# Patient Record
Sex: FEMALE | Race: WHITE | NOT HISPANIC OR LATINO | Employment: OTHER | URBAN - NONMETROPOLITAN AREA
[De-identification: names, ages, dates, MRNs, and addresses within clinical notes are randomized per-mention and may not be internally consistent; named-entity substitution may affect disease eponyms.]

---

## 2017-01-05 ENCOUNTER — TELEPHONE (OUTPATIENT)
Dept: MEDICAL GROUP | Facility: CLINIC | Age: 62
End: 2017-01-05

## 2017-01-05 NOTE — TELEPHONE ENCOUNTER
----- Message from REGLA Doyle sent at 1/4/2017 12:49 PM PST -----  Please call Ms. Mcleod and schedule an appointment to discuss test results.  REGLA Doyle

## 2017-01-10 RX ORDER — INSULIN GLARGINE 100 [IU]/ML
INJECTION, SOLUTION SUBCUTANEOUS
Qty: 1 VIAL | Refills: 5 | Status: SHIPPED | OUTPATIENT
Start: 2017-01-10 | End: 2017-03-01 | Stop reason: SDUPTHER

## 2017-01-13 ENCOUNTER — ANTICOAGULATION VISIT (OUTPATIENT)
Dept: MEDICAL GROUP | Facility: PHYSICIAN GROUP | Age: 62
End: 2017-01-13
Payer: MEDICAID

## 2017-01-13 DIAGNOSIS — I48.19 PERSISTENT ATRIAL FIBRILLATION (HCC): ICD-10-CM

## 2017-01-13 LAB — INR PPP: 2.1 (ref 2–3.5)

## 2017-01-13 PROCEDURE — 85610 PROTHROMBIN TIME: CPT | Performed by: PHYSICIAN ASSISTANT

## 2017-01-13 NOTE — PROGRESS NOTES
Anticoagulation Summary as of 1/13/2017     INR goal 2.0-3.0   Selected INR 2.1 (1/13/2017)   Maintenance plan 2.5 mg (5 mg x 0.5) on Mon, Wed, Fri; 5 mg (5 mg x 1) all other days   Weekly total 27.5 mg   Plan last modified Santana Good, PHARMD (11/18/2016)   Next INR check 2/24/2017   Target end date Indefinite    Indications   Atrial flutter (HCC) (Resolved) [I48.92]  Acute ischemic right PCA stroke-Hemorrhage transformation is noted within the infarct (Resolved) [I63.531]  Persistent atrial fibrillation (HCC) [I48.1]         Anticoagulation Episode Summary     INR check location [facility]    Preferred lab     Send INR reminders to     Comments       Anticoagulation Care Providers     Provider Role Specialty Phone number    Bijan Sampson M.D. Referring Cardiology [phone]    [facility] Responsible  [phone]        Anticoagulation Patient Findings      Shani Neelam seen in clinic today  INR  therapeutic.    Denies signs/symptoms of bleeding and/or thrombosis.    Denies changes to diet or medications.   Follow up appointment in 6 week(s).    Continue weekly warfarin dose as noted    Loc Chan, PHARMD

## 2017-01-13 NOTE — MR AVS SNAPSHOT
Shani Richter   2017 9:30 AM   Anticoagulation Visit   MRN: [id number]    Department:  [facility]   Dept Phone:  [phone]    Description:  Female : 1955   Provider:  [place] ANTI-COAG           Allergies as of 2017     No Known Allergies      You were diagnosed with     Persistent atrial fibrillation (HCC)   [921457]         Vital Signs     Smoking Status                   Passive Smoke Exposure - Never Smoker           Basic Information     Date Of Birth Sex Race Ethnicity Preferred Language    1955 Female White Non- English      Your appointments     2017 10:00 AM   Established Patient with REGLA Doyle   [facility] (--)    [address] [address] [phone]           You will be receiving a confirmation call a few days before your appointment from our automated call confirmation system.            2017  8:05 PM   Sleep Study Diagnostic with SLEEP TECH   [facility] (--)    [address] [facility] [place] [address] [phone]            Feb 15, 2017 11:20 AM   Follow UP with REGLA Bailey   [facility] (--)    [address] [facility] [address]  [place] [address] [phone]            2017  1:45 PM   FOLLOW UP with Bijan Sampson M.D.   [facility] (--)    [address] [facility] [address]  [place] [address] [phone]            2017  9:45 AM   Anti-Coag Routine with L[place]NE ANTI-COAG   [facility] [place]    [address]  [place] [address] [phone]            Mar 15, 2017  9:20 AM   Established Patient with REGLA Doyle   [facility] (--)    [address] [address] [phone]           You will be receiving a confirmation call a few days before your  appointment from our automated call confirmation system.              Problem List              ICD-10-CM Priority Class Noted - Resolved    Obesity E66.9 Medium  11/17/2015 - Present    Diabetes type 2, uncontrolled (HCC) E11.65 Medium  11/17/2015 - Present    LAE (left atrial enlargement) I51.7 Medium  11/18/2015 - Present    Hyperlipidemia E78.5 Medium  11/19/2015 - Present    Essential hypertension I10 Medium  12/1/2015 - Present    Persistent atrial fibrillation (HCC) I48.1 Medium  12/1/2015 - Present    Sleep apnea G47.30 Medium  12/30/2015 - Present    Mitral stenosis with regurgitation I05.2 Medium  4/8/2016 - Present    Pulmonary hypertension (HCC) I27.2 Medium  4/8/2016 - Present    History of stroke Z86.73 Medium  11/30/2015 - Present    Status post ablation of atrial flutter Z98.890, Z86.79 Medium  5/18/2016 - Present    Cellulitis L03.90 Low  10/26/2016 - Present    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 Low  10/26/2016 - Present    Fecal occult blood test positive R19.5   12/16/2016 - Present    Vitamin D deficiency E55.9   12/16/2016 - Present      Health Maintenance        Date Due Completion Dates    IMM DTaP/Tdap/Td Vaccine (1 - Tdap) 12/8/1972 ---    PAP SMEAR 12/8/1974 ---    MAMMOGRAM 12/8/1993 ---    COLONOSCOPY 12/8/2003 ---    IMM ZOSTER VACCINE 12/8/2013 ---    IMM PNEUMOCOCCAL 19-64 (ADULT) MEDIUM RISK SERIES (1 of 1 - PPSV23) 1/14/2016 11/19/2015    IMM INFLUENZA (1) 9/1/2016 11/18/2015    URINE ACR / MICROALBUMIN 5/3/2017 5/3/2016    A1C SCREENING 5/30/2017 11/30/2016, 10/26/2016, 4/1/2016, 3/9/2016, 11/17/2015, 1/22/2010, 9/25/2009    RETINAL SCREENING 8/25/2017 8/25/2016    DIABETES MONOFILAMTENT / LE EXAM 10/26/2017 10/26/2016    FASTING LIPID PROFILE 11/30/2017 11/30/2016, 4/7/2016, 4/1/2016, 12/8/2015, 11/17/2015, 1/22/2010, 9/25/2009    SERUM CREATININE 11/30/2017 11/30/2016, 5/9/2016, 4/20/2016, 4/10/2016, 4/7/2016, 4/6/2016, 4/1/2016, 12/8/2015, 11/21/2015, 11/20/2015, 11/17/2015,  "11/17/2015, 1/22/2010, 1/21/2010, 9/24/2009            Results     POCT Protime      Component    INR    2.1    Comment:     ic valid [id number] 160 exp 10/2017                        Current Immunizations     13-VALENT PCV PREVNAR 11/19/2015  6:12 AM    Influenza Vaccine Quad Inj (Preserved) 11/18/2015  3:53 PM      Below and/or attached are the medications your provider expects you to take. Review all of your home medications and newly ordered medications with your provider and/or pharmacist. Follow medication instructions as directed by your provider and/or pharmacist. Please keep your medication list with you and share with your provider. Update the information when medications are discontinued, doses are changed, or new medications (including over-the-counter products) are added; and carry medication information at all times in the event of emergency situations     Allergies:  No Known Allergies          Medications  Valid as of: January 13, 2017 -  9:32 AM    Generic Name Brand Name Tablet Size Instructions for use    Cholecalciferol (Cap) VITAMIN D3 5000 UNIT Take 1 Cap by mouth every day.        DiltiaZEM HCl Coated Beads (CAPSULE SR 24 HR) CARDIZEM  MG Take 1 Cap by mouth 2 Times a Day.        Furosemide (Tab) LASIX 40 MG Take 1 Tab by mouth every day.        Glimepiride (Tab) AMARYL 2 MG Take 2 Tabs by mouth every morning.        Glucose Blood (Strip) glucose blood  1 Strip by Other route as needed (one touch ultra meter test bid dx- E11.65).        Insulin Glargine (Solution) LANTUS 100 UNIT/ML Take 20 units in the morning and 46 units in the evening as directed for a total dose of 66 units daily.        Insulin Syringe-Needle U-100 (Misc) INSULIN SYRINGE 1CC/29G 29G X 1/2\" 1 ML 1 Each by Does not apply route every day.        Lisinopril (Tab) PRINIVIL 5 MG Take 1 Tab by mouth every day.        Magnesium Oxide (Tab) MAG- (241.3 MG) MG Take 1 Tab by mouth every day.        Metoprolol Tartrate " (Tab) LOPRESSOR 50 MG Take 1 Tab by mouth 2 times a day.        Simvastatin (Tab) ZOCOR 20 MG Take 1 Tab by mouth every evening.        Warfarin Sodium (Tab) COUMADIN 5 MG Take one to 2  tablets daily as directed by coumadin clinic        Warfarin Sodium (Tab) COUMADIN 5 MG Take one to one and one-half (1-1.5) tablets daily as directed by coumadin clinic        .                 Medicines prescribed today were sent to:     [facility] [address] [place] [address]    [address] [place] [address]    Phone: [phone] Fax: [phone]    Open 24 Hours?: No    [facility] - [address] [address]    Phone: [phone] Fax: [phone]    Open 24 Hours?: No      Medication refill instructions:       If your prescription bottle indicates you have medication refills left, it is not necessary to call your provider’s office. Please contact your pharmacy and they will refill your medication.    If your prescription bottle indicates you do not have any refills left, you may request refills at any time through one of the following ways: The online [employer] system (except Urgent Care), by calling your provider’s office, or by asking your pharmacy to contact your provider’s office with a refill request. Medication refills are processed only during regular business hours and may not be available until the next business day. Your provider may request additional information or to have a follow-up visit with you prior to refilling your medication.   *Please Note: Medication refills are assigned a new Rx number when refilled electronically. Your pharmacy may indicate that no refills were authorized even though a new prescription for the same medication is available at the pharmacy. Please request the medicine by name with the pharmacy before contacting your provider for a refill.        Warfarin Dosing Calendar   January 2017 Details    Sun Mon Tue  Wed Thu Fri Sat     1               2               3               4               5               6               7                 8               9               10               11               12               13   2.1   2.5 mg   See details      14      5 mg           15      5 mg         16      2.5 mg         17      5 mg         18      2.5 mg         19      5 mg         20      2.5 mg         21      5 mg           22      5 mg         23      2.5 mg         24      5 mg         25      2.5 mg         26      5 mg         27      2.5 mg         28      5 mg           29      5 mg         30      2.5 mg         31      5 mg              Date Details   01/13 This INR check   INR: 2.1   ic valid [id number] 160 exp 10/2017               How to take your warfarin dose     To take:  2.5 mg Take 0.5 of a 5 mg tablet.    To take:  5 mg Take 1 of the 5 mg tablets.           Warfarin Dosing Calendar   February 2017 Details    Sun Mon Tue Wed Thu Fri Sat        1      2.5 mg         2      5 mg         3      2.5 mg         4      5 mg           5      5 mg         6      2.5 mg         7      5 mg         8      2.5 mg         9      5 mg         10      2.5 mg         11      5 mg           12      5 mg         13      2.5 mg         14      5 mg         15      2.5 mg         16      5 mg         17      2.5 mg         18      5 mg           19      5 mg         20      2.5 mg         21      5 mg         22      2.5 mg         23      5 mg         24      2.5 mg         25                 26               27               28                    Date Details   No additional details    Date of next INR:  2/24/2017         How to take your warfarin dose     To take:  2.5 mg Take 0.5 of a 5 mg tablet.    To take:  5 mg Take 1 of the 5 mg tablets.              GOODWIN Access Code: Activation code not generated  Current GOODWIN Status: Active

## 2017-01-18 ENCOUNTER — OFFICE VISIT (OUTPATIENT)
Dept: MEDICAL GROUP | Facility: CLINIC | Age: 62
End: 2017-01-18
Payer: MEDICAID

## 2017-01-18 VITALS
BODY MASS INDEX: 50.02 KG/M2 | DIASTOLIC BLOOD PRESSURE: 64 MMHG | RESPIRATION RATE: 18 BRPM | HEART RATE: 71 BPM | OXYGEN SATURATION: 97 % | HEIGHT: 64 IN | WEIGHT: 293 LBS | SYSTOLIC BLOOD PRESSURE: 108 MMHG | TEMPERATURE: 97.6 F

## 2017-01-18 DIAGNOSIS — J06.9 VIRAL UPPER RESPIRATORY TRACT INFECTION: ICD-10-CM

## 2017-01-18 DIAGNOSIS — I10 ESSENTIAL HYPERTENSION: ICD-10-CM

## 2017-01-18 PROCEDURE — 99214 OFFICE O/P EST MOD 30 MIN: CPT | Performed by: NURSE PRACTITIONER

## 2017-01-18 NOTE — ASSESSMENT & PLAN NOTE
This is a chronic problem that is well controlled with her current medications. Patient denies any headache, dizziness, chest pain, shortness of breath, or lower extremity edema. She is due for labs which have been ordered.

## 2017-01-18 NOTE — ASSESSMENT & PLAN NOTE
This is a chronic problem. Patient continues taking Lantus, 20 units in the morning and 35 units in the evening. Her fasting blood glucose this morning was 234. She will increase her Lantus to 40 units in the evening. She also continues taking glimepiride 4 mg every morning. I will increase her dose to 6 mg every morning. Dietary counseling was provided and lifestyle modifications were discussed as strategies to improve her overall well-being and to help control her blood sugar. Patient admits that she frequently makes poor diet choices and is rather sedentary. Encouraged her to increase her activity level.

## 2017-01-18 NOTE — MR AVS SNAPSHOT
"        Shani Richter   2017 10:00 AM   Office Visit   MRN: [id number]    Department:  [facility] Phone:  [phone]    Description:  Female : 1955   Provider:  STELLA DoyleRKENYA           Reason for Visit     Illness nasal congestion/ need new lab orders      Allergies as of 2017     No Known Allergies      You were diagnosed with     Uncontrolled type 2 diabetes mellitus with complication, with long-term current use of insulin (CMS-[facility])   [2430192]   Uncontrolled. Increase Amaryl, increase Lantus as instructed. Make dietary changes. Follow-up in 3 months.    Essential hypertension   [9943035]   Controlled. Continue current medications. Obtain current labs and follow-up for results.    Viral upper respiratory tract infection   [732733]   Uncontrolled. Use symptomatic care as instructed. Follow-up if symptoms worsen or fail to improve.      Vital Signs     Blood Pressure Pulse Temperature Respirations Height Weight    108/64 mmHg 71 36.4 °C (97.6 °F) 18 1.626 m (5' 4\") 135.172 kg (298 lb)    Body Mass Index Oxygen Saturation Smoking Status             51.13 kg/m2 97% Passive Smoke Exposure - Never Smoker         Basic Information     Date Of Birth Sex Race Ethnicity Preferred Language    1955 Female White Non- English      Your appointments     2017  8:05 PM   Sleep Study Diagnostic with SLEEP TECH   [facility] (--)    [address]  [place] [address]   [phone]            Feb 15, 2017 11:20 AM   Follow UP with SU BaileyP.RKENYA   [facility] (--)    [address]  [place] [address]   [phone]            2017  1:45 PM   FOLLOW UP with Bijan Sampson M.D.   [facility] (--)    [address]  [place] [address]   [phone]            2017  9:45 AM   Anti-Coag Routine with [place] " ANTI-COAG   [facility]    [address] [facility]  [place] [address] [phone]            Mar 15, 2017  9:20 AM   Established Patient with REGLA Doyle   [facility] (--)    [address] [address] [phone]           You will be receiving a confirmation call a few days before your appointment from our automated call confirmation system.              Problem List              ICD-10-CM Priority Class Noted - Resolved    Obesity E66.9 Medium  11/17/2015 - Present    Diabetes type 2, uncontrolled (CMS-HCC) E11.65 Medium  11/17/2015 - Present    LAE (left atrial enlargement) I51.7 Medium  11/18/2015 - Present    Hyperlipidemia E78.5 Medium  11/19/2015 - Present    Essential hypertension I10 Medium  12/1/2015 - Present    Persistent atrial fibrillation (CMS-HCC) I48.1 Medium  12/1/2015 - Present    Sleep apnea G47.30 Medium  12/30/2015 - Present    Mitral stenosis with regurgitation I05.2 Medium  4/8/2016 - Present    Pulmonary hypertension (CMS-HCC) I27.2 Medium  4/8/2016 - Present    History of stroke Z86.73 Medium  11/30/2015 - Present    Status post ablation of atrial flutter Z98.890, Z86.79 Medium  5/18/2016 - Present    Cellulitis L03.90 Low  10/26/2016 - Present    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 Low  10/26/2016 - Present    Fecal occult blood test positive R19.5   12/16/2016 - Present    Vitamin D deficiency E55.9   12/16/2016 - Present    Viral upper respiratory tract infection J06.9, B97.89   1/18/2017 - Present      Health Maintenance        Date Due Completion Dates    IMM DTaP/Tdap/Td Vaccine (1 - Tdap) 12/8/1972 ---    PAP SMEAR 12/8/1974 ---    MAMMOGRAM 12/8/1993 ---    IMM ZOSTER VACCINE 12/8/2013 ---    IMM PNEUMOCOCCAL 19-64 (ADULT) MEDIUM RISK SERIES (1 of 1 - PPSV23) 1/14/2016 11/19/2015    IMM INFLUENZA (1) 9/1/2016 11/18/2015    URINE ACR / MICROALBUMIN 5/3/2017 5/3/2016    A1C SCREENING  5/30/2017 11/30/2016, 10/26/2016, 4/1/2016, 3/9/2016, 11/17/2015, 1/22/2010, 9/25/2009    RETINAL SCREENING 8/25/2017 8/25/2016    DIABETES MONOFILAMENT / LE EXAM 10/26/2017 10/26/2016    COLON CANCER SCREENING ANNUAL FIT 11/15/2017 11/15/2016    FASTING LIPID PROFILE 11/30/2017 11/30/2016, 4/7/2016, 4/1/2016, 12/8/2015, 11/17/2015, 1/22/2010, 9/25/2009    SERUM CREATININE 11/30/2017 11/30/2016, 5/9/2016, 4/20/2016, 4/10/2016, 4/7/2016, 4/6/2016, 4/1/2016, 12/8/2015, 11/21/2015, 11/20/2015, 11/17/2015, 11/17/2015, 1/22/2010, 1/21/2010, 9/24/2009            Current Immunizations     13-VALENT PCV PREVNAR 11/19/2015  6:12 AM    Influenza Vaccine Quad Inj (Preserved) 11/18/2015  3:53 PM      Below and/or attached are the medications your provider expects you to take. Review all of your home medications and newly ordered medications with your provider and/or pharmacist. Follow medication instructions as directed by your provider and/or pharmacist. Please keep your medication list with you and share with your provider. Update the information when medications are discontinued, doses are changed, or new medications (including over-the-counter products) are added; and carry medication information at all times in the event of emergency situations     Allergies:  No Known Allergies          Medications  Valid as of: January 18, 2017 - 10:55 AM    Generic Name Brand Name Tablet Size Instructions for use    Cholecalciferol (Cap) VITAMIN D3 5000 UNIT Take 1 Cap by mouth every day.        DiltiaZEM HCl Coated Beads (CAPSULE SR 24 HR) CARDIZEM  MG Take 1 Cap by mouth 2 Times a Day.        Furosemide (Tab) LASIX 40 MG Take 1 Tab by mouth every day.        Glimepiride (Tab) AMARYL 2 MG Take 2 Tabs by mouth every morning.        Glucose Blood (Strip) glucose blood  1 Strip by Other route as needed (one touch ultra meter test bid dx- E11.65).        Insulin Glargine (Solution) LANTUS 100 UNIT/ML Take 20 units in the morning and  "46 units in the evening as directed for a total dose of 66 units daily.        Insulin Syringe-Needle U-100 (Misc) INSULIN SYRINGE 1CC/29G 29G X 1/2\" 1 ML 1 Each by Does not apply route every day.        Lisinopril (Tab) PRINIVIL 5 MG Take 1 Tab by mouth every day.        Magnesium Oxide (Tab) MAG- (241.3 MG) MG Take 1 Tab by mouth every day.        Metoprolol Tartrate (Tab) LOPRESSOR 50 MG Take 1 Tab by mouth 2 times a day.        Simvastatin (Tab) ZOCOR 20 MG Take 1 Tab by mouth every evening.        Warfarin Sodium (Tab) COUMADIN 5 MG Take one to 2  tablets daily as directed by coumadin clinic        Warfarin Sodium (Tab) COUMADIN 5 MG Take one to one and one-half (1-1.5) tablets daily as directed by coumadin clinic        .                 Medicines prescribed today [facility]:     [facility] [address] [place] [address]    [address] [place] [address]    Phone: [phone] Fax: [phone]    Open 24 Hours?: No      Medication refill instructions:       If your prescription bottle indicates you have medication refills left, it is not necessary to call your provider’s office. Please contact your pharmacy and they will refill your medication.    If your prescription bottle indicates you do not have any refills left, you may request refills at any time through one of the following ways: The online [employer] system (except Urgent Care), by calling your provider’s office, or by asking your pharmacy to contact your provider’s office with a refill request. Medication refills are processed only during regular business hours and may not be available until the next business day. Your provider may request additional information or to have a follow-up visit with you prior to refilling your medication.   *Please Note: Medication refills are assigned a new Rx number when refilled electronically. Your pharmacy may indicate that no refills were authorized even though a new prescription for the same medication " is available at the pharmacy. Please request the medicine by name with the pharmacy before contacting your provider for a refill.        Your To Do List     Future Labs/Procedures Complete By Expires    CBC WITH DIFFERENTIAL  As directed 1/19/2018    COMP METABOLIC PANEL  As directed 1/19/2018    FREE THYROXINE  As directed 1/19/2018    HEMOGLOBIN A1C  As directed 1/19/2018    LIPID PROFILE  As directed 1/19/2018    TSH  As directed 1/19/2018    VITAMIN D,25 HYDROXY  As directed 1/19/2018         Groupiterhart Access Code: Activation code not generated  Current TopCoder Status: Active

## 2017-01-18 NOTE — PROGRESS NOTES
Chief Complaint   Patient presents with   • Illness     nasal congestion/ need new lab orders       HISTORY OF PRESENT ILLNESS: Patient is a 61 y.o. female established patient who presents today to discuss her health concerns as outlined below.      Diabetes type 2, uncontrolled  This is a chronic problem. Patient continues taking Lantus, 20 units in the morning and 35 units in the evening. Her fasting blood glucose this morning was 234. She will increase her Lantus to 40 units in the evening. She also continues taking glimepiride 4 mg every morning. I will increase her dose to 6 mg every morning. Dietary counseling was provided and lifestyle modifications were discussed as strategies to improve her overall well-being and to help control her blood sugar. Patient admits that she frequently makes poor diet choices and is rather sedentary. Encouraged her to increase her activity level.     Viral upper respiratory tract infection  This is a new problem. Patient complains of sinus and nasal congestion and sore throat for the past 3 days. She denies any fever or body aches. She complains of intermittent sore throat that is mild. Symptomatic care was discussed including over-the-counter medications, saline nasal irrigation, and warm salt water gargles.      Essential hypertension  This is a chronic problem that is well controlled with her current medications. Patient denies any headache, dizziness, chest pain, shortness of breath, or lower extremity edema. She is due for labs which have been ordered.        Patient Active Problem List    Diagnosis Date Noted   • Status post ablation of atrial flutter 05/18/2016     Priority: Medium   • Mitral stenosis with regurgitation 04/08/2016     Priority: Medium   • Pulmonary hypertension (CMS-HCC) 04/08/2016     Priority: Medium   • Sleep apnea 12/30/2015     Priority: Medium   • Essential hypertension 12/01/2015     Priority: Medium   • Persistent atrial fibrillation (CMS-HCC)  "12/01/2015     Priority: Medium   • History of stroke 11/30/2015     Priority: Medium   • Hyperlipidemia 11/19/2015     Priority: Medium   • LAE (left atrial enlargement) 11/18/2015     Priority: Medium   • Obesity 11/17/2015     Priority: Medium   • Diabetes type 2, uncontrolled (CMS-HCC) 11/17/2015     Priority: Medium   • Cellulitis 10/26/2016     Priority: Low   • Retinal hemorrhage of both eyes 10/26/2016     Priority: Low   • Viral upper respiratory tract infection 01/18/2017   • Fecal occult blood test positive 12/16/2016   • Vitamin D deficiency 12/16/2016       Allergies:Review of patient's allergies indicates no known allergies.    Current Outpatient Prescriptions   Medication Sig Dispense Refill   • insulin glargine (LANTUS) 100 UNIT/ML Solution Take 20 units in the morning and 46 units in the evening as directed for a total dose of 66 units daily. 1 Vial 5   • cholecalciferol (VITAMIN D3) 5000 UNIT Cap Take 1 Cap by mouth every day. 30 Cap 5   • INSULIN SYRINGE 1CC/29G 29G X 1/2\" 1 ML Misc 1 Each by Does not apply route every day. 100 Each 3   • glimepiride (AMARYL) 2 MG Tab Take 2 Tabs by mouth every morning. (Patient taking differently: Take 2 Tablets by mouth daily) 60 Tab 3   • simvastatin (ZOCOR) 20 MG Tab Take 1 Tab by mouth every evening. 90 Tab 3   • lisinopril (PRINIVIL) 5 MG Tab Take 1 Tab by mouth every day. 90 Tab 3   • glucose blood strip 1 Strip by Other route as needed (one touch ultra meter test bid dx- E11.65). 100 Strip 11   • warfarin (COUMADIN) 5 MG Tab Take one to one and one-half (1-1.5) tablets daily as directed by coumadin clinic 180 Tab 3   • diltiazem CD (CARDIZEM CD) 120 MG CAPSULE SR 24 HR Take 1 Cap by mouth 2 Times a Day. 180 Cap 3   • metoprolol (LOPRESSOR) 50 MG Tab Take 1 Tab by mouth 2 times a day. 180 Tab 3   • furosemide (LASIX) 40 MG Tab Take 1 Tab by mouth every day. 90 Tab 3   • magnesium oxide (MAG-OX) 400 (241.3 MG) MG Tab tablet Take 1 Tab by mouth every day. 60 " "Tab 2   • warfarin (COUMADIN) 5 MG Tab Take one to 2  tablets daily as directed by coumadin clinic 180 Tab 1     No current facility-administered medications for this visit.       Reviewed today: Past medical history, social history, and family history. Updated as needed.    Social History     Social History Narrative       ROS:  Review of Systems   Constitutional: Negative for fever, lethargy and unexplained weight loss.   Respiratory: Negative for cough, wheezing and SOB.   Cardiovascular: Negative for chest pain, dizziness, and leg swelling.    Gastrointestinal: Negative for nausea, vomiting, and diarrhea.   Psych: Negative for anxiety and depression.    All other systems reviewed and are negative except as in HPI.      Exam:  Blood pressure 108/64, pulse 71, temperature 36.4 °C (97.6 °F), resp. rate 18, height 1.626 m (5' 4\"), weight 135.172 kg (298 lb), SpO2 97 %.  General:  Well nourished, well developed female in NAD  Head: normocephalic, atraumatic  Eyes: EOM within normal limits, no conjunctival injection, no scleral icterus  Nose: symmetrical, no discharge.  Neck: no masses, range of motion within normal limits, no tracheal deviation. No obvious thyroid enlargement. No adenopathy.   Pulmonary: chest is symmetrical with respiration, no wheezes, crackles, or rhonchi. Normal effort.   Cardiovascular: regular rate and rhythm without murmurs, rubs, or gallops.  Musculoskeletal: Normal gait, grossly normal muscle tone.  Extremities: no clubbing, cyanosis, or edema.  Psych: appropriate mood, affect, judgement.   Skin: Pink, warm, dry.      Please note that this dictation was created using voice recognition software. I have made every reasonable attempt to correct obvious errors, but I expect that there are errors of grammar and possibly content that I did not discover before finalizing the note.    Assessment/Plan:  1. Uncontrolled type 2 diabetes mellitus with complication, with long-term current use of insulin " (CMS-[facility])  HEMOGLOBIN A1C    Uncontrolled. Increase Amaryl, increase Lantus as instructed. Make dietary changes. Follow-up in 3 months.   2. Essential hypertension  CBC WITH DIFFERENTIAL    COMP METABOLIC PANEL    FREE THYROXINE    LIPID PROFILE    TSH    VITAMIN D,25 HYDROXY    Controlled. Continue current medications. Obtain current labs and follow-up for results.   3. Viral upper respiratory tract infection      Uncontrolled. Use symptomatic care as instructed. Follow-up if symptoms worsen or fail to improve.

## 2017-01-18 NOTE — ASSESSMENT & PLAN NOTE
This is a new problem. Patient complains of sinus and nasal congestion and sore throat for the past 3 days. She denies any fever or body aches. She complains of intermittent sore throat that is mild. Symptomatic care was discussed including over-the-counter medications, saline nasal irrigation, and warm salt water gargles.

## 2017-02-14 ENCOUNTER — SLEEP STUDY (OUTPATIENT)
Dept: SLEEP MEDICINE | Facility: MEDICAL CENTER | Age: 62
End: 2017-02-14
Payer: MEDICAID

## 2017-02-14 DIAGNOSIS — I27.20 PULMONARY HYPERTENSION (HCC): ICD-10-CM

## 2017-02-14 DIAGNOSIS — G47.33 OBSTRUCTIVE SLEEP APNEA: ICD-10-CM

## 2017-02-14 PROCEDURE — 95810 POLYSOM 6/> YRS 4/> PARAM: CPT | Performed by: INTERNAL MEDICINE

## 2017-02-14 NOTE — MR AVS SNAPSHOT
Shani Neelam   2017 8:05 PM   Sleep Study   MRN: [id number]    Department:  [facility]   Dept Phone:  [phone]    Description:  Female : 1955   Provider:  SLEEP TECH           Allergies as of 2017     No Known Allergies      Vital Signs     Smoking Status                   Passive Smoke Exposure - Never Smoker           Basic Information     Date Of Birth Sex Race Ethnicity Preferred Language    1955 Female White Non- English      Your appointments     Feb 15, 2017 11:20 AM   Follow UP with REGLA Bailey   [facility] (--)    [address] [place]  [place] [address] [phone]            2017  1:45 PM   FOLLOW UP with Bijan Sampson M.D.   [facility] (--)    [address] [facility] [address]  [place] [address] [phone]            2017  9:45 AM   Anti-Coag Routine with [place] ANTI-COAG   [facility] ([place])    [address] [place], [place] [address] [place] [address] [phone]            Mar 15, 2017  9:20 AM   Established Patient with REGLA Doyle   [facility] (--)    [facility] [address] [phone]           You will be receiving a confirmation call a few days before your appointment from our automated call confirmation system.              Problem List              ICD-10-CM Priority Class Noted - Resolved    Obesity E66.9 Medium  2015 - Present    Diabetes type 2, uncontrolled (CMS-HCC) E11.65 Medium  2015 - Present    LAE (left atrial enlargement) I51.7 Medium  2015 - Present    Hyperlipidemia E78.5 Medium  2015 - Present    Essential hypertension I10 Medium  2015 - Present    Persistent atrial fibrillation (CMS-HCC) I48.1 Medium  2015 - Present    Sleep apnea G47.30 Medium  2015 - Present    Mitral stenosis with regurgitation I05.2  Medium  4/8/2016 - Present    Pulmonary hypertension (CMS-HCC) I27.2 Medium  4/8/2016 - Present    History of stroke Z86.73 Medium  11/30/2015 - Present    Status post ablation of atrial flutter Z98.890, Z86.79 Medium  5/18/2016 - Present    Cellulitis L03.90 Low  10/26/2016 - Present    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 Low  10/26/2016 - Present    Fecal occult blood test positive R19.5   12/16/2016 - Present    Vitamin D deficiency E55.9   12/16/2016 - Present    Viral upper respiratory tract infection J06.9, B97.89   1/18/2017 - Present      Health Maintenance        Date Due Completion Dates    IMM DTaP/Tdap/Td Vaccine (1 - Tdap) 12/8/1972 ---    PAP SMEAR 12/8/1974 ---    MAMMOGRAM 12/8/1993 ---    IMM ZOSTER VACCINE 12/8/2013 ---    IMM PNEUMOCOCCAL 19-64 (ADULT) MEDIUM RISK SERIES (1 of 1 - PPSV23) 1/14/2016 11/19/2015    IMM INFLUENZA (1) 9/1/2016 11/18/2015    URINE ACR / MICROALBUMIN 5/3/2017 5/3/2016    A1C SCREENING 5/30/2017 11/30/2016, 10/26/2016, 4/1/2016, 3/9/2016, 11/17/2015, 1/22/2010, 9/25/2009    DIABETES MONOFILAMENT / LE EXAM 10/26/2017 10/26/2016    COLON CANCER SCREENING ANNUAL FIT 11/15/2017 11/15/2016    FASTING LIPID PROFILE 11/30/2017 11/30/2016, 4/7/2016, 4/1/2016, 12/8/2015, 11/17/2015, 1/22/2010, 9/25/2009    SERUM CREATININE 11/30/2017 11/30/2016, 5/9/2016, 4/20/2016, 4/10/2016, 4/7/2016, 4/6/2016, 4/1/2016, 12/8/2015, 11/21/2015, 11/20/2015, 11/17/2015, 11/17/2015, 1/22/2010, 1/21/2010, 9/24/2009    RETINAL SCREENING 1/12/2018 1/12/2017, 8/25/2016            Current Immunizations     13-VALENT PCV PREVNAR 11/19/2015  6:12 AM    Influenza Vaccine Quad Inj (Preserved) 11/18/2015  3:53 PM      Below and/or attached are the medications your provider expects you to take. Review all of your home medications and newly ordered medications with your provider and/or pharmacist. Follow medication instructions as directed by your provider and/or pharmacist. Please keep your medication list  "with you and share with your provider. Update the information when medications are discontinued, doses are changed, or new medications (including over-the-counter products) are added; and carry medication information at all times in the event of emergency situations     Allergies:  No Known Allergies          Medications  Valid as of: February 15, 2017 -  6:39 AM    Generic Name Brand Name Tablet Size Instructions for use    Cholecalciferol (Cap) VITAMIN D3 5000 UNIT Take 1 Cap by mouth every day.        DiltiaZEM HCl Coated Beads (CAPSULE SR 24 HR) CARDIZEM  MG Take 1 Cap by mouth 2 Times a Day.        Furosemide (Tab) LASIX 40 MG Take 1 Tab by mouth every day.        Glimepiride (Tab) AMARYL 2 MG Take 2 Tabs by mouth every morning.        Glucose Blood (Strip) glucose blood  1 Strip by Other route as needed (one touch ultra meter test bid dx- E11.65).        Insulin Glargine (Solution) LANTUS 100 UNIT/ML Take 20 units in the morning and 46 units in the evening as directed for a total dose of 66 units daily.        Insulin Syringe-Needle U-100 (Misc) INSULIN SYRINGE 1CC/29G 29G X 1/2\" 1 ML 1 Each by Does not apply route every day.        Lisinopril (Tab) PRINIVIL 5 MG Take 1 Tab by mouth every day.        Magnesium Oxide (Tab) MAG- (241.3 MG) MG Take 1 Tab by mouth every day.        Metoprolol Tartrate (Tab) LOPRESSOR 50 MG Take 1 Tab by mouth 2 times a day.        Simvastatin (Tab) ZOCOR 20 MG Take 1 Tab by mouth every evening.        Warfarin Sodium (Tab) COUMADIN 5 MG Take one to 2  tablets daily as directed by coumadin clinic        Warfarin Sodium (Tab) COUMADIN 5 MG Take one to one and one-half (1-1.5) tablets daily as directed by coumadin clinic        .                 Medicines prescribed today were sent to:     [facility]    [address] [facility] [place] [address]    Phone: [phone] Fax: [phone]    Open 24 Hours?: No      Medication refill instructions:       "  If your prescription bottle indicates you have medication refills left, it is not necessary to call your provider’s office. Please contact your pharmacy and they will refill your medication.    If your prescription bottle indicates you do not have any refills left, you may request refills at any time through one of the following ways: The online Quintiles system (except Urgent Care), by calling your provider’s office, or by asking your pharmacy to contact your provider’s office with a refill request. Medication refills are processed only during regular business hours and may not be available until the next business day. Your provider may request additional information or to have a follow-up visit with you prior to refilling your medication.   *Please Note: Medication refills are assigned a new Rx number when refilled electronically. Your pharmacy may indicate that no refills were authorized even though a new prescription for the same medication is available at the pharmacy. Please request the medicine by name with the pharmacy before contacting your provider for a refill.           Quintiles Access Code: Activation code not generated  Current Quintiles Status: Active

## 2017-02-15 ENCOUNTER — SLEEP STUDY (OUTPATIENT)
Dept: SLEEP MEDICINE | Facility: MEDICAL CENTER | Age: 62
End: 2017-02-15
Attending: NURSE PRACTITIONER
Payer: MEDICAID

## 2017-02-15 ENCOUNTER — SLEEP CENTER VISIT (OUTPATIENT)
Dept: SLEEP MEDICINE | Facility: MEDICAL CENTER | Age: 62
End: 2017-02-15
Payer: MEDICAID

## 2017-02-15 VITALS
HEART RATE: 76 BPM | SYSTOLIC BLOOD PRESSURE: 120 MMHG | RESPIRATION RATE: 12 BRPM | DIASTOLIC BLOOD PRESSURE: 74 MMHG | HEIGHT: 64 IN | BODY MASS INDEX: 50.02 KG/M2 | WEIGHT: 293 LBS

## 2017-02-15 DIAGNOSIS — G47.33 OBSTRUCTIVE SLEEP APNEA: ICD-10-CM

## 2017-02-15 DIAGNOSIS — Z98.890 STATUS POST ABLATION OF ATRIAL FLUTTER: ICD-10-CM

## 2017-02-15 DIAGNOSIS — Z79.01 CHRONIC ANTICOAGULATION: ICD-10-CM

## 2017-02-15 DIAGNOSIS — Z86.79 STATUS POST ABLATION OF ATRIAL FLUTTER: ICD-10-CM

## 2017-02-15 DIAGNOSIS — I27.20 PULMONARY HYPERTENSION (HCC): ICD-10-CM

## 2017-02-15 DIAGNOSIS — I48.19 PERSISTENT ATRIAL FIBRILLATION (HCC): ICD-10-CM

## 2017-02-15 DIAGNOSIS — I10 ESSENTIAL HYPERTENSION: ICD-10-CM

## 2017-02-15 PROCEDURE — 99213 OFFICE O/P EST LOW 20 MIN: CPT | Performed by: NURSE PRACTITIONER

## 2017-02-15 PROCEDURE — 95811 POLYSOM 6/>YRS CPAP 4/> PARM: CPT | Performed by: INTERNAL MEDICINE

## 2017-02-15 NOTE — MR AVS SNAPSHOT
Shani Richter   2/15/2017 8:10 PM   Sleep Study   MRN: [id number]    Department:  [facility]   Dept Phone:  [phone]    Description:  Female : 1955   Provider:  Isaac Sifuentes M.D.           Allergies as of 2/15/2017     No Known Allergies      You were diagnosed with     Obstructive sleep apnea   [[id number]]         Vital Signs     Smoking Status                   Passive Smoke Exposure - Never Smoker           Basic Information     Date Of Birth Sex Race Ethnicity Preferred Language    1955 Female White Non- English      Your appointments     2017  1:45 PM   FOLLOW UP with Bijan Sampson M.D.   [facility] (--)    [address], [address]  [place] [address] [phone]            2017  9:45 AM   [facility]   [facility]    [address] [facility] [place] [address] [phone]            2017  9:20 AM   Established Patient Pul with REGLA Bailey   [facility] (--)    [address]  [place] [address] [phone]            Mar 15, 2017  9:20 AM   Established Patient with REGLA Doyle   [facility] (--)    [address] [address] [phone]           You will be receiving a confirmation call a few days before your appointment from our automated call confirmation system.              Problem List              ICD-10-CM Priority Class Noted - Resolved    Obesity E66.9 Medium  2015 - Present    Diabetes type 2, uncontrolled (CMS-HCC) E11.65 Medium  2015 - Present    LAE (left atrial enlargement) I51.7 Medium  2015 - Present    Hyperlipidemia E78.5 Medium  2015 - Present    Essential hypertension I10 Medium  2015 - Present    Persistent atrial fibrillation (CMS-HCC) I48.1 Medium  2015 - Present    Sleep  apnea G47.30 Medium  12/30/2015 - Present    Mitral stenosis with regurgitation I05.2 Medium  4/8/2016 - Present    Pulmonary hypertension (CMS-HCC) I27.2 Medium  4/8/2016 - Present    History of stroke Z86.73 Medium  11/30/2015 - Present    Status post ablation of atrial flutter Z98.890, Z86.79 Medium  5/18/2016 - Present    Cellulitis L03.90 Low  10/26/2016 - Present    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 Low  10/26/2016 - Present    Fecal occult blood test positive R19.5   12/16/2016 - Present    Vitamin D deficiency E55.9   12/16/2016 - Present    Viral upper respiratory tract infection J06.9, B97.89   1/18/2017 - Present    Obstructive sleep apnea G47.33   2/15/2017 - Present    Chronic anticoagulation Z79.01   2/15/2017 - Present      Health Maintenance        Date Due Completion Dates    IMM DTaP/Tdap/Td Vaccine (1 - Tdap) 12/8/1972 ---    PAP SMEAR 12/8/1974 ---    MAMMOGRAM 12/8/1993 ---    IMM ZOSTER VACCINE 12/8/2013 ---    IMM PNEUMOCOCCAL 19-64 (ADULT) MEDIUM RISK SERIES (1 of 1 - PPSV23) 1/14/2016 11/19/2015    IMM INFLUENZA (1) 9/1/2016 11/18/2015    URINE ACR / MICROALBUMIN 5/3/2017 5/3/2016    A1C SCREENING 5/30/2017 11/30/2016, 10/26/2016, 4/1/2016, 3/9/2016, 11/17/2015, 1/22/2010, 9/25/2009    DIABETES MONOFILAMENT / LE EXAM 10/26/2017 10/26/2016    COLON CANCER SCREENING ANNUAL FIT 11/15/2017 11/15/2016    FASTING LIPID PROFILE 11/30/2017 11/30/2016, 4/7/2016, 4/1/2016, 12/8/2015, 11/17/2015, 1/22/2010, 9/25/2009    SERUM CREATININE 11/30/2017 11/30/2016, 5/9/2016, 4/20/2016, 4/10/2016, 4/7/2016, 4/6/2016, 4/1/2016, 12/8/2015, 11/21/2015, 11/20/2015, 11/17/2015, 11/17/2015, 1/22/2010, 1/21/2010, 9/24/2009    RETINAL SCREENING 1/12/2018 1/12/2017, 8/25/2016            Current Immunizations     13-VALENT PCV PREVNAR 11/19/2015  6:12 AM    Influenza Vaccine Quad Inj (Preserved) 11/18/2015  3:53 PM      Below and/or attached are the medications your provider expects you to take. Review all of your  "home medications and newly ordered medications with your provider and/or pharmacist. Follow medication instructions as directed by your provider and/or pharmacist. Please keep your medication list with you and share with your provider. Update the information when medications are discontinued, doses are changed, or new medications (including over-the-counter products) are added; and carry medication information at all times in the event of emergency situations     Allergies:  No Known Allergies          Medications  Valid as of: February 16, 2017 -  6:41 AM    Generic Name Brand Name Tablet Size Instructions for use    Cholecalciferol (Cap) VITAMIN D3 5000 UNIT Take 1 Cap by mouth every day.        DiltiaZEM HCl Coated Beads (CAPSULE SR 24 HR) CARDIZEM  MG Take 1 Cap by mouth 2 Times a Day.        Furosemide (Tab) LASIX 40 MG Take 1 Tab by mouth every day.        Glimepiride (Tab) AMARYL 2 MG Take 2 Tabs by mouth every morning.        Glucose Blood (Strip) glucose blood  1 Strip by Other route as needed (one touch ultra meter test bid dx- E11.65).        Insulin Glargine (Solution) LANTUS 100 UNIT/ML Take 20 units in the morning and 46 units in the evening as directed for a total dose of 66 units daily.        Insulin Syringe-Needle U-100 (Misc) INSULIN SYRINGE 1CC/29G 29G X 1/2\" 1 ML 1 Each by Does not apply route every day.        Lisinopril (Tab) PRINIVIL 5 MG Take 1 Tab by mouth every day.        Magnesium Oxide (Tab) MAG- (241.3 MG) MG Take 1 Tab by mouth every day.        Metoprolol Tartrate (Tab) LOPRESSOR 50 MG Take 1 Tab by mouth 2 times a day.        Simvastatin (Tab) ZOCOR 20 MG Take 1 Tab by mouth every evening.        Warfarin Sodium (Tab) COUMADIN 5 MG Take one to 2  tablets daily as directed by coumadin clinic        Warfarin Sodium (Tab) COUMADIN 5 MG Take one to one and one-half (1-1.5) tablets daily as directed by coumadin clinic        .                 Medicines prescribed today [facility] " [facility]:     [facility] [place] [place] [facility] [address]    [address] [place] [address]    Phone: [phone] Fax: [phone]    Open 24 Hours?: No      Medication refill instructions:       If your prescription bottle indicates you have medication refills left, it is not necessary to call your provider’s office. Please contact your pharmacy and they will refill your medication.    If your prescription bottle indicates you do not have any refills left, you may request refills at any time through one of the following ways: The online [employer] system (except Urgent Care), by calling your provider’s office, or by asking your pharmacy to contact your provider’s office with a refill request. Medication refills are processed only during regular business hours and may not be available until the next business day. Your provider may request additional information or to have a follow-up visit with you prior to refilling your medication.   *Please Note: Medication refills are assigned a new Rx number when refilled electronically. Your pharmacy may indicate that no refills were authorized even though a new prescription for the same medication is available at the pharmacy. Please request the medicine by name with the pharmacy before contacting your provider for a refill.           Zesty Access Code: Activation code not generated  Current [employer]esty Status: Active

## 2017-02-15 NOTE — PROGRESS NOTES
Chief Complaint   Patient presents with   • Follow-Up       HPI:  Shani Richter is a 61 y.o. year old female here today for follow-up on sleep study results. She was last seen by Dr. Segura 12/8/2016 for new patient sleep apnea visit. She has a history of morbid obesity, documented nighttime desaturation, atrial fibrillation post ablation with prior embolic stroke, DM II, hypertension, hyperlipidemia and on chronic anticoagulation. She currently resides in [facility]. She is accompanied by her daughter today. BMI 49. PSG split-night 2/14/2017 indicated severe obstructive sleep apnea with an AHI of 52.0 and minimum oxygen saturation of 64%. Patient spent 95.5% of diagnostic study between 80-89% oxygen saturation. Patient fell asleep quickly during the study but did have poor sleep efficiency during the titration portion. She was titrated on CPAP and BiPAP with no successful pressure setting. I reviewed testing with patient and her daughter and she is a minimal to dedicated titration study. She most likely will need increased BiPAP pressures or possibly ASV. She did not have a persistent amount of central events during her diagnostic study. She denies any changes in health since her last office visit.     She denies fever, chills, night sweats, ankle swelling or hemoptysis. She notes a history of dyspnea on exertion with chest tightness. She notes occasional cough with wheezing. PCP follows her for this. She denies seasonal allergies. She denies GERD. She notes occasional headaches and dizziness.    ROS: As per HPI and otherwise negative if not stated.    Past Medical History   Diagnosis Date   • Diabetes    • Hypertension    • Heart murmur      childhood   • Morbid obesity (CMS-HCC)    • Stroke (CMS-HCC) 11/18/2015     Acute ischemic right PCA stroke-Hemorrhage transformation is noted within the infarct [I63.531]   • Atrial flutter (CMS-HCC) 11/18/2015   • Atrial fibrillation (CMS-HCC)    • Hyperlipidemia   "  • Pneumonia 4/6/2016   • Osteoarthritis    • Valvular heart disease      Mitral stenosis       Past Surgical History   Procedure Laterality Date   • Tonsillectomy     • Pilonidal cyst excision     • Recovery  5/9/2016     Procedure: CATH LAB FLUTTER ABLATION, CAMILO WITH ANESTHESIA DR. ARMIJO;  Surgeon: Recoveryonly Surgery;  [place] [facility] [place]  Service:        Family History   Problem Relation Age of Onset   • Stroke Mother 71   • Cancer Father 71   • Heart Disease Brother        Social History     Social History   • Marital Status:      Spouse Name: N/A   • Number of Children: N/A   • Years of Education: N/A     Occupational History   • Not on file.     Social History Main Topics   • Smoking status: Passive Smoke Exposure - Never Smoker   • Smokeless tobacco: Never Used   • Alcohol Use: No   • Drug Use: No   • Sexual Activity: Not on file     Other Topics Concern   • Not on file     Social History Narrative       Allergies as of 02/15/2017   • (No Known Allergies)        @Vital signs for this encounter:  Filed Vitals:    02/15/17 1121   Height: 1.626 m (5' 4.02\")   Weight: 136.533 kg (301 lb)   Weight % change since last entry.: 0 %   BP: 120/74   Pulse: 76   BMI (Calculated): 51.64   Resp: 12   TempSrc: Temporal   O2 sat % room air: 91 %       Current medications as of today   Current Outpatient Prescriptions   Medication Sig Dispense Refill   • insulin glargine (LANTUS) 100 UNIT/ML Solution Take 20 units in the morning and 46 units in the evening as directed for a total dose of 66 units daily. 1 Vial 5   • cholecalciferol (VITAMIN D3) 5000 UNIT Cap Take 1 Cap by mouth every day. 30 Cap 5   • INSULIN SYRINGE 1CC/29G 29G X 1/2\" 1 ML Misc 1 Each by Does not apply route every day. 100 Each 3   • glimepiride (AMARYL) 2 MG Tab Take 2 Tabs by mouth every morning. (Patient taking differently: Take 2 Tablets by mouth daily) 60 Tab 3   • simvastatin (ZOCOR) 20 MG Tab Take 1 Tab by mouth every " evening. 90 Tab 3   • lisinopril (PRINIVIL) 5 MG Tab Take 1 Tab by mouth every day. 90 Tab 3   • glucose blood strip 1 Strip by Other route as needed (one touch ultra meter test bid dx- E11.65). 100 Strip 11   • warfarin (COUMADIN) 5 MG Tab Take one to one and one-half (1-1.5) tablets daily as directed by coumadin clinic 180 Tab 3   • diltiazem CD (CARDIZEM CD) 120 MG CAPSULE SR 24 HR Take 1 Cap by mouth 2 Times a Day. 180 Cap 3   • metoprolol (LOPRESSOR) 50 MG Tab Take 1 Tab by mouth 2 times a day. 180 Tab 3   • furosemide (LASIX) 40 MG Tab Take 1 Tab by mouth every day. 90 Tab 3   • magnesium oxide (MAG-OX) 400 (241.3 MG) MG Tab tablet Take 1 Tab by mouth every day. 60 Tab 2   • warfarin (COUMADIN) 5 MG Tab Take one to 2  tablets daily as directed by coumadin clinic 180 Tab 1     No current facility-administered medications for this visit.         Physical Exam:   Gen:           Alert and oriented, No apparent distress. Mood and affect appropriate, normal interaction with examiner.  Eyes:          PERRL, EOM intact, sclere white, conjunctive moist.  Ears:          Not examined.   Hearing:     Grossly intact.  Nose:          Normal, no lesions or deformities.  Dentition:    Good dentition.  Oropharynx:   Tongue normal, posterior pharynx without erythema or exudate.  Mallampati Classification: 3  Neck:        Supple, trachea midline, no masses.  Respiratory Effort: No intercostal retractions or use of accessory muscles.   Lung Auscultation:      Clear to auscultation bilaterally; no rales, rhonchi or wheezing.  CV:            Regular rate and rhythm. No murmurs, rubs or gallops. Hx. A. Fib.  Abd:           Not examined. Obese.  Lymphadenopathy: Not examined.  Gait and Station: Normal.  Digits and Nails: No clubbing, cyanosis, petechiae, or nodes.   Cranial Nerves: II-XII grossly intact.  Skin:        No rashes, lesions or ulcers noted.               Ext:           No cyanosis or edema.      Assessment:  1.  Obstructive sleep apnea  POLYSOMNOGRAPHY TITRATION   2. Essential hypertension     3. Uncontrolled type 2 diabetes mellitus with complication, with long-term current use of insulin (CMS-[facility])     4. Status post ablation of atrial flutter     5. Pulmonary hypertension (CMS-[facility])     6. Persistent atrial fibrillation (CMS-[facility])     7. Chronic anticoagulation         Immunizations:    Flu:2015  Pneumovax 23:not given  Prevnar 13:2015    Plan:  1. STAT Titration study now.  2. Discussed sleep hygiene.  3. Due to multiple co-morbidities, treatment needs to be expedited.  4. Follow up after titration study to start treatment, sooner if needed.

## 2017-02-15 NOTE — MR AVS SNAPSHOT
"Glorianna Neelam   2/15/2017 11:20 AM   Sleep Center Visit   MRN: [id number]    Department:  [facility]   Dept Phone:  [phone]    Description:  Female : 1955   Provider:  REGLA Bailey           Reason for Visit     Follow-Up           Allergies as of 2/15/2017     No Known Allergies      You were diagnosed with     Obstructive sleep apnea   [912766]       Essential hypertension   [4695683]       Uncontrolled type 2 diabetes mellitus with complication, with long-term current use of insulin (CMS-[facility])   [0585520]       Status post ablation of atrial flutter   [155722]       Pulmonary hypertension (CMS-HCC)   [172857]       Persistent atrial fibrillation (CMS-[facility])   [500661]       Chronic anticoagulation   [207077]         Vital Signs     Blood Pressure Pulse Respirations Height Weight Body Mass Index    120/74 mmHg 76 12 1.626 m (5' 4.02\") 136.533 kg (301 lb) 51.64 kg/m2    Smoking Status                   Passive Smoke Exposure - Never Smoker           Basic Information     Date Of Birth Sex Race Ethnicity Preferred Language    1955 Female White Non- English      Your appointments     Feb 15, 2017  8:10 PM   Sleep Study with SLEEP TECH   [facility] (--)    [address]  [place] [address] [phone]            2017  1:45 PM   FOLLOW UP with Bijan Sampson M.D.   [facility] (--)    [address]  [place] [address] [phone]            2017  9:45 AM   Anti-Coag Routine with LAVERNE ANTI-COAG   [facility] [place] ([place])    [address]  [place] [address] [phone]            2017  9:20 AM   Established Patient Pul with SU BaileyPJulesRKENYA   [facility] Pulmonary Medicine (--)    [address]  [address]  [place] [id number] [phone]            Mar 15, 2017  9:20 AM   Established Patient " with FRANCIS Doyle.   [facility] (--)    [address] [address] [phone]           You will be receiving a confirmation call a few days before your appointment from our automated call confirmation system.              Problem List              ICD-10-CM Priority Class Noted - Resolved    Obesity E66.9 Medium  11/17/2015 - Present    Diabetes type 2, uncontrolled (CMS-HCC) E11.65 Medium  11/17/2015 - Present    LAE (left atrial enlargement) I51.7 Medium  11/18/2015 - Present    Hyperlipidemia E78.5 Medium  11/19/2015 - Present    Essential hypertension I10 Medium  12/1/2015 - Present    Persistent atrial fibrillation (CMS-HCC) I48.1 Medium  12/1/2015 - Present    Sleep apnea G47.30 Medium  12/30/2015 - Present    Mitral stenosis with regurgitation I05.2 Medium  4/8/2016 - Present    Pulmonary hypertension (CMS-[facility]) I27.2 Medium  4/8/2016 - Present    History of stroke Z86.73 Medium  11/30/2015 - Present    Status post ablation of atrial flutter Z98.890, Z86.79 Medium  5/18/2016 - Present    Cellulitis L03.90 Low  10/26/2016 - Present    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 Low  10/26/2016 - Present    Fecal occult blood test positive R19.5   12/16/2016 - Present    Vitamin D deficiency E55.9   12/16/2016 - Present    Viral upper respiratory tract infection J06.9, B97.89   1/18/2017 - Present    Obstructive sleep apnea G47.33   2/15/2017 - Present    Chronic anticoagulation Z79.01   2/15/2017 - Present      Health Maintenance        Date Due Completion Dates    IMM DTaP/Tdap/Td Vaccine (1 - Tdap) 12/8/1972 ---    PAP SMEAR 12/8/1974 ---    MAMMOGRAM 12/8/1993 ---    IMM ZOSTER VACCINE 12/8/2013 ---    IMM PNEUMOCOCCAL 19-64 (ADULT) MEDIUM RISK SERIES (1 of 1 - PPSV23) 1/14/2016 11/19/2015    IMM INFLUENZA (1) 9/1/2016 11/18/2015    URINE ACR / MICROALBUMIN 5/3/2017 5/3/2016    A1C SCREENING 5/30/2017 11/30/2016, 10/26/2016, 4/1/2016, 3/9/2016,  11/17/2015, 1/22/2010, 9/25/2009    DIABETES MONOFILAMENT / LE EXAM 10/26/2017 10/26/2016    COLON CANCER SCREENING ANNUAL FIT 11/15/2017 11/15/2016    FASTING LIPID PROFILE 11/30/2017 11/30/2016, 4/7/2016, 4/1/2016, 12/8/2015, 11/17/2015, 1/22/2010, 9/25/2009    SERUM CREATININE 11/30/2017 11/30/2016, 5/9/2016, 4/20/2016, 4/10/2016, 4/7/2016, 4/6/2016, 4/1/2016, 12/8/2015, 11/21/2015, 11/20/2015, 11/17/2015, 11/17/2015, 1/22/2010, 1/21/2010, 9/24/2009    RETINAL SCREENING 1/12/2018 1/12/2017, 8/25/2016            Current Immunizations     13-VALENT PCV PREVNAR 11/19/2015  6:12 AM    Influenza Vaccine Quad Inj (Preserved) 11/18/2015  3:53 PM      Below and/or attached are the medications your provider expects you to take. Review all of your home medications and newly ordered medications with your provider and/or pharmacist. Follow medication instructions as directed by your provider and/or pharmacist. Please keep your medication list with you and share with your provider. Update the information when medications are discontinued, doses are changed, or new medications (including over-the-counter products) are added; and carry medication information at all times in the event of emergency situations     Allergies:  No Known Allergies          Medications  Valid as of: February 15, 2017 - 12:29 PM    Generic Name Brand Name Tablet Size Instructions for use    Cholecalciferol (Cap) VITAMIN D3 5000 UNIT Take 1 Cap by mouth every day.        DiltiaZEM HCl Coated Beads (CAPSULE SR 24 HR) CARDIZEM  MG Take 1 Cap by mouth 2 Times a Day.        Furosemide (Tab) LASIX 40 MG Take 1 Tab by mouth every day.        Glimepiride (Tab) AMARYL 2 MG Take 2 Tabs by mouth every morning.        Glucose Blood (Strip) glucose blood  1 Strip by Other route as needed (one touch ultra meter test bid dx- E11.65).        Insulin Glargine (Solution) LANTUS 100 UNIT/ML Take 20 units in the morning and 46 units in the evening as directed for a  "total dose of 66 units daily.        Insulin Syringe-Needle U-100 (Misc) INSULIN SYRINGE 1CC/29G 29G X 1/2\" 1 ML 1 Each by Does not apply route every day.        Lisinopril (Tab) PRINIVIL 5 MG Take 1 Tab by mouth every day.        Magnesium Oxide (Tab) MAG- (241.3 MG) MG Take 1 Tab by mouth every day.        Metoprolol Tartrate (Tab) LOPRESSOR 50 MG Take 1 Tab by mouth 2 times a day.        Simvastatin (Tab) ZOCOR 20 MG Take 1 Tab by mouth every evening.        Warfarin Sodium (Tab) COUMADIN 5 MG Take one to 2  tablets daily as directed by coumadin clinic        Warfarin Sodium (Tab) COUMADIN 5 MG Take one to one and one-half (1-1.5) tablets daily as directed by coumadin clinic        .                 Medicines prescribed today were sent to:     [facility] [address] [address]    Phone: [phone] Fax: [phone]    Open 24 Hours?: No      Medication refill instructions:       If your prescription bottle indicates you have medication refills left, it is not necessary to call your provider’s office. Please contact your pharmacy and they will refill your medication.    If your prescription bottle indicates you do not have any refills left, you may request refills at any time through one of the following ways: The online [employer] system (except Urgent Care), by calling your provider’s office, or by asking your pharmacy to contact your provider’s office with a refill request. Medication refills are processed only during regular business hours and may not be available until the next business day. Your provider may request additional information or to have a follow-up visit with you prior to refilling your medication.   *Please Note: Medication refills are assigned a new Rx number when refilled electronically. Your pharmacy may indicate that no refills were authorized even though a new prescription for the same medication is available at the pharmacy. Please " request the medicine by name with the pharmacy before contacting your provider for a refill.        Your To Do List     Future Labs/Procedures Complete By Expires    POLYSOMNOGRAPHY TITRATION  As directed 2/15/2018    Comments:    Patient did not respond well to cpap, bipap tried 13/9 to 19/15 with no clearcut response but improved oxygen levels.      Instructions    Complete sleep study STAT. Follow up for results to start therapy.          [employer] Access Code: Activation code not generated  Current Signicat Status: Active

## 2017-02-15 NOTE — PROCEDURES
CLINICAL COMMENTS:  The patient underwent a split night polysomnogram with a CPAP titration using the standard montage for measurement of parameters of sleep, respiratory events, movement abnormalities, heart rate and rhythm. A microphone was used to monitor snoring.    INTERPRETATION: The diagnostic recording time was 120.5 minutes with a sleep period of 116.9 minutes.  Total sleep time was 90.0 minutes with a sleep efficiency of 74.7%.  The sleep latency was 3.6 minutes, and REM latency was 107.0 minutes.  The patient had 11 arousals in total, for an arousal index of 7.3.        RESPIRATORY: The patient had 1 apneas in total.  Of these, 0 were obstructive apneas, and 1 were central apneas.  This resulted in an apnea index (AI) of 0.7.  The patient had 77 hypopneas in total, which resulted in a hypopnea index of 51.3.  The overall AHI was 52.0, while the AHI during REM was 60.0.  The supine AHI = N/A.    OXIMETRY: Oxygen saturation monitoring showed a mean SpO2 of 84.5% for the diagnostic part of the study, with a minimum oxygen saturation of 64.0%.  Oxygen saturations were below 89% for 87.8% of sleep time.    CARDIAC: The highest heart rate for the first part of the study was 85.0 beats per minute.  The average heart rate during sleep was 78.1 bpm, while the highest heart rate was 85.0 bpm.    LIMB MOVEMENTS: There were a total of 0 periodic limb movements during sleep, of which 0 were PLMS arousals.  This resulted in a PLMS index of 0.0 and a PLMS arousal index of 0.0.    TREATMENT    Treatment recording time was 365.1 minutes with a total sleep time of 177.0min.  The patient had an arousal index of 20.7.      RESPIRATORY: The patient had 0 obstructive apneas, 19 central apneas, and 116 hypopneas for an overall AHI was 45.8.    OXIMETRY: The mean SpO2 during treatment was 89.6%, with a minimum oxygen saturation of 80.0%.        Impression:    During the diagnostic phase, the patient had a reduced sleep  efficiency to 74.7%, 26.9 minutes of wake after sleep onset, and a sleep latency of 3.6 minutes. Severe obstructive sleep apnea was found. The apnea hypopnea index was 52.0 and the RDI was 52.7. The mean event duration was 19.1 seconds and the longest event lasted 44.6 seconds. The REM index was 60.0. The patient did not experience any supine sleep. The lowest saturation during sleep was 64.0%. The saturations were less than 90% for 99.1% of the recording.    Once the patient met the protocol for split study, the technician performed a positive airway pressure titration. The patient was tried on CPAP from 5-11 cm but persisted with primarily hypopneas and desaturations. Bilevel was then tried at multiple levels  again without resounding success.    Recommendation:    Recommend the patient return for a dedicated bilevel overnight polysomnogram.

## 2017-02-16 NOTE — PROCEDURES
Clinical Comments:  The patient underwent a comprehensive polysomnogram using the standard montage for measurement of parameters of sleep, respiratory events, movement abnormalities, heart rate and rhythm. A microphone was used to monitor snoring.      INTERPRETATION:  The total recording time was 471.3 minutes with a sleep period of 469.6 minutes and the total sleep time was 258.6 minutes with a sleep efficiency of 54.9%.  The sleep latency was 1.7 minutes, and REM latency was 346.0 minutes.  The patient experienced 70 arousals in total, for an arousal index of 16.2    RESPIRATORY: The patient had 9 apneas in total.  Of these, 0 were obstructive apneas, and 9 were central apneas.  This resulted in an apnea index (AI) of 2.1.  The patient had 42 hypopneas, for a hypopnea index of 9.7.  The overall AHI was 11.8, while the AHI during Stage R sleep was 34.6.  AHI while supine was N/A.    OXIMETRY: Oxygen saturation monitoring showed a mean SpO2 of 89.8%, with a minimum oxygen saturation of 84.0%.  Oxygen saturations were less than or = 89% for 10.6 minutes of sleep time.    CARDIAC: The highest heart rate during the recording was 91.0 beats per minute.  The average heart rate during sleep was 72.7 bpm.    LIMB MOVEMENTS: There were a total of 343 PLMs during sleep, of which 14 were PLMs arousals.  This resulted in a PLMS index of 79.6.      Impression:    This was an overnight bilevel titration. The patient chose to use a small [employer] mask with heated humidification. The technician initiated the pressure at 16/12 and the patient was titrated to a final pressure of 22/18. Central apneas comprised 17.6% of the 78 respiratory events occurred during the study. The patient did best on bilevel 16/12 and bilevel 22/18 did not achieve REM at either setting nor did she experience any supine sleep.    Recommendation:     Recommended trial of bilevel 22/18 using a mask of choice and heated humidification followed by  clinical and compliance card review in 8 weeks. Alternatively, a trial of auto BiPAP could prove more effective.

## 2017-02-23 ENCOUNTER — OFFICE VISIT (OUTPATIENT)
Dept: CARDIOLOGY | Facility: MEDICAL CENTER | Age: 62
End: 2017-02-23
Payer: MEDICAID

## 2017-02-23 VITALS
OXYGEN SATURATION: 97 % | HEART RATE: 75 BPM | SYSTOLIC BLOOD PRESSURE: 126 MMHG | HEIGHT: 64 IN | BODY MASS INDEX: 50.02 KG/M2 | WEIGHT: 293 LBS | DIASTOLIC BLOOD PRESSURE: 68 MMHG

## 2017-02-23 DIAGNOSIS — I10 ESSENTIAL HYPERTENSION: ICD-10-CM

## 2017-02-23 DIAGNOSIS — Z79.01 CHRONIC ANTICOAGULATION: ICD-10-CM

## 2017-02-23 DIAGNOSIS — I05.2 MITRAL STENOSIS WITH INSUFFICIENCY, UNSPECIFIED ETIOLOGY: ICD-10-CM

## 2017-02-23 DIAGNOSIS — I48.92 PAROXYSMAL ATRIAL FLUTTER (HCC): ICD-10-CM

## 2017-02-23 PROCEDURE — 99214 OFFICE O/P EST MOD 30 MIN: CPT | Performed by: INTERNAL MEDICINE

## 2017-02-23 ASSESSMENT — ENCOUNTER SYMPTOMS
PALPITATIONS: 0
PND: 0
DIZZINESS: 0

## 2017-02-23 NOTE — PROGRESS NOTES
Subjective:   Shani Richter is a 61 y.o. female who presents today for follow-up evaluation of mitral stenosis, mitral regurgitation, paroxysmal atrial flutter, chronic anticoagulation with a history of hypertension, cerebrovascular accident in 2015 with prior evidence of a lacunar infarction.    Last seen on 11/18/2016 by APN.  I last saw the patient on her initial hospitalization in November, 2015 at the time that she presented with atrial fibrillation/flutter and CVA.  Subsequently the patient was again hospitalized in April, 2016 for atrial fibrillation.  The patient subsequently underwent atrial flutter ablation on 5/9/2016 by Dr. Miguel Segura.    Overall the patient states that over the past year she's had less shortness of breath that her blood pressures been better.  Last week she had a sleep study and is being evaluated for either BiPAP or CPAP device.  The patient denies palpitations or chest pain.    Past medical history  [place]17[place] [facility] [place]CHARGE DIAGNOSES:  1.  Atrial flutter.  2.  Atrial fibrillation.  3.  Stroke in the right posterior cerebral artery distribution.  4.  Hypertensive urgency.  5.  Hyponatremia.  6.  Dyslipidemia.  7.  Diabetes mellitus, type 2.  8.  Possible sleep apnea.    Between 4/6/2016[place] [facility] [place]  [place]CHARGE DIAGNOSES:  1.  Status post treatment of community-acquired pneumonia.  The patient is    improved on medication regimen.  We will continue an overall 10-day antibiotic   course.  2.  Pulmonary edema secondary to valvular heart disease.  The patient also    mitral stenosis and atrial flutter fibrillation.  The patient is improved on    diuretics.  3.  Hypoxemia is improved.  The patient remains hypoxic during the entire monitoring period  4.  Question of obstructive sleep apnea.  The patient now discharged on    nocturnal oxygen, for outpatient followup and referral to pulmonary medicine    and outpatient sleep study.  5.   Atrial flutter and fibrillation.  The patient is better rate controlled on   current anticoagulation therapy, for a close outpatient followup cardiology    as well as likely, cardioversion as an outpatient.  6.  Hypertension is improved.  7.  Diabetes type 2, to continue her home regimen.  8.  Mitral stenosis.  9.  Dyslipidemia.  10.  Severe dietary obesity.    DATE OF SERVICE:  05/09/2016  PROCEDURE PERFORMED:  1.  Electrophysiology study with radiofrequency ablation of SVT circuit.  2.  Advanced mapping using CARTO system.  3.  Left atrial pacing, sensing and stimulation protocol.  4.  Transesophageal echocardiogram.  PHYSICIAN:  Miguel Segura MD  INDICATION:  Persistent atrial flutter.    Past Medical History   Diagnosis Date   • Diabetes    • Hypertension    • Heart murmur      childhood   • Morbid obesity (CMS-HCC)    • Stroke (CMS-HCC) 11/18/2015     Acute ischemic right PCA stroke-Hemorrhage transformation is noted within the infarct [I63.531]   • Atrial flutter (CMS-HCC) 11/18/2015   • Atrial fibrillation (CMS-HCC)    • Hyperlipidemia    • Pneumonia 4/6/2016   • Osteoarthritis    • Valvular heart disease      Mitral stenosis     Past Surgical History   Procedure Laterality Date   • Tonsillectomy     • Pilonidal cyst excision     • Recovery  5/9/2016     Procedure: CATH LAB FLUTTER ABLATION, CAMILO WITH ANESTHESIA DR. SEGURA;  Surgeon: [facility];  Location: SURGERY PRE-POST PROC UNIT [facility] [place];  Service:      Family History   Problem Relation Age of Onset   • Stroke Mother 71   • Cancer Father 71   • Heart Disease Brother      History   Smoking status   • Passive Smoke Exposure - Never Smoker   Smokeless tobacco   • Never Used     No Known Allergies  Outpatient Encounter Prescriptions as of 2/23/2017   Medication Sig Dispense Refill   • insulin glargine (LANTUS) 100 UNIT/ML Solution Take 20 units in the morning and 46 units in the evening as directed for a total dose of 66 units daily. 1 Vial 5   •  "cholecalciferol (VITAMIN D3) 5000 UNIT Cap Take 1 Cap by mouth every day. 30 Cap 5   • INSULIN SYRINGE 1CC/29G 29G X 1/2\" 1 ML Misc 1 Each by Does not apply route every day. 100 Each 3   • glimepiride (AMARYL) 2 MG Tab Take 2 Tabs by mouth every morning. (Patient taking differently: Take 2 Tablets by mouth daily) 60 Tab 3   • simvastatin (ZOCOR) 20 MG Tab Take 1 Tab by mouth every evening. 90 Tab 3   • lisinopril (PRINIVIL) 5 MG Tab Take 1 Tab by mouth every day. 90 Tab 3   • glucose blood strip 1 Strip by Other route as needed (one touch ultra meter test bid dx- E11.65). 100 Strip 11   • warfarin (COUMADIN) 5 MG Tab Take one to one and one-half (1-1.5) tablets daily as directed by coumadin clinic 180 Tab 3   • warfarin (COUMADIN) 5 MG Tab Take one to 2  tablets daily as directed by coumadin clinic 180 Tab 1   • diltiazem CD (CARDIZEM CD) 120 MG CAPSULE SR 24 HR Take 1 Cap by mouth 2 Times a Day. 180 Cap 3   • metoprolol (LOPRESSOR) 50 MG Tab Take 1 Tab by mouth 2 times a day. 180 Tab 3   • furosemide (LASIX) 40 MG Tab Take 1 Tab by mouth every day. 90 Tab 3   • magnesium oxide (MAG-OX) 400 (241.3 MG) MG Tab tablet Take 1 Tab by mouth every day. 60 Tab 2     No facility-administered encounter medications on file as of 2/23/2017.     Review of Systems   Cardiovascular: Positive for leg swelling. Negative for chest pain, palpitations and PND.   Neurological: Negative for dizziness.        Objective:   /68 mmHg  Pulse 75  Ht 1.626 m (5' 4.02\")  Wt 137.893 kg (304 lb)  BMI 52.16 kg/m2  SpO2 97%    Physical Exam   Constitutional: She is oriented to person, place, and time. She appears well-nourished. No distress.   HENT:   Head: Normocephalic.   Eyes: EOM are normal. No scleral icterus.   Neck: Carotid bruit is not present.   Thick neck JVP could not be accurately assessed.   Cardiovascular: Normal rate, regular rhythm, S1 normal, S2 normal and normal heart sounds.  Exam reveals no gallop and no friction rub.  "   No murmur heard.  Pulses:       Carotid pulses are 2+ on the right side, and 2+ on the left side.       Radial pulses are 2+ on the right side, and 2+ on the left side.   Pulmonary/Chest: Effort normal and breath sounds normal. She has no wheezes. She has no rhonchi. She has no rales.   Increased AP diameter.   Abdominal:   Obese.   Musculoskeletal: She exhibits edema.   Indurated, erythematous nonpitting edema bilaterally.   Neurological: She is alert and oriented to person, place, and time.   Skin: Skin is warm and dry.   Psychiatric: She has a normal mood and affect. Her behavior is normal.     01/22/2010 MPI  1. DIPYRIDAMOLE STRESS POSITIVE FOR CHEST DISCOMFORT BUT WITHOUT ISCHEMIC   EKG CHANGES.  2. SPECT IMAGES SUGGEST A SMALL SEGMENT OF PROBABLE MILD INFEROLATERAL   ISCHEMIA.  PROMINENT APICAL THINNING IS NOTED.  3. THE GATED STUDY SHOWS A MODERATE REDUCTION OF EJECTION FRACTION WITH   PROBABLE LATERAL HYPOKINESIS.    04/06/2016 ECHOCARDIOGRAM  Left ventricular ejection fraction is visually estimated to be 50%.  Dilated inferior vena cava, 2.6 cm, without inspiratory collapse.    Biatrial dilation.  Mild-moderate mixed mitral valve disease.  The mitral valve is   significantly thickened and restricted.  Right ventricular systolic pressure is estimated to be 55 mmHg.  No significant change from the study of 11/2015.  Moderate mitral stenosis. Mean transvalvular gradient is 8 mmHg at a   heart rate of 80 BPM. Mitral valve area is 4.9 sq cm. Mild mitral   regurgitation.    Assessment:     1. Mitral stenosis with insufficiency, unspecified etiology  ECHOCARDIOGRAM-COMP W/ CONT   2. Paroxysmal atrial flutter (CMS-[facility])  ECHOCARDIOGRAM-COMP W/ CONT   3. Chronic anticoagulation     4. Essential hypertension         Medical Decision Making:  Today's Assessment / Status / Plan:     Mitral stenosis/mitral regurgitation. Symptomatically the patient is proved improved over the past year.    Paroxysmal atrial  flutter.    Atrial flutter ablation 5/9/2016.    Anticoagulation. On warfarin. In anticoagulation surveillance.    Hypertension controlled.    Diabetes mellitus.    Morbid obesity.    Sleep apnea. Treatment in progress.    Recommendation/Discussion  Spent 25 minutes in face-to-face patient contact in which greater than 50% of the visit was spent in discussion of the patient's various specific cardiac problems, counseling/coordination of care of medication management, symptom management, re-assurance, discussion of medications, review of recent blood work, and repeat echocardiogram to reassess mitral stenosis/mitral regurgitation and compare with previous echocardiogram 1/6/2016; in addition the patient needs a drastic weight loss program and optimally the patient should be considered a candidate for gastric bypass procedure in view of the mortality benefits demonstrated in patients with cardiac problems and diabetes mellitus.  We'll continue current therapy.  Follow-up 6 months, sooner if necessary.

## 2017-02-23 NOTE — MR AVS SNAPSHOT
"Glorianna Neelam   2017 1:45 PM   Office Visit   MRN: [id number]    Department:  [facility]   Dept Phone:  [phone]    Description:  Female : 1955   Provider:  Bijan Sampson M.D.           Reason for Visit     Follow-Up           Allergies as of 2017     No Known Allergies      You were diagnosed with     Mitral stenosis with insufficiency, unspecified etiology   [3356360]       Paroxysmal atrial flutter (CMS-HCC)   [733709]       Chronic anticoagulation   [434801]       Essential hypertension   [9381672]         Vital Signs     Blood Pressure Pulse Height Weight Body Mass Index Oxygen Saturation    126/68 mmHg 75 1.626 m (5' 4.02\") 137.893 kg (304 lb) 52.16 kg/m2 97%    Smoking Status                   Passive Smoke Exposure - Never Smoker           Basic Information     Date Of Birth Sex Race Ethnicity Preferred Language    1955 Female White Non- English      Your appointments     2017  9:45 AM   Anti-Coag Routine with [place] ANTI-COAG   [facility]    [address] [facility] [id number] [phone]            2017  9:20 AM   Established Patient Pul with REGLA Bailey   [facility] (--)    [address] [place] [address] [address]  [facility] [id number] [phone]            Mar 15, 2017  9:20 AM   Established Patient with REGLA Doyle   [facility] (--)    [address] [place] [id number] [phone]           You will be receiving a confirmation call a few days before your appointment from our automated call confirmation system.              Problem List              ICD-10-CM Priority Class Noted - Resolved    Obesity E66.9 Medium  2015 - Present    Diabetes type 2, uncontrolled (CMS-HCC) E11.65 Medium  2015 - Present    Hyperlipidemia E78.5 Medium  2015 - Present    Essential hypertension I10 Medium  2015 - " Present    Persistent atrial fibrillation (CMS-HCC) I48.1 Medium  12/1/2015 - Present    Sleep apnea G47.30 Medium  12/30/2015 - Present    Mitral stenosis with regurgitation I05.2 Medium  4/8/2016 - Present    Pulmonary hypertension (CMS-HCC) I27.2 Medium  4/8/2016 - Present    History of stroke Z86.73 Medium  11/30/2015 - Present    Status post ablation of atrial flutter Z98.890, Z86.79 Medium  5/18/2016 - Present    Cellulitis L03.90 Low  10/26/2016 - Present    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 Low  10/26/2016 - Present    Fecal occult blood test positive R19.5   12/16/2016 - Present    Vitamin D deficiency E55.9   12/16/2016 - Present    Viral upper respiratory tract infection J06.9, B97.89   1/18/2017 - Present    Obstructive sleep apnea G47.33   2/15/2017 - Present    Chronic anticoagulation Z79.01   2/15/2017 - Present    Paroxysmal atrial flutter (CMS-HCC) I48.92   2/23/2017 - Present      Health Maintenance        Date Due Completion Dates    IMM DTaP/Tdap/Td Vaccine (1 - Tdap) 12/8/1972 ---    PAP SMEAR 12/8/1974 ---    MAMMOGRAM 12/8/1993 ---    IMM ZOSTER VACCINE 12/8/2013 ---    IMM PNEUMOCOCCAL 19-64 (ADULT) MEDIUM RISK SERIES (1 of 1 - PPSV23) 1/14/2016 11/19/2015    IMM INFLUENZA (1) 9/1/2016 11/18/2015    URINE ACR / MICROALBUMIN 5/3/2017 5/3/2016    A1C SCREENING 5/30/2017 11/30/2016, 10/26/2016, 4/1/2016, 3/9/2016, 11/17/2015, 1/22/2010, 9/25/2009    DIABETES MONOFILAMENT / LE EXAM 10/26/2017 10/26/2016    COLON CANCER SCREENING ANNUAL FIT 11/15/2017 11/15/2016    FASTING LIPID PROFILE 11/30/2017 11/30/2016, 4/7/2016, 4/1/2016, 12/8/2015, 11/17/2015, 1/22/2010, 9/25/2009    SERUM CREATININE 11/30/2017 11/30/2016, 5/9/2016, 4/20/2016, 4/10/2016, 4/7/2016, 4/6/2016, 4/1/2016, 12/8/2015, 11/21/2015, 11/20/2015, 11/17/2015, 11/17/2015, 1/22/2010, 1/21/2010, 9/24/2009    RETINAL SCREENING 1/12/2018 1/12/2017, 8/25/2016            Current Immunizations     13-VALENT PCV PREVNAR 11/19/2015  6:12 AM  "   Influenza Vaccine Quad Inj (Preserved) 11/18/2015  3:53 PM      Below and/or attached are the medications your provider expects you to take. Review all of your home medications and newly ordered medications with your provider and/or pharmacist. Follow medication instructions as directed by your provider and/or pharmacist. Please keep your medication list with you and share with your provider. Update the information when medications are discontinued, doses are changed, or new medications (including over-the-counter products) are added; and carry medication information at all times in the event of emergency situations     Allergies:  No Known Allergies          Medications  Valid as of: February 23, 2017 -  2:06 PM    Generic Name Brand Name Tablet Size Instructions for use    Cholecalciferol (Cap) VITAMIN D3 5000 UNIT Take 1 Cap by mouth every day.        DiltiaZEM HCl Coated Beads (CAPSULE SR 24 HR) CARDIZEM  MG Take 1 Cap by mouth 2 Times a Day.        Furosemide (Tab) LASIX 40 MG Take 1 Tab by mouth every day.        Glimepiride (Tab) AMARYL 2 MG Take 2 Tabs by mouth every morning.        Glucose Blood (Strip) glucose blood  1 Strip by Other route as needed (one touch ultra meter test bid dx- E11.65).        Insulin Glargine (Solution) LANTUS 100 UNIT/ML Take 20 units in the morning and 46 units in the evening as directed for a total dose of 66 units daily.        Insulin Syringe-Needle U-100 (Misc) INSULIN SYRINGE 1CC/29G 29G X 1/2\" 1 ML 1 Each by Does not apply route every day.        Lisinopril (Tab) PRINIVIL 5 MG Take 1 Tab by mouth every day.        Magnesium Oxide (Tab) MAG- (241.3 MG) MG Take 1 Tab by mouth every day.        Metoprolol Tartrate (Tab) LOPRESSOR 50 MG Take 1 Tab by mouth 2 times a day.        Simvastatin (Tab) ZOCOR 20 MG Take 1 Tab by mouth every evening.        Warfarin Sodium (Tab) COUMADIN 5 MG Take one to 2  tablets daily as directed by coumadin clinic        Warfarin " Sodium (Tab) COUMADIN 5 MG Take one to one and one-half (1-1.5) tablets daily as directed by coumadin clinic        .                 Medicines prescribed today were sent to:     [facility] [place] - [address] [facility]    [address] [place] [address]    Phone: [phone] Fax: [phone]    Open 24 Hours?: No      Medication refill instructions:       If your prescription bottle indicates you have medication refills left, it is not necessary to call your provider’s office. Please contact your pharmacy and they will refill your medication.    If your prescription bottle indicates you do not have any refills left, you may request refills at any time through one of the following ways: The online [employer] system (except Urgent Care), by calling your provider’s office, or by asking your pharmacy to contact your provider’s office with a refill request. Medication refills are processed only during regular business hours and may not be available until the next business day. Your provider may request additional information or to have a follow-up visit with you prior to refilling your medication.   *Please Note: Medication refills are assigned a new Rx number when refilled electronically. Your pharmacy may indicate that no refills were authorized even though a new prescription for the same medication is available at the pharmacy. Please request the medicine by name with the pharmacy before contacting your provider for a refill.        Your To Do List     Future Labs/Procedures Complete By Expires    ECHOCARDIOGRAM-COMP W/ CONT  As directed 2/24/2018         [employer] Access Code: Activation code not generated  Current [employer] Status: Active

## 2017-02-24 ENCOUNTER — ANTICOAGULATION VISIT (OUTPATIENT)
Dept: MEDICAL GROUP | Facility: PHYSICIAN GROUP | Age: 62
End: 2017-02-24
Payer: MEDICAID

## 2017-02-24 DIAGNOSIS — I48.92 ATRIAL FLUTTER, UNSPECIFIED TYPE (HCC): ICD-10-CM

## 2017-02-24 DIAGNOSIS — I10 ESSENTIAL HYPERTENSION: ICD-10-CM

## 2017-02-24 DIAGNOSIS — I48.19 PERSISTENT ATRIAL FIBRILLATION (HCC): ICD-10-CM

## 2017-02-24 LAB — INR PPP: 2.7 (ref 2–3.5)

## 2017-02-24 PROCEDURE — 85610 PROTHROMBIN TIME: CPT | Performed by: PHYSICIAN ASSISTANT

## 2017-02-24 RX ORDER — GLIMEPIRIDE 2 MG/1
TABLET ORAL
Qty: 60 TAB | Refills: 2 | Status: SHIPPED | OUTPATIENT
Start: 2017-02-24 | End: 2017-07-01 | Stop reason: SDUPTHER

## 2017-02-24 NOTE — MR AVS SNAPSHOT
Shani Richter   2017 9:45 AM   Anticoagulation Visit   MRN: [id number]    Department:  [facility]   Dept Phone:  [phone]    Description:  Female : 1955   Provider:  LAVERNE ANTI-COAG           Allergies as of 2017     No Known Allergies      You were diagnosed with     Persistent atrial fibrillation (CMS-HCC)   [113556]         Vital Signs     Smoking Status                   Passive Smoke Exposure - Never Smoker           Basic Information     Date Of Birth Sex Race Ethnicity Preferred Language    1955 Female White Non- English      Your appointments     2017  9:45 AM   Anti-Coag Routine with LAVERNE ANTI-COAG   [facility] [place] [facility]    [address] [place] [address] [phone]            2017  9:20 AM   Established Patient Pul with REGLA Bailey   [facility] (--)    [address] [facility] [address]  [facility] [id number] [phone]            Mar 15, 2017  9:20 AM   Established Patient with REGLA Doyle   [facility] (--)    [facility] [address] [phone]           You will be receiving a confirmation call a few days before your appointment from our automated call confirmation system.            Mar 23, 2017 12:15 PM   ECHO with ECHO [facility], [facility] EXAM 9   ECHOCARDIOLOGY [facility] ([facility])    [address] [facility] [place] [address]   [phone]           No prep            2017  9:15 AM   Anti-Coag Routine with LAVERNE ANTI-COAG   [facility] [place] [facility]    [address]  [place] [address] [phone]              Problem List              ICD-10-CM Priority Class Noted - Resolved    Obesity E66.9 Medium  2015 - Present    Diabetes type 2, uncontrolled (CMS-HCC) E11.65 Medium  2015 - Present    Hyperlipidemia E78.5 Medium  2015 - Present    Essential hypertension I10  Medium  12/1/2015 - Present    Persistent atrial fibrillation (CMS-HCC) I48.1 Medium  12/1/2015 - Present    Sleep apnea G47.30 Medium  12/30/2015 - Present    Mitral stenosis with regurgitation I05.2 Medium  4/8/2016 - Present    Pulmonary hypertension (CMS-HCC) I27.2 Medium  4/8/2016 - Present    History of stroke Z86.73 Medium  11/30/2015 - Present    Status post ablation of atrial flutter Z98.890, Z86.79 Medium  5/18/2016 - Present    Cellulitis L03.90 Low  10/26/2016 - Present    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 Low  10/26/2016 - Present    Fecal occult blood test positive R19.5   12/16/2016 - Present    Vitamin D deficiency E55.9   12/16/2016 - Present    Viral upper respiratory tract infection J06.9, B97.89   1/18/2017 - Present    Obstructive sleep apnea G47.33   2/15/2017 - Present    Chronic anticoagulation Z79.01   2/15/2017 - Present    Paroxysmal atrial flutter (CMS-HCC) I48.92   2/23/2017 - Present      Health Maintenance        Date Due Completion Dates    IMM DTaP/Tdap/Td Vaccine (1 - Tdap) 12/8/1972 ---    PAP SMEAR 12/8/1974 ---    MAMMOGRAM 12/8/1993 ---    IMM ZOSTER VACCINE 12/8/2013 ---    IMM PNEUMOCOCCAL 19-64 (ADULT) MEDIUM RISK SERIES (1 of 1 - PPSV23) 1/14/2016 11/19/2015    IMM INFLUENZA (1) 9/1/2016 11/18/2015    URINE ACR / MICROALBUMIN 5/3/2017 5/3/2016    A1C SCREENING 5/30/2017 11/30/2016, 10/26/2016, 4/1/2016, 3/9/2016, 11/17/2015, 1/22/2010, 9/25/2009    DIABETES MONOFILAMENT / LE EXAM 10/26/2017 10/26/2016    COLON CANCER SCREENING ANNUAL FIT 11/15/2017 11/15/2016    FASTING LIPID PROFILE 11/30/2017 11/30/2016, 4/7/2016, 4/1/2016, 12/8/2015, 11/17/2015, 1/22/2010, 9/25/2009    SERUM CREATININE 11/30/2017 11/30/2016, 5/9/2016, 4/20/2016, 4/10/2016, 4/7/2016, 4/6/2016, 4/1/2016, 12/8/2015, 11/21/2015, 11/20/2015, 11/17/2015, 11/17/2015, 1/22/2010, 1/21/2010, 9/24/2009    RETINAL SCREENING 1/12/2018 1/12/2017, 8/25/2016            Results     POCT Protime      Component    INR   "   2.7    Comment:      valid [id number] exp 10/2017                        Current Immunizations     13-VALENT PCV PREVNAR 11/19/2015  6:12 AM    Influenza Vaccine Quad Inj (Preserved) 11/18/2015  3:53 PM      Below and/or attached are the medications your provider expects you to take. Review all of your home medications and newly ordered medications with your provider and/or pharmacist. Follow medication instructions as directed by your provider and/or pharmacist. Please keep your medication list with you and share with your provider. Update the information when medications are discontinued, doses are changed, or new medications (including over-the-counter products) are added; and carry medication information at all times in the event of emergency situations     Allergies:  No Known Allergies          Medications  Valid as of: February 24, 2017 -  9:34 AM    Generic Name Brand Name Tablet Size Instructions for use    Cholecalciferol (Cap) VITAMIN D3 5000 UNIT Take 1 Cap by mouth every day.        DiltiaZEM HCl Coated Beads (CAPSULE SR 24 HR) CARDIZEM  MG Take 1 Cap by mouth 2 Times a Day.        Furosemide (Tab) LASIX 40 MG Take 1 Tab by mouth every day.        Glimepiride (Tab) AMARYL 2 MG Take 2 Tabs by mouth every morning.        Glucose Blood (Strip) glucose blood  1 Strip by Other route as needed (one touch ultra meter test bid dx- E11.65).        Insulin Glargine (Solution) LANTUS 100 UNIT/ML Take 20 units in the morning and 46 units in the evening as directed for a total dose of 66 units daily.        Insulin Syringe-Needle U-100 (Misc) INSULIN SYRINGE 1CC/29G 29G X 1/2\" 1 ML 1 Each by Does not apply route every day.        Lisinopril (Tab) PRINIVIL 5 MG Take 1 Tab by mouth every day.        Magnesium Oxide (Tab) MAG- (241.3 MG) MG Take 1 Tab by mouth every day.        Metoprolol Tartrate (Tab) LOPRESSOR 50 MG Take 1 Tab by mouth 2 times a day.        Simvastatin (Tab) ZOCOR 20 MG Take 1 Tab " by mouth every evening.        Warfarin Sodium (Tab) COUMADIN 5 MG Take one to 2  tablets daily as directed by coumadin clinic        Warfarin Sodium (Tab) COUMADIN 5 MG Take one to one and one-half (1-1.5) tablets daily as directed by coumadin clinic        .                 Medicines prescribed today were sent to:     [facility] - [address] [address]    Phone: [phone] Fax: [phone]    Open 24 Hours?: No      Medication refill instructions:       If your prescription bottle indicates you have medication refills left, it is not necessary to call your provider’s office. Please contact your pharmacy and they will refill your medication.    If your prescription bottle indicates you do not have any refills left, you may request refills at any time through one of the following ways: The online [employer] system (except Urgent Care), by calling your provider’s office, or by asking your pharmacy to contact your provider’s office with a refill request. Medication refills are processed only during regular business hours and may not be available until the next business day. Your provider may request additional information or to have a follow-up visit with you prior to refilling your medication.   *Please Note: Medication refills are assigned a new Rx number when refilled electronically. Your pharmacy may indicate that no refills were authorized even though a new prescription for the same medication is available at the pharmacy. Please request the medicine by name with the pharmacy before contacting your provider for a refill.        Warfarin Dosing Calendar   February 2017 Details    Sun Mon Tue Wed Thu Fri Sat        1               2               3               4                 5               6               7               8               9               10               11                 12               13               14               15               16                  17               18                 19               20               21               22               23               24   2.7   2.5 mg   See details      25      5 mg           26      5 mg         27      2.5 mg         28      5 mg              Date Details   02/24 This INR check   INR: 2.7   ic valid [id number] 160 exp 10/2017               How to take your warfarin dose     To take:  2.5 mg Take 0.5 of a 5 mg tablet.    To take:  5 mg Take 1 of the 5 mg tablets.           Warfarin Dosing Calendar   March 2017 Details    Sun Mon Tue Wed Thu Fri Sat        1      2.5 mg         2      5 mg         3      2.5 mg         4      5 mg           5      5 mg         6      2.5 mg         7      5 mg         8      2.5 mg         9      5 mg         10      2.5 mg         11      5 mg           12      5 mg         13      2.5 mg         14      5 mg         15      2.5 mg         16      5 mg         17      2.5 mg         18      5 mg           19      5 mg         20      2.5 mg         21      5 mg         22      2.5 mg         23      5 mg         24      2.5 mg         25      5 mg           26      5 mg         27      2.5 mg         28      5 mg         29      2.5 mg         30      5 mg         31      2.5 mg           Date Details   No additional details            How to take your warfarin dose     To take:  2.5 mg Take 0.5 of a 5 mg tablet.    To take:  5 mg Take 1 of the 5 mg tablets.           Warfarin Dosing Calendar   April 2017 Details    Sun Mon Tue Wed Thu Fri Sat           1      5 mg           2      5 mg         3      2.5 mg         4      5 mg         5      2.5 mg         6      5 mg         7      2.5 mg         8      5 mg           9      5 mg         10      2.5 mg         11      5 mg         12      2.5 mg         13      5 mg         14      2.5 mg         15      5 mg           16      5 mg         17      2.5 mg         18      5 mg         19      2.5 mg         20      5 mg          21      2.5 mg         22                 23               24               25               26               27               28               29                 30                      Date Details   No additional details    Date of next INR:  4/21/2017         How to take your warfarin dose     To take:  2.5 mg Take 0.5 of a 5 mg tablet.    To take:  5 mg Take 1 of the 5 mg tablets.              [employer] Access Code: Activation code not generated  Current [employer] Status: Active

## 2017-02-24 NOTE — PROGRESS NOTES
Anticoagulation Summary as of 2/24/2017     INR goal 2.0-3.0   Selected INR 2.7 (2/24/2017)   Maintenance plan 2.5 mg (5 mg x 0.5) on Mon, Wed, Fri; 5 mg (5 mg x 1) all other days   Weekly total 27.5 mg   Plan last modified Santana Good, PHARMD (11/18/2016)   Next INR check 4/21/2017   Target end date Indefinite    Indications   Atrial flutter (CMS-HCC) (Resolved) [I48.92]  Acute ischemic right PCA stroke-Hemorrhage transformation is noted within the infarct (Resolved) [I63.531]  Persistent atrial fibrillation (CMS-HCC) [I48.1]         Anticoagulation Episode Summary     INR check location [facility]    Preferred lab     Send INR reminders to     Comments       Anticoagulation Care Providers     Provider Role Specialty Phone number    Bijan Sampson M.D. Referring Cardiology [phone]    [facility] Responsible  [phone]        Anticoagulation Patient Findings   Negatives Missed Doses, Extra Doses, Medication Changes, Antibiotic Use, Diet Changes, Dental/Other Procedures, Hospitalization, Bleeding Gums, Nose Bleeds, Blood in Urine, Blood in Stool, Any Bruising, Other Complaints          Shani Richter seen in clinic today  INR  therapeutic.    Denies signs/symptoms of bleeding and/or thrombosis.    Denies changes to diet or medications.   Follow up appointment in 8 week(s).      Continue weekly warfarin dose as noted    Loc Chan, MARCELD

## 2017-02-25 RX ORDER — METOPROLOL TARTRATE 50 MG/1
50 TABLET, FILM COATED ORAL 2 TIMES DAILY
Qty: 180 TAB | Refills: 3 | Status: SHIPPED | OUTPATIENT
Start: 2017-02-25 | End: 2017-09-01 | Stop reason: SDUPTHER

## 2017-02-25 RX ORDER — DILTIAZEM HYDROCHLORIDE 120 MG/1
120 CAPSULE, COATED, EXTENDED RELEASE ORAL 2 TIMES DAILY
Qty: 180 CAP | Refills: 3 | Status: SHIPPED | OUTPATIENT
Start: 2017-02-25 | End: 2018-02-21 | Stop reason: SDUPTHER

## 2017-02-27 ENCOUNTER — APPOINTMENT (OUTPATIENT)
Dept: PULMONOLOGY | Facility: HOSPICE | Age: 62
End: 2017-02-27
Payer: MEDICAID

## 2017-02-27 ENCOUNTER — OFFICE VISIT (OUTPATIENT)
Dept: PULMONOLOGY | Facility: HOSPICE | Age: 62
End: 2017-02-27
Payer: MEDICAID

## 2017-02-27 VITALS
HEART RATE: 76 BPM | OXYGEN SATURATION: 91 % | SYSTOLIC BLOOD PRESSURE: 118 MMHG | HEIGHT: 64 IN | DIASTOLIC BLOOD PRESSURE: 86 MMHG | BODY MASS INDEX: 50.02 KG/M2 | TEMPERATURE: 98.4 F | RESPIRATION RATE: 16 BRPM | WEIGHT: 293 LBS

## 2017-02-27 DIAGNOSIS — G47.33 OBSTRUCTIVE SLEEP APNEA: ICD-10-CM

## 2017-02-27 DIAGNOSIS — I27.20 PULMONARY HYPERTENSION (HCC): ICD-10-CM

## 2017-02-27 DIAGNOSIS — Z79.01 CHRONIC ANTICOAGULATION: ICD-10-CM

## 2017-02-27 DIAGNOSIS — I48.19 PERSISTENT ATRIAL FIBRILLATION (HCC): ICD-10-CM

## 2017-02-27 DIAGNOSIS — I10 ESSENTIAL HYPERTENSION: ICD-10-CM

## 2017-02-27 PROCEDURE — 99213 OFFICE O/P EST LOW 20 MIN: CPT | Performed by: NURSE PRACTITIONER

## 2017-02-27 NOTE — PROGRESS NOTES
Chief Complaint   Patient presents with   • Apnea     Sleep study results       HPI:  Shani Richter is a 61 y.o. year old female here today for follow-up on her in lab dedicated Bipap titration study. She has a history of morbid obesity, documented nighttime desaturation, atrial fibrillation post ablation with prior embolic stroke, DM II, hypertension, hyperlipidemia and on chronic anticoagulation. BMI is 53. PSG split-night 2/14/2017 indicated severe obstructive sleep apnea with an AHI of 52.0 and minimum oxygen saturation of 64%. Patient spent 95.5% of diagnostic study between 80-89% oxygen saturation. Patient fell asleep quickly during the study but did have poor sleep efficiency during the titration portion. She was titrated on CPAP and BiPAP with no successful pressure setting. She underwent a dedicated in lab titration study 2/15/2017 which indicates successful titration to a Bipap pressure of 22/18 CM with a resultant AHI of 3.6 with a low 02 saturation of 90%. However, she was only on this pressure for 16 minutes and no REM sleep on this setting.     She does snore at night. She denies trouble falling asleep, but wakes frequently to urinate. She tends to wake un refreshed and often naps during the day. She tends to nod off if sedentary. She notes occasional morning headaches. She is currently on 02 at night. She has a daughter with sleep apnea and is on CPAP.         Past Medical History   Diagnosis Date   • Diabetes    • Hypertension    • Heart murmur      childhood   • Morbid obesity (CMS-HCC)    • Stroke (CMS-HCC) 11/18/2015     Acute ischemic right PCA stroke-Hemorrhage transformation is noted within the infarct [I63.531]   • Atrial flutter (CMS-[facility]) 11/18/2015   • Atrial fibrillation (CMS-HCC)    • Hyperlipidemia    • Pneumonia 4/6/2016   • Osteoarthritis    • Valvular heart disease      Mitral stenosis       Past Surgical History   Procedure Laterality Date   • Tonsillectomy     • Pilonidal cyst  excision     • Recovery  5/9/2016     Procedure: CATH LAB FLUTTER ABLATION, CAMILO WITH ANESTHESIA DR. ARMIJO;  Surgeon: Recoveryonly Surgery;  [place] [facility]  Service:        Family History   Problem Relation Age of Onset   • Stroke Mother 71   • Cancer Father 71   • Heart Disease Brother        Social History     Social History   • Marital Status:      Spouse Name: N/A   • Number of Children: N/A   • Years of Education: N/A     Occupational History   • Not on file.     Social History Main Topics   • Smoking status: Passive Smoke Exposure - Never Smoker   • Smokeless tobacco: Never Used   • Alcohol Use: No   • Drug Use: No   • Sexual Activity: Not on file     Other Topics Concern   • Not on file     Social History Narrative       ROS:  Constitutional: Denies fevers, chills, sweats, weight loss  Eyes: Denies glasses, vision loss, pain, drainage, double vision  Ears/Nose/Mouth/Throat: Denies rhinitis, nasal congestion, ear ache, difficulty hearing, sore throat, persistent hoarseness, decayed teeth/toothache  Cardiovascular: Denies chest pain, tightness, palpitations, swelling in feet/legs, fainting, difficulty breathing when laying down  Respiratory: Denies shortness of breath, cough, sputum, wheezing, painful breathing, coughing up blood  GI: Denies heartburn, difficulty swallowing, nausea, vomiting, abdominal pain, diarrhea, constipation  : Denies frequent urination, painful urination  Integumentary: Denies rashes, lumps or color changes  MSK: Denies painful joints, sore muscles, and back pain.   Neurological: Denies frequent headaches, dizziness, weakness  Sleep: See HPI       Current Outpatient Prescriptions on File Prior to Visit   Medication Sig Dispense Refill   • metoprolol (LOPRESSOR) 50 MG Tab Take 1 Tab by mouth 2 times a day. 180 Tab 3   • diltiazem CD (CARDIZEM CD) 120 MG CAPSULE SR 24 HR Take 1 Cap by mouth 2 Times a Day. 180 Cap 3   • glimepiride (AMARYL) 2 MG Tab TAKE  "2 TABLETS BY MOUTH EVERY MORNING 60 Tab 2   • insulin glargine (LANTUS) 100 UNIT/ML Solution Take 20 units in the morning and 46 units in the evening as directed for a total dose of 66 units daily. 1 Vial 5   • cholecalciferol (VITAMIN D3) 5000 UNIT Cap Take 1 Cap by mouth every day. 30 Cap 5   • INSULIN SYRINGE 1CC/29G 29G X 1/2\" 1 ML Misc 1 Each by Does not apply route every day. 100 Each 3   • simvastatin (ZOCOR) 20 MG Tab Take 1 Tab by mouth every evening. 90 Tab 3   • lisinopril (PRINIVIL) 5 MG Tab Take 1 Tab by mouth every day. 90 Tab 3   • glucose blood strip 1 Strip by Other route as needed (one touch ultra meter test bid dx- E11.65). 100 Strip 11   • warfarin (COUMADIN) 5 MG Tab Take one to one and one-half (1-1.5) tablets daily as directed by coumadin clinic 180 Tab 3   • warfarin (COUMADIN) 5 MG Tab Take one to 2  tablets daily as directed by coumadin clinic 180 Tab 1   • furosemide (LASIX) 40 MG Tab Take 1 Tab by mouth every day. 90 Tab 3   • magnesium oxide (MAG-OX) 400 (241.3 MG) MG Tab tablet Take 1 Tab by mouth every day. 60 Tab 2     No current facility-administered medications on file prior to visit.     Review of patient's allergies indicates no known allergies.    Blood pressure 118/86, pulse 76, temperature 36.9 °C (98.4 °F), resp. rate 16, height 1.626 m (5' 4.02\"), weight 140.615 kg (310 lb), SpO2 91 %.  PE:   Appearance: Well developed, well nourished, no acute distress  Eyes: PERRL, EOM intact, sclera white, conjunctiva moist  Ears: no lesions or deformities  Hearing: grossly intact  Nose: no lesions or deformities  Oropharynx: tongue normal, posterior pharynx without erythema or exudate  Mallampati Classification: class 4  Neck: supple, trachea midline, no masses   Respiratory effort: no intercostal retractions or use of accessory muscles  Lung auscultation: no rales, rhonchi or wheezes  Heart auscultation: no murmur rub or gallop  Extremities: no cyanosis or edema  Abdomen: soft ,non " tender, no masses  Gait and Station: normal  Digits and nails: no clubbing, cyanosis, petechiae or nodes.  Cranial nerves: grossly intact  Skin: no rashes, lesions or ulcers noted  Orientation: Oriented to time, person and place  Mood and affect: mood and affect appropriate, normal interaction with examiner  Judgement: Intact          Assessment:  1. Obstructive sleep apnea     2. BMI 50.0-59.9, adult (CMS-HCC)     3. Essential hypertension     4. Persistent atrial fibrillation (CMS-HCC)     5. Pulmonary hypertension (CMS-HCC)     6. Chronic anticoagulation           Plan:    1) Reviewed sleep study in detail. Discussed pathophysiology of sleep apnea in detail and need for treatment. She is amendable to a trial of airway pressurization. Order for Auto Bipap with EPAP min 13, IPAP max 22, PS 4.   2) Sleep hygiene discussed.  3) Weight loss recommended.  4) Continue to follow with Cardiology.   5) Follow up in 6 weeks with compliance card download, sooner if needed.

## 2017-02-27 NOTE — PATIENT INSTRUCTIONS
Plan:    1) Reviewed sleep study in detail. Discussed pathophysiology of sleep apnea in detail and need for treatment. She is amendable to a trial of airway pressurization. Order for Auto Bipap with EPAP min 13, IPAP max 22, PS 4.   2) Sleep hygiene discussed.  3) Weight loss recommended.  4) Continue to follow with Cardiology.   5) Follow up in 6 weeks with compliance card download, sooner if needed.

## 2017-03-10 ENCOUNTER — NON-PROVIDER VISIT (OUTPATIENT)
Dept: MEDICAL GROUP | Facility: CLINIC | Age: 62
End: 2017-03-10
Payer: MEDICAID

## 2017-03-10 ENCOUNTER — HOSPITAL ENCOUNTER (OUTPATIENT)
Facility: MEDICAL CENTER | Age: 62
End: 2017-03-10
Attending: NURSE PRACTITIONER
Payer: MEDICAID

## 2017-03-10 ENCOUNTER — HOSPITAL ENCOUNTER (OUTPATIENT)
Facility: MEDICAL CENTER | Age: 62
End: 2017-03-10
Attending: INTERNAL MEDICINE
Payer: MEDICAID

## 2017-03-10 DIAGNOSIS — I10 ESSENTIAL HYPERTENSION: ICD-10-CM

## 2017-03-10 DIAGNOSIS — Z01.89 ROUTINE LAB DRAW: ICD-10-CM

## 2017-03-10 LAB
25(OH)D3 SERPL-MCNC: 38 NG/ML (ref 30–100)
ALBUMIN SERPL BCP-MCNC: 3.8 G/DL (ref 3.2–4.9)
ALBUMIN/GLOB SERPL: 1.2 G/DL
ALP SERPL-CCNC: 95 U/L (ref 30–99)
ALT SERPL-CCNC: 13 U/L (ref 2–50)
ANION GAP SERPL CALC-SCNC: 6 MMOL/L (ref 0–11.9)
AST SERPL-CCNC: 18 U/L (ref 12–45)
BASOPHILS # BLD AUTO: 0.04 K/UL (ref 0–0.12)
BASOPHILS NFR BLD AUTO: 0.6 % (ref 0–1.8)
BILIRUB SERPL-MCNC: 0.4 MG/DL (ref 0.1–1.5)
BUN SERPL-MCNC: 15 MG/DL (ref 8–22)
CALCIUM SERPL-MCNC: 9.2 MG/DL (ref 8.5–10.5)
CHLORIDE SERPL-SCNC: 102 MMOL/L (ref 96–112)
CHOLEST SERPL-MCNC: 147 MG/DL (ref 100–199)
CO2 SERPL-SCNC: 29 MMOL/L (ref 20–33)
CREAT SERPL-MCNC: 1.07 MG/DL (ref 0.5–1.4)
EOSINOPHIL # BLD: 0.12 K/UL (ref 0–0.51)
EOSINOPHIL NFR BLD AUTO: 1.7 % (ref 0–6.9)
ERYTHROCYTE [DISTWIDTH] IN BLOOD BY AUTOMATED COUNT: 47.5 FL (ref 35.9–50)
EST. AVERAGE GLUCOSE BLD GHB EST-MCNC: 200 MG/DL
GLOBULIN SER CALC-MCNC: 3.3 G/DL (ref 1.9–3.5)
GLUCOSE SERPL-MCNC: 215 MG/DL (ref 65–99)
HBA1C MFR BLD: 8.6 % (ref 0–5.6)
HCT VFR BLD AUTO: 42.2 % (ref 37–47)
HDLC SERPL-MCNC: 41 MG/DL
HGB BLD-MCNC: 13 G/DL (ref 12–16)
IMM GRANULOCYTES # BLD AUTO: 0.02 K/UL (ref 0–0.11)
IMM GRANULOCYTES NFR BLD AUTO: 0.3 % (ref 0–0.9)
INR PPP: 2.43 (ref 0.87–1.13)
LDLC SERPL CALC-MCNC: 85 MG/DL
LYMPHOCYTES # BLD: 1.46 K/UL (ref 1–4.8)
LYMPHOCYTES NFR BLD AUTO: 20.6 % (ref 22–41)
MCH RBC QN AUTO: 27.1 PG (ref 27–33)
MCHC RBC AUTO-ENTMCNC: 30.8 G/DL (ref 33.6–35)
MCV RBC AUTO: 88.1 FL (ref 81.4–97.8)
MONOCYTES # BLD: 0.49 K/UL (ref 0–0.85)
MONOCYTES NFR BLD AUTO: 6.9 % (ref 0–13.4)
NEUTROPHILS # BLD: 4.95 K/UL (ref 2–7.15)
NEUTROPHILS NFR BLD AUTO: 69.9 % (ref 44–72)
NRBC # BLD AUTO: 0 K/UL
NRBC BLD-RTO: 0 /100 WBC
PLATELET # BLD AUTO: 238 K/UL (ref 164–446)
PMV BLD AUTO: 11.9 FL (ref 9–12.9)
POTASSIUM SERPL-SCNC: 4.3 MMOL/L (ref 3.6–5.5)
PROT SERPL-MCNC: 7.1 G/DL (ref 6–8.2)
PROTHROMBIN TIME: 27.2 SEC (ref 12–14.6)
RBC # BLD AUTO: 4.79 M/UL (ref 4.2–5.4)
SODIUM SERPL-SCNC: 137 MMOL/L (ref 135–145)
T4 FREE SERPL-MCNC: 0.96 NG/DL (ref 0.53–1.43)
TRIGL SERPL-MCNC: 105 MG/DL (ref 0–149)
TSH SERPL DL<=0.005 MIU/L-ACNC: 3.7 UIU/ML (ref 0.3–3.7)
WBC # BLD AUTO: 7.1 K/UL (ref 4.8–10.8)

## 2017-03-10 PROCEDURE — 99000 SPECIMEN HANDLING OFFICE-LAB: CPT | Performed by: NURSE PRACTITIONER

## 2017-03-10 PROCEDURE — 36415 COLL VENOUS BLD VENIPUNCTURE: CPT | Performed by: NURSE PRACTITIONER

## 2017-03-10 PROCEDURE — 85610 PROTHROMBIN TIME: CPT

## 2017-03-14 DIAGNOSIS — E11.65 UNCONTROLLED TYPE 2 DIABETES MELLITUS WITH COMPLICATION, UNSPECIFIED LONG TERM INSULIN USE STATUS: ICD-10-CM

## 2017-03-14 DIAGNOSIS — E11.8 UNCONTROLLED TYPE 2 DIABETES MELLITUS WITH COMPLICATION, UNSPECIFIED LONG TERM INSULIN USE STATUS: ICD-10-CM

## 2017-03-14 RX ORDER — SIMVASTATIN 20 MG
20 TABLET ORAL EVERY EVENING
Qty: 90 TAB | Refills: 3 | Status: SHIPPED | OUTPATIENT
Start: 2017-03-14 | End: 2018-06-21 | Stop reason: SDUPTHER

## 2017-03-14 RX ORDER — WARFARIN SODIUM 5 MG/1
TABLET ORAL
Qty: 180 TAB | Refills: 1 | Status: SHIPPED | OUTPATIENT
Start: 2017-03-14 | End: 2018-01-22

## 2017-03-14 RX ORDER — LISINOPRIL 5 MG/1
5 TABLET ORAL DAILY
Qty: 90 TAB | Refills: 3 | Status: SHIPPED | OUTPATIENT
Start: 2017-03-14 | End: 2018-08-16 | Stop reason: SDUPTHER

## 2017-03-15 ENCOUNTER — OFFICE VISIT (OUTPATIENT)
Dept: MEDICAL GROUP | Facility: CLINIC | Age: 62
End: 2017-03-15
Payer: MEDICAID

## 2017-03-15 ENCOUNTER — TELEPHONE (OUTPATIENT)
Dept: MEDICAL GROUP | Facility: CLINIC | Age: 62
End: 2017-03-15

## 2017-03-15 VITALS
RESPIRATION RATE: 18 BRPM | SYSTOLIC BLOOD PRESSURE: 130 MMHG | HEART RATE: 70 BPM | OXYGEN SATURATION: 92 % | DIASTOLIC BLOOD PRESSURE: 78 MMHG | TEMPERATURE: 98.1 F | HEIGHT: 64 IN | BODY MASS INDEX: 50.02 KG/M2 | WEIGHT: 293 LBS

## 2017-03-15 DIAGNOSIS — Z86.73 HISTORY OF STROKE: ICD-10-CM

## 2017-03-15 DIAGNOSIS — I48.19 PERSISTENT ATRIAL FIBRILLATION (HCC): ICD-10-CM

## 2017-03-15 DIAGNOSIS — Z12.39 SCREENING FOR BREAST CANCER: ICD-10-CM

## 2017-03-15 DIAGNOSIS — Z76.89 ESTABLISHING CARE WITH NEW DOCTOR, ENCOUNTER FOR: ICD-10-CM

## 2017-03-15 DIAGNOSIS — R19.5 FECAL OCCULT BLOOD TEST POSITIVE: ICD-10-CM

## 2017-03-15 DIAGNOSIS — Z23 NEED FOR VACCINATION: ICD-10-CM

## 2017-03-15 DIAGNOSIS — N18.30 CHRONIC KIDNEY DISEASE, STAGE III (MODERATE) (HCC): ICD-10-CM

## 2017-03-15 PROBLEM — J06.9 VIRAL UPPER RESPIRATORY TRACT INFECTION: Status: RESOLVED | Noted: 2017-01-18 | Resolved: 2017-03-15

## 2017-03-15 PROCEDURE — 90472 IMMUNIZATION ADMIN EACH ADD: CPT | Performed by: NURSE PRACTITIONER

## 2017-03-15 PROCEDURE — 90715 TDAP VACCINE 7 YRS/> IM: CPT | Performed by: NURSE PRACTITIONER

## 2017-03-15 PROCEDURE — 90471 IMMUNIZATION ADMIN: CPT | Performed by: NURSE PRACTITIONER

## 2017-03-15 PROCEDURE — 99214 OFFICE O/P EST MOD 30 MIN: CPT | Mod: 25 | Performed by: NURSE PRACTITIONER

## 2017-03-15 PROCEDURE — 90732 PPSV23 VACC 2 YRS+ SUBQ/IM: CPT | Performed by: NURSE PRACTITIONER

## 2017-03-15 RX ORDER — INSULIN GLARGINE 100 [IU]/ML
INJECTION, SOLUTION SUBCUTANEOUS
Qty: 1 VIAL | Refills: 6 | Status: SHIPPED | OUTPATIENT
Start: 2017-03-15 | End: 2017-09-09

## 2017-03-15 ASSESSMENT — PAIN SCALES - GENERAL: PAINLEVEL: NO PAIN

## 2017-03-15 NOTE — ASSESSMENT & PLAN NOTE
Last A1c: 8.6, this is an increase from 8.1   DM Medications: Lantus, 70 units daily Patient reports good medication compliance.   HTN: Blood pressure goal <140/<90   ACE: Lisinopril 5 mg   Hyperlipidemia: Cholesterol goal LDL <100, Last LDL 83. Currently Rx Statin: Simvastatin 20 mg   Last monofilament foot exam: 10/2016 Checks feet at home: yes, no sores currently   Last Eye exam: 2017   Kidney function: GFR 52, will continue to monitor   Microalbumin screenin5016  Has patient received flu vaccine: declines  Has patient received Hep B series: today in clinic

## 2017-03-15 NOTE — MR AVS SNAPSHOT
"        Shani Love   3/15/2017 9:20 AM   Office Visit   MRN: [id number]    [facility]  [facility] Phone:  [phone]    Description:  Female : 1955   Provider:  REGLA Montiel           Reason for Visit     Establish Care     Results labs done on 3/10    Immunizations TDap / Pneumoccocal    Orders Needed Mammogram      Allergies as of 3/15/2017     No Known Allergies      You were diagnosed with     Establishing care with new doctor, encounter for   [305332]       Screening for breast cancer   [117114]       Need for vaccination   [502665]       Uncontrolled type 2 diabetes mellitus with complication, with long-term current use of insulin (CMS-HCC)   [0278536]       Persistent atrial fibrillation (CMS-[facility])   [753204]       Chronic kidney disease, stage III (moderate)   [585.3.ICD-9-CM]       Fecal occult blood test positive   [644288]       History of stroke   [777216]         Vital Signs     Blood Pressure Pulse Temperature Respirations Height Weight    130/78 mmHg 70 36.7 °C (98.1 °F) 18 1.626 m (5' 4.02\") 142.883 kg (315 lb)    Body Mass Index Oxygen Saturation Smoking Status             54.04 kg/m2 92% Passive Smoke Exposure - Never Smoker         Basic Information     Date Of Birth Sex Race Ethnicity Preferred Language    1955 Female White Non- English      Your appointments     Mar 23, 2017 12:15 PM   ECHO with [facility] [place] EXAM 9   ECHOCARDIOLOGY [facility] [place] ([facility])    [address] [place] [address]   [phone]           No prep            2017 10:00 AM   Established Patient Pul with SU MarcialPKENYA   [facility] (--)    [address]  [address]  [place] [address] [phone]            2017  9:15 AM   Anti-Coag Routine with [place] ANTI-COAG   [facility] [place]    [address]  [place] [address] [phone]              Problem List              ICD-10-CM " Priority Class Noted - Resolved    Obesity E66.9 Medium  11/17/2015 - Present    Diabetes type 2, uncontrolled (CMS-HCC) E11.65 Medium  11/17/2015 - Present    Hyperlipidemia E78.5 Medium  11/19/2015 - Present    Essential hypertension I10 Medium  12/1/2015 - Present    Persistent atrial fibrillation (CMS-HCC) I48.1 Medium  12/1/2015 - Present    Mitral stenosis with regurgitation I05.2 Medium  4/8/2016 - Present    Pulmonary hypertension (CMS-HCC) I27.2 Medium  4/8/2016 - Present    History of stroke Z86.73 Medium  11/30/2015 - Present    Status post ablation of atrial flutter Z98.890, Z86.79 Medium  5/18/2016 - Present    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 Low  10/26/2016 - Present    Fecal occult blood test positive R19.5   12/16/2016 - Present    Vitamin D deficiency E55.9   12/16/2016 - Present    Obstructive sleep apnea G47.33   2/15/2017 - Present    Chronic anticoagulation Z79.01   2/15/2017 - Present    Paroxysmal atrial flutter (CMS-HCC) I48.92   2/23/2017 - Present    Chronic kidney disease, stage III (moderate) N18.3   3/15/2017 - Present      Health Maintenance        Date Due Completion Dates    IMM DTaP/Tdap/Td Vaccine (1 - Tdap) 12/8/1972 ---    PAP SMEAR 12/8/1974 ---    MAMMOGRAM 12/8/1993 ---    IMM ZOSTER VACCINE 12/8/2013 ---    IMM PNEUMOCOCCAL 19-64 (ADULT) MEDIUM RISK SERIES (1 of 1 - PPSV23) 1/14/2016 11/19/2015    IMM INFLUENZA (1) 9/1/2016 11/18/2015    URINE ACR / MICROALBUMIN 5/3/2017 5/3/2016    A1C SCREENING 9/10/2017 3/10/2017, 11/30/2016, 10/26/2016, 4/1/2016, 3/9/2016, 11/17/2015, 1/22/2010, 9/25/2009    DIABETES MONOFILAMENT / LE EXAM 10/26/2017 10/26/2016    COLON CANCER SCREENING ANNUAL FIT 11/15/2017 11/15/2016    RETINAL SCREENING 1/12/2018 1/12/2017, 8/25/2016    FASTING LIPID PROFILE 3/10/2018 3/10/2017, 11/30/2016, 4/7/2016, 4/1/2016, 12/8/2015, 11/17/2015, 1/22/2010, 9/25/2009    SERUM CREATININE 3/10/2018 3/10/2017, 11/30/2016, 5/9/2016, 4/20/2016, 4/10/2016, 4/7/2016,  "4/6/2016, 4/1/2016, 12/8/2015, 11/21/2015, 11/20/2015, 11/17/2015, 11/17/2015, 1/22/2010, 1/21/2010, 9/24/2009            Current Immunizations     13-VALENT PCV PREVNAR 11/19/2015  6:12 AM    Influenza Vaccine Quad Inj (Preserved) 11/18/2015  3:53 PM    Pneumococcal polysaccharide vaccine (PPSV-23)  Incomplete    Tdap Vaccine  Incomplete      Below and/or attached are the medications your provider expects you to take. Review all of your home medications and newly ordered medications with your provider and/or pharmacist. Follow medication instructions as directed by your provider and/or pharmacist. Please keep your medication list with you and share with your provider. Update the information when medications are discontinued, doses are changed, or new medications (including over-the-counter products) are added; and carry medication information at all times in the event of emergency situations     Allergies:  No Known Allergies          Medications  Valid as of: March 15, 2017 - 10:00 AM    Generic Name Brand Name Tablet Size Instructions for use    Cholecalciferol (Cap) VITAMIN D3 5000 UNIT Take 1 Cap by mouth every day.        DiltiaZEM HCl Coated Beads (CAPSULE SR 24 HR) CARDIZEM  MG Take 1 Cap by mouth 2 Times a Day.        Furosemide (Tab) LASIX 40 MG Take 1 Tab by mouth every day.        Glimepiride (Tab) AMARYL 2 MG TAKE 2 TABLETS BY MOUTH EVERY MORNING        Glucose Blood (Strip) glucose blood  1 Strip by Other route as needed (one touch ultra meter test bid dx- E11.65).        Insulin Glargine (Solution) LANTUS 100 UNIT/ML Take 20 units in the morning and 46 units in the evening as directed for a total dose of 66 units daily.        Insulin Syringe-Needle U-100 (Misc) INSULIN SYRINGE 1CC/29G 29G X 1/2\" 1 ML 1 Each by Does not apply route every day.        Lisinopril (Tab) PRINIVIL 5 MG Take 1 Tab by mouth every day.        Magnesium Oxide (Tab) MAG- (241.3 MG) MG Take 1 Tab by mouth every day.   "        Metoprolol Tartrate (Tab) LOPRESSOR 50 MG Take 1 Tab by mouth 2 times a day.        Simvastatin (Tab) ZOCOR 20 MG Take 1 Tab by mouth every evening.        Warfarin Sodium (Tab) COUMADIN 5 MG Take one to one and one-half (1-1.5) tablets daily as directed by coumadin clinic        Warfarin Sodium (Tab) COUMADIN 5 MG Take one to 2  tablets daily as directed by coumadin clinic        .                 Medicines prescribed today were sent to:     [facility] - [place], [place] - [address]    [address] [place] [address] [address]    Phone: [phone] Fax: [phone]    Open 24 Hours?: No      Medication refill instructions:       If your prescription bottle indicates you have medication refills left, it is not necessary to call your provider’s office. Please contact your pharmacy and they will refill your medication.    If your prescription bottle indicates you do not have any refills left, you may request refills at any time through one of the following ways: The online [employer] system (except Urgent Care), by calling your provider’s office, or by asking your pharmacy to contact your provider’s office with a refill request. Medication refills are processed only during regular business hours and may not be available until the next business day. Your provider may request additional information or to have a follow-up visit with you prior to refilling your medication.   *Please Note: Medication refills are assigned a new Rx number when refilled electronically. Your pharmacy may indicate that no refills were authorized even though a new prescription for the same medication is available at the pharmacy. Please request the medicine by name with the pharmacy before contacting your provider for a refill.        Your To Do List     Future Labs/Procedures Complete By Expires    [id number]  As directed 3/15/2018      Referral     A referral request has been sent to our patient care  coordination department. Please allow 3-5 business days for us to process this request and contact you either by phone or mail. If you do not hear from us by the 5th business day, please call us at [phone].        Instructions    I have placed a referral for Dietician at your appointment today.   You should receive a phone call or letter from [facility] stating your Referral has been completed. At that time, please contact the office you have been referred to in order to make an appointment.   If you do not hear from our office within 1-2 weeks, please contact Becky at [phone].  Diabetes Mellitus and Food  It is important for you to manage your blood sugar (glucose) level. Your blood glucose level can be greatly affected by what you eat. Eating healthier foods in the appropriate amounts throughout the day at about the same time each day will help you control your blood glucose level. It can also help slow or prevent worsening of your diabetes mellitus. Healthy eating may even help you improve the level of your blood pressure and reach or maintain a healthy weight.   General recommendations for healthful eating and cooking habits include:  · Eating meals and snacks regularly. Avoid going long periods of time without eating to lose weight.  · Eating a diet that consists mainly of plant-based foods, such as fruits, vegetables, nuts, legumes, and whole grains.  · Using low-heat cooking methods, such as baking, instead of high-heat cooking methods, such as deep frying.  Work with your dietitian to make sure you understand how to use the Nutrition Facts information on food labels.  HOW CAN FOOD AFFECT ME?  Carbohydrates  Carbohydrates affect your blood glucose level more than any other type of food. Your dietitian will help you determine how many carbohydrates to eat at each meal and teach you how to count carbohydrates. Counting carbohydrates is important to keep your blood glucose at a healthy level,  especially if you are using insulin or taking certain medicines for diabetes mellitus.  Alcohol  Alcohol can cause sudden decreases in blood glucose (hypoglycemia), especially if you use insulin or take certain medicines for diabetes mellitus. Hypoglycemia can be a life-threatening condition. Symptoms of hypoglycemia (sleepiness, dizziness, and disorientation) are similar to symptoms of having too much alcohol.   If your health care provider has given you approval to drink alcohol, do so in moderation and use the following guidelines:  · Women should not have more than one drink per day, and men should not have more than two drinks per day. One drink is equal to:  ¨ 12 oz of beer.  ¨ 5 oz of wine.  ¨ 1½ oz of hard liquor.  · Do not drink on an empty stomach.  · Keep yourself hydrated. Have water, diet soda, or unsweetened iced tea.  · Regular soda, juice, and other mixers might contain a lot of carbohydrates and should be counted.  WHAT FOODS ARE NOT RECOMMENDED?  As you make food choices, it is important to remember that all foods are not the same. Some foods have fewer nutrients per serving than other foods, even though they might have the same number of calories or carbohydrates. It is difficult to get your body what it needs when you eat foods with fewer nutrients. Examples of foods that you should avoid that are high in calories and carbohydrates but low in nutrients include:  · Trans fats (most processed foods list trans fats on the Nutrition Facts label).  · Regular soda.  · Juice.  · Candy.  · Sweets, such as cake, pie, doughnuts, and cookies.  · Fried foods.  WHAT FOODS CAN I EAT?  Eat nutrient-rich foods, which will nourish your body and keep you healthy. The food you should eat also will depend on several factors, including:  · The calories you need.  · The medicines you take.  · Your weight.  · Your blood glucose level.  · Your blood pressure level.  · Your cholesterol level.  You should eat a variety of  foods, including:  · Protein.  ¨ Lean cuts of meat.  ¨ Proteins low in saturated fats, such as fish, egg whites, and beans. Avoid processed meats.  · Fruits and vegetables.  ¨ Fruits and vegetables that may help control blood glucose levels, such as apples, mangoes, and yams.  · Dairy products.  ¨ Choose fat-free or low-fat dairy products, such as milk, yogurt, and cheese.  · Grains, bread, pasta, and rice.  ¨ Choose whole grain products, such as multigrain bread, whole oats, and brown rice. These foods may help control blood pressure.  · Fats.  ¨ Foods containing healthful fats, such as nuts, avocado, olive oil, canola oil, and fish.  DOES EVERYONE WITH DIABETES MELLITUS HAVE THE SAME MEAL PLAN?  Because every person with diabetes mellitus is different, there is not one meal plan that works for everyone. It is very important that you meet with a dietitian who will help you create a meal plan that is just right for you.     This information is not intended to replace advice given to you by your health care provider. Make sure you discuss any questions you have with your health care provider.     Document Released: 09/14/2006 Document Revised: 01/08/2016 Document Reviewed: 11/14/2014  [employer] Interactive Patient Education ©2016 [employer]    Your medical care was provided today by: REI Boyd    Thank You for the opportunity to serve you.    You may receive a brief survey in the mail shortly regarding your visit today. Please take a few moments to complete the survey and return it; no postage is necessary. We are working to serve our patient population better, improve customer service and our patients overall experience and your input can help us to accomplish this. We thank you for your help and for the opportunity to serve you today and in the future.     Special Instructions:  Always call 9-1-1 immediately if you develop a life threatening emergency.    Unless told otherwise please take all  medications as directed and complete prescription therapies.     Watch for the following signs that require additional evaluation: progressive lethargy or unresponsiveness, localized pain (chest, abdomen), shortness of breath, painful breathing, progressive vomiting with weakness, bloody stools, or new rash.     If you are prescribed pain medication or any other medication that is sedating, do not take medication before or while operating a vehicle or heavy machinery or equipment due to potential side effects such as drowsiness and/or dizziness.             Response Biomedical Access Code: Activation code not generated  Current Response Biomedical Status: Active

## 2017-03-15 NOTE — PATIENT INSTRUCTIONS
I have placed a referral for Dietician at your appointment today.   You should receive a phone call or letter from [facility] stating your Referral has been completed. At that time, please contact the office you have been referred to in order to make an appointment.   If you do not hear from our office within 1-2 weeks, please contact Becky at [phone].  Diabetes Mellitus and Food  It is important for you to manage your blood sugar (glucose) level. Your blood glucose level can be greatly affected by what you eat. Eating healthier foods in the appropriate amounts throughout the day at about the same time each day will help you control your blood glucose level. It can also help slow or prevent worsening of your diabetes mellitus. Healthy eating may even help you improve the level of your blood pressure and reach or maintain a healthy weight.   General recommendations for healthful eating and cooking habits include:  · Eating meals and snacks regularly. Avoid going long periods of time without eating to lose weight.  · Eating a diet that consists mainly of plant-based foods, such as fruits, vegetables, nuts, legumes, and whole grains.  · Using low-heat cooking methods, such as baking, instead of high-heat cooking methods, such as deep frying.  Work with your dietitian to make sure you understand how to use the Nutrition Facts information on food labels.  HOW CAN FOOD AFFECT ME?  Carbohydrates  Carbohydrates affect your blood glucose level more than any other type of food. Your dietitian will help you determine how many carbohydrates to eat at each meal and teach you how to count carbohydrates. Counting carbohydrates is important to keep your blood glucose at a healthy level, especially if you are using insulin or taking certain medicines for diabetes mellitus.  Alcohol  Alcohol can cause sudden decreases in blood glucose (hypoglycemia), especially if you use insulin or take certain medicines for diabetes mellitus.  Hypoglycemia can be a life-threatening condition. Symptoms of hypoglycemia (sleepiness, dizziness, and disorientation) are similar to symptoms of having too much alcohol.   If your health care provider has given you approval to drink alcohol, do so in moderation and use the following guidelines:  · Women should not have more than one drink per day, and men should not have more than two drinks per day. One drink is equal to:  ¨ 12 oz of beer.  ¨ 5 oz of wine.  ¨ 1½ oz of hard liquor.  · Do not drink on an empty stomach.  · Keep yourself hydrated. Have water, diet soda, or unsweetened iced tea.  · Regular soda, juice, and other mixers might contain a lot of carbohydrates and should be counted.  WHAT FOODS ARE NOT RECOMMENDED?  As you make food choices, it is important to remember that all foods are not the same. Some foods have fewer nutrients per serving than other foods, even though they might have the same number of calories or carbohydrates. It is difficult to get your body what it needs when you eat foods with fewer nutrients. Examples of foods that you should avoid that are high in calories and carbohydrates but low in nutrients include:  · Trans fats (most processed foods list trans fats on the Nutrition Facts label).  · Regular soda.  · Juice.  · Candy.  · Sweets, such as cake, pie, doughnuts, and cookies.  · Fried foods.  WHAT FOODS CAN I EAT?  Eat nutrient-rich foods, which will nourish your body and keep you healthy. The food you should eat also will depend on several factors, including:  · The calories you need.  · The medicines you take.  · Your weight.  · Your blood glucose level.  · Your blood pressure level.  · Your cholesterol level.  You should eat a variety of foods, including:  · Protein.  ¨ Lean cuts of meat.  ¨ Proteins low in saturated fats, such as fish, egg whites, and beans. Avoid processed meats.  · Fruits and vegetables.  ¨ Fruits and vegetables that may help control blood glucose levels,  such as apples, mangoes, and yams.  · Dairy products.  ¨ Choose fat-free or low-fat dairy products, such as milk, yogurt, and cheese.  · Grains, bread, pasta, and rice.  ¨ Choose whole grain products, such as multigrain bread, whole oats, and brown rice. These foods may help control blood pressure.  · Fats.  ¨ Foods containing healthful fats, such as nuts, avocado, olive oil, canola oil, and fish.  DOES EVERYONE WITH DIABETES MELLITUS HAVE THE SAME MEAL PLAN?  Because every person with diabetes mellitus is different, there is not one meal plan that works for everyone. It is very important that you meet with a dietitian who will help you create a meal plan that is just right for you.     This information is not intended to replace advice given to you by your health care provider. Make sure you discuss any questions you have with your health care provider.     Document Released: 09/14/2006 Document Revised: 01/08/2016 Document Reviewed: 11/14/2014  [employer] Interactive Patient Education ©2016 [employer]    Your medical care was provided today by: REI Boyd    Thank You for the opportunity to serve you.    You may receive a brief survey in the mail shortly regarding your visit today. Please take a few moments to complete the survey and return it; no postage is necessary. We are working to serve our patient population better, improve customer service and our patients overall experience and your input can help us to accomplish this. We thank you for your help and for the opportunity to serve you today and in the future.     Special Instructions:  Always call 9-1-1 immediately if you develop a life threatening emergency.    Unless told otherwise please take all medications as directed and complete prescription therapies.     Watch for the following signs that require additional evaluation: progressive lethargy or unresponsiveness, localized pain (chest, abdomen), shortness of breath, painful breathing,  progressive vomiting with weakness, bloody stools, or new rash.     If you are prescribed pain medication or any other medication that is sedating, do not take medication before or while operating a vehicle or heavy machinery or equipment due to potential side effects such as drowsiness and/or dizziness.

## 2017-03-17 ENCOUNTER — TELEPHONE (OUTPATIENT)
Dept: VASCULAR LAB | Facility: MEDICAL CENTER | Age: 62
End: 2017-03-17

## 2017-03-17 ENCOUNTER — TELEPHONE (OUTPATIENT)
Dept: MEDICAL GROUP | Facility: CLINIC | Age: 62
End: 2017-03-17

## 2017-03-17 NOTE — TELEPHONE ENCOUNTER
Please call pharmacy.   We have increased her Lantus to 26 units in the morning, 50 units in the evening.     Kane Campos, APRN

## 2017-03-17 NOTE — TELEPHONE ENCOUNTER
[facility]    GI consultants asked for clearance for holding warfarin.  Due to pt's stroke history, suggest bridging this patient.  Clinic to provide bridging instructions at next visit on 4/21/17.    Colonoscopy scheduled for 5/2/17, clearance faxed to GI consultants.      Vic Morrison, PHARMD

## 2017-03-21 RX ORDER — INSULIN GLARGINE 100 [IU]/ML
INJECTION, SOLUTION SUBCUTANEOUS
Qty: 1 VIAL | Refills: 4 | Status: SHIPPED | OUTPATIENT
Start: 2017-03-21 | End: 2017-06-15

## 2017-03-23 ENCOUNTER — HOSPITAL ENCOUNTER (OUTPATIENT)
Dept: CARDIOLOGY | Facility: MEDICAL CENTER | Age: 62
End: 2017-03-23
Attending: INTERNAL MEDICINE
Payer: MEDICAID

## 2017-03-23 DIAGNOSIS — I48.92 PAROXYSMAL ATRIAL FLUTTER (HCC): ICD-10-CM

## 2017-03-23 DIAGNOSIS — I05.2 MITRAL STENOSIS WITH INSUFFICIENCY, UNSPECIFIED ETIOLOGY: ICD-10-CM

## 2017-03-23 LAB
LV EJECT FRACT  99904: 65
LV EJECT FRACT MOD 2C 99903: 69.12
LV EJECT FRACT MOD 4C 99902: 77.76
LV EJECT FRACT MOD BP 99901: 72.64

## 2017-03-23 PROCEDURE — 93306 TTE W/DOPPLER COMPLETE: CPT

## 2017-03-23 PROCEDURE — 93306 TTE W/DOPPLER COMPLETE: CPT | Mod: 26 | Performed by: INTERNAL MEDICINE

## 2017-03-31 ENCOUNTER — OFFICE VISIT (OUTPATIENT)
Dept: URGENT CARE | Facility: PHYSICIAN GROUP | Age: 62
End: 2017-03-31
Payer: MEDICAID

## 2017-03-31 VITALS
SYSTOLIC BLOOD PRESSURE: 132 MMHG | RESPIRATION RATE: 18 BRPM | DIASTOLIC BLOOD PRESSURE: 94 MMHG | BODY MASS INDEX: 54.21 KG/M2 | HEART RATE: 84 BPM | WEIGHT: 293 LBS | TEMPERATURE: 98.6 F | OXYGEN SATURATION: 96 %

## 2017-03-31 DIAGNOSIS — L03.116 CELLULITIS OF LEFT LOWER EXTREMITY: ICD-10-CM

## 2017-03-31 PROCEDURE — 99214 OFFICE O/P EST MOD 30 MIN: CPT | Performed by: FAMILY MEDICINE

## 2017-03-31 RX ORDER — SULFAMETHOXAZOLE AND TRIMETHOPRIM 800; 160 MG/1; MG/1
TABLET ORAL
Qty: 20 TAB | Refills: 0 | Status: SHIPPED | OUTPATIENT
Start: 2017-03-31 | End: 2017-06-15

## 2017-03-31 NOTE — PROGRESS NOTES
Chief Complaint:    Chief Complaint   Patient presents with   • Leg Pain     Possible cellulitis       History of Present Illness:    This is a new problem. 2-3 weeks ago, she accidentally hit left lower leg against bed and sustained a wound. Since then has increasing redness and soreness in left lower leg. I reviewed with her OV 9/28/16 and she was seen for similar and reports Bactrim DS rx'd worked/was tolerated.      Review of Systems:    Constitutional: Negative for fever, chills, and diaphoresis.   Eyes: Negative for change in vision, photophobia, pain, redness, and discharge.  ENT: Negative for ear pain, ear discharge, hearing loss, tinnitus, nasal congestion, nosebleeds, and sore throat.    Respiratory: Negative for cough, hemoptysis, sputum production, shortness of breath, wheezing, and stridor.    Cardiovascular: Negative for chest pain.  Gastrointestinal: Negative for abdominal pain, nausea, vomiting, diarrhea, constipation, blood in stool, and melena.   Genitourinary: Negative for dysuria, urinary urgency, urinary frequency, hematuria, and flank pain.   Musculoskeletal: See HPI.  Skin: See HPI.   Neurological: No new symptoms.  Endo: Diabetic, on medication.   Heme: On Warfarin.   Psychiatric/Behavioral: Negative for depression, suicidal ideas, hallucinations, memory loss and substance abuse. The patient is not nervous/anxious and does not have insomnia.      Past Medical History:    Past Medical History   Diagnosis Date   • Diabetes    • Hypertension    • Heart murmur      childhood   • Morbid obesity (CMS-HCC)    • Stroke (CMS-HCC) 11/18/2015     Acute ischemic right PCA stroke-Hemorrhage transformation is noted within the infarct [I63.531]   • Atrial flutter (CMS-HCC) 11/18/2015   • Atrial fibrillation (CMS-HCC)    • Hyperlipidemia    • Pneumonia 4/6/2016   • Osteoarthritis    • Valvular heart disease      Mitral stenosis       Past Surgical History:    Past Surgical History   Procedure Laterality Date  "  • Tonsillectomy     • Pilonidal cyst excision     • Recovery  5/9/2016     Procedure: CATH LAB FLUTTER ABLATION, CAMILO WITH ANESTHESIA DR. ARMIJO;  Surgeon: Recoveryonly Surgery;  [place] [facility] [place]:        Social History:    Social History     Social History   • Marital Status:      Spouse Name: N/A   • Number of Children: N/A   • Years of Education: N/A     Occupational History   • Not on file.     Social History Main Topics   • Smoking status: Passive Smoke Exposure - Never Smoker   • Smokeless tobacco: Never Used   • Alcohol Use: No   • Drug Use: No   • Sexual Activity: Not on file     Other Topics Concern   • Not on file     Social History Narrative       Family History:    Family History   Problem Relation Age of Onset   • Stroke Mother 71   • Cancer Father 71   • Heart Disease Brother        Medications:    Current Outpatient Prescriptions on File Prior to Visit   Medication Sig Dispense Refill   • LANTUS 100 UNIT/ML Solution INJECT 20 UNITS SUBCUTANEOUSLY EVERY MORNING AND INJECT 46 UNITS EVERY EVENING AS DIRECTED **TOTAL 66 UNITS DAILY** 1 Vial 4   • insulin glargine (LANTUS) 100 UNIT/ML Solution Take 20 units in the morning and 46 units in the evening as directed for a total dose of 66 units daily. 1 Vial 6   • simvastatin (ZOCOR) 20 MG Tab Take 1 Tab by mouth every evening. 90 Tab 3   • warfarin (COUMADIN) 5 MG Tab Take one to 2  tablets daily as directed by coumadin clinic 180 Tab 1   • lisinopril (PRINIVIL) 5 MG Tab Take 1 Tab by mouth every day. 90 Tab 3   • metoprolol (LOPRESSOR) 50 MG Tab Take 1 Tab by mouth 2 times a day. 180 Tab 3   • diltiazem CD (CARDIZEM CD) 120 MG CAPSULE SR 24 HR Take 1 Cap by mouth 2 Times a Day. 180 Cap 3   • glimepiride (AMARYL) 2 MG Tab TAKE 2 TABLETS BY MOUTH EVERY MORNING 60 Tab 2   • cholecalciferol (VITAMIN D3) 5000 UNIT Cap Take 1 Cap by mouth every day. 30 Cap 5   • INSULIN SYRINGE 1CC/29G 29G X 1/2\" 1 ML Misc 1 Each by Does not " apply route every day. 100 Each 3   • glucose blood strip 1 Strip by Other route as needed (one touch ultra meter test bid dx- E11.65). 100 Strip 11   • furosemide (LASIX) 40 MG Tab Take 1 Tab by mouth every day. 90 Tab 3   • magnesium oxide (MAG-OX) 400 (241.3 MG) MG Tab tablet Take 1 Tab by mouth every day. 60 Tab 2     No current facility-administered medications on file prior to visit.       Allergies:    No Known Allergies      Vitals:    Filed Vitals:    03/31/17 1513   BP: 132/94   Pulse: 84   Temp: 37 °C (98.6 °F)   Resp: 18   Weight: 143.337 kg (316 lb)   SpO2: 96%       Physical Exam:    Constitutional: Vital signs reviewed. Appears well-developed and well-nourished. Morbidly obese. No acute distress.   Eyes: Sclera white, conjunctivae clear.  ENT: External ears normal. Hearing normal.  Cardiovascular: Peripheral pulses 2+. Bilateral 1-2+ pitting edema lower legs which are baseline for her.  Pulmonary/Chest: Respirations non-labored.  Musculoskeletal: No muscular atrophy or weakness.  Neurological: Alert and oriented to person, place, and time. Muscle tone normal. Coordination normal. Light touch and sensation normal.   Skin: Left lower leg anteriorly: there is evidence of healed wound with large area of surrounding erythema and mild tenderness to palpation.  Psychiatric: Normal mood and affect. Behavior is normal. Judgment and thought content normal.       Assessment / Plan:    1. Cellulitis of left lower extremity  - sulfamethoxazole-trimethoprim (BACTRIM DS) 800-160 MG tablet; 1 TAB TWICE A DAY X 10 DAYS.  Dispense: 20 Tab; Refill: 0      Discussed with her DDX and management options.    Agreeable to medication prescribed.    Follow-up with PCP or urgent care if getting worse or not better with above.

## 2017-03-31 NOTE — MR AVS SNAPSHOT
Shani Love   3/31/2017 3:10 PM   Office Visit   MRN: [id number]    Department:  [facility]   Dept Phone:  [phone]    Description:  Female : 1955   Provider:  Donte Grant M.D.           Reason for Visit     Leg Pain Possible cellulitis      Allergies as of 3/31/2017     No Known Allergies      You were diagnosed with     Cellulitis of left lower extremity   [496265]         Vital Signs     Blood Pressure Pulse Temperature Respirations Weight Oxygen Saturation    132/94 mmHg 84 37 °C (98.6 °F) 18 143.337 kg (316 lb) 96%    Smoking Status                   Passive Smoke Exposure - Never Smoker           Basic Information     Date Of Birth Sex Race Ethnicity Preferred Language    1955 Female White Non- English      Your appointments     2017 10:00 AM   Established Patient Pul with STORM Marcial   [facility] (--)    [address] [place] [address] [address]  [facility] [id number] [phone]            2017  9:15 AM   Anti-Coag Routine with [place] ANTI-COAG   [facility] ([place])    [address] [facility] [place] [id number] [phone]            [place] 15, 2017  9:20 AM   Established Patient with REGLA Montiel   [facility] (--)    [address] [id number] [phone]           You will be receiving a confirmation call a few days before your appointment from our automated call confirmation system.              Problem List              ICD-10-CM Priority Class Noted - Resolved    Obesity E66.9 Medium  2015 - Present    Diabetes type 2, uncontrolled (CMS-HCC) E11.65 Medium  2015 - Present    Hyperlipidemia E78.5 Medium  2015 - Present    Essential hypertension I10 Medium  2015 - Present    Persistent atrial fibrillation (CMS-HCC) I48.1 Medium  2015 - Present    Mitral stenosis with regurgitation I05.2 Medium  2016 -  Present    Pulmonary hypertension (CMS-HCC) I27.2 Medium  4/8/2016 - Present    History of stroke Z86.73 Medium  11/30/2015 - Present    Status post ablation of atrial flutter Z98.890, Z86.79 Medium  5/18/2016 - Present    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 Low  10/26/2016 - Present    Fecal occult blood test positive R19.5   12/16/2016 - Present    Vitamin D deficiency E55.9   12/16/2016 - Present    Obstructive sleep apnea G47.33   2/15/2017 - Present    Chronic anticoagulation Z79.01   2/15/2017 - Present    Paroxysmal atrial flutter (CMS-HCC) I48.92   2/23/2017 - Present    Chronic kidney disease, stage III (moderate) N18.3   3/15/2017 - Present      Health Maintenance        Date Due Completion Dates    PAP SMEAR 12/8/1974 ---    MAMMOGRAM 12/8/1993 ---    IMM ZOSTER VACCINE 12/8/2013 ---    IMM INFLUENZA (1) 9/1/2016 11/18/2015    URINE ACR / MICROALBUMIN 5/3/2017 5/3/2016    A1C SCREENING 9/10/2017 3/10/2017, 11/30/2016, 10/26/2016, 4/1/2016, 3/9/2016, 11/17/2015, 1/22/2010, 9/25/2009    DIABETES MONOFILAMENT / LE EXAM 10/26/2017 10/26/2016    COLON CANCER SCREENING ANNUAL FIT 11/15/2017 11/15/2016    RETINAL SCREENING 1/12/2018 1/12/2017, 8/25/2016    FASTING LIPID PROFILE 3/10/2018 3/10/2017, 11/30/2016, 4/7/2016, 4/1/2016, 12/8/2015, 11/17/2015, 1/22/2010, 9/25/2009    SERUM CREATININE 3/10/2018 3/10/2017, 11/30/2016, 5/9/2016, 4/20/2016, 4/10/2016, 4/7/2016, 4/6/2016, 4/1/2016, 12/8/2015, 11/21/2015, 11/20/2015, 11/17/2015, 11/17/2015, 1/22/2010, 1/21/2010, 9/24/2009    IMM DTaP/Tdap/Td Vaccine (2 - Td) 3/15/2027 3/15/2017            Current Immunizations     13-VALENT PCV PREVNAR 11/19/2015  6:12 AM    Influenza Vaccine Quad Inj (Preserved) 11/18/2015  3:53 PM    Pneumococcal polysaccharide vaccine (PPSV-23) 3/15/2017    Tdap Vaccine 3/15/2017      Below and/or attached are the medications your provider expects you to take. Review all of your home medications and newly ordered medications with your  "provider and/or pharmacist. Follow medication instructions as directed by your provider and/or pharmacist. Please keep your medication list with you and share with your provider. Update the information when medications are discontinued, doses are changed, or new medications (including over-the-counter products) are added; and carry medication information at all times in the event of emergency situations     Allergies:  No Known Allergies          Medications  Valid as of: March 31, 2017 -  3:29 PM    Generic Name Brand Name Tablet Size Instructions for use    Cholecalciferol (Cap) VITAMIN D3 5000 UNIT Take 1 Cap by mouth every day.        DiltiaZEM HCl Coated Beads (CAPSULE SR 24 HR) CARDIZEM  MG Take 1 Cap by mouth 2 Times a Day.        Furosemide (Tab) LASIX 40 MG Take 1 Tab by mouth every day.        Glimepiride (Tab) AMARYL 2 MG TAKE 2 TABLETS BY MOUTH EVERY MORNING        Glucose Blood (Strip) glucose blood  1 Strip by Other route as needed (one touch ultra meter test bid dx- E11.65).        Insulin Glargine (Solution) LANTUS 100 UNIT/ML INJECT 20 UNITS SUBCUTANEOUSLY EVERY MORNING AND INJECT 46 UNITS EVERY EVENING AS DIRECTED **TOTAL 66 UNITS DAILY**        Insulin Glargine (Solution) LANTUS 100 UNIT/ML Take 20 units in the morning and 46 units in the evening as directed for a total dose of 66 units daily.        Insulin Syringe-Needle U-100 (Misc) INSULIN SYRINGE 1CC/29G 29G X 1/2\" 1 ML 1 Each by Does not apply route every day.        Lisinopril (Tab) PRINIVIL 5 MG Take 1 Tab by mouth every day.        Magnesium Oxide (Tab) MAG- (241.3 MG) MG Take 1 Tab by mouth every day.        Metoprolol Tartrate (Tab) LOPRESSOR 50 MG Take 1 Tab by mouth 2 times a day.        Simvastatin (Tab) ZOCOR 20 MG Take 1 Tab by mouth every evening.        Sulfamethoxazole-Trimethoprim (Tab) BACTRIM -160 MG 1 TAB TWICE A DAY X 10 DAYS.        Warfarin Sodium (Tab) COUMADIN 5 MG Take one to 2  tablets daily as " directed by coumadin clinic        .                 Medicines prescribed today were sent to:     [facility] - [place], [place] - [address] [facility]    [address] [facility] [address] [place] [address]    Phone: [phone] Fax: [phone]    Open 24 Hours?: No      Medication refill instructions:       If your prescription bottle indicates you have medication refills left, it is not necessary to call your provider’s office. Please contact your pharmacy and they will refill your medication.    If your prescription bottle indicates you do not have any refills left, you may request refills at any time through one of the following ways: The online [employer] system (except Urgent Care), by calling your provider’s office, or by asking your pharmacy to contact your provider’s office with a refill request. Medication refills are processed only during regular business hours and may not be available until the next business day. Your provider may request additional information or to have a follow-up visit with you prior to refilling your medication.   *Please Note: Medication refills are assigned a new Rx number when refilled electronically. Your pharmacy may indicate that no refills were authorized even though a new prescription for the same medication is available at the pharmacy. Please request the medicine by name with the pharmacy before contacting your provider for a refill.           [employer] Access Code: Activation code not generated  Current [employer] Status: Active

## 2017-04-13 ENCOUNTER — APPOINTMENT (OUTPATIENT)
Dept: PULMONOLOGY | Facility: HOSPICE | Age: 62
End: 2017-04-13
Payer: MEDICAID

## 2017-04-21 ENCOUNTER — ANTICOAGULATION VISIT (OUTPATIENT)
Dept: MEDICAL GROUP | Facility: PHYSICIAN GROUP | Age: 62
End: 2017-04-21
Payer: MEDICAID

## 2017-04-21 ENCOUNTER — ANTICOAGULATION MONITORING (OUTPATIENT)
Dept: VASCULAR LAB | Facility: MEDICAL CENTER | Age: 62
End: 2017-04-21

## 2017-04-21 VITALS — SYSTOLIC BLOOD PRESSURE: 141 MMHG | DIASTOLIC BLOOD PRESSURE: 81 MMHG | HEART RATE: 75 BPM

## 2017-04-21 DIAGNOSIS — I48.19 PERSISTENT ATRIAL FIBRILLATION (HCC): ICD-10-CM

## 2017-04-21 LAB — INR PPP: 2.1 (ref 2–3.5)

## 2017-04-21 PROCEDURE — 85610 PROTHROMBIN TIME: CPT | Performed by: NURSE PRACTITIONER

## 2017-04-21 NOTE — MR AVS SNAPSHOT
Shani Love   2017 9:15 AM   Anticoagulation Visit   MRN: [id number]    Department:  [facility]   Dept Phone:  [phone]    Description:  Female : 1955   Provider:  [facility] ANTI-COAG           Allergies as of 2017     No Known Allergies      You were diagnosed with     Persistent atrial fibrillation (CMS-HCC)   [734111]         Vital Signs     Blood Pressure Pulse Smoking Status             141/81 mmHg 75 Passive Smoke Exposure - Never Smoker         Basic Information     Date Of Birth Sex Race Ethnicity Preferred Language    1955 Female White Non- English      Your appointments     2017  9:15 AM   Anti-Coag Routine with [place] ANTI-COAG   [facility] ([place])    [address] [facility] [address] [phone]            May 12, 2017 10:30 AM   Anti-Coag Routine with [place] ANTI-COAG   [facility] ([place])    [address] [place] [id number] [phone]            May 30, 2017 10:00 AM   Established Patient Pul with STORM Marcial   [facility] (--)    [address] [facility] [address]  [facility] [address] [phone]            [place] 15, 2017  9:20 AM   Established Patient with REGLA Montiel   [facility] (--)    [address] [id number] [phone]           You will be receiving a confirmation call a few days before your appointment from our automated call confirmation system.              Problem List              ICD-10-CM Priority Class Noted - Resolved    Obesity E66.9 Medium  2015 - Present    Diabetes type 2, uncontrolled (CMS-HCC) E11.65 Medium  2015 - Present    Hyperlipidemia E78.5 Medium  2015 - Present    Essential hypertension I10 Medium  2015 - Present    Persistent atrial fibrillation (CMS-HCC) I48.1 Medium  2015 - Present    Mitral stenosis with  regurgitation I05.2 Medium  4/8/2016 - Present    Pulmonary hypertension (CMS-HCC) I27.2 Medium  4/8/2016 - Present    History of stroke Z86.73 Medium  11/30/2015 - Present    Status post ablation of atrial flutter Z98.890, Z86.79 Medium  5/18/2016 - Present    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 Low  10/26/2016 - Present    Fecal occult blood test positive R19.5   12/16/2016 - Present    Vitamin D deficiency E55.9   12/16/2016 - Present    Obstructive sleep apnea G47.33   2/15/2017 - Present    Chronic anticoagulation Z79.01   2/15/2017 - Present    Paroxysmal atrial flutter (CMS-HCC) I48.92   2/23/2017 - Present    Chronic kidney disease, stage III (moderate) N18.3   3/15/2017 - Present      Health Maintenance        Date Due Completion Dates    PAP SMEAR 12/8/1974 ---    MAMMOGRAM 12/8/1993 ---    IMM ZOSTER VACCINE 12/8/2013 ---    URINE ACR / MICROALBUMIN 5/3/2017 5/3/2016    A1C SCREENING 9/10/2017 3/10/2017, 11/30/2016, 10/26/2016, 4/1/2016, 3/9/2016, 11/17/2015, 1/22/2010, 9/25/2009    DIABETES MONOFILAMENT / LE EXAM 10/26/2017 10/26/2016    COLON CANCER SCREENING ANNUAL FIT 11/15/2017 11/15/2016    RETINAL SCREENING 1/12/2018 1/12/2017, 8/25/2016    FASTING LIPID PROFILE 3/10/2018 3/10/2017, 11/30/2016, 4/7/2016, 4/1/2016, 12/8/2015, 11/17/2015, 1/22/2010, 9/25/2009    SERUM CREATININE 3/10/2018 3/10/2017, 11/30/2016, 5/9/2016, 4/20/2016, 4/10/2016, 4/7/2016, 4/6/2016, 4/1/2016, 12/8/2015, 11/21/2015, 11/20/2015, 11/17/2015, 11/17/2015, 1/22/2010, 1/21/2010, 9/24/2009    IMM DTaP/Tdap/Td Vaccine (2 - Td) 3/15/2027 3/15/2017            Results     POCT Protime      Component    INR    2.1    Comment:     ic valid [id number] 160 exp 01/2018                        Current Immunizations     13-VALENT PCV PREVNAR 11/19/2015  6:12 AM    Influenza Vaccine Quad Inj (Preserved) 11/18/2015  3:53 PM    Pneumococcal polysaccharide vaccine (PPSV-23) 3/15/2017    Tdap Vaccine 3/15/2017      Below and/or attached are the  "medications your provider expects you to take. Review all of your home medications and newly ordered medications with your provider and/or pharmacist. Follow medication instructions as directed by your provider and/or pharmacist. Please keep your medication list with you and share with your provider. Update the information when medications are discontinued, doses are changed, or new medications (including over-the-counter products) are added; and carry medication information at all times in the event of emergency situations     Allergies:  No Known Allergies          Medications  Valid as of: April 21, 2017 -  9:08 AM    Generic Name Brand Name Tablet Size Instructions for use    Cholecalciferol (Cap) VITAMIN D3 5000 UNIT Take 1 Cap by mouth every day.        DilTIAZem HCl Coated Beads (CAPSULE SR 24 HR) CARDIZEM  MG Take 1 Cap by mouth 2 Times a Day.        Furosemide (Tab) LASIX 40 MG Take 1 Tab by mouth every day.        Glimepiride (Tab) AMARYL 2 MG TAKE 2 TABLETS BY MOUTH EVERY MORNING        Glucose Blood (Strip) glucose blood  1 Strip by Other route as needed (one touch ultra meter test bid dx- E11.65).        Insulin Glargine (Solution) LANTUS 100 UNIT/ML INJECT 20 UNITS SUBCUTANEOUSLY EVERY MORNING AND INJECT 46 UNITS EVERY EVENING AS DIRECTED **TOTAL 66 UNITS DAILY**        Insulin Glargine (Solution) LANTUS 100 UNIT/ML Take 20 units in the morning and 46 units in the evening as directed for a total dose of 66 units daily.        Insulin Syringe-Needle U-100 (Misc) INSULIN SYRINGE 1CC/29G 29G X 1/2\" 1 ML 1 Each by Does not apply route every day.        Lisinopril (Tab) PRINIVIL 5 MG Take 1 Tab by mouth every day.        Magnesium Oxide (Tab) MAG- (241.3 MG) MG Take 1 Tab by mouth every day.        Metoprolol Tartrate (Tab) LOPRESSOR 50 MG Take 1 Tab by mouth 2 times a day.        Simvastatin (Tab) ZOCOR 20 MG Take 1 Tab by mouth every evening.        Sulfamethoxazole-Trimethoprim (Tab) BACTRIM " -160 MG 1 TAB TWICE A DAY X 10 DAYS.        Warfarin Sodium (Tab) COUMADIN 5 MG Take one to 2  tablets daily as directed by coumadin clinic        .                 Medicines prescribed today were sent to:     [facility] - [place], [place] - [address] [facility]    [address] [facility] [address] [facility] [address]    Phone: [phone] Fax: [phone]    Open 24 Hours?: No      Medication refill instructions:       If your prescription bottle indicates you have medication refills left, it is not necessary to call your provider’s office. Please contact your pharmacy and they will refill your medication.    If your prescription bottle indicates you do not have any refills left, you may request refills at any time through one of the following ways: The online [employer] system (except Urgent Care), by calling your provider’s office, or by asking your pharmacy to contact your provider’s office with a refill request. Medication refills are processed only during regular business hours and may not be available until the next business day. Your provider may request additional information or to have a follow-up visit with you prior to refilling your medication.   *Please Note: Medication refills are assigned a new Rx number when refilled electronically. Your pharmacy may indicate that no refills were authorized even though a new prescription for the same medication is available at the pharmacy. Please request the medicine by name with the pharmacy before contacting your provider for a refill.        Warfarin Dosing Calendar   April 2017 Details    Sun Mon Tue Wed Thu Fri Sat           1                 2               3               4               5               6               7               8                 9               10               11               12               13               14               15                 16               17               18               19               20                  21   2.1   2.5 mg   See details      22      5 mg           23      5 mg         24      2.5 mg         25      5 mg         26      2.5 mg         27      Hold         28      Hold         29      Hold           30      Hold                Date Details   04/21 This INR check   INR: 2.1   ic valid [id number] 160 exp 01/2018               How to take your warfarin dose     To take:  2.5 mg Take 0.5 of a 5 mg tablet.    To take:  5 mg Take 1 of the 5 mg tablets.    Hold Do not take your warfarin dose. See the Details table to the right for additional instructions.                Warfarin Dosing Calendar   May 2017 Details    Sun Mon Tue Wed Thu Fri Sat      1      Hold         2      5 mg         3      5 mg         4      5 mg         5      5 mg         6      5 mg           7      5 mg         8      2.5 mg         9      5 mg         10      2.5 mg         11      5 mg         12      2.5 mg         13                 14               15               16               17               18               19               20                 21               22               23               24               25               26               27                 28               29               30               31                   Date Details   No additional details    Date of next INR:  5/12/2017         How to take your warfarin dose     To take:  2.5 mg Take 0.5 of a 5 mg tablet.    To take:  5 mg Take 1 of the 5 mg tablets.    Hold Do not take your warfarin dose. See the Details table to the right for additional instructions.                   [employer] Access Code: Activation code not generated  Current [employer] Status: Active

## 2017-04-21 NOTE — Clinical Note
Please advise, CHADSVAS=5 but the stroke was 2 years ago and noted to be ischemic/hemorrahge do you think we should bridge. If it did not say hemorrhage i would say yes based on the  2017 ACC pathway.  Maybe i'm over thinking it.     Thanks Stephen

## 2017-04-21 NOTE — PROGRESS NOTES
Anticoagulation Summary as of 4/21/2017     INR goal 2.0-3.0   Selected INR 2.1 (4/21/2017)   Maintenance plan 2.5 mg (5 mg x 0.5) on Mon, Wed, Fri; 5 mg (5 mg x 1) all other days   Weekly total 27.5 mg   Plan last modified Santana Good, PHARMD (11/18/2016)   Next INR check    Target end date Indefinite    Indications   Atrial flutter (CMS-HCC) (Resolved) [I48.92]  Acute ischemic right PCA stroke-Hemorrhage transformation is noted within the infarct (Resolved) [I63.531]  Persistent atrial fibrillation (CMS-HCC) [I48.1]         Anticoagulation Episode Summary     INR check location [facility]    Preferred lab     Send INR reminders to     Comments       Anticoagulation Care Providers     Provider Role Specialty Phone number    Bijan Sampson M.D. Referring Cardiology [phone]    [facility] An[facility] Responsible  [phone]        Anticoagulation Patient Findings     Current Outpatient Prescriptions on File Prior to Visit   Medication Sig Dispense Refill   • sulfamethoxazole-trimethoprim (BACTRIM DS) 800-160 MG tablet 1 TAB TWICE A DAY X 10 DAYS. 20 Tab 0   • LANTUS 100 UNIT/ML Solution INJECT 20 UNITS SUBCUTANEOUSLY EVERY MORNING AND INJECT 46 UNITS EVERY EVENING AS DIRECTED **TOTAL 66 UNITS DAILY** 1 Vial 4   • insulin glargine (LANTUS) 100 UNIT/ML Solution Take 20 units in the morning and 46 units in the evening as directed for a total dose of 66 units daily. 1 Vial 6   • simvastatin (ZOCOR) 20 MG Tab Take 1 Tab by mouth every evening. 90 Tab 3   • warfarin (COUMADIN) 5 MG Tab Take one to 2  tablets daily as directed by coumadin clinic 180 Tab 1   • lisinopril (PRINIVIL) 5 MG Tab Take 1 Tab by mouth every day. 90 Tab 3   • metoprolol (LOPRESSOR) 50 MG Tab Take 1 Tab by mouth 2 times a day. 180 Tab 3   • diltiazem CD (CARDIZEM CD) 120 MG CAPSULE SR 24 HR Take 1 Cap by mouth 2 Times a Day. 180 Cap 3   • glimepiride (AMARYL) 2 MG Tab TAKE 2 TABLETS BY MOUTH EVERY MORNING 60 Tab 2   •  "cholecalciferol (VITAMIN D3) 5000 UNIT Cap Take 1 Cap by mouth every day. 30 Cap 5   • INSULIN SYRINGE 1CC/29G 29G X 1/2\" 1 ML Misc 1 Each by Does not apply route every day. 100 Each 3   • glucose blood strip 1 Strip by Other route as needed (one touch ultra meter test bid dx- E11.65). 100 Strip 11   • furosemide (LASIX) 40 MG Tab Take 1 Tab by mouth every day. 90 Tab 3   • magnesium oxide (MAG-OX) 400 (241.3 MG) MG Tab tablet Take 1 Tab by mouth every day. 60 Tab 2     No current facility-administered medications on file prior to visit.        Lab Results   Component Value Date/Time    SODIUM 137 03/10/2017 09:15 AM    POTASSIUM 4.3 03/10/2017 09:15 AM    CHLORIDE 102 03/10/2017 09:15 AM    CO2 29 03/10/2017 09:15 AM    GLUCOSE 215* 03/10/2017 09:15 AM    BUN 15 03/10/2017 09:15 AM    CREATININE 1.07 03/10/2017 09:15 AM          Past Medical History   Diagnosis Date   • Diabetes    • Hypertension    • Heart murmur      childhood   • Morbid obesity (CMS-HCC)    • Stroke (CMS-HCC) 11/18/2015     Acute ischemic right PCA stroke-Hemorrhage transformation is noted within the infarct [I63.531]   • Atrial flutter (CMS-HCC) 11/18/2015   • Atrial fibrillation (CMS-HCC)    • Hyperlipidemia    • Pneumonia 4/6/2016   • Osteoarthritis    • Valvular heart disease      Mitral stenosis       CHADSVAS=5 with a colonoscopy on 5/2/2017, based on the criteria below and the type of stroke with will not send in Lovenox yet and ask Dr. Wagner because of the type of stroke.     2017 ACC Expert Consensus Decision Pathway for Periprocedural  Management of Anticoagulation in Patients With Nonvalvular Atrial Fibrillation.     [id number] 5-6, mod risk (or with PMH of stroke >90 days),   2. No stroke and low bleed risk  = no bridge   3. Stroke and low bleed risk= bridge        Shani Love seen in clinic today  INR  therapeutic.  She will hold for 5 days then resume normal dose after unless we bridge with lovenox .   Denies signs/symptoms of " bleeding and/or thrombosis.    Denies changes to diet or medications.   Follow up appointment in 2 week(s).      Loc Chan, MARCELD

## 2017-05-04 PROBLEM — E11.3493: Status: ACTIVE | Noted: 2017-05-04

## 2017-05-12 ENCOUNTER — ANTICOAGULATION VISIT (OUTPATIENT)
Dept: MEDICAL GROUP | Facility: PHYSICIAN GROUP | Age: 62
End: 2017-05-12
Payer: MEDICAID

## 2017-05-12 VITALS — SYSTOLIC BLOOD PRESSURE: 149 MMHG | HEART RATE: 77 BPM | DIASTOLIC BLOOD PRESSURE: 80 MMHG

## 2017-05-12 DIAGNOSIS — I48.19 PERSISTENT ATRIAL FIBRILLATION (HCC): ICD-10-CM

## 2017-05-12 LAB — INR PPP: 2.2 (ref 2–3.5)

## 2017-05-12 PROCEDURE — 85610 PROTHROMBIN TIME: CPT | Performed by: PHYSICIAN ASSISTANT

## 2017-05-12 NOTE — MR AVS SNAPSHOT
Shani Love   2017 10:30 AM   Anticoagulation Visit   MRN: [id number]    Department:  [facility]   Dept Phone:  [phone]    Description:  Female : 1955   Provider:  LAVERNE ANTI-COAG           Allergies as of 2017     No Known Allergies      You were diagnosed with     Persistent atrial fibrillation (CMS-HCC)   [518039]         Vital Signs     Blood Pressure Pulse Smoking Status             149/80 mmHg 77 Passive Smoke Exposure - Never Smoker         Basic Information     Date Of Birth Sex Race Ethnicity Preferred Language    1955 Female White Non- English      Your appointments     May 30, 2017 10:00 AM   Established Patient Pul with STORM Marcial   [facility] (--)    [address] [place] [address] [address]  [facility] [id number] [phone]            2017 10:45 AM   Anti-Coag Routine with LAVERNE ANTI-COAG   [facility] ([place]    [address] [place] [address] [phone]            Ascencion 15, 2017  9:20 AM   Established Patient with REGLA Montiel   [facility] (--)    [address] [address] [phone]           You will be receiving a confirmation call a few days before your appointment from our automated call confirmation system.              Problem List              ICD-10-CM Priority Class Noted - Resolved    Obesity E66.9 Medium  2015 - Present    Uncontrolled type 2 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, with long-term current use of insulin (CMS-HCC) E11.3493, E11.65, Z79.4 Medium  2015 - Present    Hyperlipidemia E78.5 Medium  2015 - Present    Essential hypertension I10 Medium  2015 - Present    Persistent atrial fibrillation (CMS-HCC) I48.1 Medium  2015 - Present    Mitral stenosis with regurgitation I05.2 Medium  2016 - Present    Pulmonary hypertension  (CMS-HCC) I27.2 Medium  4/8/2016 - Present    History of stroke Z86.73 Medium  11/30/2015 - Present    Status post ablation of atrial flutter Z98.890, Z86.79 Medium  5/18/2016 - Present    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 Low  10/26/2016 - Present    Fecal occult blood test positive R19.5   12/16/2016 - Present    Vitamin D deficiency E55.9   12/16/2016 - Present    Obstructive sleep apnea G47.33   2/15/2017 - Present    Chronic anticoagulation Z79.01   2/15/2017 - Present    Paroxysmal atrial flutter (CMS-HCC) I48.92   2/23/2017 - Present    Chronic kidney disease, stage III (moderate) N18.3   3/15/2017 - Present    Severe nonproliferative diabetic retinopathy of both eyes associated with type 2 diabetes mellitus (CMS-HCC) E11.3493   5/4/2017 - Present      Health Maintenance        Date Due Completion Dates    PAP SMEAR 12/8/1974 ---    MAMMOGRAM 12/8/1993 ---    IMM ZOSTER VACCINE 12/8/2013 ---    URINE ACR / MICROALBUMIN 5/3/2017 5/3/2016    A1C SCREENING 9/10/2017 3/10/2017, 11/30/2016, 10/26/2016, 4/1/2016, 3/9/2016, 11/17/2015, 1/22/2010, 9/25/2009    DIABETES MONOFILAMENT / LE EXAM 10/26/2017 10/26/2016    COLON CANCER SCREENING ANNUAL FIT 11/15/2017 11/15/2016    FASTING LIPID PROFILE 3/10/2018 3/10/2017, 11/30/2016, 4/7/2016, 4/1/2016, 12/8/2015, 11/17/2015, 1/22/2010, 9/25/2009    SERUM CREATININE 3/10/2018 3/10/2017, 11/30/2016, 5/9/2016, 4/20/2016, 4/10/2016, 4/7/2016, 4/6/2016, 4/1/2016, 12/8/2015, 11/21/2015, 11/20/2015, 11/17/2015, 11/17/2015, 1/22/2010, 1/21/2010, 9/24/2009    RETINAL SCREENING 4/24/2018 4/24/2017, 1/12/2017, 8/25/2016    IMM DTaP/Tdap/Td Vaccine (2 - Td) 3/15/2027 3/15/2017            Results     POCT Protime      Component    INR    2.2    Comment:     ic valid [id number] 160 exp 02/2018                        Current Immunizations     13-VALENT PCV PREVNAR 11/19/2015  6:12 AM    Influenza Vaccine Quad Inj (Preserved) 11/18/2015  3:53 PM    Pneumococcal polysaccharide vaccine  "(PPSV-23) 3/15/2017    Tdap Vaccine 3/15/2017      Below and/or attached are the medications your provider expects you to take. Review all of your home medications and newly ordered medications with your provider and/or pharmacist. Follow medication instructions as directed by your provider and/or pharmacist. Please keep your medication list with you and share with your provider. Update the information when medications are discontinued, doses are changed, or new medications (including over-the-counter products) are added; and carry medication information at all times in the event of emergency situations     Allergies:  No Known Allergies          Medications  Valid as of: May 12, 2017 - 10:37 AM    Generic Name Brand Name Tablet Size Instructions for use    Cholecalciferol (Cap) VITAMIN D3 5000 UNIT Take 1 Cap by mouth every day.        DilTIAZem HCl Coated Beads (CAPSULE SR 24 HR) CARDIZEM  MG Take 1 Cap by mouth 2 Times a Day.        Furosemide (Tab) LASIX 40 MG Take 1 Tab by mouth every day.        Glimepiride (Tab) AMARYL 2 MG TAKE 2 TABLETS BY MOUTH EVERY MORNING        Glucose Blood (Strip) glucose blood  1 Strip by Other route as needed (one touch ultra meter test bid dx- E11.65).        Insulin Glargine (Solution) LANTUS 100 UNIT/ML INJECT 20 UNITS SUBCUTANEOUSLY EVERY MORNING AND INJECT 46 UNITS EVERY EVENING AS DIRECTED **TOTAL 66 UNITS DAILY**        Insulin Glargine (Solution) LANTUS 100 UNIT/ML Take 20 units in the morning and 46 units in the evening as directed for a total dose of 66 units daily.        Insulin Syringe-Needle U-100 (Misc) INSULIN SYRINGE 1CC/29G 29G X 1/2\" 1 ML 1 Each by Does not apply route every day.        Lisinopril (Tab) PRINIVIL 5 MG Take 1 Tab by mouth every day.        Magnesium Oxide (Tab) MAG- (241.3 MG) MG Take 1 Tab by mouth every day.        Metoprolol Tartrate (Tab) LOPRESSOR 50 MG Take 1 Tab by mouth 2 times a day.        Simvastatin (Tab) ZOCOR 20 MG Take 1 " Tab by mouth every evening.        Sulfamethoxazole-Trimethoprim (Tab) BACTRIM -160 MG 1 TAB TWICE A DAY X 10 DAYS.        Warfarin Sodium (Tab) COUMADIN 5 MG Take one to 2  tablets daily as directed by coumadin clinic        .                 Medicines prescribed today were sent to:     [facility] - [place], [place] - [address] [facility]    [address] [facility] [address] [facility] [address]    Phone: [phone] Fax: [phone]    Open 24 Hours?: No      Medication refill instructions:       If your prescription bottle indicates you have medication refills left, it is not necessary to call your provider’s office. Please contact your pharmacy and they will refill your medication.    If your prescription bottle indicates you do not have any refills left, you may request refills at any time through one of the following ways: The online [employer] system (except Urgent Care), by calling your provider’s office, or by asking your pharmacy to contact your provider’s office with a refill request. Medication refills are processed only during regular business hours and may not be available until the next business day. Your provider may request additional information or to have a follow-up visit with you prior to refilling your medication.   *Please Note: Medication refills are assigned a new Rx number when refilled electronically. Your pharmacy may indicate that no refills were authorized even though a new prescription for the same medication is available at the pharmacy. Please request the medicine by name with the pharmacy before contacting your provider for a refill.        Warfarin Dosing Calendar   May 2017 Details    Sun Mon Tue Wed Thu Fri Sat      1               2               3               4               5               6                 7               8               9               10               11               12   2.2   2.5 mg   See details      13      5 mg           14      5 mg          15      2.5 mg         16      5 mg         17      2.5 mg         18      5 mg         19      2.5 mg         20      5 mg           21      5 mg         22      2.5 mg         23      5 mg         24      2.5 mg         25      5 mg         26      2.5 mg         27      5 mg           28      5 mg         29      2.5 mg         30      5 mg         31      2.5 mg             Date Details   05/12 This INR check   INR: 2.2   ic valid [id number] 160 exp 02/2018               How to take your warfarin dose     To take:  2.5 mg Take 0.5 of a 5 mg tablet.    To take:  5 mg Take 1 of the 5 mg tablets.           Warfarin Dosing Calendar   June 2017 Details    Sun Mon Tue Wed Thu Fri Sat         1      5 mg         2      2.5 mg         3      5 mg           4      5 mg         5      2.5 mg         6      5 mg         7      2.5 mg         8      5 mg         9      2.5 mg         10                 11               12               13               14               15               16               17                 18               19               20               21               22               23               24                 25               26               27               28               29               30                 Date Details   No additional details    Date of next INR:  6/9/2017         How to take your warfarin dose     To take:  2.5 mg Take 0.5 of a 5 mg tablet.    To take:  5 mg Take 1 of the 5 mg tablets.              Siklu Access Code: Activation code not generated  Current Siklu Status: Active

## 2017-05-19 RX ORDER — IBUPROFEN 200 MG
1 TABLET ORAL DAILY
Qty: 100 EACH | Refills: 3 | Status: SHIPPED | OUTPATIENT
Start: 2017-05-19 | End: 2017-09-09

## 2017-05-30 ENCOUNTER — OFFICE VISIT (OUTPATIENT)
Dept: PULMONOLOGY | Facility: HOSPICE | Age: 62
End: 2017-05-30
Payer: MEDICAID

## 2017-05-30 DIAGNOSIS — G47.33 OBSTRUCTIVE SLEEP APNEA: ICD-10-CM

## 2017-06-05 ENCOUNTER — TELEPHONE (OUTPATIENT)
Dept: PULMONOLOGY | Facility: HOSPICE | Age: 62
End: 2017-06-05

## 2017-06-05 DIAGNOSIS — G47.33 OBSTRUCTIVE SLEEP APNEA: ICD-10-CM

## 2017-06-15 ENCOUNTER — PATIENT OUTREACH (OUTPATIENT)
Dept: HEALTH INFORMATION MANAGEMENT | Facility: OTHER | Age: 62
End: 2017-06-15

## 2017-06-15 ENCOUNTER — OFFICE VISIT (OUTPATIENT)
Dept: MEDICAL GROUP | Facility: CLINIC | Age: 62
End: 2017-06-15
Payer: MEDICAID

## 2017-06-15 ENCOUNTER — HOSPITAL ENCOUNTER (OUTPATIENT)
Facility: MEDICAL CENTER | Age: 62
End: 2017-06-15
Attending: NURSE PRACTITIONER
Payer: MEDICAID

## 2017-06-15 VITALS
TEMPERATURE: 98.2 F | SYSTOLIC BLOOD PRESSURE: 130 MMHG | DIASTOLIC BLOOD PRESSURE: 84 MMHG | OXYGEN SATURATION: 93 % | HEART RATE: 68 BPM | HEIGHT: 64 IN

## 2017-06-15 DIAGNOSIS — E11.3493 SEVERE NONPROLIFERATIVE DIABETIC RETINOPATHY OF BOTH EYES ASSOCIATED WITH TYPE 2 DIABETES MELLITUS, MACULAR EDEMA PRESENCE UNSPECIFIED (HCC): ICD-10-CM

## 2017-06-15 DIAGNOSIS — R19.5 FECAL OCCULT BLOOD TEST POSITIVE: ICD-10-CM

## 2017-06-15 DIAGNOSIS — I10 ESSENTIAL HYPERTENSION: ICD-10-CM

## 2017-06-15 DIAGNOSIS — F32.1 MODERATE SINGLE CURRENT EPISODE OF MAJOR DEPRESSIVE DISORDER (HCC): ICD-10-CM

## 2017-06-15 DIAGNOSIS — N18.30 CHRONIC KIDNEY DISEASE, STAGE III (MODERATE) (HCC): ICD-10-CM

## 2017-06-15 DIAGNOSIS — E11.65 UNCONTROLLED TYPE 2 DIABETES MELLITUS WITH SEVERE NONPROLIFERATIVE RETINOPATHY OF BOTH EYES, WITH LONG-TERM CURRENT USE OF INSULIN, MACULAR EDEMA PRESENCE UNSPECIFIED: ICD-10-CM

## 2017-06-15 DIAGNOSIS — Z79.4 UNCONTROLLED TYPE 2 DIABETES MELLITUS WITH SEVERE NONPROLIFERATIVE RETINOPATHY OF BOTH EYES, WITH LONG-TERM CURRENT USE OF INSULIN, MACULAR EDEMA PRESENCE UNSPECIFIED: ICD-10-CM

## 2017-06-15 DIAGNOSIS — E78.00 PURE HYPERCHOLESTEROLEMIA: ICD-10-CM

## 2017-06-15 DIAGNOSIS — E11.3493 UNCONTROLLED TYPE 2 DIABETES MELLITUS WITH SEVERE NONPROLIFERATIVE RETINOPATHY OF BOTH EYES, WITH LONG-TERM CURRENT USE OF INSULIN, MACULAR EDEMA PRESENCE UNSPECIFIED: ICD-10-CM

## 2017-06-15 LAB
HBA1C MFR BLD: 9.7 % (ref ?–5.8)
INT CON NEG: NEGATIVE
INT CON POS: POSITIVE

## 2017-06-15 PROCEDURE — 99214 OFFICE O/P EST MOD 30 MIN: CPT | Performed by: NURSE PRACTITIONER

## 2017-06-15 PROCEDURE — 83036 HEMOGLOBIN GLYCOSYLATED A1C: CPT | Performed by: NURSE PRACTITIONER

## 2017-06-15 PROCEDURE — 82570 ASSAY OF URINE CREATININE: CPT

## 2017-06-15 PROCEDURE — 82043 UR ALBUMIN QUANTITATIVE: CPT

## 2017-06-15 RX ORDER — CITALOPRAM 20 MG/1
20 TABLET ORAL DAILY
Qty: 30 TAB | Refills: 2 | Status: SHIPPED | OUTPATIENT
Start: 2017-06-15 | End: 2017-09-18 | Stop reason: SDUPTHER

## 2017-06-15 NOTE — ASSESSMENT & PLAN NOTE
This is a new problem. Patient is here today with her daughter who also discusses patient's mood. Patient is tearful today during clinic. She reports feeling overwhelmed at times with her diagnosis and is having a hard time as she gets older becoming more dependent on her children. Reports that she has thought about why she even wants to live, however, no plan, no intent to hurt herself. She is interested in open to counseling.

## 2017-06-15 NOTE — ASSESSMENT & PLAN NOTE
Last A1c: 9.7. Which has even again increased from 8.6 at last visit   DM Medications: Lantus, 70 units daily total (she is delivering in separate injections as discussed at last visit) Patient reports good medication compliance.   HTN: Blood pressure goal <140/<90   ACE: Lisinopril 5 mg   Hyperlipidemia: Cholesterol goal LDL <100, Last LDL 83. Currently Rx Statin: Simvastatin 20 mg   Last monofilament foot exam: 10/2016 Checks feet at home: yes, no sores currently   Last Eye exam: 2017   Kidney function: GFR 52, will continue to monitor   Microalbumin screenin5016  Has patient received flu vaccine: declines  Has patient received Hep B series: done

## 2017-06-15 NOTE — ASSESSMENT & PLAN NOTE
This is a new diagnosis. Most recent exam 4/24/17 with [facility]. Patient does have follow up for further treatment and care.

## 2017-06-15 NOTE — PATIENT INSTRUCTIONS
Depression, Adult  Depression refers to feeling sad, low, down in the dumps, blue, gloomy, or empty. In general, there are two kinds of depression:  1. Normal sadness or normal grief. This kind of depression is one that we all feel from time to time after upsetting life experiences, such as the loss of a job or the ending of a relationship. This kind of depression is considered normal, is short lived, and resolves within a few days to 2 weeks. Depression experienced after the loss of a loved one (bereavement) often lasts longer than 2 weeks but normally gets better with time.  2. Clinical depression. This kind of depression lasts longer than normal sadness or normal grief or interferes with your ability to function at home, at work, and in school. It also interferes with your personal relationships. It affects almost every aspect of your life. Clinical depression is an illness.  Symptoms of depression can also be caused by conditions other than those mentioned above, such as:  · Physical illness. Some physical illnesses, including underactive thyroid gland (hypothyroidism), severe anemia, specific types of cancer, diabetes, uncontrolled seizures, heart and lung problems, strokes, and chronic pain are commonly associated with symptoms of depression.  · Side effects of some prescription medicine. In some people, certain types of medicine can cause symptoms of depression.  · Substance abuse. Abuse of alcohol and illicit drugs can cause symptoms of depression.  SYMPTOMS  Symptoms of normal sadness and normal grief include the following:  · Feeling sad or crying for short periods of time.  · Not caring about anything (apathy).  · Difficulty sleeping or sleeping too much.  · No longer able to enjoy the things you used to enjoy.  · Desire to be by oneself all the time (social isolation).  · Lack of energy or motivation.  · Difficulty concentrating or remembering.  · Change in appetite or weight.  · Restlessness or  agitation.  Symptoms of clinical depression include the same symptoms of normal sadness or normal grief and also the following symptoms:  · Feeling sad or crying all the time.  · Feelings of guilt or worthlessness.  · Feelings of hopelessness or helplessness.  · Thoughts of suicide or the desire to harm yourself (suicidal ideation).  · Loss of touch with reality (psychotic symptoms). Seeing or hearing things that are not real (hallucinations) or having false beliefs about your life or the people around you (delusions and paranoia).  DIAGNOSIS   The diagnosis of clinical depression is usually based on how bad the symptoms are and how long they have lasted. Your health care provider will also ask you questions about your medical history and substance use to find out if physical illness, use of prescription medicine, or substance abuse is causing your depression. Your health care provider may also order blood tests.  TREATMENT   Often, normal sadness and normal grief do not require treatment. However, sometimes antidepressant medicine is given for bereavement to ease the depressive symptoms until they resolve.  The treatment for clinical depression depends on how bad the symptoms are but often includes antidepressant medicine, counseling with a mental health professional, or both. Your health care provider will help to determine what treatment is best for you.  Depression caused by physical illness usually goes away with appropriate medical treatment of the illness. If prescription medicine is causing depression, talk with your health care provider about stopping the medicine, decreasing the dose, or changing to another medicine.  Depression caused by the abuse of alcohol or illicit drugs goes away when you stop using these substances. Some adults need professional help in order to stop drinking or using drugs.  SEEK IMMEDIATE MEDICAL CARE IF:  · You have thoughts about hurting yourself or others.  · You lose touch  with reality (have psychotic symptoms).  · You are taking medicine for depression and have a serious side effect.  FOR MORE INFORMATION  · National [place] on Mental Illness: www.deann.org   · National [place] of Mental Health: www.nimh.nih.gov      This information is not intended to replace advice given to you by your health care provider. Make sure you discuss any questions you have with your health care provider.     Document Released: 12/15/2001 Document Revised: 01/08/2016 Document Reviewed: 03/18/2013  [employer] Interactive Patient Education ©2016 [employer]    Your medical care was provided today by: REI Boyd    Thank You for the opportunity to serve you.    You may receive a brief survey in the mail shortly regarding your visit today. Please take a few moments to complete the survey and return it; no postage is necessary. We are working to serve our patient population better, improve customer service and our patients overall experience and your input can help us to accomplish this. We thank you for your help and for the opportunity to serve you today and in the future.     Special Instructions:  Always call 9-1-1 immediately if you develop a life threatening emergency.    Unless told otherwise please take all medications as directed and complete prescription therapies.     Watch for the following signs that require additional evaluation: progressive lethargy or unresponsiveness, localized pain (chest, abdomen), shortness of breath, painful breathing, progressive vomiting with weakness, bloody stools, or new rash.     If you are prescribed pain medication or any other medication that is sedating, do not take medication before or while operating a vehicle or heavy machinery or equipment due to potential side effects such as drowsiness and/or dizziness.

## 2017-06-15 NOTE — MR AVS SNAPSHOT
"        Shani Love   6/15/2017 9:20 AM   Office Visit   MRN: [id number]    [facility] Phone[facility]  [phone]    Description:  Female : 1955   Provider:  REGLA Montiel           Reason for Visit     Follow-Up chromium or magnesium? buy over the counter       Allergies as of 6/15/2017     No Known Allergies      You were diagnosed with     Severe nonproliferative diabetic retinopathy of both eyes associated with type 2 diabetes mellitus, macular edema presence unspecified (CMS-[facility])   [2592582]       Uncontrolled type 2 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, with long-term current use of insulin, macular edema presence unspecified (CMS-[facility])   [7749605]       Pure hypercholesterolemia   [272.0.ICD-9-CM]       Essential hypertension   [9038820]       Chronic kidney disease, stage III (moderate)   [585.3.ICD-9-CM]       Fecal occult blood test positive   [327944]       Moderate single current episode of major depressive disorder (CMS-[facility])   [7259581]         Vital Signs     Blood Pressure Pulse Temperature Height Oxygen Saturation Smoking Status    130/84 mmHg 68 36.8 °C (98.2 °F) 1.626 m (5' 4.02\") 93% Passive Smoke Exposure - Never Smoker      Basic Information     Date Of Birth Sex Race Ethnicity Preferred Language    1955 Female White Non- English      Your appointments     2017 10:30 AM   Anti-Coag Routine with [facility] ANTI-CO[facility]   [facility]    [address] [place] [address] [phone]            2017  9:00 AM   Follow UP with Shanthi Barlow [email or web address]   [facility] (--)    [address]  [place] [address] [phone]              Problem List              ICD-10-CM Priority Class Noted - Resolved    Obesity E66.9 Medium  2015 - Present    Uncontrolled type 2 diabetes mellitus with severe nonproliferative " retinopathy of both eyes, with long-term current use of insulin (CMS-HCC) E11.3493, E11.65, Z79.4 Medium  11/17/2015 - Present    Hyperlipidemia E78.5 Medium  11/19/2015 - Present    Essential hypertension I10 Medium  12/1/2015 - Present    Persistent atrial fibrillation (CMS-HCC) I48.1 Medium  12/1/2015 - Present    Mitral stenosis with regurgitation I05.2 Medium  4/8/2016 - Present    Pulmonary hypertension (CMS-HCC) I27.2 Medium  4/8/2016 - Present    History of stroke Z86.73 Medium  11/30/2015 - Present    Status post ablation of atrial flutter Z98.890, Z86.79 Medium  5/18/2016 - Present    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 Low  10/26/2016 - Present    Fecal occult blood test positive R19.5   12/16/2016 - Present    Vitamin D deficiency E55.9   12/16/2016 - Present    Obstructive sleep apnea G47.33   2/15/2017 - Present    Chronic anticoagulation Z79.01   2/15/2017 - Present    Paroxysmal atrial flutter (CMS-HCC) I48.92   2/23/2017 - Present    Chronic kidney disease, stage III (moderate) N18.3   3/15/2017 - Present    Severe nonproliferative diabetic retinopathy of both eyes associated with type 2 diabetes mellitus (CMS-HCC) E11.3493   5/4/2017 - Present    Moderate single current episode of major depressive disorder (CMS-HCC) F32.1   6/15/2017 - Present      Health Maintenance        Date Due Completion Dates    PAP SMEAR 12/8/1974 ---    MAMMOGRAM 12/8/1993 ---    IMM ZOSTER VACCINE 12/8/2013 ---    URINE ACR / MICROALBUMIN 5/3/2017 5/3/2016    A1C SCREENING 9/10/2017 3/10/2017, 11/30/2016, 10/26/2016, 4/1/2016, 3/9/2016, 11/17/2015, 1/22/2010, 9/25/2009    DIABETES MONOFILAMENT / LE EXAM 10/26/2017 10/26/2016    COLON CANCER SCREENING ANNUAL FIT 11/15/2017 11/15/2016    FASTING LIPID PROFILE 3/10/2018 3/10/2017, 11/30/2016, 4/7/2016, 4/1/2016, 12/8/2015, 11/17/2015, 1/22/2010, 9/25/2009    SERUM CREATININE 3/10/2018 3/10/2017, 11/30/2016, 5/9/2016, 4/20/2016, 4/10/2016, 4/7/2016, 4/6/2016, 4/1/2016,  12/8/2015, 11/21/2015, 11/20/2015, 11/17/2015, 11/17/2015, 1/22/2010, 1/21/2010, 9/24/2009    RETINAL SCREENING 4/24/2018 4/24/2017, 1/12/2017, 8/25/2016    IMM DTaP/Tdap/Td Vaccine (2 - Td) 3/15/2027 3/15/2017            Results     POCT  A1C      Component    Glycohemoglobin    9.7    Internal Control Negative    Negative    Internal Control Positive    Positive                        Current Immunizations     13-VALENT PCV PREVNAR 11/19/2015  6:12 AM    Influenza Vaccine Quad Inj (Preserved) 11/18/2015  3:53 PM    Pneumococcal polysaccharide vaccine (PPSV-23) 3/15/2017    Tdap Vaccine 3/15/2017      Below and/or attached are the medications your provider expects you to take. Review all of your home medications and newly ordered medications with your provider and/or pharmacist. Follow medication instructions as directed by your provider and/or pharmacist. Please keep your medication list with you and share with your provider. Update the information when medications are discontinued, doses are changed, or new medications (including over-the-counter products) are added; and carry medication information at all times in the event of emergency situations     Allergies:  No Known Allergies          Medications  Valid as of: Mariya 15, 2017 - 10:36 AM    Generic Name Brand Name Tablet Size Instructions for use    Cholecalciferol (Cap) VITAMIN D3 5000 UNIT Take 1 Cap by mouth every day.        Citalopram Hydrobromide (Tab) CELEXA 20 MG Take 1 Tab by mouth every day.        DilTIAZem HCl Coated Beads (CAPSULE SR 24 HR) CARDIZEM  MG Take 1 Cap by mouth 2 Times a Day.        Furosemide (Tab) LASIX 40 MG Take 1 Tab by mouth every day.        Glimepiride (Tab) AMARYL 2 MG TAKE 2 TABLETS BY MOUTH EVERY MORNING        Glucose Blood (Strip) glucose blood  1 Strip by Other route as needed (one touch ultra meter test bid dx- E11.65).        Insulin Glargine (Solution) LANTUS 100 UNIT/ML Take 20 units in the morning and 46 units  "in the evening as directed for a total dose of 66 units daily.        Insulin Syringe-Needle U-100 (Misc) INSULIN SYRINGE 1CC/29G 29G X 1/2\" 1 ML 1 Each by Does not apply route every day.        Lisinopril (Tab) PRINIVIL 5 MG Take 1 Tab by mouth every day.        Magnesium Oxide (Tab) MAG- (241.3 MG) MG Take 1 Tab by mouth every day.        Metoprolol Tartrate (Tab) LOPRESSOR 50 MG Take 1 Tab by mouth 2 times a day.        Simvastatin (Tab) ZOCOR 20 MG Take 1 Tab by mouth every evening.        Warfarin Sodium (Tab) COUMADIN 5 MG Take one to 2  tablets daily as directed by coumadin clinic        .                 Medicines prescribed today were sent to:     [facility] - [address] [facility] [address]    Phone: [phone] Fax: [phone]    Open 24 Hours?: No      Medication refill instructions:       If your prescription bottle indicates you have medication refills left, it is not necessary to call your provider’s office. Please contact your pharmacy and they will refill your medication.    If your prescription bottle indicates you do not have any refills left, you may request refills at any time through one of the following ways: The online [facility] system (except Urgent Care), by calling your provider’s office, or by asking your pharmacy to contact your provider’s office with a refill request. Medication refills are processed only during regular business hours and may not be available until the next business day. Your provider may request additional information or to have a follow-up visit with you prior to refilling your medication.   *Please Note: Medication refills are assigned a new Rx number when refilled electronically. Your pharmacy may indicate that no refills were authorized even though a new prescription for the same medication is available at the pharmacy. Please request the medicine by name with the pharmacy before contacting " your provider for a refill.        Your To Do List     Future Labs/Procedures Complete By Expires    MICROALBUMIN CREAT RATIO URINE (CLINIC COLLECT)  As directed 6/15/2018      Referral     A referral request has been sent to our patient care coordination department. Please allow 3-5 business days for us to process this request and contact you either by phone or mail. If you do not hear from us by the 5th business day, please call us at [phone].        Instructions    Depression, Adult  Depression refers to feeling sad, low, down in the dumps, blue, gloomy, or empty. In general, there are two kinds of depression:  1. Normal sadness or normal grief. This kind of depression is one that we all feel from time to time after upsetting life experiences, such as the loss of a job or the ending of a relationship. This kind of depression is considered normal, is short lived, and resolves within a few days to 2 weeks. Depression experienced after the loss of a loved one (bereavement) often lasts longer than 2 weeks but normally gets better with time.  2. Clinical depression. This kind of depression lasts longer than normal sadness or normal grief or interferes with your ability to function at home, at work, and in school. It also interferes with your personal relationships. It affects almost every aspect of your life. Clinical depression is an illness.  Symptoms of depression can also be caused by conditions other than those mentioned above, such as:  · Physical illness. Some physical illnesses, including underactive thyroid gland (hypothyroidism), severe anemia, specific types of cancer, diabetes, uncontrolled seizures, heart and lung problems, strokes, and chronic pain are commonly associated with symptoms of depression.  · Side effects of some prescription medicine. In some people, certain types of medicine can cause symptoms of depression.  · Substance abuse. Abuse of alcohol and illicit drugs can cause symptoms of  depression.  SYMPTOMS  Symptoms of normal sadness and normal grief include the following:  · Feeling sad or crying for short periods of time.  · Not caring about anything (apathy).  · Difficulty sleeping or sleeping too much.  · No longer able to enjoy the things you used to enjoy.  · Desire to be by oneself all the time (social isolation).  · Lack of energy or motivation.  · Difficulty concentrating or remembering.  · Change in appetite or weight.  · Restlessness or agitation.  Symptoms of clinical depression include the same symptoms of normal sadness or normal grief and also the following symptoms:  · Feeling sad or crying all the time.  · Feelings of guilt or worthlessness.  · Feelings of hopelessness or helplessness.  · Thoughts of suicide or the desire to harm yourself (suicidal ideation).  · Loss of touch with reality (psychotic symptoms). Seeing or hearing things that are not real (hallucinations) or having false beliefs about your life or the people around you (delusions and paranoia).  DIAGNOSIS   The diagnosis of clinical depression is usually based on how bad the symptoms are and how long they have lasted. Your health care provider will also ask you questions about your medical history and substance use to find out if physical illness, use of prescription medicine, or substance abuse is causing your depression. Your health care provider may also order blood tests.  TREATMENT   Often, normal sadness and normal grief do not require treatment. However, sometimes antidepressant medicine is given for bereavement to ease the depressive symptoms until they resolve.  The treatment for clinical depression depends on how bad the symptoms are but often includes antidepressant medicine, counseling with a mental health professional, or both. Your health care provider will help to determine what treatment is best for you.  Depression caused by physical illness usually goes away with appropriate medical treatment of  the illness. If prescription medicine is causing depression, talk with your health care provider about stopping the medicine, decreasing the dose, or changing to another medicine.  Depression caused by the abuse of alcohol or illicit drugs goes away when you stop using these substances. Some adults need professional help in order to stop drinking or using drugs.  SEEK IMMEDIATE MEDICAL CARE IF:  · You have thoughts about hurting yourself or others.  · You lose touch with reality (have psychotic symptoms).  · You are taking medicine for depression and have a serious side effect.  FOR MORE INFORMATION  · National [place] on Mental Illness: www.deann.org   · National [place] of Mental Health: www.nimh.nih.gov      This information is not intended to replace advice given to you by your health care provider. Make sure you discuss any questions you have with your health care provider.     Document Released: 12/15/2001 Document Revised: 01/08/2016 Document Reviewed: 03/18/2013  [employer] Interactive Patient Education ©2016 [employer]    Your medical care was provided today by: REI Boyd    Thank You for the opportunity to serve you.    You may receive a brief survey in the mail shortly regarding your visit today. Please take a few moments to complete the survey and return it; no postage is necessary. We are working to serve our patient population better, improve customer service and our patients overall experience and your input can help us to accomplish this. We thank you for your help and for the opportunity to serve you today and in the future.     Special Instructions:  Always call 9-1-1 immediately if you develop a life threatening emergency.    Unless told otherwise please take all medications as directed and complete prescription therapies.     Watch for the following signs that require additional evaluation: progressive lethargy or unresponsiveness, localized pain (chest, abdomen), shortness of  breath, painful breathing, progressive vomiting with weakness, bloody stools, or new rash.     If you are prescribed pain medication or any other medication that is sedating, do not take medication before or while operating a vehicle or heavy machinery or equipment due to potential side effects such as drowsiness and/or dizziness.             [employer] Access Code: Activation code not generated  Current [employer] Status: Active

## 2017-06-15 NOTE — Clinical Note
[facility]  REGLA Montiel  [address] [address] [address]  [facility] [address]  Fax: [phone]   Authorization for Release/Disclosure of   Protected Health Information   Name: HSANI GREEN : 1955 SSN: [id number]   Address: [address] [place] [address] [address]  [facility] [address] Phone:    [phone] (home) [phone] (work)   I authorize the entity listed below to release/disclose the PHI below to:   [facility]/REGLA Montiel and REGLA Montiel   Provider or Entity Name:     Address   City, State, Zip   Phone:      Fax:     Reason for request: continuity of care   Information to be released:    [X  ] LAST COLONOSCOPY,  including any PATH REPORT and follow-up  [  ] LAST FIT/COLOGUARD RESULT [  ] LAST DEXA  [  ] LAST MAMMOGRAM  [  ] LAST PAP  [  ] LAST LABS [  ] RETINA EXAM REPORT  [  ] IMMUNIZATION RECORDS  [  ] Release all info      [  ] Check here and initial the line next to each item to release ALL health information INCLUDING  _____ Care and treatment for drug and / or alcohol abuse  _____ HIV testing, infection status, or AIDS  _____ Genetic Testing    DATES OF SERVICE OR TIME PERIOD TO BE DISCLOSED: _____________  I understand and acknowledge that:  * This Authorization may be revoked at any time by you in writing, except if your health information has already been used or disclosed.  * Your health information that will be used or disclosed as a result of you signing this authorization could be re-disclosed by the recipient. If this occurs, your re-disclosed health information may no longer be protected by State or Federal laws.  * You may refuse to sign this Authorization. Your refusal will not affect your ability to obtain treatment.  * This Authorization becomes effective upon signing and will  on (date) __________.      If no date is indicated, this Authorization will  one (1) year from the signature date.    Name: Shani  Kate    Signature:   Date:     6/15/2017       PLEASE FAX REQUESTED RECORDS BACK TO: [phone]

## 2017-06-15 NOTE — Clinical Note
[facility]  REGLA Montiel  [address] [address] [address]  [facility] [address]  Fax: [phone]   Authorization for Release/Disclosure of   Protected Health Information   Name: RUBINA GREEN : 1955 SSN: [id number]   Address: [address] [place] [address] [address]  [facility] [address] Phone:    [phone] (home) [phone] (work)   I authorize the entity listed below to release/disclose the PHI below to:   [facility]/REGLA Montiel and REGLA Montiel   Provider or Entity Name:  Silas Lopez    [facility]   City, [facility] [place], [place]  Phone:      Fax:     Reason for request: continuity of care   Information to be released:    [  ] LAST COLONOSCOPY,  including any PATH REPORT and follow-up  [  ] LAST FIT/COLOGUARD RESULT [  ] LAST DEXA  [ X ] LAST MAMMOGRAM  [  ] LAST PAP  [  ] LAST LABS [  ] RETINA EXAM REPORT  [  ] IMMUNIZATION RECORDS  [  ] Release all info      [  ] Check here and initial the line next to each item to release ALL health information INCLUDING  _____ Care and treatment for drug and / or alcohol abuse  _____ HIV testing, infection status, or AIDS  _____ Genetic Testing    DATES OF SERVICE OR TIME PERIOD TO BE DISCLOSED: _____________  I understand and acknowledge that:  * This Authorization may be revoked at any time by you in writing, except if your health information has already been used or disclosed.  * Your health information that will be used or disclosed as a result of you signing this authorization could be re-disclosed by the recipient. If this occurs, your re-disclosed health information may no longer be protected by State or Federal laws.  * You may refuse to sign this Authorization. Your refusal will not affect your ability to obtain treatment.  * This Authorization becomes effective upon signing and will  on (date) __________.      If no date is indicated, this Authorization will  one (1) year from the signature date.       Name: Shani Love    Signature:   Date:     6/15/2017       PLEASE FAX REQUESTED RECORDS BACK TO: [phone]

## 2017-06-15 NOTE — ASSESSMENT & PLAN NOTE
Cholesterol,Tot 147 100 - 199 mg/dL Final      Triglycerides 105 0 - 149 mg/dL Final     HDL 41 >=40 mg/dL Final     LDL 85 <100 mg/dL Final     This is a chronic problem. She is an uncontrolled diabetic. Currently taking simvastatin 30 mg daily with AE.

## 2017-06-15 NOTE — ASSESSMENT & PLAN NOTE
This is a chronic problem that is well controlled with her current medications. Patient denies any headache, dizziness, chest pain, or lower extremity edema. She is followed annually by Cardiology.

## 2017-06-15 NOTE — PROGRESS NOTES
Subjective:     Shani Love is a 61 y.o. female here today for evaluation and management of the following:     Chronic kidney disease, stage III (moderate)  GFR 52. Will continue to monitor.         Essential hypertension  This is a chronic problem that is well controlled with her current medications. Patient denies any headache, dizziness, chest pain, or lower extremity edema. She is followed annually by Cardiology.       Fecal occult blood test positive  Patient reports completed Colonoscopy. Patient has copy of report, appears WNL and due for repeat colonoscopy in 5 years.         Hyperlipidemia  Cholesterol,Tot 147 100 - 199 mg/dL Final      Triglycerides 105 0 - 149 mg/dL Final     HDL 41 >=40 mg/dL Final     LDL 85 <100 mg/dL Final     This is a chronic problem. She is an uncontrolled diabetic. Currently taking simvastatin 30 mg daily with AE.     Moderate single current episode of major depressive disorder (CMS-HCC)  This is a new problem. Patient is here today with her daughter who also discusses patient's mood. Patient is tearful today during clinic. She reports feeling overwhelmed at times with her diagnosis and is having a hard time as she gets older becoming more dependent on her children. Reports that she has thought about why she even wants to live, however, no plan, no intent to hurt herself. She is interested in open to counseling.    Severe nonproliferative diabetic retinopathy of both eyes associated with type 2 diabetes mellitus (CMS-HCC)  This is a new diagnosis. Most recent exam 4/24/17 with [facility]. Patient does have follow up for further treatment and care.     Uncontrolled type 2 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, with long-term current use of insulin (CMS-HCC)    Last A1c: 9.7. Which has even again increased from 8.6 at last visit   DM Medications: Lantus, 70 units daily total (she is delivering in separate injections as discussed at last visit)  "Patient reports good medication compliance.   HTN: Blood pressure goal <140/<90   ACE: Lisinopril 5 mg   Hyperlipidemia: Cholesterol goal LDL <100, Last LDL 83. Currently Rx Statin: Simvastatin 20 mg   Last monofilament foot exam: 10/2016 Checks feet at home: yes, no sores currently   Last Eye exam: 2017   Kidney function: GFR 52, will continue to monitor   Microalbumin [id number]  Has patient received flu vaccine: declines  Has patient received Hep B series: done                 Current medicines (including changes today)  Current Outpatient Prescriptions   Medication Sig Dispense Refill   • magnesium oxide (MAG-OX) 400 (241.3 MG) MG Tab tablet Take 1 Tab by mouth every day. 60 Tab 2   • citalopram (CELEXA) 20 MG Tab Take 1 Tab by mouth every day. 30 Tab 2   • INSULIN SYRINGE 1CC/29G 29G X 1/2\" 1 ML Misc 1 Each by Does not apply route every day. 100 Each 3   • glucose blood strip 1 Strip by Other route as needed (one touch ultra meter test bid dx- E11.65). 100 Strip 11   • insulin glargine (LANTUS) 100 UNIT/ML Solution Take 20 units in the morning and 46 units in the evening as directed for a total dose of 66 units daily. 1 Vial 6   • simvastatin (ZOCOR) 20 MG Tab Take 1 Tab by mouth every evening. 90 Tab 3   • warfarin (COUMADIN) 5 MG Tab Take one to 2  tablets daily as directed by coumadin clinic 180 Tab 1   • lisinopril (PRINIVIL) 5 MG Tab Take 1 Tab by mouth every day. 90 Tab 3   • metoprolol (LOPRESSOR) 50 MG Tab Take 1 Tab by mouth 2 times a day. 180 Tab 3   • diltiazem CD (CARDIZEM CD) 120 MG CAPSULE SR 24 HR Take 1 Cap by mouth 2 Times a Day. 180 Cap 3   • glimepiride (AMARYL) 2 MG Tab TAKE 2 TABLETS BY MOUTH EVERY MORNING 60 Tab 2   • cholecalciferol (VITAMIN D3) 5000 UNIT Cap Take 1 Cap by mouth every day. 30 Cap 5   • furosemide (LASIX) 40 MG Tab Take 1 Tab by mouth every day. 90 Tab 3     No current facility-administered medications for this visit.       She  has a past medical history of " "Diabetes; Hypertension; Heart murmur; Morbid obesity (CMS-HCC); Stroke (CMS-HCC) (11/18/2015); Atrial flutter (CMS-HCC) (11/18/2015); Atrial fibrillation (CMS-HCC); Hyperlipidemia; Pneumonia (4/6/2016); Osteoarthritis; and Valvular heart disease.    She  has past surgical history that includes tonsillectomy; pilonidal cyst excision; and recovery (5/9/2016).     Social History     Social History   • Marital Status:      Spouse Name: N/A   • Number of Children: N/A   • Years of Education: N/A     Social History Main Topics   • Smoking status: Passive Smoke Exposure - Never Smoker   • Smokeless tobacco: Never Used   • Alcohol Use: No   • Drug Use: No   • Sexual Activity: Not Asked     Other Topics Concern   • None     Social History Narrative       Family History   Problem Relation Age of Onset   • Stroke Mother 71   • Cancer Father 71   • Heart Disease Brother          ROS  Positive for depression, admits SI, no plan. Does report at times she is more SOB with exertion.   No fever, no chest pain, no abdominal pain, no rashes    All other systems reviewed and are negative.        Objective:     Blood pressure 130/84, pulse 68, temperature 36.8 °C (98.2 °F), height 1.626 m (5' 4.02\"), SpO2 93 %. There is no weight on file to calculate BMI.    Physical Exam:   Constitutional: Alert, no distress. She is tearful at times when discussing depression.   Eye: Equal, round and reactive, conjunctiva clear, lids normal.   ENMT: Lips without lesions, oropharynx clear.   Neck: Trachea midline, no masses, no thyromegaly.   Respiratory: Unlabored respiratory effort, lungs clear to auscultation, no wheezes, no ronchi.  Cardiovascular: Normal S1, S2, no murmur, no edema.   Abdomen: Soft, non-tender, no masses, no hepatosplenomegaly. Normal bowel sounds.   Skin: Warm, dry, good turgor, no rashes in visible areas. She does have some minimal discoloration of LE, this is her normal. No s/s to any active infection today.   Neuro:  " normal sensation in extremities.   Psych: Alert and oriented x3, normal affect and mood.        Assessment and Plan:   The following treatment plan was discussed    1. Severe nonproliferative diabetic retinopathy of both eyes associated with type 2 diabetes mellitus, macular edema presence unspecified (CMS-HCC)  Encouraged patient to have continued follow-up with ophthalmology. I again stressed the importance of good blood sugar control to prevent further damage to her eyes.  - REFERRAL TO COMPLEX CARE MANAGEMENT Services Requested:: Care Manager to Evaluate and Recommend    2. Uncontrolled type 2 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, with long-term current use of insulin, macular edema presence unspecified (CMS-HCC)  Unfortunately patient's diabetes continues to worsen despite her high doses of Lantus. I do suspect she needs to be on short acting insulin as well to better control her blood sugars. At this time I would like her to consult with endocrinology's of that we can focus on getting her blood sugar under better control. She is amenable to this plan. Discussed the importance of close follow-up.  - MICROALBUMIN CREAT RATIO URINE (CLINIC COLLECT); Future  - POCT  A1C  - REFERRAL TO ENDOCRINOLOGY  - REFERRAL TO COMPLEX CARE MANAGEMENT Services Requested:: Care Manager to Evaluate and Recommend    3. Pure hypercholesterolemia  Appears stable, continue with simvastatin 20 mg daily.    4. Essential hypertension  Stable, I have advised patient to follow up with cardiologist. In regards to her reported shortness of breath, may consider echo at future visit?    5. Chronic kidney disease, stage III (moderate)  Stable, continue to monitor.     6. Fecal occult blood test positive  Resolved. F/u with repeat colonoscopy in 5 years. Discussed signs and symptoms to seek emergent care.    7. Moderate single current episode of major depressive disorder (CMS-HCC)  Patient was very tearful in  clinic today. We'll start patient on Celexa 20 mg daily. Discussed signs and symptoms to seek emergent care, risks of medication, including black box warning of SI. She has good support from her daughters. I have also referred her to counseling services.  - REFERRAL TO COMPLEX CARE MANAGEMENT Services Requested:: Care Manager to Evaluate and Recommend  - REFERRAL TO PSYCHIATRY       Reviewed indication, dosage, usage and potential adverse effects of prescribed medications. Patient appears to understand, verbalizes understanding and is willing to try medications as prescribed.      Reviewed risks and benefits of treatment plan. Patient verbally agrees to plan of care.       Followup: Return in about 1 month (around 7/15/2017) for PAP.    FRANCIS Montiel.     PLEASE NOTE: This dictation was created using voice recognition software. I have made every reasonable attempt to correct obvious errors, but I expect that there may be errors of grammar and possibly content that I did not discover prior finalizing this note.

## 2017-06-15 NOTE — ASSESSMENT & PLAN NOTE
Patient reports completed Colonoscopy. Patient has copy of report, appears WNL and due for repeat colonoscopy in 5 years.

## 2017-06-16 ENCOUNTER — ANTICOAGULATION VISIT (OUTPATIENT)
Dept: MEDICAL GROUP | Facility: PHYSICIAN GROUP | Age: 62
End: 2017-06-16
Payer: MEDICAID

## 2017-06-16 DIAGNOSIS — I48.19 PERSISTENT ATRIAL FIBRILLATION (HCC): ICD-10-CM

## 2017-06-16 DIAGNOSIS — E11.3493 UNCONTROLLED TYPE 2 DIABETES MELLITUS WITH SEVERE NONPROLIFERATIVE RETINOPATHY OF BOTH EYES, WITH LONG-TERM CURRENT USE OF INSULIN, MACULAR EDEMA PRESENCE UNSPECIFIED: ICD-10-CM

## 2017-06-16 DIAGNOSIS — E11.65 UNCONTROLLED TYPE 2 DIABETES MELLITUS WITH SEVERE NONPROLIFERATIVE RETINOPATHY OF BOTH EYES, WITH LONG-TERM CURRENT USE OF INSULIN, MACULAR EDEMA PRESENCE UNSPECIFIED: ICD-10-CM

## 2017-06-16 DIAGNOSIS — Z79.4 UNCONTROLLED TYPE 2 DIABETES MELLITUS WITH SEVERE NONPROLIFERATIVE RETINOPATHY OF BOTH EYES, WITH LONG-TERM CURRENT USE OF INSULIN, MACULAR EDEMA PRESENCE UNSPECIFIED: ICD-10-CM

## 2017-06-16 LAB
AMBIGUOUS DTTM AMBI4: NORMAL
CREAT UR-MCNC: 26 MG/DL
INR PPP: 2 (ref 2–3.5)
MICROALBUMIN UR-MCNC: 4.5 MG/DL
MICROALBUMIN/CREAT UR: 173 MG/G (ref 0–30)

## 2017-06-16 PROCEDURE — 85610 PROTHROMBIN TIME: CPT | Performed by: PHYSICIAN ASSISTANT

## 2017-06-16 NOTE — MR AVS SNAPSHOT
Shani Love   2017 10:30 AM   Anticoagulation Visit   MRN: [id number]    Department:  [facility]   Dept Phone:  [phone]    Description:  Female : 1955   Provider:  Santana Good, PHARMD           Allergies as of 2017     No Known Allergies      You were diagnosed with     Persistent atrial fibrillation (CMS-HCC)   [758814]         Vital Signs     Smoking Status                   Passive Smoke Exposure - Never Smoker           Basic Information     Date Of Birth Sex Race Ethnicity Preferred Language    1955 Female White Non- English      Your appointments     2017  9:00 AM   Follow UP with STORM Marcial   [facility] (--)    [address]  [place] [id number] [phone]            2017  1:00 PM   Anti-Coag Routine with [place] [facility]   [facility]    [address] [address] [phone]            2017  1:20 PM   ANNUAL EXAM PREVENTATIVE with REGLA Montiel   [facility] (--)    [facility] [id number] [phone]            Sep 07, 2017 10:40 AM   Established Patient with REGLA Montiel   [facility] (--)    [address] [place] [id number] [phone]           You will be receiving a confirmation call a few days before your appointment from our automated call confirmation system.              Problem List              ICD-10-CM Priority Class Noted - Resolved    Obesity E66.9 Medium  2015 - Present    Uncontrolled type 2 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, with long-term current use of insulin (CMS-HCC) E11.3493, E11.65, Z79.4 Medium  2015 - Present    Hyperlipidemia E78.5 Medium  2015 - Present    Essential hypertension I10 Medium  2015 - Present    Persistent atrial fibrillation (CMS-HCC) I48.1 Medium  12/1/2015 - Present    Mitral stenosis with regurgitation I05.2 Medium  4/8/2016 - Present    Pulmonary hypertension (CMS-HCC) I27.2 Medium  4/8/2016 - Present    History of stroke Z86.73 Medium  11/30/2015 - Present    Status post ablation of atrial flutter Z98.890, Z86.79 Medium  5/18/2016 - Present    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 Low  10/26/2016 - Present    Fecal occult blood test positive R19.5   12/16/2016 - Present    Vitamin D deficiency E55.9   12/16/2016 - Present    Obstructive sleep apnea G47.33   2/15/2017 - Present    Chronic anticoagulation Z79.01   2/15/2017 - Present    Paroxysmal atrial flutter (CMS-HCC) I48.92   2/23/2017 - Present    Chronic kidney disease, stage III (moderate) N18.3   3/15/2017 - Present    Severe nonproliferative diabetic retinopathy of both eyes associated with type 2 diabetes mellitus (CMS-HCC) E11.3493   5/4/2017 - Present    Moderate single current episode of major depressive disorder (CMS-HCC) F32.1   6/15/2017 - Present      Health Maintenance        Date Due Completion Dates    PAP SMEAR 12/8/1974 ---    MAMMOGRAM 12/8/1993 ---    IMM ZOSTER VACCINE 12/8/2013 ---    URINE ACR / MICROALBUMIN 5/3/2017 5/3/2016    DIABETES MONOFILAMENT / LE EXAM 10/26/2017 10/26/2016    COLON CANCER SCREENING ANNUAL FIT 11/15/2017 11/15/2016    A1C SCREENING 12/15/2017 6/15/2017, 3/10/2017, 11/30/2016, 10/26/2016, 4/1/2016, 3/9/2016, 11/17/2015, 1/22/2010, 9/25/2009    FASTING LIPID PROFILE 3/10/2018 3/10/2017, 11/30/2016, 4/7/2016, 4/1/2016, 12/8/2015, 11/17/2015, 1/22/2010, 9/25/2009    SERUM CREATININE 3/10/2018 3/10/2017, 11/30/2016, 5/9/2016, 4/20/2016, 4/10/2016, 4/7/2016, 4/6/2016, 4/1/2016, 12/8/2015, 11/21/2015, 11/20/2015, 11/17/2015, 11/17/2015, 1/22/2010, 1/21/2010, 9/24/2009    RETINAL SCREENING 4/24/2018 4/24/2017, 1/12/2017, 8/25/2016    IMM DTaP/Tdap/Td Vaccine (2 - Td) 3/15/2027 3/15/2017            Results     "   POCT Protime      Component    INR    2.0    Comment:     Lot: [id number] Exp: 3/18 IC valid                        Current Immunizations     13-VALENT PCV PREVNAR 11/19/2015  6:12 AM    Influenza Vaccine Quad Inj (Preserved) 11/18/2015  3:53 PM    Pneumococcal polysaccharide vaccine (PPSV-23) 3/15/2017    Tdap Vaccine 3/15/2017      Below and/or attached are the medications your provider expects you to take. Review all of your home medications and newly ordered medications with your provider and/or pharmacist. Follow medication instructions as directed by your provider and/or pharmacist. Please keep your medication list with you and share with your provider. Update the information when medications are discontinued, doses are changed, or new medications (including over-the-counter products) are added; and carry medication information at all times in the event of emergency situations     Allergies:  No Known Allergies          Medications  Valid as of: June 16, 2017 - 10:32 AM    Generic Name Brand Name Tablet Size Instructions for use    Cholecalciferol (Cap) VITAMIN D3 5000 UNIT Take 1 Cap by mouth every day.        Citalopram Hydrobromide (Tab) CELEXA 20 MG Take 1 Tab by mouth every day.        DilTIAZem HCl Coated Beads (CAPSULE SR 24 HR) CARDIZEM  MG Take 1 Cap by mouth 2 Times a Day.        Furosemide (Tab) LASIX 40 MG Take 1 Tab by mouth every day.        Glimepiride (Tab) AMARYL 2 MG TAKE 2 TABLETS BY MOUTH EVERY MORNING        Glucose Blood (Strip) glucose blood  1 Strip by Other route as needed (one touch ultra meter test bid dx- E11.65).        Insulin Glargine (Solution) LANTUS 100 UNIT/ML Take 20 units in the morning and 46 units in the evening as directed for a total dose of 66 units daily.        Insulin Syringe-Needle U-100 (Misc) INSULIN SYRINGE 1CC/29G 29G X 1/2\" 1 ML 1 Each by Does not apply route every day.        Lisinopril (Tab) PRINIVIL 5 MG Take 1 Tab by mouth every day.        " Magnesium Oxide (Tab) MAG- (241.3 MG) MG Take 1 Tab by mouth every day.        Metoprolol Tartrate (Tab) LOPRESSOR 50 MG Take 1 Tab by mouth 2 times a day.        Simvastatin (Tab) ZOCOR 20 MG Take 1 Tab by mouth every evening.        Warfarin Sodium (Tab) COUMADIN 5 MG Take one to 2  tablets daily as directed by coumadin clinic        .                 Medicines prescribed today were sent to:     [facility] - [place], [place] - [address]    [address] [facility] [address]    Phone: [phone] Fax: [phone]    Open 24 Hours?: No      Medication refill instructions:       If your prescription bottle indicates you have medication refills left, it is not necessary to call your provider’s office. Please contact your pharmacy and they will refill your medication.    If your prescription bottle indicates you do not have any refills left, you may request refills at any time through one of the following ways: The online [facility] system (except Urgent Care), by calling your provider’s office, or by asking your pharmacy to contact your provider’s office with a refill request. Medication refills are processed only during regular business hours and may not be available until the next business day. Your provider may request additional information or to have a follow-up visit with you prior to refilling your medication.   *Please Note: Medication refills are assigned a new Rx number when refilled electronically. Your pharmacy may indicate that no refills were authorized even though a new prescription for the same medication is available at the pharmacy. Please request the medicine by name with the pharmacy before contacting your provider for a refill.        Warfarin Dosing Calendar   June 2017 Details    Sun Mon Tue Wed Thu Fri Sat         1               2               3                 4               5               6               7               8               9                10                 11               12               13               14               15               16   2.0   2.5 mg   See details      17      5 mg           18      5 mg         19      2.5 mg         20      5 mg         21      2.5 mg         22      5 mg         23      2.5 mg         24      5 mg           25      5 mg         26      2.5 mg         27      5 mg         28      2.5 mg         29      5 mg         30      2.5 mg           Date Details   06/16 This INR check   INR: 2.0   Lot: [id number] Exp: 3/18 IC valid       Date of next INR:  6/30/2017         How to take your warfarin dose     To take:  2.5 mg Take 0.5 of a 5 mg tablet.    To take:  5 mg Take 1 of the 5 mg tablets.              [facility] Access Code: Activation code not generated  Current [facility] Status: Active

## 2017-06-16 NOTE — PROGRESS NOTES
Anticoagulation Summary as of 6/16/2017     INR goal 2.0-3.0   Selected INR 2.0 (6/16/2017)   Maintenance plan 2.5 mg (5 mg x 0.5) on Mon, Wed, Fri; 5 mg (5 mg x 1) all other days   Weekly total 27.5 mg   Plan last modified Santana Good, PHARMD (11/18/2016)   Next INR check 6/30/2017   Target end date Indefinite    Indications   Atrial flutter (CMS-HCC) (Resolved) [I48.92]  Acute ischemic right PCA stroke-Hemorrhage transformation is noted within the infarct (Resolved) [I63.531]  Persistent atrial fibrillation (CMS-HCC) [I48.1]         Anticoagulation Episode Summary     INR check location [facility]    Preferred lab     Send INR reminders to     Comments       Anticoagulation Care Providers     Provider Role Specialty Phone number    Bijan Sampson M.D. Referring Cardiology [phone]    [facility] Anticoagulation Services Responsible  [phone]        Anticoagulation Patient Findings   Positives Medication Changes    Comments Started citalopram today        No current signs of bleeding. Given the start of SSRI, INR may increase. Will continue current dose of warfarin for now. Follow up INR in 2 weeks to see effect of drug/drug interaction.    Santana Good, PHARMD

## 2017-06-28 ENCOUNTER — OFFICE VISIT (OUTPATIENT)
Dept: PULMONOLOGY | Facility: HOSPICE | Age: 62
End: 2017-06-28
Payer: MEDICAID

## 2017-06-28 ENCOUNTER — APPOINTMENT (OUTPATIENT)
Dept: SLEEP MEDICINE | Facility: MEDICAL CENTER | Age: 62
End: 2017-06-28
Payer: MEDICAID

## 2017-06-28 VITALS
WEIGHT: 293 LBS | TEMPERATURE: 97.7 F | SYSTOLIC BLOOD PRESSURE: 120 MMHG | HEART RATE: 63 BPM | RESPIRATION RATE: 16 BRPM | DIASTOLIC BLOOD PRESSURE: 60 MMHG | HEIGHT: 64 IN | OXYGEN SATURATION: 90 % | BODY MASS INDEX: 50.02 KG/M2

## 2017-06-28 DIAGNOSIS — I27.20 PULMONARY HYPERTENSION (HCC): ICD-10-CM

## 2017-06-28 DIAGNOSIS — G47.33 OBSTRUCTIVE SLEEP APNEA: ICD-10-CM

## 2017-06-28 PROCEDURE — 99213 OFFICE O/P EST LOW 20 MIN: CPT | Performed by: NURSE PRACTITIONER

## 2017-06-28 NOTE — PROGRESS NOTES
CC:  Here for f/u sleep issues as listed below    HPI:   Shani presents today for follow up obstructive sleep apnea. Medical history including diabetes type 2, hypertension, atrial fibrillation post ablation with prior embolic stroke. BMI is 55. PSG from 2/2017 indicated an AHI of 52 and low oxygen of 64%.  Currently she is being treated with BIPAP @ 22/13 with PS 4cmH20.  Compliance download from the dates 5/29/2017 - 6/27/2017 indicates she is wearing the device 96.7% for an avg of 7 hours and 23 minutes per night with a reduced AHI of 1.1.  She does tolerate pressure and mask well.  She wakes up more refreshed and is less tired throughout the day. She continues to  Take naps 1x/day for appx 1/2 hour, but much improved since starting the machine in which she admits to sleeping almost all day. They deny morning H/A. She sleeps better overall. She will continue to clean supplies weekly and change them as insurance allows.            Patient Active Problem List    Diagnosis Date Noted   • Status post ablation of atrial flutter 05/18/2016     Priority: Medium   • Mitral stenosis with regurgitation 04/08/2016     Priority: Medium   • Pulmonary hypertension (CMS-HCC) 04/08/2016     Priority: Medium   • Essential hypertension 12/01/2015     Priority: Medium   • Persistent atrial fibrillation (CMS-HCC) 12/01/2015     Priority: Medium   • History of stroke 11/30/2015     Priority: Medium   • Hyperlipidemia 11/19/2015     Priority: Medium   • Obesity 11/17/2015     Priority: Medium   • Uncontrolled type 2 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, with long-term current use of insulin (CMS-HCC) 11/17/2015     Priority: Medium   • Retinal hemorrhage of both eyes 10/26/2016     Priority: Low   • BMI 50.0-59.9, adult (CMS-HCC) 06/28/2017   • Moderate single current episode of major depressive disorder (CMS-HCC) 06/15/2017   • Severe nonproliferative diabetic retinopathy of both eyes associated  "with type 2 diabetes mellitus (CMS-HCC) 05/04/2017   • Chronic kidney disease, stage III (moderate) 03/15/2017   • Paroxysmal atrial flutter (CMS-HCC) 02/23/2017   • Obstructive sleep apnea 02/15/2017   • Chronic anticoagulation 02/15/2017   • Fecal occult blood test positive 12/16/2016   • Vitamin D deficiency 12/16/2016       Past Medical History   Diagnosis Date   • Diabetes    • Hypertension    • Heart murmur      childhood   • Morbid obesity (CMS-HCC)    • Stroke (CMS-HCC) 11/18/2015     Acute ischemic right PCA stroke-Hemorrhage transformation is noted within the infarct [I63.531]   • Atrial flutter (CMS-HCC) 11/18/2015   • Atrial fibrillation (CMS-HCC)    • Hyperlipidemia    • Pneumonia 4/6/2016   • Osteoarthritis    • Valvular heart disease      Mitral stenosis       Past Surgical History   Procedure Laterality Date   • Tonsillectomy     • Pilonidal cyst excision     • Recovery  5/9/2016     Procedure: CATH LAB FLUTTER ABLATION, CAMILO WITH ANESTHESIA DR. ARMIJO;  Surgeon: Recoveryonly Surgery;  Location: SURGERY PRE-POST PROC UNIT [facility];  Service:        Family History   Problem Relation Age of Onset   • Stroke Mother 71   • Cancer Father 71   • Heart Disease Brother        Social History     Social History   • Marital Status:      Spouse Name: N/A   • Number of Children: N/A   • Years of Education: N/A     Occupational History   • Not on file.     Social History Main Topics   • Smoking status: Passive Smoke Exposure - Never Smoker   • Smokeless tobacco: Never Used   • Alcohol Use: No   • Drug Use: No   • Sexual Activity: Not on file     Other Topics Concern   • Not on file     Social History Narrative       Current Outpatient Prescriptions   Medication Sig Dispense Refill   • magnesium oxide (MAG-OX) 400 (241.3 MG) MG Tab tablet Take 1 Tab by mouth every day. 60 Tab 2   • INSULIN SYRINGE 1CC/29G 29G X 1/2\" 1 ML Misc 1 Each by Does not apply route every day. 100 Each 3   • glucose blood strip 1 Strip by " "Other route as needed (one touch ultra meter test bid dx- E11.65). 100 Strip 11   • insulin glargine (LANTUS) 100 UNIT/ML Solution Take 20 units in the morning and 46 units in the evening as directed for a total dose of 66 units daily. 1 Vial 6   • simvastatin (ZOCOR) 20 MG Tab Take 1 Tab by mouth every evening. 90 Tab 3   • warfarin (COUMADIN) 5 MG Tab Take one to 2  tablets daily as directed by coumadin clinic 180 Tab 1   • lisinopril (PRINIVIL) 5 MG Tab Take 1 Tab by mouth every day. 90 Tab 3   • metoprolol (LOPRESSOR) 50 MG Tab Take 1 Tab by mouth 2 times a day. 180 Tab 3   • diltiazem CD (CARDIZEM CD) 120 MG CAPSULE SR 24 HR Take 1 Cap by mouth 2 Times a Day. 180 Cap 3   • glimepiride (AMARYL) 2 MG Tab TAKE 2 TABLETS BY MOUTH EVERY MORNING 60 Tab 2   • cholecalciferol (VITAMIN D3) 5000 UNIT Cap Take 1 Cap by mouth every day. 30 Cap 5   • furosemide (LASIX) 40 MG Tab Take 1 Tab by mouth every day. 90 Tab 3   • citalopram (CELEXA) 20 MG Tab Take 1 Tab by mouth every day. 30 Tab 2     No current facility-administered medications for this visit.          Allergies: Review of patient's allergies indicates no known allergies.      ROS   Gen: Denies fever, chills, unintentional weight loss, fatigue  Resp:Denies Dyspnea  CV: Denies chest pain, chest tightness  Sleep:Denies morning headache, insomnia, daytime somnolence, snoring, gasping for air, apnea  Neuro: Denies frequent headaches, weakness, dizziness  See HPI.  All other systems reviewed and negative        Vital signs for this encounter:  Filed Vitals:    06/28/17 1029   Height: 1.626 m (5' 4.02\")   Weight: 146.965 kg (324 lb)   Weight % change since last entry.: 0 %   BP: 120/60   Pulse: 63   BMI (Calculated): 55.59   Resp: 16   Temp: 36.5 °C (97.7 °F)   O2 sat % room air: 90 %                   Physical Exam:   Gen:         Alert and oriented, No apparent distress.   Neck:        No Lymphadenopathy.  Lungs:     Clear to auscultation bilaterally.    CV:          " Regular rate and rhythm. No murmurs, rubs or gallops.   Abd:         Soft non tender, non distended.            Ext:          No clubbing, cyanosis, edema.    Assessment   1. Obstructive sleep apnea     2. Pulmonary hypertension (CMS-HCC)     3. BMI 50.0-59.9, adult (CMS-[facility])         PLAN:   Patient Instructions   1) Continue BIPAP at 22/13 with PS of 4cmH20  2) Clean mask and supplies weekly and change them as insurance allows  3) Return in about 3 months (around 9/28/2017), or or REI Rodriguez, for Compliance, review of symptoms, if not sooner, follow up with REI Moody.

## 2017-06-28 NOTE — PATIENT INSTRUCTIONS
1) Continue BIPAP at 22/13 with PS of 4cmH20  2) Clean mask and supplies weekly and change them as insurance allows  3) Return in about 3 months (around 9/28/2017), or or REI Rodriguez, for Compliance, review of symptoms, if not sooner, follow up with REI Moody.

## 2017-06-28 NOTE — MR AVS SNAPSHOT
"        Shani Love   2017 11:20 AM   Office Visit   MRN: [id number]    Department:  [facility]   Dept Phone:  [phone]    Description:  Female : 1955   Provider:  REGLA García           Reason for Visit     Apnea Last seen 17       Allergies as of 2017     No Known Allergies      Vital Signs     Blood Pressure Pulse Temperature Respirations Height Weight    120/60 mmHg 63 36.5 °C (97.7 °F) 16 1.626 m (5' 4.02\") 146.965 kg (324 lb)    Body Mass Index Oxygen Saturation Smoking Status             55.59 kg/m2 90% Passive Smoke Exposure - Never Smoker         Basic Information     Date Of Birth Sex Race Ethnicity Preferred Language    1955 Female White Non- English      Your appointments     2017  1:00 PM   Anti-Coag Routine with LAVERNE ANTI-COAG   [place] ([place]    [address] [facility] [address] [phone]            2017  1:20 PM   ANNUAL EXAM PREVENTATIVE with REGLA Montiel   [facility] (--)    [facility] [id number] [phone]            Sep 07, 2017 10:40 AM   Established Patient with REGLA Montiel   [facility] (--)    [address] [place] [id number] [phone]           You will be receiving a confirmation call a few days before your appointment from our automated call confirmation system.            Sep 25, 2017  2:00 PM   [facility]    [address]  [facility] [address]  [place] [address] [phone]              Problem List              ICD-10-CM Priority Class Noted - Resolved    Obesity E66.9 Medium  2015 - Present    Uncontrolled type 2 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, with long-term current use of insulin (CMS-[facility]) E11.3493, E11.65, " Z79.4 Medium  11/17/2015 - Present    Hyperlipidemia E78.5 Medium  11/19/2015 - Present    Essential hypertension I10 Medium  12/1/2015 - Present    Persistent atrial fibrillation (CMS-HCC) I48.1 Medium  12/1/2015 - Present    Mitral stenosis with regurgitation I05.2 Medium  4/8/2016 - Present    Pulmonary hypertension (CMS-HCC) I27.2 Medium  4/8/2016 - Present    History of stroke Z86.73 Medium  11/30/2015 - Present    Status post ablation of atrial flutter Z98.890, Z86.79 Medium  5/18/2016 - Present    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 Low  10/26/2016 - Present    Fecal occult blood test positive R19.5   12/16/2016 - Present    Vitamin D deficiency E55.9   12/16/2016 - Present    Obstructive sleep apnea G47.33   2/15/2017 - Present    Chronic anticoagulation Z79.01   2/15/2017 - Present    Paroxysmal atrial flutter (CMS-HCC) I48.92   2/23/2017 - Present    Chronic kidney disease, stage III (moderate) N18.3   3/15/2017 - Present    Severe nonproliferative diabetic retinopathy of both eyes associated with type 2 diabetes mellitus (CMS-HCC) E11.3493   5/4/2017 - Present    Moderate single current episode of major depressive disorder (CMS-HCC) F32.1   6/15/2017 - Present      Health Maintenance        Date Due Completion Dates    PAP SMEAR 12/8/1974 ---    MAMMOGRAM 12/8/1993 ---    IMM ZOSTER VACCINE 12/8/2013 ---    DIABETES MONOFILAMENT / LE EXAM 10/26/2017 10/26/2016    COLON CANCER SCREENING ANNUAL FIT 11/15/2017 11/15/2016    A1C SCREENING 12/15/2017 6/15/2017, 3/10/2017, 11/30/2016, 10/26/2016, 4/1/2016, 3/9/2016, 11/17/2015, 1/22/2010, 9/25/2009    FASTING LIPID PROFILE 3/10/2018 3/10/2017, 11/30/2016, 4/7/2016, 4/1/2016, 12/8/2015, 11/17/2015, 1/22/2010, 9/25/2009    SERUM CREATININE 3/10/2018 3/10/2017, 11/30/2016, 5/9/2016, 4/20/2016, 4/10/2016, 4/7/2016, 4/6/2016, 4/1/2016, 12/8/2015, 11/21/2015, 11/20/2015, 11/17/2015, 11/17/2015, 1/22/2010, 1/21/2010, 9/24/2009    RETINAL SCREENING 4/24/2018  "4/24/2017, 1/12/2017, 8/25/2016    URINE ACR / MICROALBUMIN 6/15/2018 6/15/2017, 5/3/2016    IMM DTaP/Tdap/Td Vaccine (2 - Td) 3/15/2027 3/15/2017            Current Immunizations     13-VALENT PCV PREVNAR 11/19/2015  6:12 AM    Influenza Vaccine Quad Inj (Preserved) 11/18/2015  3:53 PM    Pneumococcal polysaccharide vaccine (PPSV-23) 3/15/2017    Tdap Vaccine 3/15/2017      Below and/or attached are the medications your provider expects you to take. Review all of your home medications and newly ordered medications with your provider and/or pharmacist. Follow medication instructions as directed by your provider and/or pharmacist. Please keep your medication list with you and share with your provider. Update the information when medications are discontinued, doses are changed, or new medications (including over-the-counter products) are added; and carry medication information at all times in the event of emergency situations     Allergies:  No Known Allergies          Medications  Valid as of: June 28, 2017 - 11:54 AM    Generic Name Brand Name Tablet Size Instructions for use    Cholecalciferol (Cap) VITAMIN D3 5000 UNIT Take 1 Cap by mouth every day.        Citalopram Hydrobromide (Tab) CELEXA 20 MG Take 1 Tab by mouth every day.        DilTIAZem HCl Coated Beads (CAPSULE SR 24 HR) CARDIZEM  MG Take 1 Cap by mouth 2 Times a Day.        Furosemide (Tab) LASIX 40 MG Take 1 Tab by mouth every day.        Glimepiride (Tab) AMARYL 2 MG TAKE 2 TABLETS BY MOUTH EVERY MORNING        Glucose Blood (Strip) glucose blood  1 Strip by Other route as needed (one touch ultra meter test bid dx- E11.65).        Insulin Glargine (Solution) LANTUS 100 UNIT/ML Take 20 units in the morning and 46 units in the evening as directed for a total dose of 66 units daily.        Insulin Syringe-Needle U-100 (Misc) INSULIN SYRINGE 1CC/29G 29G X 1/2\" 1 ML 1 Each by Does not apply route every day.        Lisinopril (Tab) PRINIVIL 5 MG Take " 1 Tab by mouth every day.        Magnesium Oxide (Tab) MAG- (241.3 MG) MG Take 1 Tab by mouth every day.        Metoprolol Tartrate (Tab) LOPRESSOR 50 MG Take 1 Tab by mouth 2 times a day.        Simvastatin (Tab) ZOCOR 20 MG Take 1 Tab by mouth every evening.        Warfarin Sodium (Tab) COUMADIN 5 MG Take one to 2  tablets daily as directed by coumadin clinic        .                 Medicines prescribed today were sent to:     [facility] [place], [place] - [address] [facility]    [address] [facility] [address] [facility] [address]    Phone: [phone] Fax: [phone]    Open 24 Hours?: No      Medication refill instructions:       If your prescription bottle indicates you have medication refills left, it is not necessary to call your provider’s office. Please contact your pharmacy and they will refill your medication.    If your prescription bottle indicates you do not have any refills left, you may request refills at any time through one of the following ways: The online [employer] system (except Urgent Care), by calling your provider’s office, or by asking your pharmacy to contact your provider’s office with a refill request. Medication refills are processed only during regular business hours and may not be available until the next business day. Your provider may request additional information or to have a follow-up visit with you prior to refilling your medication.   *Please Note: Medication refills are assigned a new Rx number when refilled electronically. Your pharmacy may indicate that no refills were authorized even though a new prescription for the same medication is available at the pharmacy. Please request the medicine by name with the pharmacy before contacting your provider for a refill.        Instructions    1) Continue BIPAP at 22/13 with PS of 4cmH20  2) Clean mask and supplies weekly and change them as insurance allows  3) Return in about 3 months (around 9/28/2017), or or Shanthi  REI Barlow, for Compliance, review of symptoms, if not sooner, follow up with REI Moody.              MyChart Access Code: Activation code not generated  Current [facility] Status: Active

## 2017-06-30 ENCOUNTER — ANTICOAGULATION VISIT (OUTPATIENT)
Dept: MEDICAL GROUP | Facility: PHYSICIAN GROUP | Age: 62
End: 2017-06-30
Payer: MEDICAID

## 2017-06-30 VITALS — SYSTOLIC BLOOD PRESSURE: 122 MMHG | DIASTOLIC BLOOD PRESSURE: 64 MMHG | HEART RATE: 85 BPM

## 2017-06-30 DIAGNOSIS — I48.19 PERSISTENT ATRIAL FIBRILLATION (HCC): ICD-10-CM

## 2017-06-30 LAB — INR PPP: 1.3 (ref 2–3.5)

## 2017-06-30 PROCEDURE — 85610 PROTHROMBIN TIME: CPT | Performed by: FAMILY MEDICINE

## 2017-06-30 NOTE — PROGRESS NOTES
"Anticoagulation Summary as of 6/30/2017     INR goal 2.0-3.0   Selected INR 1.3! (6/30/2017)   Maintenance plan 2.5 mg (5 mg x 0.5) on Mon, Wed, Fri; 5 mg (5 mg x 1) all other days   Weekly total 27.5 mg   Plan last modified Santana Good, PHARMD (11/18/2016)   Next INR check 7/14/2017   Target end date Indefinite    Indications   Atrial flutter (CMS-HCC) (Resolved) [I48.92]  Acute ischemic right PCA stroke-Hemorrhage transformation is noted within the infarct (Resolved) [I63.531]  Persistent atrial fibrillation (CMS-HCC) [I48.1]         Anticoagulation Episode Summary     INR check location [facility]    Preferred lab     Send INR reminders to     Comments       Anticoagulation Care Providers     Provider Role Specialty Phone number    Bijan Sampson M.D. Referring Cardiology [phone]    [facility] Responsible  [phone]        Anticoagulation Patient Findings      Current Outpatient Prescriptions on File Prior to Visit   Medication Sig Dispense Refill   • magnesium oxide (MAG-OX) 400 (241.3 MG) MG Tab tablet Take 1 Tab by mouth every day. 60 Tab 2   • citalopram (CELEXA) 20 MG Tab Take 1 Tab by mouth every day. 30 Tab 2   • INSULIN SYRINGE 1CC/29G 29G X 1/2\" 1 ML Misc 1 Each by Does not apply route every day. 100 Each 3   • glucose blood strip 1 Strip by Other route as needed (one touch ultra meter test bid dx- E11.65). 100 Strip 11   • insulin glargine (LANTUS) 100 UNIT/ML Solution Take 20 units in the morning and 46 units in the evening as directed for a total dose of 66 units daily. 1 Vial 6   • simvastatin (ZOCOR) 20 MG Tab Take 1 Tab by mouth every evening. 90 Tab 3   • warfarin (COUMADIN) 5 MG Tab Take one to 2  tablets daily as directed by coumadin clinic 180 Tab 1   • lisinopril (PRINIVIL) 5 MG Tab Take 1 Tab by mouth every day. 90 Tab 3   • metoprolol (LOPRESSOR) 50 MG Tab Take 1 Tab by mouth 2 times a day. 180 Tab 3   • diltiazem CD (CARDIZEM CD) 120 MG CAPSULE SR " 24 HR Take 1 Cap by mouth 2 Times a Day. 180 Cap 3   • glimepiride (AMARYL) 2 MG Tab TAKE 2 TABLETS BY MOUTH EVERY MORNING 60 Tab 2   • cholecalciferol (VITAMIN D3) 5000 UNIT Cap Take 1 Cap by mouth every day. 30 Cap 5   • furosemide (LASIX) 40 MG Tab Take 1 Tab by mouth every day. 90 Tab 3     No current facility-administered medications on file prior to visit.       Lab Results   Component Value Date/Time    SODIUM 137 03/10/2017 09:15 AM    POTASSIUM 4.3 03/10/2017 09:15 AM    CHLORIDE 102 03/10/2017 09:15 AM    CO2 29 03/10/2017 09:15 AM    GLUCOSE 215* 03/10/2017 09:15 AM    BUN 15 03/10/2017 09:15 AM    CREATININE 1.07 03/10/2017 09:15 AM          Shani Love seen in clinic today  INR  Sub-therapeutic.  Missed a dose or two   Denies signs/symptoms of bleeding and/or thrombosis.    Denies changes to diet or medications.   Follow up appointment in 2 week(s).      7.5mg x two then continue weekly warfarin dose as noted       Loc Chan, PHARMD

## 2017-06-30 NOTE — MR AVS SNAPSHOT
Shani Love   2017 1:00 PM   Anticoagulation Visit   MRN: [id number]    Department:  [facility]   Dept Phone:  [phone]    Description:  Female : 1955   Provider:  LAVERNE ANTI-COAG           Allergies as of 2017     No Known Allergies      You were diagnosed with     Persistent atrial fibrillation (CMS-HCC)   [323918]         Vital Signs     Blood Pressure Pulse Smoking Status             122/64 mmHg 85 Passive Smoke Exposure - Never Smoker         Basic Information     Date Of Birth Sex Race Ethnicity Preferred Language    1955 Female White Non- English      Your appointments     2017  1:00 PM   Anti-Coag Routine with KAYLANLDINORA ANTI-COAG   [facility] ([place])    [address] [place] [id number] [phone]            2017  1:00 PM   [facility]   [facility] ([place])    [address] [place] [address] [phone]            2017  1:20 PM   ANNUAL EXAM PREVENTATIVE with REGLA Montiel   [facility] (--)    [facility] [address] [id number] [phone]            Sep 07, 2017 10:40 AM   Established Patient with REGLA Montiel   [facility] (--)    [address] [place] [id number] [phone]           You will be receiving a confirmation call a few days before your appointment from our automated call confirmation system.            Sep 25, 2017  2:00 PM   Established Patient Pul with STORM Marcial   [facility] (--)    [address]  [address]  [place] [address] [phone]              Problem List              ICD-10-CM Priority Class Noted - Resolved    Obesity E66.9 Medium  2015 - Present    Uncontrolled type 2 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, with  long-term current use of insulin (CMS-HCC) E11.3493, E11.65, Z79.4 Medium  11/17/2015 - Present    Hyperlipidemia E78.5 Medium  11/19/2015 - Present    Essential hypertension I10 Medium  12/1/2015 - Present    Persistent atrial fibrillation (CMS-HCC) I48.1 Medium  12/1/2015 - Present    Mitral stenosis with regurgitation I05.2 Medium  4/8/2016 - Present    Pulmonary hypertension (CMS-HCC) I27.2 Medium  4/8/2016 - Present    History of stroke Z86.73 Medium  11/30/2015 - Present    Status post ablation of atrial flutter Z98.890, Z86.79 Medium  5/18/2016 - Present    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 Low  10/26/2016 - Present    Fecal occult blood test positive R19.5   12/16/2016 - Present    Vitamin D deficiency E55.9   12/16/2016 - Present    Obstructive sleep apnea G47.33   2/15/2017 - Present    Chronic anticoagulation Z79.01   2/15/2017 - Present    Paroxysmal atrial flutter (CMS-HCC) I48.92   2/23/2017 - Present    Chronic kidney disease, stage III (moderate) N18.3   3/15/2017 - Present    Severe nonproliferative diabetic retinopathy of both eyes associated with type 2 diabetes mellitus (CMS-HCC) E11.3493   5/4/2017 - Present    Moderate single current episode of major depressive disorder (CMS-HCC) F32.1   6/15/2017 - Present    BMI 50.0-59.9, adult (CMS-HCC) Z68.43   6/28/2017 - Present      Health Maintenance        Date Due Completion Dates    PAP SMEAR 12/8/1974 ---    MAMMOGRAM 12/8/1993 ---    IMM ZOSTER VACCINE 12/8/2013 ---    DIABETES MONOFILAMENT / LE EXAM 10/26/2017 10/26/2016    COLON CANCER SCREENING ANNUAL FIT 11/15/2017 11/15/2016    A1C SCREENING 12/15/2017 6/15/2017, 3/10/2017, 11/30/2016, 10/26/2016, 4/1/2016, 3/9/2016, 11/17/2015, 1/22/2010, 9/25/2009    FASTING LIPID PROFILE 3/10/2018 3/10/2017, 11/30/2016, 4/7/2016, 4/1/2016, 12/8/2015, 11/17/2015, 1/22/2010, 9/25/2009    SERUM CREATININE 3/10/2018 3/10/2017, 11/30/2016, 5/9/2016, 4/20/2016, 4/10/2016, 4/7/2016, 4/6/2016, 4/1/2016,  12/8/2015, 11/21/2015, 11/20/2015, 11/17/2015, 11/17/2015, 1/22/2010, 1/21/2010, 9/24/2009    RETINAL SCREENING 4/24/2018 4/24/2017, 1/12/2017, 8/25/2016    URINE ACR / MICROALBUMIN 6/15/2018 6/15/2017, 5/3/2016    IMM DTaP/Tdap/Td Vaccine (2 - Td) 3/15/2027 3/15/2017            Results     POCT Protime      Component    INR    1.3    Comment:     ic valid [id number] 160 exp 03/2018                        Current Immunizations     13-VALENT PCV PREVNAR 11/19/2015  6:12 AM    Influenza Vaccine Quad Inj (Preserved) 11/18/2015  3:53 PM    Pneumococcal polysaccharide vaccine (PPSV-23) 3/15/2017    Tdap Vaccine 3/15/2017      Below and/or attached are the medications your provider expects you to take. Review all of your home medications and newly ordered medications with your provider and/or pharmacist. Follow medication instructions as directed by your provider and/or pharmacist. Please keep your medication list with you and share with your provider. Update the information when medications are discontinued, doses are changed, or new medications (including over-the-counter products) are added; and carry medication information at all times in the event of emergency situations     Allergies:  No Known Allergies          Medications  Valid as of: June 30, 2017 - 12:55 PM    Generic Name Brand Name Tablet Size Instructions for use    Cholecalciferol (Cap) VITAMIN D3 5000 UNIT Take 1 Cap by mouth every day.        Citalopram Hydrobromide (Tab) CELEXA 20 MG Take 1 Tab by mouth every day.        DilTIAZem HCl Coated Beads (CAPSULE SR 24 HR) CARDIZEM  MG Take 1 Cap by mouth 2 Times a Day.        Furosemide (Tab) LASIX 40 MG Take 1 Tab by mouth every day.        Glimepiride (Tab) AMARYL 2 MG TAKE 2 TABLETS BY MOUTH EVERY MORNING        Glucose Blood (Strip) glucose blood  1 Strip by Other route as needed (one touch ultra meter test bid dx- E11.65).        Insulin Glargine (Solution) LANTUS 100 UNIT/ML Take 20 units in the  "morning and 46 units in the evening as directed for a total dose of 66 units daily.        Insulin Syringe-Needle U-100 (Misc) INSULIN SYRINGE 1CC/29G 29G X 1/2\" 1 ML 1 Each by Does not apply route every day.        Lisinopril (Tab) PRINIVIL 5 MG Take 1 Tab by mouth every day.        Magnesium Oxide (Tab) MAG- (241.3 MG) MG Take 1 Tab by mouth every day.        Metoprolol Tartrate (Tab) LOPRESSOR 50 MG Take 1 Tab by mouth 2 times a day.        Simvastatin (Tab) ZOCOR 20 MG Take 1 Tab by mouth every evening.        Warfarin Sodium (Tab) COUMADIN 5 MG Take one to 2  tablets daily as directed by coumadin clinic        .                 Medicines prescribed today were sent to:     [facility] - [place], [place] - [address] [place] [address] [address] [facility] [address]    Phone: [phone] Fax: [phone]    Open 24 Hours?: No      Medication refill instructions:       If your prescription bottle indicates you have medication refills left, it is not necessary to call your provider’s office. Please contact your pharmacy and they will refill your medication.    If your prescription bottle indicates you do not have any refills left, you may request refills at any time through one of the following ways: The online [employer] system (except Urgent Care), by calling your provider’s office, or by asking your pharmacy to contact your provider’s office with a refill request. Medication refills are processed only during regular business hours and may not be available until the next business day. Your provider may request additional information or to have a follow-up visit with you prior to refilling your medication.   *Please Note: Medication refills are assigned a new Rx number when refilled electronically. Your pharmacy may indicate that no refills were authorized even though a new prescription for the same medication is available at the pharmacy. Please request the medicine by name with the " pharmacy before contacting your provider for a refill.        Warfarin Dosing Calendar   June 2017 Details    Sun Mon Tue Wed Thu Fri Sat         1               2               3                 4               5               6               7               8               9               10                 11               12               13               14               15               16               17                 18               19               20               21               22               23               24                 25               26               27               28               29               30   1.3   7.5 mg   See details        Date Details   06/30 This INR check   INR: 1.3   ic valid [id number] 160 exp 03/2018               How to take your warfarin dose     To take:  7.5 mg Take 1.5 of the 5 mg tablets.           Warfarin Dosing Calendar   July 2017 Details    Sun Mon Tue Wed Thu Fri Sat           1      7.5 mg           2      5 mg         3      2.5 mg         4      5 mg         5      2.5 mg         6      5 mg         7      2.5 mg         8      5 mg           9      5 mg         10      2.5 mg         11      5 mg         12      2.5 mg         13      5 mg         14      2.5 mg         15                 16               17               18               19               20               21               22                 23               24               25               26               27               28               29                 30               31                     Date Details   No additional details    Date of next INR:  7/14/2017         How to take your warfarin dose     To take:  2.5 mg Take 0.5 of a 5 mg tablet.    To take:  5 mg Take 1 of the 5 mg tablets.    To take:  7.5 mg Take 1.5 of the 5 mg tablets.              [employer] Access Code: Activation code not generated  Current [employer] Status: Active

## 2017-07-01 RX ORDER — GLIMEPIRIDE 2 MG/1
TABLET ORAL
Qty: 60 TAB | Refills: 2 | Status: SHIPPED | OUTPATIENT
Start: 2017-07-01 | End: 2017-09-09

## 2017-07-01 NOTE — TELEPHONE ENCOUNTER
Refill completed. Looks like referral department is unable to reach for her referral to Endo, please be sure she makes an appt!   FRANCIS Montiel.

## 2017-07-14 ENCOUNTER — ANTICOAGULATION VISIT (OUTPATIENT)
Dept: MEDICAL GROUP | Facility: PHYSICIAN GROUP | Age: 62
End: 2017-07-14
Payer: MEDICAID

## 2017-07-14 VITALS — SYSTOLIC BLOOD PRESSURE: 156 MMHG | DIASTOLIC BLOOD PRESSURE: 90 MMHG | HEART RATE: 89 BPM

## 2017-07-14 DIAGNOSIS — I48.19 PERSISTENT ATRIAL FIBRILLATION (HCC): ICD-10-CM

## 2017-07-14 LAB — INR PPP: 2.7 (ref 2–3.5)

## 2017-07-14 PROCEDURE — 85610 PROTHROMBIN TIME: CPT | Performed by: PHYSICIAN ASSISTANT

## 2017-07-14 NOTE — PROGRESS NOTES
"Anticoagulation Summary as of 7/14/2017     INR goal 2.0-3.0   Selected INR 2.7 (7/14/2017)   Maintenance plan 2.5 mg (5 mg x 0.5) on Mon, Wed, Fri; 5 mg (5 mg x 1) all other days   Weekly total 27.5 mg   Plan last modified Santana Good, PHARMD (11/18/2016)   Next INR check 8/11/2017   Target end date Indefinite    Indications   Atrial flutter (CMS-HCC) (Resolved) [I48.92]  Acute ischemic right PCA stroke-Hemorrhage transformation is noted within the infarct (Resolved) [I63.531]  Persistent atrial fibrillation (CMS-HCC) [I48.1]         Anticoagulation Episode Summary     INR check location [facility]    Preferred lab     Send INR reminders to     Comments       Anticoagulation Care Providers     Provider Role Specialty Phone number    Bijan Sampson M.D. Referring Cardiology [phone]    [facility] Responsible  [phone]        Anticoagulation Patient Findings      Current Outpatient Prescriptions on File Prior to Visit   Medication Sig Dispense Refill   • glimepiride (AMARYL) 2 MG Tab TAKE 2 TABLETS BY MOUTH EVERY MORNING 60 Tab 2   • magnesium oxide (MAG-OX) 400 (241.3 MG) MG Tab tablet Take 1 Tab by mouth every day. 60 Tab 2   • citalopram (CELEXA) 20 MG Tab Take 1 Tab by mouth every day. 30 Tab 2   • INSULIN SYRINGE 1CC/29G 29G X 1/2\" 1 ML Misc 1 Each by Does not apply route every day. 100 Each 3   • glucose blood strip 1 Strip by Other route as needed (one touch ultra meter test bid dx- E11.65). 100 Strip 11   • insulin glargine (LANTUS) 100 UNIT/ML Solution Take 20 units in the morning and 46 units in the evening as directed for a total dose of 66 units daily. 1 Vial 6   • simvastatin (ZOCOR) 20 MG Tab Take 1 Tab by mouth every evening. 90 Tab 3   • warfarin (COUMADIN) 5 MG Tab Take one to 2  tablets daily as directed by coumadin clinic 180 Tab 1   • lisinopril (PRINIVIL) 5 MG Tab Take 1 Tab by mouth every day. 90 Tab 3   • metoprolol (LOPRESSOR) 50 MG Tab Take 1 Tab by " mouth 2 times a day. 180 Tab 3   • diltiazem CD (CARDIZEM CD) 120 MG CAPSULE SR 24 HR Take 1 Cap by mouth 2 Times a Day. 180 Cap 3   • cholecalciferol (VITAMIN D3) 5000 UNIT Cap Take 1 Cap by mouth every day. 30 Cap 5   • furosemide (LASIX) 40 MG Tab Take 1 Tab by mouth every day. 90 Tab 3     No current facility-administered medications on file prior to visit.       Lab Results   Component Value Date/Time    SODIUM 137 03/10/2017 09:15 AM    POTASSIUM 4.3 03/10/2017 09:15 AM    CHLORIDE 102 03/10/2017 09:15 AM    CO2 29 03/10/2017 09:15 AM    GLUCOSE 215* 03/10/2017 09:15 AM    BUN 15 03/10/2017 09:15 AM    CREATININE 1.07 03/10/2017 09:15 AM          Shani Love seen in clinic today  INR  therapeutic.    Denies signs/symptoms of bleeding and/or thrombosis.    Denies changes to diet or medications.   Follow up appointment in 4 week(s).      Continue weekly warfarin dose as noted       Loc Chan, PHARMD

## 2017-07-14 NOTE — MR AVS SNAPSHOT
Shani Love   2017 1:00 PM   Anticoagulation Visit   MRN: [id number]    Department:  [facility]   Dept Phone:  [phone]    Description:  Female : 1955   Provider:  LAVERNE ANTI-COAG           Allergies as of 2017     No Known Allergies      You were diagnosed with     Persistent atrial fibrillation (CMS-HCC)   [691872]         Vital Signs     Blood Pressure Pulse Smoking Status             156/90 mmHg 89 Passive Smoke Exposure - Never Smoker         Basic Information     Date Of Birth Sex Race Ethnicity Preferred Language    1955 Female White Non- English      Your appointments     2017  1:20 PM   ANNUAL EXAM PREVENTATIVE with REGLA Montiel   [facility] (--)    [address] [id number] [phone]            Aug 11, 2017  1:15 PM   Anti-Coag Routine with LAVERNE ANTI-COAG   [facility] [place]    [facility] [address] [phone]            Sep 07, 2017 10:40 AM   Established Patient with REGLA Montiel   [facility] (--)    [address] [address] [phone]           You will be receiving a confirmation call a few days before your appointment from our automated call confirmation system.            Sep 25, 2017  2:00 PM   Established Patient Pul with Shanthi T Barlow, A.P.N.   [facility] (--)    [address] [place] [address]  [facility] [id number] [phone]              Problem List              ICD-10-CM Priority Class Noted - Resolved    Obesity E66.9 Medium  2015 - Present    Uncontrolled type 2 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, with long-term current use of insulin (CMS-HCC) E11.3493, E11.65, Z79.4 Medium  2015 - Present    Hyperlipidemia E78.5 Medium  2015 - Present    Essential hypertension I10  Medium  12/1/2015 - Present    Persistent atrial fibrillation (CMS-HCC) I48.1 Medium  12/1/2015 - Present    Mitral stenosis with regurgitation I05.2 Medium  4/8/2016 - Present    Pulmonary hypertension (CMS-HCC) I27.2 Medium  4/8/2016 - Present    History of stroke Z86.73 Medium  11/30/2015 - Present    Status post ablation of atrial flutter Z98.890, Z86.79 Medium  5/18/2016 - Present    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 Low  10/26/2016 - Present    Fecal occult blood test positive R19.5   12/16/2016 - Present    Vitamin D deficiency E55.9   12/16/2016 - Present    Obstructive sleep apnea G47.33   2/15/2017 - Present    Chronic anticoagulation Z79.01   2/15/2017 - Present    Paroxysmal atrial flutter (CMS-HCC) I48.92   2/23/2017 - Present    Chronic kidney disease, stage III (moderate) N18.3   3/15/2017 - Present    Severe nonproliferative diabetic retinopathy of both eyes associated with type 2 diabetes mellitus (CMS-HCC) E11.3493   5/4/2017 - Present    Moderate single current episode of major depressive disorder (CMS-HCC) F32.1   6/15/2017 - Present    BMI 50.0-59.9, adult (CMS-HCC) Z68.43   6/28/2017 - Present      Health Maintenance        Date Due Completion Dates    PAP SMEAR 12/8/1974 ---    MAMMOGRAM 12/8/1993 ---    IMM ZOSTER VACCINE 12/8/2013 ---    IMM INFLUENZA (1) 9/1/2017 11/18/2015    DIABETES MONOFILAMENT / LE EXAM 10/26/2017 10/26/2016    COLON CANCER SCREENING ANNUAL FIT 11/15/2017 11/15/2016    A1C SCREENING 12/15/2017 6/15/2017, 3/10/2017, 11/30/2016, 10/26/2016, 4/1/2016, 3/9/2016, 11/17/2015, 1/22/2010, 9/25/2009    FASTING LIPID PROFILE 3/10/2018 3/10/2017, 11/30/2016, 4/7/2016, 4/1/2016, 12/8/2015, 11/17/2015, 1/22/2010, 9/25/2009    SERUM CREATININE 3/10/2018 3/10/2017, 11/30/2016, 5/9/2016, 4/20/2016, 4/10/2016, 4/7/2016, 4/6/2016, 4/1/2016, 12/8/2015, 11/21/2015, 11/20/2015, 11/17/2015, 11/17/2015, 1/22/2010, 1/21/2010, 9/24/2009    RETINAL SCREENING 4/24/2018 4/24/2017, 1/12/2017,  8/25/2016    URINE ACR / MICROALBUMIN 6/15/2018 6/15/2017, 5/3/2016    IMM DTaP/Tdap/Td Vaccine (2 - Td) 3/15/2027 3/15/2017            Results     POCT Protime      Component    INR    2.7    Comment:     ic valid [id number] 160 exp 03/2018                        Current Immunizations     13-VALENT PCV PREVNAR 11/19/2015  6:12 AM    Influenza Vaccine Quad Inj (Preserved) 11/18/2015  3:53 PM    Pneumococcal polysaccharide vaccine (PPSV-23) 3/15/2017    Tdap Vaccine 3/15/2017      Below and/or attached are the medications your provider expects you to take. Review all of your home medications and newly ordered medications with your provider and/or pharmacist. Follow medication instructions as directed by your provider and/or pharmacist. Please keep your medication list with you and share with your provider. Update the information when medications are discontinued, doses are changed, or new medications (including over-the-counter products) are added; and carry medication information at all times in the event of emergency situations     Allergies:  No Known Allergies          Medications  Valid as of: July 14, 2017 -  1:30 PM    Generic Name Brand Name Tablet Size Instructions for use    Cholecalciferol (Cap) VITAMIN D3 5000 UNIT Take 1 Cap by mouth every day.        Citalopram Hydrobromide (Tab) CELEXA 20 MG Take 1 Tab by mouth every day.        DilTIAZem HCl Coated Beads (CAPSULE SR 24 HR) CARDIZEM  MG Take 1 Cap by mouth 2 Times a Day.        Furosemide (Tab) LASIX 40 MG Take 1 Tab by mouth every day.        Glimepiride (Tab) AMARYL 2 MG TAKE 2 TABLETS BY MOUTH EVERY MORNING        Glucose Blood (Strip) glucose blood  1 Strip by Other route as needed (one touch ultra meter test bid dx- E11.65).        Insulin Glargine (Solution) LANTUS 100 UNIT/ML Take 20 units in the morning and 46 units in the evening as directed for a total dose of 66 units daily.        Insulin Syringe-Needle U-100 (Misc) INSULIN SYRINGE  "1CC/29G 29G X 1/2\" 1 ML 1 Each by Does not apply route every day.        Lisinopril (Tab) PRINIVIL 5 MG Take 1 Tab by mouth every day.        Magnesium Oxide (Tab) MAG- (241.3 MG) MG Take 1 Tab by mouth every day.        Metoprolol Tartrate (Tab) LOPRESSOR 50 MG Take 1 Tab by mouth 2 times a day.        Simvastatin (Tab) ZOCOR 20 MG Take 1 Tab by mouth every evening.        Warfarin Sodium (Tab) COUMADIN 5 MG Take one to 2  tablets daily as directed by coumadin clinic        .                 Medicines prescribed today were sent to:     [facility] - [place], [place] - [address] [facility]    [address] [facility] [address] [facility] [address]    Phone: [phone] Fax: [phone]    Open 24 Hours?: No      Medication refill instructions:       If your prescription bottle indicates you have medication refills left, it is not necessary to call your provider’s office. Please contact your pharmacy and they will refill your medication.    If your prescription bottle indicates you do not have any refills left, you may request refills at any time through one of the following ways: The online [employer] system (except Urgent Care), by calling your provider’s office, or by asking your pharmacy to contact your provider’s office with a refill request. Medication refills are processed only during regular business hours and may not be available until the next business day. Your provider may request additional information or to have a follow-up visit with you prior to refilling your medication.   *Please Note: Medication refills are assigned a new Rx number when refilled electronically. Your pharmacy may indicate that no refills were authorized even though a new prescription for the same medication is available at the pharmacy. Please request the medicine by name with the pharmacy before contacting your provider for a refill.        Warfarin Dosing Calendar   July 2017 Details    Sun Mon Tue Wed Thu Fri Sat           1 "                 2               3               4               5               6               7               8                 9               10               11               12               13               14   2.7   2.5 mg   See details      15      5 mg           16      5 mg         17      2.5 mg         18      5 mg         19      2.5 mg         20      5 mg         21      2.5 mg         22      5 mg           23      5 mg         24      2.5 mg         25      5 mg         26      2.5 mg         27      5 mg         28      2.5 mg         29      5 mg           30      5 mg         31      2.5 mg               Date Details   07/14 This INR check   INR: 2.7   ic valid [id number] 160 exp 03/2018               How to take your warfarin dose     To take:  2.5 mg Take 0.5 of a 5 mg tablet.    To take:  5 mg Take 1 of the 5 mg tablets.           Warfarin Dosing Calendar   August 2017 Details    Sun Mon Tue Wed Thu Fri Sat       1      5 mg         2      2.5 mg         3      5 mg         4      2.5 mg         5      5 mg           6      5 mg         7      2.5 mg         8      5 mg         9      2.5 mg         10      5 mg         11      2.5 mg         12                 13               14               15               16               17               18               19                 20               21               22               23               24               25               26                 27               28               29               30               31                  Date Details   No additional details    Date of next INR:  8/11/2017         How to take your warfarin dose     To take:  2.5 mg Take 0.5 of a 5 mg tablet.    To take:  5 mg Take 1 of the 5 mg tablets.              [employer] Access Code: Activation code not generated  Current [employer] Status: Active

## 2017-07-20 ENCOUNTER — HOSPITAL ENCOUNTER (OUTPATIENT)
Facility: MEDICAL CENTER | Age: 62
End: 2017-07-20
Attending: NURSE PRACTITIONER
Payer: MEDICAID

## 2017-07-20 ENCOUNTER — OFFICE VISIT (OUTPATIENT)
Dept: MEDICAL GROUP | Facility: CLINIC | Age: 62
End: 2017-07-20
Payer: MEDICAID

## 2017-07-20 VITALS
RESPIRATION RATE: 18 BRPM | TEMPERATURE: 99.5 F | BODY MASS INDEX: 50.02 KG/M2 | HEART RATE: 80 BPM | SYSTOLIC BLOOD PRESSURE: 124 MMHG | OXYGEN SATURATION: 90 % | DIASTOLIC BLOOD PRESSURE: 70 MMHG | WEIGHT: 293 LBS | HEIGHT: 64 IN

## 2017-07-20 DIAGNOSIS — Z79.4 UNCONTROLLED TYPE 2 DIABETES MELLITUS WITH SEVERE NONPROLIFERATIVE RETINOPATHY OF BOTH EYES, WITH LONG-TERM CURRENT USE OF INSULIN, MACULAR EDEMA PRESENCE UNSPECIFIED: ICD-10-CM

## 2017-07-20 DIAGNOSIS — F32.1 MODERATE SINGLE CURRENT EPISODE OF MAJOR DEPRESSIVE DISORDER (HCC): ICD-10-CM

## 2017-07-20 DIAGNOSIS — E11.65 UNCONTROLLED TYPE 2 DIABETES MELLITUS WITH SEVERE NONPROLIFERATIVE RETINOPATHY OF BOTH EYES, WITH LONG-TERM CURRENT USE OF INSULIN, MACULAR EDEMA PRESENCE UNSPECIFIED: ICD-10-CM

## 2017-07-20 DIAGNOSIS — Z01.419 WELL WOMAN EXAM WITH ROUTINE GYNECOLOGICAL EXAM: ICD-10-CM

## 2017-07-20 DIAGNOSIS — E11.3493 UNCONTROLLED TYPE 2 DIABETES MELLITUS WITH SEVERE NONPROLIFERATIVE RETINOPATHY OF BOTH EYES, WITH LONG-TERM CURRENT USE OF INSULIN, MACULAR EDEMA PRESENCE UNSPECIFIED: ICD-10-CM

## 2017-07-20 DIAGNOSIS — B37.2 CANDIDIASIS OF SKIN: ICD-10-CM

## 2017-07-20 PROCEDURE — 87591 N.GONORRHOEAE DNA AMP PROB: CPT

## 2017-07-20 PROCEDURE — 99000 SPECIMEN HANDLING OFFICE-LAB: CPT | Performed by: NURSE PRACTITIONER

## 2017-07-20 PROCEDURE — 87491 CHLMYD TRACH DNA AMP PROBE: CPT

## 2017-07-20 PROCEDURE — 99214 OFFICE O/P EST MOD 30 MIN: CPT | Mod: 25 | Performed by: NURSE PRACTITIONER

## 2017-07-20 PROCEDURE — 99396 PREV VISIT EST AGE 40-64: CPT | Mod: 25 | Performed by: NURSE PRACTITIONER

## 2017-07-20 PROCEDURE — 88175 CYTOPATH C/V AUTO FLUID REDO: CPT

## 2017-07-20 PROCEDURE — 87624 HPV HI-RISK TYP POOLED RSLT: CPT

## 2017-07-20 RX ORDER — NYSTATIN 100000 [USP'U]/G
POWDER TOPICAL
Qty: 15 G | Refills: 2 | Status: SHIPPED | OUTPATIENT
Start: 2017-07-20

## 2017-07-20 ASSESSMENT — PAIN SCALES - GENERAL: PAINLEVEL: NO PAIN

## 2017-07-20 NOTE — PROGRESS NOTES
CC:  Pap/Well Woman Exam    HPI:     Shani Love is 61 y.o. female presenting today for well woman exam with gynecological exam and Pap smear.   She reports her periods are absent since age 43. She reports 0 sexual partners in the last 6 months, although she is hoping she will have a new partner soon.     Moderate single current episode of major depressive disorder (CMS-HCC)  Patient reports since starting Celexa 20 mg daily, she has not been crying as often and has been doing well without AE. She does have an appt next month to also follow up, she has not started counseling yet.         Candidiasis of skin  This is a new problem. Patient reports some intermittent redness under her breasts, panis and skin folds. She is an uncontrolled diabetic. Denies s/s infection.     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   Patient admits she has not made an appt with Endo, she knows she needs to call to make an appt.     Last A1c: 9.7. Which has even again increased from 8.6 at last visit   DM Medications: Lantus, 70 units daily total (she is delivering in separate injections as discussed at last visit) Patient reports good medication compliance.   HTN: Blood pressure goal <140/<90   ACE: Lisinopril 5 mg   Hyperlipidemia: Cholesterol goal LDL <100, Last LDL 83. Currently Rx Statin: Simvastatin 20 mg   Last monofilament foot exam: 10/2016 Checks feet at home: yes, no sores currently   Last Eye exam: 2017   Kidney function: GFR 52, will continue to monitor   Microalbumin screenin5016  Has patient received flu vaccine: declines  Has patient received Hep B series: done                    She  has a past medical history of Diabetes; Hypertension; Heart murmur; Morbid obesity (CMS-HCC); Stroke (CMS-HCC) (2015); Atrial flutter (CMS-HCC) (2015); Atrial fibrillation (CMS-HCC);  "Hyperlipidemia; Pneumonia (4/6/2016); Osteoarthritis; and Valvular heart disease.     She  has past surgical history that includes tonsillectomy; pilonidal cyst excision; and recovery (5/9/2016).     History   Sexual Activity   • Sexual Activity:   • Partners: Male       Outpatient Encounter Prescriptions as of 7/20/2017   Medication Sig Dispense Refill   • nystatin (MYCOSTATIN) powder Apply to affected area 1-2 times daily PRN 15 g 2   • glimepiride (AMARYL) 2 MG Tab TAKE 2 TABLETS BY MOUTH EVERY MORNING 60 Tab 2   • magnesium oxide (MAG-OX) 400 (241.3 MG) MG Tab tablet Take 1 Tab by mouth every day. 60 Tab 2   • citalopram (CELEXA) 20 MG Tab Take 1 Tab by mouth every day. 30 Tab 2   • INSULIN SYRINGE 1CC/29G 29G X 1/2\" 1 ML Misc 1 Each by Does not apply route every day. 100 Each 3   • glucose blood strip 1 Strip by Other route as needed (one touch ultra meter test bid dx- E11.65). 100 Strip 11   • insulin glargine (LANTUS) 100 UNIT/ML Solution Take 20 units in the morning and 46 units in the evening as directed for a total dose of 66 units daily. 1 Vial 6   • simvastatin (ZOCOR) 20 MG Tab Take 1 Tab by mouth every evening. 90 Tab 3   • warfarin (COUMADIN) 5 MG Tab Take one to 2  tablets daily as directed by coumadin clinic 180 Tab 1   • lisinopril (PRINIVIL) 5 MG Tab Take 1 Tab by mouth every day. 90 Tab 3   • metoprolol (LOPRESSOR) 50 MG Tab Take 1 Tab by mouth 2 times a day. 180 Tab 3   • diltiazem CD (CARDIZEM CD) 120 MG CAPSULE SR 24 HR Take 1 Cap by mouth 2 Times a Day. 180 Cap 3   • cholecalciferol (VITAMIN D3) 5000 UNIT Cap Take 1 Cap by mouth every day. 30 Cap 5   • furosemide (LASIX) 40 MG Tab Take 1 Tab by mouth every day. 90 Tab 3     No facility-administered encounter medications on file as of 7/20/2017.       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, with long-term current use of insulin (CMS-HCC) 11/17/2015     Priority: Medium   • Retinal hemorrhage of both eyes 10/26/2016     Priority: Low   • Candidiasis of skin 07/20/2017   • BMI 50.0-59.9, adult (CMS-HCC) 06/28/2017   • Moderate single current episode of major depressive disorder (CMS-HCC) 06/15/2017   • Severe nonproliferative diabetic retinopathy of both eyes associated with type 2 diabetes mellitus (CMS-HCC) 05/04/2017   • Chronic kidney disease, stage III (moderate) 03/15/2017   • Paroxysmal atrial flutter (CMS-HCC) 02/23/2017   • Obstructive sleep apnea 02/15/2017   • Chronic anticoagulation 02/15/2017   • Fecal occult blood test positive 12/16/2016   • Vitamin D deficiency 12/16/2016       .  Social History     Social History   • Marital Status:      Spouse Name: N/A   • Number of Children: N/A   • Years of Education: N/A     Occupational History   • Not on file.     Social History Main Topics   • Smoking status: Never Smoker    • Smokeless tobacco: Never Used   • Alcohol Use: No   • Drug Use: No   • Sexual Activity:     Partners: Male     Other Topics Concern   • Not on file     Social History Narrative       Family History   Problem Relation Age of Onset   • Stroke Mother 71   • Cancer Father 71   • Heart Disease Brother          ROS:  Denies Weight loss, fatigue, chest pain, SOB, bowel or bladder changes. No significant dysmenorrhea, concerning vaginal discharge or irritation, no dyspareunia or postcoital bleeding. Denies h/o migraine with  "aura. Denies musculoskeletal, neurological, or psychiatric problems, other than mild treated depression.    All other systems reviewed and are negative.       /70 mmHg  Pulse 80  Temp(Src) 37.5 °C (99.5 °F)  Resp 18  Ht 1.626 m (5' 4.02\")  Wt 146.965 kg (324 lb)  BMI 55.59 kg/m2  SpO2 90%    Physical Exam:  Joann Mazariegos MA present during exam.     GEN:  Appears well and in no apparent distress   NECK:  Supple without adenopathy or thyromegaly  LUNGS:  Clear and equal. No wheeze, ronchi, or rales.  CV:  RRR, S1, S2. No murmur.  Pedal pulses 2+ bilaterally.  BREAST:  Symmetrical without masses. No nipple discharge.   ABD:  Soft, non-tender, non-distended, normal bowel sounds.  No hepatosplenomegaly.  :  Normal external female genitalia.  Vaginal canal clear.  Cervix appears normal without lesions or polyps. Specimen collected from transformation zone. Bimanual exam:  No CMT, normal size uterus without masses or tenderness; no adnexal masses or tenderness.   There is mild redness in skin folds consistent with fungal.       Assessment and plan:    1. Well woman exam with routine gynecological exam  Will contact with results. She did have some questions regarding douching, etc. We did discuss the best hygiene and care for vaginal area. Encouraged better BS control as always. Encouraged to obtain mammo which has been ordered.   - THINPREP PAP W/HPV AND CTNG; Future    2. 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 Again, encouraged to make appt with Endo and for better BS control.     3. Moderate single current episode of major depressive disorder (CMS-HCC)  Stable, continue with Celexa 20 mg daily. Keep upcoming appt.     4. Candidiasis of skin  Nystatin powder 1-2 times daily as needed. Discussed hygiene.      Reviewed risks and benefits of " treatment plan. Patient verbally agrees to plan of care.       F/u pending results  Return for she already has an upcoming appt .    TRISH [email or web address].     PLEASE NOTE: This dictation was created using voice recognition software. I have made every reasonable attempt to correct obvious errors, but I expect that there may be errors of grammar and possibly content that I did not discover prior finalizing this note.

## 2017-07-20 NOTE — ASSESSMENT & PLAN NOTE
This is a chronic problem.   Patient admits she has not made an appt with Endo, she knows she needs to call to make an appt.     Last A1c: 9.7. Which has even again increased from 8.6 at last visit   DM Medications: Lantus, 70 units daily total (she is delivering in separate injections as discussed at last visit) Patient reports good medication compliance.   HTN: Blood pressure goal <140/<90   ACE: Lisinopril 5 mg   Hyperlipidemia: Cholesterol goal LDL <100, Last LDL 83. Currently Rx Statin: Simvastatin 20 mg   Last monofilament foot exam: 10/2016 Checks feet at home: yes, no sores currently   Last Eye exam: 2017   Kidney function: GFR 52, will continue to monitor   Microalbumin screenin5016  Has patient received flu vaccine: declines  Has patient received Hep B series: done

## 2017-07-20 NOTE — MR AVS SNAPSHOT
"        Shani Love   2017 1:20 PM   Office Visit   MRN: [id number]    Department:  [facility]   Dept Phone:  [phone]    Description:  Female : 1955   Provider:  REGLA Montiel           Reason for Visit     Gynecologic Exam           Allergies as of 2017     No Known Allergies      You were diagnosed with     Well woman exam with routine gynecological exam   [074969]       Uncontrolled type 2 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, with long-term current use of insulin, macular edema presence unspecified (CMS-HCC)   [4180165]       Moderate single current episode of major depressive disorder (CMS-HCC)   [7309916]         Vital Signs     Blood Pressure Pulse Temperature Respirations Height Weight    124/70 mmHg 80 37.5 °C (99.5 °F) 18 1.626 m (5' 4.02\") 146.965 kg (324 lb)    Body Mass Index Oxygen Saturation Smoking Status             55.59 kg/m2 90% Never Smoker          Basic Information     Date Of Birth Sex Race Ethnicity Preferred Language    1955 Female White Non- English      Your appointments     Aug 11, 2017  1:15 PM   Anti-Coag Routine with [facility]   [facility] [place]    [address] [facility] [address] [phone]            Sep 07, 2017 10:40 AM   Established Patient with REGLA Montiel   [facility] (--)    [address] [id number] [phone]           You will be receiving a confirmation call a few days before your appointment from our automated call confirmation system.            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, with long-term current use of insulin (CMS-HCC) E11.3493, E11.65, Z79.4 Medium  11/17/2015 - Present    Hyperlipidemia E78.5 Medium  11/19/2015 - Present    Essential hypertension I10 Medium  12/1/2015 - Present    Persistent atrial fibrillation (CMS-HCC) I48.1 Medium  12/1/2015 - Present    Mitral stenosis with regurgitation I05.2 Medium  4/8/2016 - Present    Pulmonary hypertension (CMS-HCC) I27.2 Medium  4/8/2016 - Present    History of stroke Z86.73 Medium  11/30/2015 - Present    Status post ablation of atrial flutter Z98.890, Z86.79 Medium  5/18/2016 - Present    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 Low  10/26/2016 - Present    Fecal occult blood test positive R19.5   12/16/2016 - Present    Vitamin D deficiency E55.9   12/16/2016 - Present    Obstructive sleep apnea G47.33   2/15/2017 - Present    Chronic anticoagulation Z79.01   2/15/2017 - Present    Paroxysmal atrial flutter (CMS-HCC) I48.92   2/23/2017 - Present    Chronic kidney disease, stage III (moderate) N18.3   3/15/2017 - Present    Severe nonproliferative diabetic retinopathy of both eyes associated with type 2 diabetes mellitus (CMS-HCC) E11.3493   5/4/2017 - Present    Moderate single current episode of major depressive disorder (CMS-HCC) F32.1   6/15/2017 - Present    BMI 50.0-59.9, adult (CMS-HCC) Z68.43   6/28/2017 - Present      Health Maintenance        Date Due Completion Dates    PAP SMEAR 12/8/1974 ---    MAMMOGRAM 12/8/1993 ---    IMM ZOSTER VACCINE 12/8/2013 ---    IMM INFLUENZA (1) 9/1/2017 11/18/2015    DIABETES MONOFILAMENT / LE EXAM 10/26/2017 10/26/2016    COLON CANCER SCREENING ANNUAL FIT 11/15/2017 11/15/2016    A1C SCREENING 12/15/2017 6/15/2017, 3/10/2017, 11/30/2016, 10/26/2016, 4/1/2016, 3/9/2016, 11/17/2015, 1/22/2010, 9/25/2009    FASTING LIPID PROFILE 3/10/2018 3/10/2017, 11/30/2016, 4/7/2016, 4/1/2016, 12/8/2015, 11/17/2015,  1/22/2010, 9/25/2009    SERUM CREATININE 3/10/2018 3/10/2017, 11/30/2016, 5/9/2016, 4/20/2016, 4/10/2016, 4/7/2016, 4/6/2016, 4/1/2016, 12/8/2015, 11/21/2015, 11/20/2015, 11/17/2015, 11/17/2015, 1/22/2010, 1/21/2010, 9/24/2009    RETINAL SCREENING 4/24/2018 4/24/2017, 1/12/2017, 8/25/2016    URINE ACR / MICROALBUMIN 6/15/2018 6/15/2017, 5/3/2016    IMM DTaP/Tdap/Td Vaccine (2 - Td) 3/15/2027 3/15/2017            Current Immunizations     13-VALENT PCV PREVNAR 11/19/2015  6:12 AM    Influenza Vaccine Quad Inj (Preserved) 11/18/2015  3:53 PM    Pneumococcal polysaccharide vaccine (PPSV-23) 3/15/2017    Tdap Vaccine 3/15/2017      Below and/or attached are the medications your provider expects you to take. Review all of your home medications and newly ordered medications with your provider and/or pharmacist. Follow medication instructions as directed by your provider and/or pharmacist. Please keep your medication list with you and share with your provider. Update the information when medications are discontinued, doses are changed, or new medications (including over-the-counter products) are added; and carry medication information at all times in the event of emergency situations     Allergies:  No Known Allergies          Medications  Valid as of: July 20, 2017 -  1:37 PM    Generic Name Brand Name Tablet Size Instructions for use    Cholecalciferol (Cap) VITAMIN D3 5000 UNIT Take 1 Cap by mouth every day.        Citalopram Hydrobromide (Tab) CELEXA 20 MG Take 1 Tab by mouth every day.        DilTIAZem HCl Coated Beads (CAPSULE SR 24 HR) CARDIZEM  MG Take 1 Cap by mouth 2 Times a Day.        Furosemide (Tab) LASIX 40 MG Take 1 Tab by mouth every day.        Glimepiride (Tab) AMARYL 2 MG TAKE 2 TABLETS BY MOUTH EVERY MORNING        Glucose Blood (Strip) glucose blood  1 Strip by Other route as needed (one touch ultra meter test bid dx- E11.65).        Insulin Glargine (Solution) LANTUS 100 UNIT/ML Take 20 units  "in the morning and 46 units in the evening as directed for a total dose of 66 units daily.        Insulin Syringe-Needle U-100 (Misc) INSULIN SYRINGE 1CC/29G 29G X 1/2\" 1 ML 1 Each by Does not apply route every day.        Lisinopril (Tab) PRINIVIL 5 MG Take 1 Tab by mouth every day.        Magnesium Oxide (Tab) MAG- (241.3 MG) MG Take 1 Tab by mouth every day.        Metoprolol Tartrate (Tab) LOPRESSOR 50 MG Take 1 Tab by mouth 2 times a day.        Nystatin (Powder) MYCOSTATIN 550194 UNIT/GM Apply to affected area 1-2 times daily PRN        Simvastatin (Tab) ZOCOR 20 MG Take 1 Tab by mouth every evening.        Warfarin Sodium (Tab) COUMADIN 5 MG Take one to 2  tablets daily as directed by coumadin clinic        .                 Medicines prescribed today were sent to:     [facility] - [place], [place] - [address] [facility]    [address] [facility] [address] [facility] [address]    Phone: [phone] Fax: [phone]    Open 24 Hours?: No      Medication refill instructions:       If your prescription bottle indicates you have medication refills left, it is not necessary to call your provider’s office. Please contact your pharmacy and they will refill your medication.    If your prescription bottle indicates you do not have any refills left, you may request refills at any time through one of the following ways: The online [employer] system (except Urgent Care), by calling your provider’s office, or by asking your pharmacy to contact your provider’s office with a refill request. Medication refills are processed only during regular business hours and may not be available until the next business day. Your provider may request additional information or to have a follow-up visit with you prior to refilling your medication.   *Please Note: Medication refills are assigned a new Rx number when refilled electronically. Your pharmacy may indicate that no refills were authorized even though a new prescription for " the same medication is available at the pharmacy. Please request the medicine by name with the pharmacy before contacting your provider for a refill.        Your To Do List     Future Labs/Procedures Complete By Expires    THINPREP PAP W/HPV AND CTNG  As directed 7/21/2018      Instructions    Pap Test  A Pap test is a procedure done in a clinic office to evaluate cells that are on the surface of the cervix. The cervix is the lower portion of the uterus and upper portion of the vagina. For some women, the cervical region has the potential to form cancer. With consistent evaluations by your caregiver, this type of cancer can be prevented.   If a Pap test is abnormal, it is most often a result of a previous exposure to human papillomavirus (HPV). HPV is a virus that can infect the cells of the cervix and cause dysplasia. Dysplasia is where the cells no longer look normal. If a woman has been diagnosed with high-grade or severe dysplasia, they are at higher risk of developing cervical cancer. People diagnosed with low-grade dysplasia should still be seen by their caregiver because there is a small chance that low-grade dysplasia could develop into cancer.   LET YOUR CAREGIVER KNOW ABOUT:  · Recent sexually transmitted infection (STI) you have had.  · Any new sex partners you have had.  · History of previous abnormal Pap tests results.  · History of previous cervical procedures you have had (colposcopy, biopsy, loop electrosurgical excision procedure [LEEP]).  · Concerns you have had regarding unusual vaginal discharge.  · History of pelvic pain.  · Your use of birth control.  BEFORE THE PROCEDURE  · Ask your caregiver when to schedule your Pap test. It is best not to be on your period if your caregiver uses a wooden spatula to collect cells or applies cells to a glass slide. Newer techniques are not so sensitive to the timing of a menstrual cycle.  · Do not douche or have sexual intercourse for 24 hours before the  test.    · Do not use vaginal creams or tampons for 24 hours before the test.    · Empty your bladder just before the test to lessen any discomfort.    PROCEDURE  You will lie on an exam table with your feet in stirrups. A warm metal or plastic instrument (speculum) is placed in your vagina. This instrument allows your caregiver to see the inside of your vagina and look at your cervix. A small, plastic brush or wooden spatula is then used to collect cervical cells. These cells are placed in a lab specimen container. The cells are looked at under a microscope. A specialist will determine if the cells are normal.   AFTER THE PROCEDURE  Make sure to get your test results. If your results come back abnormal, you may need further testing.   Document Released: 03/09/2004 Document Revised: 03/11/2013 Document Reviewed: 12/13/2012  [employer]® Patient Information ©2014 [employer].    Your medical care was provided today by: REI Boyd    Thank You for the opportunity to serve you.    You may receive a brief survey in the mail shortly regarding your visit today. Please take a few moments to complete the survey and return it; no postage is necessary. We are working to serve our patient population better, improve customer service and our patients overall experience and your input can help us to accomplish this. We thank you for your help and for the opportunity to serve you today and in the future.     Special Instructions:  Always call 9-1-1 immediately if you develop a life threatening emergency.    Unless told otherwise please take all medications as directed and complete prescription therapies.     Watch for the following signs that require additional evaluation: progressive lethargy or unresponsiveness, localized pain (chest, abdomen), shortness of breath, painful breathing, progressive vomiting with weakness, bloody stools, or new rash.     If you are prescribed pain medication or any other medication that is  sedating, do not take medication before or while operating a vehicle or heavy machinery or equipment due to potential side effects such as drowsiness and/or dizziness.             [employer] Access Code: Activation code not generated  Current [employer] Status: Active

## 2017-07-20 NOTE — ASSESSMENT & PLAN NOTE
This is a new problem. Patient reports some intermittent redness under her breasts, panis and skin folds. She is an uncontrolled diabetic. Denies s/s infection.

## 2017-07-20 NOTE — PATIENT INSTRUCTIONS
Pap Test  A Pap test is a procedure done in a clinic office to evaluate cells that are on the surface of the cervix. The cervix is the lower portion of the uterus and upper portion of the vagina. For some women, the cervical region has the potential to form cancer. With consistent evaluations by your caregiver, this type of cancer can be prevented.   If a Pap test is abnormal, it is most often a result of a previous exposure to human papillomavirus (HPV). HPV is a virus that can infect the cells of the cervix and cause dysplasia. Dysplasia is where the cells no longer look normal. If a woman has been diagnosed with high-grade or severe dysplasia, they are at higher risk of developing cervical cancer. People diagnosed with low-grade dysplasia should still be seen by their caregiver because there is a small chance that low-grade dysplasia could develop into cancer.   LET YOUR CAREGIVER KNOW ABOUT:  · Recent sexually transmitted infection (STI) you have had.  · Any new sex partners you have had.  · History of previous abnormal Pap tests results.  · History of previous cervical procedures you have had (colposcopy, biopsy, loop electrosurgical excision procedure [LEEP]).  · Concerns you have had regarding unusual vaginal discharge.  · History of pelvic pain.  · Your use of birth control.  BEFORE THE PROCEDURE  · Ask your caregiver when to schedule your Pap test. It is best not to be on your period if your caregiver uses a wooden spatula to collect cells or applies cells to a glass slide. Newer techniques are not so sensitive to the timing of a menstrual cycle.  · Do not douche or have sexual intercourse for 24 hours before the test.    · Do not use vaginal creams or tampons for 24 hours before the test.    · Empty your bladder just before the test to lessen any discomfort.    PROCEDURE  You will lie on an exam table with your feet in stirrups. A warm metal or plastic instrument (speculum) is placed in your vagina. This  instrument allows your caregiver to see the inside of your vagina and look at your cervix. A small, plastic brush or wooden spatula is then used to collect cervical cells. These cells are placed in a lab specimen container. The cells are looked at under a microscope. A specialist will determine if the cells are normal.   AFTER THE PROCEDURE  Make sure to get your test results. If your results come back abnormal, you may need further testing.   Document Released: 03/09/2004 Document Revised: 03/11/2013 Document Reviewed: 12/13/2012  ExitCare® Patient Information ©2014 [employer].    Your medical care was provided today by: REI Boyd    Thank You for the opportunity to serve you.    You may receive a brief survey in the mail shortly regarding your visit today. Please take a few moments to complete the survey and return it; no postage is necessary. We are working to serve our patient population better, improve customer service and our patients overall experience and your input can help us to accomplish this. We thank you for your help and for the opportunity to serve you today and in the future.     Special Instructions:  Always call 9-1-1 immediately if you develop a life threatening emergency.    Unless told otherwise please take all medications as directed and complete prescription therapies.     Watch for the following signs that require additional evaluation: progressive lethargy or unresponsiveness, localized pain (chest, abdomen), shortness of breath, painful breathing, progressive vomiting with weakness, bloody stools, or new rash.     If you are prescribed pain medication or any other medication that is sedating, do not take medication before or while operating a vehicle or heavy machinery or equipment due to potential side effects such as drowsiness and/or dizziness.

## 2017-07-21 LAB
C TRACH DNA GENITAL QL NAA+PROBE: NEGATIVE
CYTOLOGY REG CYTOL: NORMAL
HPV HR 12 DNA CVX QL NAA+PROBE: NEGATIVE
HPV16 DNA SPEC QL NAA+PROBE: NEGATIVE
HPV18 DNA SPEC QL NAA+PROBE: NEGATIVE
N GONORRHOEA DNA GENITAL QL NAA+PROBE: NEGATIVE
SPECIMEN SOURCE: NORMAL
SPECIMEN SOURCE: NORMAL

## 2017-08-11 ENCOUNTER — ANTICOAGULATION VISIT (OUTPATIENT)
Dept: MEDICAL GROUP | Facility: PHYSICIAN GROUP | Age: 62
End: 2017-08-11
Payer: MEDICAID

## 2017-08-11 VITALS — DIASTOLIC BLOOD PRESSURE: 72 MMHG | HEART RATE: 96 BPM | SYSTOLIC BLOOD PRESSURE: 129 MMHG

## 2017-08-11 DIAGNOSIS — I48.19 PERSISTENT ATRIAL FIBRILLATION (HCC): ICD-10-CM

## 2017-08-11 LAB — INR PPP: 2.7 (ref 2–3.5)

## 2017-08-11 PROCEDURE — 85610 PROTHROMBIN TIME: CPT | Performed by: PHYSICIAN ASSISTANT

## 2017-08-11 NOTE — MR AVS SNAPSHOT
Shani Love   2017 1:15 PM   Anticoagulation Visit   MRN: [id number]    Department:  [facility]   Dept Phone:  [phone]    Description:  Female : 1955   Provider:  FERNLEY PHARMACIST           Allergies as of 2017     No Known Allergies      You were diagnosed with     Persistent atrial fibrillation (CMS-HCC)   [451160]         Vital Signs     Smoking Status                   Never Smoker            Basic Information     Date Of Birth Sex Race Ethnicity Preferred Language    1955 Female White Non- English      Your appointments     Aug 11, 2017  1:15 PM   Anti-Coag Routine with LAVERNE PHARMACIST   [facility] ([place]    [address] [facility] [address] [phone]            Sep 05, 2017  2:00 PM   MA SCRN10 with RB MG 3   [facility] [address]    [address]  [facility] [address] [phone]           No deodorant, powder, perfume or lotion under the arm or breast area.            Sep 07, 2017 10:40 AM   Established Patient with REGLA Montiel   [facility] (--)    [address] [address] [phone]           You will be receiving a confirmation call a few days before your appointment from our automated call confirmation system.            Sep 22, 2017  1:15 PM   Anti-Coag Routine with FERNLEY PHARMACIST   [facility]    [address] [facility] [address] [phone]            Sep 25, 2017  2:00 PM   Established Patient Pul with STORM Marcial   [facility] (--)    [address]  [address]  [facility] [address] [phone]              Problem List              ICD-10-CM Priority Class Noted - Resolved    Obesity E66.9 Medium  2015 - Present    Uncontrolled type 2 diabetes mellitus with severe nonproliferative retinopathy of both  eyes, with long-term current use of insulin (CMS-HCC) E11.3493, E11.65, Z79.4 Medium  11/17/2015 - Present    Hyperlipidemia E78.5 Medium  11/19/2015 - Present    Essential hypertension I10 Medium  12/1/2015 - Present    Persistent atrial fibrillation (CMS-HCC) I48.1 Medium  12/1/2015 - Present    Mitral stenosis with regurgitation I05.2 Medium  4/8/2016 - Present    Pulmonary hypertension (CMS-HCC) I27.2 Medium  4/8/2016 - Present    History of stroke Z86.73 Medium  11/30/2015 - Present    Status post ablation of atrial flutter Z98.890, Z86.79 Medium  5/18/2016 - Present    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 Low  10/26/2016 - Present    Fecal occult blood test positive R19.5   12/16/2016 - Present    Vitamin D deficiency E55.9   12/16/2016 - Present    Obstructive sleep apnea G47.33   2/15/2017 - Present    Chronic anticoagulation Z79.01   2/15/2017 - Present    Paroxysmal atrial flutter (CMS-HCC) I48.92   2/23/2017 - Present    Chronic kidney disease, stage III (moderate) N18.3   3/15/2017 - Present    Severe nonproliferative diabetic retinopathy of both eyes associated with type 2 diabetes mellitus (CMS-HCC) E11.3493   5/4/2017 - Present    Moderate single current episode of major depressive disorder (CMS-HCC) F32.1   6/15/2017 - Present    BMI 50.0-59.9, adult (CMS-HCC) Z68.43   6/28/2017 - Present    Candidiasis of skin B37.2   7/20/2017 - Present      Health Maintenance        Date Due Completion Dates    MAMMOGRAM 12/8/1993 ---    IMM ZOSTER VACCINE 12/8/2013 ---    IMM INFLUENZA (1) 9/1/2017 11/18/2015    DIABETES MONOFILAMENT / LE EXAM 10/26/2017 10/26/2016    COLON CANCER SCREENING ANNUAL FIT 11/15/2017 11/15/2016    A1C SCREENING 12/15/2017 6/15/2017, 3/10/2017, 11/30/2016, 10/26/2016, 4/1/2016, 3/9/2016, 11/17/2015, 1/22/2010, 9/25/2009    FASTING LIPID PROFILE 3/10/2018 3/10/2017, 11/30/2016, 4/7/2016, 4/1/2016, 12/8/2015, 11/17/2015, 1/22/2010, 9/25/2009    SERUM CREATININE 3/10/2018 3/10/2017,  11/30/2016, 5/9/2016, 4/20/2016, 4/10/2016, 4/7/2016, 4/6/2016, 4/1/2016, 12/8/2015, 11/21/2015, 11/20/2015, 11/17/2015, 11/17/2015, 1/22/2010, 1/21/2010, 9/24/2009    RETINAL SCREENING 4/24/2018 4/24/2017, 1/12/2017, 8/25/2016    URINE ACR / MICROALBUMIN 6/15/2018 6/15/2017, 5/3/2016    PAP SMEAR 7/20/2020 7/20/2017, 7/20/2017    IMM DTaP/Tdap/Td Vaccine (2 - Td) 3/15/2027 3/15/2017            Results     POCT Protime      Component    INR    2.7    Comment:     ic valid [id number] 160 exp 05/2018                        Current Immunizations     13-VALENT PCV PREVNAR 11/19/2015  6:12 AM    Influenza Vaccine Quad Inj (Preserved) 11/18/2015  3:53 PM    Pneumococcal polysaccharide vaccine (PPSV-23) 3/15/2017    Tdap Vaccine 3/15/2017      Below and/or attached are the medications your provider expects you to take. Review all of your home medications and newly ordered medications with your provider and/or pharmacist. Follow medication instructions as directed by your provider and/or pharmacist. Please keep your medication list with you and share with your provider. Update the information when medications are discontinued, doses are changed, or new medications (including over-the-counter products) are added; and carry medication information at all times in the event of emergency situations     Allergies:  No Known Allergies          Medications  Valid as of: August 11, 2017 -  1:14 PM    Generic Name Brand Name Tablet Size Instructions for use    Cholecalciferol (Cap) VITAMIN D3 5000 UNIT Take 1 Cap by mouth every day.        Citalopram Hydrobromide (Tab) CELEXA 20 MG Take 1 Tab by mouth every day.        DilTIAZem HCl Coated Beads (CAPSULE SR 24 HR) CARDIZEM  MG Take 1 Cap by mouth 2 Times a Day.        Furosemide (Tab) LASIX 40 MG Take 1 Tab by mouth every day.        Glimepiride (Tab) AMARYL 2 MG TAKE 2 TABLETS BY MOUTH EVERY MORNING        Glucose Blood (Strip) glucose blood  1 Strip by Other route as needed  "(one touch ultra meter test bid dx- E11.65).        Insulin Glargine (Solution) LANTUS 100 UNIT/ML Take 20 units in the morning and 46 units in the evening as directed for a total dose of 66 units daily.        Insulin Syringe-Needle U-100 (Misc) INSULIN SYRINGE 1CC/29G 29G X 1/2\" 1 ML 1 Each by Does not apply route every day.        Lisinopril (Tab) PRINIVIL 5 MG Take 1 Tab by mouth every day.        Magnesium Oxide (Tab) MAG- (241.3 MG) MG Take 1 Tab by mouth every day.        Metoprolol Tartrate (Tab) LOPRESSOR 50 MG Take 1 Tab by mouth 2 times a day.        Nystatin (Powder) MYCOSTATIN 153152 UNIT/GM Apply to affected area 1-2 times daily PRN        Simvastatin (Tab) ZOCOR 20 MG Take 1 Tab by mouth every evening.        Warfarin Sodium (Tab) COUMADIN 5 MG Take one to 2  tablets daily as directed by coumadin clinic        .                 Medicines prescribed today were sent to:     [facility] - [address] [facility] [address]    Phone: [phone] Fax: [phone]    Open 24 Hours?: No      Medication refill instructions:       If your prescription bottle indicates you have medication refills left, it is not necessary to call your provider’s office. Please contact your pharmacy and they will refill your medication.    If your prescription bottle indicates you do not have any refills left, you may request refills at any time through one of the following ways: The online [employer] system (except Urgent Care), by calling your provider’s office, or by asking your pharmacy to contact your provider’s office with a refill request. Medication refills are processed only during regular business hours and may not be available until the next business day. Your provider may request additional information or to have a follow-up visit with you prior to refilling your medication.   *Please Note: Medication refills are assigned a new Rx number when " refilled electronically. Your pharmacy may indicate that no refills were authorized even though a new prescription for the same medication is available at the pharmacy. Please request the medicine by name with the pharmacy before contacting your provider for a refill.        Warfarin Dosing Calendar   August 2017 Details    Sun Mon Tue Wed Thu Fri Sat       1               2               3               4               5                 6               7               8               9               10               11   2.7   2.5 mg   See details      12      5 mg           13      5 mg         14      2.5 mg         15      5 mg         16      2.5 mg         17      5 mg         18      2.5 mg         19      5 mg           20      5 mg         21      2.5 mg         22      5 mg         23      2.5 mg         24      5 mg         25      2.5 mg         26      5 mg           27      5 mg         28      2.5 mg         29      5 mg         30      2.5 mg         31      5 mg            Date Details   08/11 This INR check   INR: 2.7   ic valid [id number] 160 exp 05/2018               How to take your warfarin dose     To take:  2.5 mg Take 0.5 of a 5 mg tablet.    To take:  5 mg Take 1 of the 5 mg tablets.           Warfarin Dosing Calendar   September 2017 Details    Sun Mon Tue Wed Thu Fri Sat          1      2.5 mg         2      5 mg           3      5 mg         4      2.5 mg         5      5 mg         6      2.5 mg         7      5 mg         8      2.5 mg         9      5 mg           10      5 mg         11      2.5 mg         12      5 mg         13      2.5 mg         14      5 mg         15      2.5 mg         16      5 mg           17      5 mg         18      2.5 mg         19      5 mg         20      2.5 mg         21      5 mg         22      2.5 mg         23                 24               25               26               27               28               29               30                Date  Details   No additional details    Date of next INR:  9/22/2017         How to take your warfarin dose     To take:  2.5 mg Take 0.5 of a 5 mg tablet.    To take:  5 mg Take 1 of the 5 mg tablets.              [employer] Access Code: Activation code not generated  Current [employer] Status: Active

## 2017-08-11 NOTE — PROGRESS NOTES
"Anticoagulation Summary as of 8/11/2017     INR goal 2.0-3.0   Selected INR 2.7 (8/11/2017)   Maintenance plan 2.5 mg (5 mg x 0.5) on Mon, Wed, Fri; 5 mg (5 mg x 1) all other days   Weekly total 27.5 mg   Plan last modified Santana Good, PHARMD (11/18/2016)   Next INR check 9/22/2017   Target end date Indefinite    Indications   Atrial flutter (CMS-HCC) (Resolved) [I48.92]  Acute ischemic right PCA stroke-Hemorrhage transformation is noted within the infarct (Resolved) [I63.531]  Persistent atrial fibrillation (CMS-HCC) [I48.1]         Anticoagulation Episode Summary     INR check location [facility]    Preferred lab     Send INR reminders to     Comments       Anticoagulation Care Providers     Provider Role Specialty Phone number    Bijan Sampson M.D. Referring Cardiology [phone]    [facility] Responsible  [phone]        Anticoagulation Patient Findings      Current Outpatient Prescriptions on File Prior to Visit   Medication Sig Dispense Refill   • nystatin (MYCOSTATIN) powder Apply to affected area 1-2 times daily PRN 15 g 2   • glimepiride (AMARYL) 2 MG Tab TAKE 2 TABLETS BY MOUTH EVERY MORNING 60 Tab 2   • magnesium oxide (MAG-OX) 400 (241.3 MG) MG Tab tablet Take 1 Tab by mouth every day. 60 Tab 2   • citalopram (CELEXA) 20 MG Tab Take 1 Tab by mouth every day. 30 Tab 2   • INSULIN SYRINGE 1CC/29G 29G X 1/2\" 1 ML Misc 1 Each by Does not apply route every day. 100 Each 3   • glucose blood strip 1 Strip by Other route as needed (one touch ultra meter test bid dx- E11.65). 100 Strip 11   • insulin glargine (LANTUS) 100 UNIT/ML Solution Take 20 units in the morning and 46 units in the evening as directed for a total dose of 66 units daily. 1 Vial 6   • simvastatin (ZOCOR) 20 MG Tab Take 1 Tab by mouth every evening. 90 Tab 3   • warfarin (COUMADIN) 5 MG Tab Take one to 2  tablets daily as directed by coumadin clinic 180 Tab 1   • lisinopril (PRINIVIL) 5 MG Tab Take 1 " Tab by mouth every day. 90 Tab 3   • metoprolol (LOPRESSOR) 50 MG Tab Take 1 Tab by mouth 2 times a day. 180 Tab 3   • diltiazem CD (CARDIZEM CD) 120 MG CAPSULE SR 24 HR Take 1 Cap by mouth 2 Times a Day. 180 Cap 3   • cholecalciferol (VITAMIN D3) 5000 UNIT Cap Take 1 Cap by mouth every day. 30 Cap 5   • furosemide (LASIX) 40 MG Tab Take 1 Tab by mouth every day. 90 Tab 3     No current facility-administered medications on file prior to visit.       Lab Results   Component Value Date/Time    SODIUM 137 03/10/2017 09:15 AM    POTASSIUM 4.3 03/10/2017 09:15 AM    CHLORIDE 102 03/10/2017 09:15 AM    CO2 29 03/10/2017 09:15 AM    GLUCOSE 215* 03/10/2017 09:15 AM    BUN 15 03/10/2017 09:15 AM    CREATININE 1.07 03/10/2017 09:15 AM        Shani Love seen in clinic today  INR  therapeutic.    Denies signs/symptoms of bleeding and/or thrombosis.    Denies changes to diet or medications.   Follow up appointment in 6 week(s).    Continue weekly warfarin dose as noted     Loc Chan, PHARMD

## 2017-08-29 DIAGNOSIS — I10 ESSENTIAL HYPERTENSION: ICD-10-CM

## 2017-08-29 RX ORDER — FUROSEMIDE 40 MG/1
40 TABLET ORAL DAILY
Qty: 90 TAB | Refills: 3 | OUTPATIENT
Start: 2017-08-29

## 2017-08-29 NOTE — TELEPHONE ENCOUNTER
"Have we ever prescribed this med? Yes.  If yes, what date? Not sure when    Last OV: 6/28/17    Next OV: 9/25/17    DX: TERESA    Medications:  Furosemide   Current Outpatient Prescriptions   Medication Sig Dispense Refill   • nystatin (MYCOSTATIN) powder Apply to affected area 1-2 times daily PRN 15 g 2   • glimepiride (AMARYL) 2 MG Tab TAKE 2 TABLETS BY MOUTH EVERY MORNING 60 Tab 2   • magnesium oxide (MAG-OX) 400 (241.3 MG) MG Tab tablet Take 1 Tab by mouth every day. 60 Tab 2   • citalopram (CELEXA) 20 MG Tab Take 1 Tab by mouth every day. 30 Tab 2   • INSULIN SYRINGE 1CC/29G 29G X 1/2\" 1 ML Misc 1 Each by Does not apply route every day. 100 Each 3   • glucose blood strip 1 Strip by Other route as needed (one touch ultra meter test bid dx- E11.65). 100 Strip 11   • insulin glargine (LANTUS) 100 UNIT/ML Solution Take 20 units in the morning and 46 units in the evening as directed for a total dose of 66 units daily. 1 Vial 6   • simvastatin (ZOCOR) 20 MG Tab Take 1 Tab by mouth every evening. 90 Tab 3   • warfarin (COUMADIN) 5 MG Tab Take one to 2  tablets daily as directed by coumadin clinic 180 Tab 1   • lisinopril (PRINIVIL) 5 MG Tab Take 1 Tab by mouth every day. 90 Tab 3   • metoprolol (LOPRESSOR) 50 MG Tab Take 1 Tab by mouth 2 times a day. 180 Tab 3   • diltiazem CD (CARDIZEM CD) 120 MG CAPSULE SR 24 HR Take 1 Cap by mouth 2 Times a Day. 180 Cap 3   • cholecalciferol (VITAMIN D3) 5000 UNIT Cap Take 1 Cap by mouth every day. 30 Cap 5   • furosemide (LASIX) 40 MG Tab Take 1 Tab by mouth every day. 90 Tab 3     No current facility-administered medications for this visit.      "

## 2017-09-01 DIAGNOSIS — I10 ESSENTIAL HYPERTENSION: ICD-10-CM

## 2017-09-01 DIAGNOSIS — I48.92 ATRIAL FLUTTER, UNSPECIFIED TYPE (HCC): ICD-10-CM

## 2017-09-01 RX ORDER — METOPROLOL TARTRATE 50 MG/1
50 TABLET, FILM COATED ORAL 2 TIMES DAILY
Qty: 180 TAB | Refills: 2 | OUTPATIENT
Start: 2017-09-01 | End: 2017-09-07 | Stop reason: SDUPTHER

## 2017-09-01 RX ORDER — FUROSEMIDE 40 MG/1
40 TABLET ORAL DAILY
Qty: 90 TAB | Refills: 2 | OUTPATIENT
Start: 2017-09-01 | End: 2017-09-07 | Stop reason: SDUPTHER

## 2017-09-05 ENCOUNTER — APPOINTMENT (OUTPATIENT)
Dept: RADIOLOGY | Facility: MEDICAL CENTER | Age: 62
End: 2017-09-05
Attending: NURSE PRACTITIONER
Payer: MEDICAID

## 2017-09-07 ENCOUNTER — OFFICE VISIT (OUTPATIENT)
Dept: MEDICAL GROUP | Facility: CLINIC | Age: 62
End: 2017-09-07
Payer: MEDICAID

## 2017-09-07 VITALS
DIASTOLIC BLOOD PRESSURE: 72 MMHG | SYSTOLIC BLOOD PRESSURE: 136 MMHG | HEART RATE: 82 BPM | OXYGEN SATURATION: 91 % | RESPIRATION RATE: 20 BRPM | TEMPERATURE: 96.7 F | BODY MASS INDEX: 50.02 KG/M2 | HEIGHT: 64 IN | WEIGHT: 293 LBS

## 2017-09-07 DIAGNOSIS — Z79.4 UNCONTROLLED TYPE 2 DIABETES MELLITUS WITH SEVERE NONPROLIFERATIVE RETINOPATHY OF BOTH EYES, WITH LONG-TERM CURRENT USE OF INSULIN, MACULAR EDEMA PRESENCE UNSPECIFIED: ICD-10-CM

## 2017-09-07 DIAGNOSIS — E11.3493 UNCONTROLLED TYPE 2 DIABETES MELLITUS WITH SEVERE NONPROLIFERATIVE RETINOPATHY OF BOTH EYES, WITH LONG-TERM CURRENT USE OF INSULIN, MACULAR EDEMA PRESENCE UNSPECIFIED: ICD-10-CM

## 2017-09-07 DIAGNOSIS — Z79.899 MEDICATION MANAGEMENT: ICD-10-CM

## 2017-09-07 DIAGNOSIS — Z23 NEED FOR VACCINATION: ICD-10-CM

## 2017-09-07 DIAGNOSIS — E11.65 UNCONTROLLED TYPE 2 DIABETES MELLITUS WITH SEVERE NONPROLIFERATIVE RETINOPATHY OF BOTH EYES, WITH LONG-TERM CURRENT USE OF INSULIN, MACULAR EDEMA PRESENCE UNSPECIFIED: ICD-10-CM

## 2017-09-07 DIAGNOSIS — F41.9 ANXIETY: ICD-10-CM

## 2017-09-07 DIAGNOSIS — F32.1 MODERATE SINGLE CURRENT EPISODE OF MAJOR DEPRESSIVE DISORDER (HCC): ICD-10-CM

## 2017-09-07 LAB
HBA1C MFR BLD: 10 % (ref ?–5.8)
INT CON NEG: NEGATIVE
INT CON POS: POSITIVE

## 2017-09-07 PROCEDURE — 90471 IMMUNIZATION ADMIN: CPT | Performed by: NURSE PRACTITIONER

## 2017-09-07 PROCEDURE — 99213 OFFICE O/P EST LOW 20 MIN: CPT | Mod: 25 | Performed by: NURSE PRACTITIONER

## 2017-09-07 PROCEDURE — 90686 IIV4 VACC NO PRSV 0.5 ML IM: CPT | Performed by: NURSE PRACTITIONER

## 2017-09-07 PROCEDURE — 83036 HEMOGLOBIN GLYCOSYLATED A1C: CPT | Performed by: NURSE PRACTITIONER

## 2017-09-07 RX ORDER — METOPROLOL TARTRATE 50 MG/1
50 TABLET, FILM COATED ORAL 2 TIMES DAILY
Qty: 180 TAB | Refills: 2 | Status: SHIPPED | OUTPATIENT
Start: 2017-09-07 | End: 2018-07-19 | Stop reason: SDUPTHER

## 2017-09-07 RX ORDER — FUROSEMIDE 40 MG/1
40 TABLET ORAL DAILY
Qty: 90 TAB | Refills: 2 | Status: SHIPPED | OUTPATIENT
Start: 2017-09-07 | End: 2018-09-11 | Stop reason: SDUPTHER

## 2017-09-07 ASSESSMENT — PAIN SCALES - GENERAL: PAINLEVEL: NO PAIN

## 2017-09-07 NOTE — PROGRESS NOTES
Subjective:     Shani oLve is a 61 y.o. 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   Patient admits she has not made an appt with Endo, she knows she needs to call to make an appt.     Last A1c: 10.0 which is again increased from 9.7. Which has even again increased from 8.6 at prior visit   DM Medications: Lantus, 70 units daily total (she is delivering in separate injections as discussed at last visit) Patient reports good medication compliance.   HTN: Blood pressure goal <140/<90   ACE: Lisinopril 5 mg   Hyperlipidemia: Cholesterol goal LDL <100, Last LDL 83. Currently Rx Statin: Simvastatin 20 mg   Last monofilament foot exam: 10/2016 Checks feet at home: yes, no sores currently   Last Eye exam: 2017   Kidney function: GFR 52, will continue to monitor   Microalbumin screenin5016  Has patient received flu vaccine: yes, today   Has patient received Hep B series: done                Moderate single current episode of major depressive disorder (CMS-HCC)  Patient reports since starting Celexa 20 mg daily, she has not been crying as often and has been doing well without AE. She reports a great improvement, she has not started counseling yet.         Medication management  Per patient and her daughter, she is having trouble with her medication regime, she starts to lay out her pills and since she is taking so many medications, she cannot recall if she did take then or not. She does admit it is easier when her daughter sets up her medications for the week but she cannot always do so.     Anxiety  This is a new problem. Patient reports anxiety when she feels like a burden to her children and sometimes she is getting headaches. Resolves with Tylenol PRN.            Current medicines (including changes today)  Current Outpatient Prescriptions  "  Medication Sig Dispense Refill   • metoprolol (LOPRESSOR) 50 MG Tab Take 1 Tab by mouth 2 times a day. 180 Tab 2   • furosemide (LASIX) 40 MG Tab Take 1 Tab by mouth every day. 90 Tab 2   • nystatin (MYCOSTATIN) powder Apply to affected area 1-2 times daily PRN 15 g 2   • glimepiride (AMARYL) 2 MG Tab TAKE 2 TABLETS BY MOUTH EVERY MORNING 60 Tab 2   • magnesium oxide (MAG-OX) 400 (241.3 MG) MG Tab tablet Take 1 Tab by mouth every day. 60 Tab 2   • citalopram (CELEXA) 20 MG Tab Take 1 Tab by mouth every day. 30 Tab 2   • INSULIN SYRINGE 1CC/29G 29G X 1/2\" 1 ML Misc 1 Each by Does not apply route every day. 100 Each 3   • glucose blood strip 1 Strip by Other route as needed (one touch ultra meter test bid dx- E11.65). 100 Strip 11   • insulin glargine (LANTUS) 100 UNIT/ML Solution Take 20 units in the morning and 46 units in the evening as directed for a total dose of 66 units daily. 1 Vial 6   • simvastatin (ZOCOR) 20 MG Tab Take 1 Tab by mouth every evening. 90 Tab 3   • warfarin (COUMADIN) 5 MG Tab Take one to 2  tablets daily as directed by coumadin clinic 180 Tab 1   • lisinopril (PRINIVIL) 5 MG Tab Take 1 Tab by mouth every day. 90 Tab 3   • diltiazem CD (CARDIZEM CD) 120 MG CAPSULE SR 24 HR Take 1 Cap by mouth 2 Times a Day. 180 Cap 3   • cholecalciferol (VITAMIN D3) 5000 UNIT Cap Take 1 Cap by mouth every day. 30 Cap 5     No current facility-administered medications for this visit.        She  has a past medical history of Atrial fibrillation (CMS-[facility]); Atrial flutter (CMS-HCC) (11/18/2015); Diabetes; Heart murmur; Hyperlipidemia; Hypertension; Morbid obesity (CMS-HCC); Osteoarthritis; Pneumonia (4/6/2016); Stroke (CMS-HCC) (11/18/2015); and Valvular heart disease.    She  has a past surgical history that includes tonsillectomy; pilonidal cyst excision; and recovery (5/9/2016).    Social History     Social History   • Marital status:      Spouse name: N/A   • Number of children: N/A   • Years of " "education: N/A     Occupational History   • Not on file.     Social History Main Topics   • Smoking status: Never Smoker   • Smokeless tobacco: Never Used   • Alcohol use No   • Drug use: No   • Sexual activity: Not Currently     Partners: Male     Other Topics Concern   • Not on file     Social History Narrative   • No narrative on file       Review of patient's allergies indicates no known allergies.      ROS  Positive for intermittent headaches, none in clinic today. Positive for depression, No SI/HI.   No fever, no vision changes, no chest pain, no edema, no shortness of breath, no abdominal pain, no rashes, no sores     All other systems reviewed and are negative.        Objective:     Blood pressure 136/72, pulse 82, temperature 35.9 °C (96.7 °F), resp. rate 20, height 1.626 m (5' 4\"), weight (!) 145.6 kg (321 lb), SpO2 91 %. Body mass index is 55.1 kg/m².    Physical Exam:   Constitutional: Alert, no distress.  Eye: Equal, round and reactive, conjunctiva clear, lids normal.  ENMT: Lips without lesions, oropharynx clear.  Neck: Trachea midline, no masses, no thyromegaly. No cervical or supraclavicular lymphadenopathy  Respiratory: Unlabored respiratory effort, lungs clear to auscultation, no wheezes, no ronchi.  Cardiovascular: Normal S1, S2, no murmur, no edema. Capillary refill < 2 seconds in UE bilaterally.   Abdomen: Soft, non-tender, no masses, no hepatosplenomegaly.  Skin: Warm, dry, good turgor, no rashes in visible areas.  Neuro: CN II-XII grossly intact, normal sensation in extremities.   Psych: Alert and oriented x3, normal affect and mood.      Assessment and Plan:   The following treatment plan was discussed    1. 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 I do also think she would benefit from Home Health, particularly medication management.   - REFERRAL TO HOME HEALTH  - POCT  A1C    2. Moderate single current episode of major depressive disorder (CMS-HCC)  Appears to be doing well, discussed we could increase dosing if needed, at this time, will continue with Celexa 20 mg daily.   - REFERRAL TO HOME HEALTH    3. Medication management  - REFERRAL TO HOME HEALTH    4. BMI 50.0-59.9, adult (CMS-HCC)  - REFERRAL TO HOME HEALTH  - Patient identified as having weight management issue.  Appropriate orders and counseling given.    5. Anxiety  Discussed if she has headache and vision changes, check BS, discussed s/s to seek emergent care.     6. Need for vaccination  - INFLUENZA VACCINE QUAD INJ >3Y(PF)       Discussed diet, exercise, disease management and weight loss goals   Advised of s/s of hyper/hypoglycemia. Discussed s/s to seek emergent care.     Reviewed indication, dosage, usage and potential adverse effects of prescribed medications. Patient appears to understand, verbalizes understanding and is willing to try medications as prescribed.      Reviewed risks and benefits of treatment plan. Patient verbally agrees to plan of care.       Followup: Return in about 3 months (around 12/7/2017) for Diabetic Visit.    SEAN [email or web address].     PLEASE NOTE: This dictation was created using voice recognition software. I have made every reasonable attempt to correct obvious errors, but I expect that there may be errors of grammar and possibly content that I did not discover prior finalizing this note.        I have reviewed and agree with history, assessment and plan for the office encounter with this mid level provider  No face to face encounter  Suggested changes or f/u : none other than listed  Signed by Ziggy Cheema M.D., MSc, physician supervisor for this midlevel

## 2017-09-07 NOTE — ASSESSMENT & PLAN NOTE
This is a new problem. Patient reports anxiety when she feels like a burden to her children and sometimes she is getting headaches. Resolves with Tylenol PRN.

## 2017-09-07 NOTE — ASSESSMENT & PLAN NOTE
Per patient and her daughter, she is having trouble with her medication regime, she starts to lay out her pills and since she is taking so many medications, she cannot recall if she did take then or not. She does admit it is easier when her daughter sets up her medications for the week but she cannot always do so.

## 2017-09-07 NOTE — ASSESSMENT & PLAN NOTE
Patient reports since starting Celexa 20 mg daily, she has not been crying as often and has been doing well without AE. She reports a great improvement, she has not started counseling yet.

## 2017-09-07 NOTE — PATIENT INSTRUCTIONS
Appt with Endocrinology HERE this Saturday!     I have placed a referral for Home Health at your appointment today.   You should receive a phone call or letter from [facility] stating your Referral has been completed. At that time, please contact the office you have been referred to in order to make an appointment.   If you do not hear from our office within 1-2 weeks, please contact Becky at [phone].    Your medical care was provided today by: REI Boyd    Thank You for the opportunity to serve you.    You may receive a brief survey in the mail shortly regarding your visit today. Please take a few moments to complete the survey and return it; no postage is necessary. We are working to serve our patient population better, improve customer service and our patients overall experience and your input can help us to accomplish this. We thank you for your help and for the opportunity to serve you today and in the future.     Special Instructions:  Always call 9-1-1 immediately if you develop a life threatening emergency.    Unless told otherwise please take all medications as directed and complete prescription therapies.     Watch for the following signs that require additional evaluation: progressive lethargy or unresponsiveness, localized pain (chest, abdomen), shortness of breath, painful breathing, progressive vomiting with weakness, bloody stools, or new rash.     If you are prescribed pain medication or any other medication that is sedating, do not take medication before or while operating a vehicle or heavy machinery or equipment due to potential side effects such as drowsiness and/or dizziness.

## 2017-09-07 NOTE — ASSESSMENT & PLAN NOTE
This is a chronic problem.   Patient admits she has not made an appt with Endo, she knows she needs to call to make an appt.     Last A1c: 10.0 which is again increased from 9.7. Which has even again increased from 8.6 at prior visit   DM Medications: Lantus, 70 units daily total (she is delivering in separate injections as discussed at last visit) Patient reports good medication compliance.   HTN: Blood pressure goal <140/<90   ACE: Lisinopril 5 mg   Hyperlipidemia: Cholesterol goal LDL <100, Last LDL 83. Currently Rx Statin: Simvastatin 20 mg   Last monofilament foot exam: 10/2016 Checks feet at home: yes, no sores currently   Last Eye exam: 2017   Kidney function: GFR 52, will continue to monitor   Microalbumin screenin5016  Has patient received flu vaccine: yes, today   Has patient received Hep B series: done

## 2017-09-09 ENCOUNTER — OFFICE VISIT (OUTPATIENT)
Dept: MEDICAL GROUP | Facility: CLINIC | Age: 62
End: 2017-09-09
Payer: MEDICAID

## 2017-09-09 VITALS
BODY MASS INDEX: 50.02 KG/M2 | SYSTOLIC BLOOD PRESSURE: 126 MMHG | RESPIRATION RATE: 18 BRPM | DIASTOLIC BLOOD PRESSURE: 70 MMHG | WEIGHT: 293 LBS | OXYGEN SATURATION: 92 % | HEART RATE: 90 BPM | TEMPERATURE: 97.7 F | HEIGHT: 64 IN

## 2017-09-09 PROCEDURE — 99214 OFFICE O/P EST MOD 30 MIN: CPT | Performed by: PHYSICIAN ASSISTANT

## 2017-09-09 RX ORDER — DAPAGLIFLOZIN 10 MG/1
1 TABLET, FILM COATED ORAL DAILY
Qty: 30 TAB | Refills: 11 | Status: SHIPPED | OUTPATIENT
Start: 2017-09-09 | End: 2017-09-23 | Stop reason: CLARIF

## 2017-09-09 RX ORDER — PIOGLITAZONEHYDROCHLORIDE 30 MG/1
30 TABLET ORAL DAILY
Qty: 30 TAB | Refills: 11 | Status: SHIPPED | OUTPATIENT
Start: 2017-09-09 | End: 2019-01-24 | Stop reason: SDUPTHER

## 2017-09-09 RX ORDER — LIRAGLUTIDE 6 MG/ML
1.8 INJECTION SUBCUTANEOUS DAILY
Qty: 3 PEN | Refills: 6 | Status: SHIPPED | OUTPATIENT
Start: 2017-09-09 | End: 2017-09-23 | Stop reason: SDUPTHER

## 2017-09-09 ASSESSMENT — PAIN SCALES - GENERAL: PAINLEVEL: NO PAIN

## 2017-09-09 NOTE — PATIENT INSTRUCTIONS
STOP Glimepiride    Start:  1.   Victoza 0.6 at night  Then on 9/16/17 INCREASE to 1.2  2.   Lantus 20units at night  3.   Actos 30mg once a day in the Am  4.   Farxiga 10mg one in the AM

## 2017-09-09 NOTE — PROGRESS NOTES
New Patient Consult Note  Referred by: REGLA Montiel    Reason for consult: Diabetes Management Type 2    HPI:  Shani Love is a 61 y.o. old patient who is seeing us today for diabetes care.  This is a pleasant patient with diabetes and I appreciate the opportunity to participate in the care of this patient.  This is a new patient with me today.    Labs of 9/7/17 show a HbA1c of 10.0  Labs of 6/16/17 show a microalbumin/Cr ratio of 173, GFR 52    BG Diary:9/9/2017  In the AM: 300, 171,   Just before meal:  Just before Bed: 342    Has been Diabetic since 10 years  Has a Glucagon pen at home: no    Hx of TIA    1. Uncontrolled type 2 diabetes mellitus with both eyes affected by severe nonproliferative retinopathy and macular edema, with long-term current use of insulin (CMS-HCC)  This is a new patient with me on 9/9/17  She is currently on  1.   Amaryl 2mg two in the Am  2.   Lantus 40units BID    Metformin ER gave her upset stomach.        ROS:   Constitutional: No change in weight , No fatigue, No night sweats.  HEENT: No Headache.  Eyes:  No blurred vision, No visual changes.  Cardiac: No chest pain, No palpitations.  Resp: No shortness of breath, No cough,   Gastro: No nausea or vomiting, No diarrhea.  Neuro: Denies numbness or tinging in bilateral feet or hands, and no loss of sensation.  Endo: No heat or cold intolerance.  : No polyuria, No polydipsia, No chronic UTI's.  Lower extremities: No lower leg edema bilateral.  All other systems were reviewed and were negative.    Past Medical History:  Patient Active Problem List    Diagnosis Date Noted   • Status post ablation of atrial flutter 05/18/2016     Priority: Medium   • Mitral stenosis with regurgitation 04/08/2016     Priority: Medium   • Pulmonary hypertension (CMS-HCC) 04/08/2016     Priority: Medium   • Essential hypertension 12/01/2015     Priority: Medium   • Persistent atrial fibrillation (CMS-[facility]) 12/01/2015     Priority: Medium   •  History of stroke 11/30/2015     Priority: Medium   • Hyperlipidemia 11/19/2015     Priority: Medium   • Obesity 11/17/2015     Priority: Medium   • Uncontrolled type 2 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, with long-term current use of insulin (CMS-HCC) 11/17/2015     Priority: Medium   • Retinal hemorrhage of both eyes 10/26/2016     Priority: Low   • Medication management 09/07/2017   • Anxiety 09/07/2017   • Candidiasis of skin 07/20/2017   • BMI 50.0-59.9, adult (CMS-HCC) 06/28/2017   • Moderate single current episode of major depressive disorder (CMS-HCC) 06/15/2017   • Severe nonproliferative diabetic retinopathy of both eyes associated with type 2 diabetes mellitus (CMS-HCC) 05/04/2017   • Chronic kidney disease, stage III (moderate) 03/15/2017   • Paroxysmal atrial flutter (CMS-HCC) 02/23/2017   • Obstructive sleep apnea 02/15/2017   • Chronic anticoagulation 02/15/2017   • Fecal occult blood test positive 12/16/2016   • Vitamin D deficiency 12/16/2016       Past Surgical History:  Past Surgical History:   Procedure Laterality Date   • RECOVERY  5/9/2016    Procedure: CATH LAB FLUTTER ABLATION, CAMILO WITH ANESTHESIA DR. ARMIJO;  Surgeon: Recoveryonly Surgery;  [place] [facility] [place] [facility] [place]  Service:    • PILONIDAL CYST EXCISION     • TONSILLECTOMY         Allergies:  Review of patient's allergies indicates no known allergies.    Social History:  Social History     Social History   • Marital status:      Spouse name: N/A   • Number of children: N/A   • Years of education: N/A     Occupational History   • Not on file.     Social History Main Topics   • Smoking status: Never Smoker   • Smokeless tobacco: Never Used   • Alcohol use No   • Drug use: No   • Sexual activity: Not Currently     Partners: Male     Other Topics Concern   • Not on file     Social History Narrative   • No narrative on file       Family History:  Family History   Problem Relation Age of  Onset   • Stroke Mother 71   • Cancer Father 71   • Heart Disease Brother        Medications:    Current Outpatient Prescriptions:   •  VICTOZA 18 MG/3ML Solution Pen-injector injection, Inject 0.3 mL as instructed every day., Disp: 3 PEN, Rfl: 6  •  Dapagliflozin Propanediol (FARXIGA) 10 MG Tab, Take 1 tablet by mouth every day., Disp: 30 Tab, Rfl: 11  •  pioglitazone (ACTOS) 30 MG Tab, Take 1 Tab by mouth every day., Disp: 30 Tab, Rfl: 11  •  Insulin Pen Needle (NOVOFINE PLUS) 32G X 4 MM Misc, 1 Applicator by Does not apply route every day. Using one needle tip with victoza per day, Disp: 100 Each, Rfl: 3  •  insulin glargine (LANTUS) 100 UNIT/ML Solution Pen-injector injection, Inject 50 Units as instructed every evening., Disp: 5 PEN, Rfl: 11  •  metoprolol (LOPRESSOR) 50 MG Tab, Take 1 Tab by mouth 2 times a day., Disp: 180 Tab, Rfl: 2  •  furosemide (LASIX) 40 MG Tab, Take 1 Tab by mouth every day., Disp: 90 Tab, Rfl: 2  •  nystatin (MYCOSTATIN) powder, Apply to affected area 1-2 times daily PRN, Disp: 15 g, Rfl: 2  •  magnesium oxide (MAG-OX) 400 (241.3 MG) MG Tab tablet, Take 1 Tab by mouth every day., Disp: 60 Tab, Rfl: 2  •  citalopram (CELEXA) 20 MG Tab, Take 1 Tab by mouth every day., Disp: 30 Tab, Rfl: 2  •  glucose blood strip, 1 Strip by Other route as needed (one touch ultra meter test bid dx- E11.65)., Disp: 100 Strip, Rfl: 11  •  simvastatin (ZOCOR) 20 MG Tab, Take 1 Tab by mouth every evening., Disp: 90 Tab, Rfl: 3  •  warfarin (COUMADIN) 5 MG Tab, Take one to 2  tablets daily as directed by coumadin clinic, Disp: 180 Tab, Rfl: 1  •  lisinopril (PRINIVIL) 5 MG Tab, Take 1 Tab by mouth every day., Disp: 90 Tab, Rfl: 3  •  diltiazem CD (CARDIZEM CD) 120 MG CAPSULE SR 24 HR, Take 1 Cap by mouth 2 Times a Day., Disp: 180 Cap, Rfl: 3  •  cholecalciferol (VITAMIN D3) 5000 UNIT Cap, Take 1 Cap by mouth every day., Disp: 30 Cap, Rfl: 5    Physical Examination:   Vital signs: /70   Pulse 90   Temp  "36.5 °C (97.7 °F)   Resp 18   Ht 1.626 m (5' 4\")   Wt (!) 145.2 kg (320 lb)   SpO2 92%   BMI 54.93 kg/m²   General: No distress, cooperative, well dressed and well nourished.   Eyes: No scleral icterus or discharge, No hyposphagma  ENMT: Normal on external inspection of nose, lips, No nasal drainage   Neck: No abnormal masses on inspection  Resp: Normal effort, Bilateral clear to auscultation, No wheezing, No rales  CVS: Regular rate and rhythm, S1 S2 normal, No murmur. No gallop  Extremities: No edema bilateral extremities  Neuro: Alert and oriented  Skin: No rash, No Ulcers  Psych: Normal mood and affect      Assessment and Plan:    1. Uncontrolled type 2 diabetes mellitus with both eyes affected by severe nonproliferative retinopathy and macular edema, with long-term current use of insulin (CMS-[facility])    I will have her:  STOP Glimepiride    Start:  1.   Victoza 0.6 at night  Then on 9/16/17 INCREASE to 1.2  2.   Lantus 20units at night  3.   Actos 30mg once a day in the Am  4.   Farxiga 10mg one in the AM    Labs ordered today  We discussed a soda of which she is drinking 20-40oz a day.  She needs to stop this and all juice.  DO NOT DRINK your sugar.  We discussed food and eating carbs and protein together.      The total time spent seeing this patient today face to face in consultation, and formulating an action plan for this visit was greater than 25 minutes. > Than 50% of this time was spent counseling, discussing problems documented above and below, coordinating care and answering questions by the physician assistant.  We developed a diabetes care plan for this patient today.      Return in about 2 weeks (around 9/23/2017).    Blood glucose log: Check BG in the morning when wake up, before lunch or dinner and before bed.  So three times a day.  Always bring BG diary to the next office visit.     This patient during there office visit was started on new medication Victoza and Farxiga.  Side effects of new " medications were discussed with the patient today in the office. The patient was supplied paperwork on this new medication.    Thank you kindly for allowing me to participate in the diabetes care plan for this patient.    Pranav Hardy PA-C, BC-[facility]  Board Certified - Advanced Diabetes Management  09/09/17    CC:   REGLA Montiel

## 2017-09-11 ENCOUNTER — NON-PROVIDER VISIT (OUTPATIENT)
Dept: MEDICAL GROUP | Facility: CLINIC | Age: 62
End: 2017-09-11
Payer: MEDICAID

## 2017-09-11 ENCOUNTER — HOSPITAL ENCOUNTER (OUTPATIENT)
Facility: MEDICAL CENTER | Age: 62
End: 2017-09-11
Attending: PHYSICIAN ASSISTANT
Payer: MEDICAID

## 2017-09-11 ENCOUNTER — TELEPHONE (OUTPATIENT)
Dept: ENDOCRINOLOGY | Facility: MEDICAL CENTER | Age: 62
End: 2017-09-11

## 2017-09-11 DIAGNOSIS — Z01.89 ROUTINE LAB DRAW: ICD-10-CM

## 2017-09-11 LAB — GFR SERPL CREATININE-BSD FRML MDRD: 48 ML/MIN/1.73 M 2

## 2017-09-11 PROCEDURE — 85025 COMPLETE CBC W/AUTO DIFF WBC: CPT

## 2017-09-11 PROCEDURE — 82043 UR ALBUMIN QUANTITATIVE: CPT

## 2017-09-11 PROCEDURE — 99000 SPECIMEN HANDLING OFFICE-LAB: CPT | Performed by: PHYSICIAN ASSISTANT

## 2017-09-11 PROCEDURE — 36415 COLL VENOUS BLD VENIPUNCTURE: CPT | Performed by: PHYSICIAN ASSISTANT

## 2017-09-11 PROCEDURE — 84681 ASSAY OF C-PEPTIDE: CPT

## 2017-09-11 PROCEDURE — 84480 ASSAY TRIIODOTHYRONINE (T3): CPT

## 2017-09-11 PROCEDURE — 82652 VIT D 1 25-DIHYDROXY: CPT

## 2017-09-11 PROCEDURE — 84439 ASSAY OF FREE THYROXINE: CPT

## 2017-09-11 PROCEDURE — 82607 VITAMIN B-12: CPT

## 2017-09-11 PROCEDURE — 84443 ASSAY THYROID STIM HORMONE: CPT

## 2017-09-11 PROCEDURE — 82570 ASSAY OF URINE CREATININE: CPT

## 2017-09-11 PROCEDURE — 80053 COMPREHEN METABOLIC PANEL: CPT

## 2017-09-11 NOTE — TELEPHONE ENCOUNTER
Patient has not been in Endocrinology office. Seen at [facility].  Pranav Rxd Touo and Medicaid will not cover unless the diagnosis is changed. To send to [facility] in [place]

## 2017-09-12 LAB
AMBIGUOUS DTTM AMBI4: NORMAL
CREAT UR-MCNC: 76.1 MG/DL
MICROALBUMIN UR-MCNC: 6.1 MG/DL
MICROALBUMIN/CREAT UR: 80 MG/G (ref 0–30)

## 2017-09-13 LAB
1,25(OH)2D3 SERPL-MCNC: 55.1 PG/ML (ref 19.9–79.3)
ALBUMIN SERPL BCP-MCNC: 4 G/DL (ref 3.2–4.9)
ALBUMIN/GLOB SERPL: 1.2 G/DL
ALP SERPL-CCNC: 77 U/L (ref 30–99)
ALT SERPL-CCNC: 14 U/L (ref 2–50)
ANION GAP SERPL CALC-SCNC: 10 MMOL/L (ref 0–11.9)
AST SERPL-CCNC: 16 U/L (ref 12–45)
BILIRUB SERPL-MCNC: 0.5 MG/DL (ref 0.1–1.5)
BUN SERPL-MCNC: 21 MG/DL (ref 8–22)
C PEPTIDE SERPL-MCNC: 3.7 NG/ML (ref 0.8–3.5)
CALCIUM SERPL-MCNC: 9.5 MG/DL (ref 8.5–10.5)
CHLORIDE SERPL-SCNC: 99 MMOL/L (ref 96–112)
CO2 SERPL-SCNC: 25 MMOL/L (ref 20–33)
CREAT SERPL-MCNC: 1.15 MG/DL (ref 0.5–1.4)
GLOBULIN SER CALC-MCNC: 3.3 G/DL (ref 1.9–3.5)
GLUCOSE SERPL-MCNC: 161 MG/DL (ref 65–99)
POTASSIUM SERPL-SCNC: 4.3 MMOL/L (ref 3.6–5.5)
PROT SERPL-MCNC: 7.3 G/DL (ref 6–8.2)
SODIUM SERPL-SCNC: 134 MMOL/L (ref 135–145)
T3 SERPL-MCNC: 93.5 NG/DL (ref 60–181)
T4 FREE SERPL-MCNC: 1.09 NG/DL (ref 0.53–1.43)
TSH SERPL DL<=0.005 MIU/L-ACNC: 3.93 UIU/ML (ref 0.3–3.7)
VIT B12 SERPL-MCNC: 268 PG/ML (ref 211–911)

## 2017-09-18 ENCOUNTER — TELEPHONE (OUTPATIENT)
Dept: MEDICAL GROUP | Facility: CLINIC | Age: 62
End: 2017-09-18

## 2017-09-18 DIAGNOSIS — F32.A DEPRESSION, UNSPECIFIED DEPRESSION TYPE: ICD-10-CM

## 2017-09-18 RX ORDER — CITALOPRAM 20 MG/1
20 TABLET ORAL DAILY
Qty: 30 TAB | Refills: 2 | Status: SHIPPED | OUTPATIENT
Start: 2017-09-18 | End: 2018-03-12 | Stop reason: SDUPTHER

## 2017-09-18 NOTE — TELEPHONE ENCOUNTER
1. Caller Name: [facility]                                         Call Back Number: [phone] (home) [phone] (work)        Patient approves a detailed voicemail message: N\A    [facility] lab stated they need a new order for CBC, due to clotting, they could not run the test and needs to be recollected.

## 2017-09-22 ENCOUNTER — ANTICOAGULATION VISIT (OUTPATIENT)
Dept: MEDICAL GROUP | Facility: PHYSICIAN GROUP | Age: 62
End: 2017-09-22
Payer: MEDICAID

## 2017-09-22 VITALS — HEART RATE: 62 BPM | DIASTOLIC BLOOD PRESSURE: 86 MMHG | SYSTOLIC BLOOD PRESSURE: 149 MMHG

## 2017-09-22 DIAGNOSIS — I48.19 PERSISTENT ATRIAL FIBRILLATION (HCC): ICD-10-CM

## 2017-09-22 LAB — INR PPP: 2 (ref 2–3.5)

## 2017-09-22 PROCEDURE — 85610 PROTHROMBIN TIME: CPT | Performed by: NURSE PRACTITIONER

## 2017-09-22 NOTE — PROGRESS NOTES
Anticoagulation Summary  As of 9/22/2017    INR goal:   2.0-3.0   TTR:   68.0 % (1.4 y)   Today's INR:   2.0   Maintenance plan:   2.5 mg (5 mg x 0.5) on Mon, Wed, Fri; 5 mg (5 mg x 1) all other days   Weekly total:   27.5 mg   Plan last modified:   Fred NewtonD (11/18/2016)   Next INR check:   11/17/2017   Target end date:   Indefinite    Indications    Atrial flutter (CMS-HCC) (Resolved) [I48.92]  Acute ischemic right PCA stroke-Hemorrhage transformation is noted within the infarct (Resolved) [I63.531]  Persistent atrial fibrillation (CMS-HCC) [I48.1]             Anticoagulation Episode Summary     INR check location:   [facility]    Preferred lab:       Send INR reminders to:       Comments:         Anticoagulation Care Providers     Provider Role Specialty Phone number    Bijan Sampson M.D. Referring Cardiology [phone]    [facility] Responsible  [phone]        Anticoagulation Patient Findings      HPI:  Shani Love seen in clinic today, on anticoagulation therapy with wafarin for aflutter  Changes to current medical/health status since last appt: none  Denies signs/symptoms of bleeding and/or thrombosis since the last appt.    Denies any interval changes to diet  Denies any interval changes to medications since last appt.   Denies any complications or cost restrictions with current therapy.   BP recorded in vitals.    A/P   INR  therapeutic.   Continue weekly warfarin dose as noted    Follow up appointment in 7 week(s).     Loc Chan, FrdeD

## 2017-09-23 ENCOUNTER — OFFICE VISIT (OUTPATIENT)
Dept: MEDICAL GROUP | Facility: CLINIC | Age: 62
End: 2017-09-23
Payer: MEDICAID

## 2017-09-23 VITALS
BODY MASS INDEX: 50.02 KG/M2 | TEMPERATURE: 98.2 F | HEIGHT: 64 IN | WEIGHT: 293 LBS | SYSTOLIC BLOOD PRESSURE: 142 MMHG | RESPIRATION RATE: 18 BRPM | HEART RATE: 58 BPM | OXYGEN SATURATION: 92 % | DIASTOLIC BLOOD PRESSURE: 84 MMHG

## 2017-09-23 DIAGNOSIS — I10 ESSENTIAL HYPERTENSION: ICD-10-CM

## 2017-09-23 PROCEDURE — 99214 OFFICE O/P EST MOD 30 MIN: CPT | Performed by: PHYSICIAN ASSISTANT

## 2017-09-23 RX ORDER — LIRAGLUTIDE 6 MG/ML
1.8 INJECTION SUBCUTANEOUS DAILY
Qty: 3 PEN | Refills: 6 | Status: SHIPPED | OUTPATIENT
Start: 2017-09-23 | End: 2017-09-23 | Stop reason: SDUPTHER

## 2017-09-23 RX ORDER — LIRAGLUTIDE 6 MG/ML
1.8 INJECTION SUBCUTANEOUS DAILY
Qty: 3 PEN | Refills: 6 | Status: SHIPPED | OUTPATIENT
Start: 2017-09-23 | End: 2017-11-04 | Stop reason: SDUPTHER

## 2017-09-23 NOTE — PATIENT INSTRUCTIONS
I had her Start:  1.   Victoza 0.6 at night   (Start today) INCREASE to 1.2 on 9/30/17  2.   Tresiba 20units at night  3.   Actos 30mg once a day in the Am  4.   Farxiga 10mg one in the AM     4.   Farxiga 10mg one in the AM ( STOP)  5.   Jardiance 10mg one pill per day

## 2017-09-23 NOTE — PROGRESS NOTES
Return to office Patient Consult Note  Referred by: REGLA Montiel    Reason for consult: Diabetes Management Type 2    HPI:  Shani Love is a 61 y.o. old patient who is seeing us today for diabetes care.  This is a pleasant patient with diabetes and I appreciate the opportunity to participate in the care of this patient.    BG Diary:9/23/2017  In the AM:  Did not bring    Labs of 9/7/17 show a HbA1c of 10.0  Labs of 6/16/17 show a microalbumin/Cr ratio of 173, GFR 52    New labs of 9/13/17  c-peptide 3.7, GFR 58, Vit D. 55, B12 268, TSH 3.9, microal/Cr ratio 80     BG Diary:9/9/2017  In the AM: 300, 171,   Just before meal:  Just before Bed: 342     Has been Diabetic since 10 years  Has a Glucagon pen at home: no     Hx of TIA    Weight:  On 9/9/17 she was 320 pounds and today she is 308    She has lost 12 pounds in just 2 weeks.  This is great.  She stopped drinking SODA.  My discussions last visit helped her.        1. 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   Victoza 0.6 at night   (did not start)  2.   Tresiba 20units at night  3.   Actos 30mg once a day in the Am  4.   Farxiga 10mg one in the AM       2. Essential hypertension  This is stable and doing well      ROS:   Constitutional: No night sweats.  Eyes:  No visual changes.  Cardiac: No chest pain, No palpitations or racing heart rate.  Resp: No shortness of breath, No cough,   Gi: No Diarrhea    All other systems were reviewed and were/are negative.  The ROS was revised/revisited during this office visit from the patients first office visit with me on 9/9/17 Please review the full ROS during the first office visit.    Past Medical History:  Patient Active Problem List    Diagnosis Date Noted   • Status post ablation of atrial flutter 05/18/2016      Priority: Medium   • Mitral stenosis with regurgitation 04/08/2016     Priority: Medium   • Pulmonary hypertension (CMS-HCC) 04/08/2016     Priority: Medium   • Essential hypertension 12/01/2015     Priority: Medium   • Persistent atrial fibrillation (CMS-HCC) 12/01/2015     Priority: Medium   • History of stroke 11/30/2015     Priority: Medium   • Hyperlipidemia 11/19/2015     Priority: Medium   • Obesity 11/17/2015     Priority: Medium   • Uncontrolled type 2 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, with long-term current use of insulin (CMS-HCC) 11/17/2015     Priority: Medium   • Retinal hemorrhage of both eyes 10/26/2016     Priority: Low   • Medication management 09/07/2017   • Anxiety 09/07/2017   • Candidiasis of skin 07/20/2017   • BMI 50.0-59.9, adult (CMS-HCC) 06/28/2017   • Moderate single current episode of major depressive disorder (CMS-HCC) 06/15/2017   • Severe nonproliferative diabetic retinopathy of both eyes associated with type 2 diabetes mellitus (CMS-HCC) 05/04/2017   • Chronic kidney disease, stage III (moderate) 03/15/2017   • Paroxysmal atrial flutter (CMS-HCC) 02/23/2017   • Obstructive sleep apnea 02/15/2017   • Chronic anticoagulation 02/15/2017   • Fecal occult blood test positive 12/16/2016   • Vitamin D deficiency 12/16/2016       Past Surgical History:  Past Surgical History:   Procedure Laterality Date   • RECOVERY  5/9/2016    Procedure: CATH LAB FLUTTER ABLATION, CAMILO WITH ANESTHESIA DR. ARMIJO;  Surgeon: [facility];  Location: SURGERY PRE-POST [facility] UNIT [facility];  Service:    • PILONIDAL CYST EXCISION     • TONSILLECTOMY         Allergies:  Review of patient's allergies indicates no known allergies.    Social History:  Social History     Social History   • Marital status:      Spouse name: N/A   • Number of children: N/A   • Years of education: N/A     Occupational History   • Not on file.     Social History Main Topics   • Smoking  status: Never Smoker   • Smokeless tobacco: Never Used   • Alcohol use No   • Drug use: No   • Sexual activity: Not Currently     Partners: Male     Other Topics Concern   • Not on file     Social History Narrative   • No narrative on file       Family History:  Family History   Problem Relation Age of Onset   • Stroke Mother 71   • Cancer Father 71   • Heart Disease Brother        Medications:    Current Outpatient Prescriptions:   •  Empagliflozin 10 MG Tab, Take 1 tablet by mouth every morning with breakfast., Disp: 30 Tab, Rfl: 11  •  Insulin Pen Needle (NOVOFINE PLUS) 32G X 4 MM Misc, 1 Applicator by Does not apply route every day. Using one needle tip with victoza per day, Disp: 100 Each, Rfl: 3  •  VICTOZA 18 MG/3ML Solution Pen-injector injection, Inject 0.3 mL as instructed every day., Disp: 3 PEN, Rfl: 6  •  citalopram (CELEXA) 20 MG Tab, Take 1 Tab by mouth every day., Disp: 30 Tab, Rfl: 2  •  Insulin Degludec (TRESIBA FLEXTOUCH) 200 UNIT/ML Solution Pen-injector, Inject 50 Units as instructed every bedtime., Disp: 3 PEN, Rfl: 5  •  pioglitazone (ACTOS) 30 MG Tab, Take 1 Tab by mouth every day., Disp: 30 Tab, Rfl: 11  •  metoprolol (LOPRESSOR) 50 MG Tab, Take 1 Tab by mouth 2 times a day., Disp: 180 Tab, Rfl: 2  •  furosemide (LASIX) 40 MG Tab, Take 1 Tab by mouth every day., Disp: 90 Tab, Rfl: 2  •  nystatin (MYCOSTATIN) powder, Apply to affected area 1-2 times daily PRN, Disp: 15 g, Rfl: 2  •  magnesium oxide (MAG-OX) 400 (241.3 MG) MG Tab tablet, Take 1 Tab by mouth every day., Disp: 60 Tab, Rfl: 2  •  glucose blood strip, 1 Strip by Other route as needed (one touch ultra meter test bid dx- E11.65)., Disp: 100 Strip, Rfl: 11  •  simvastatin (ZOCOR) 20 MG Tab, Take 1 Tab by mouth every evening., Disp: 90 Tab, Rfl: 3  •  warfarin (COUMADIN) 5 MG Tab, Take one to 2  tablets daily as directed by coumadin clinic, Disp: 180 Tab, Rfl: 1  •  lisinopril (PRINIVIL) 5 MG Tab, Take 1 Tab by mouth every day., Disp:  "90 Tab, Rfl: 3  •  diltiazem CD (CARDIZEM CD) 120 MG CAPSULE SR 24 HR, Take 1 Cap by mouth 2 Times a Day., Disp: 180 Cap, Rfl: 3  •  cholecalciferol (VITAMIN D3) 5000 UNIT Cap, Take 1 Cap by mouth every day., Disp: 30 Cap, Rfl: 5        Physical Examination:  Vital signs: /84   Pulse (!) 58   Temp 36.8 °C (98.2 °F)   Resp 18   Ht 1.626 m (5' 4\")   Wt (!) 139.7 kg (308 lb)   SpO2 92%   BMI 52.87 kg/m²   General: No distress, cooperative, well dressed and well nourished.   Eyes: No scleral icterus or discharge, No hyposphagma  ENMT: Normal on external inspection of nose, lips, No nasal drainage   Neck: No abnormal masses on inspection  Resp: Normal effort, Bilateral clear to auscultation, No wheezing, No rales  CVS: Regular rate and rhythm, S1 S2 normal, No murmur. No gallop  Extremities: No edema bilateral extremities  Neuro: Alert and oriented  Skin: No rash, No Ulcers  Psych: Normal mood and affect      Assessment and Plan:    1. 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   Victoza 0.6 at night   (Start today) INCREASE to 1.2 on 9/30/17  2.   Tresiba 20units at night  3.   Actos 30mg once a day in the Am  4.   Farxiga 10mg one in the AM     4.   Farxiga 10mg one in the AM ( STOP)  5.   Jardiance 10mg one pill per day     Because of the lower GFR change out Farxiga for Jardiance.     The total time spent seeing this patient today face to face in consultation, and formulating an action plan for this visit was greater than 25 minutes. > Than 50% of this time was spent counseling, discussing problems documented above and below, coordinating care and answering questions by the physician assistant.  We developed a diabetes care plan for this patient today.        2. Essential hypertension  This is stable      Return " in about 2 weeks (around 10/7/2017).    Blood glucose log: Check BG in the morning when wake up, before lunch or dinner and before bed.  So three times a day.  Always bring BG diary to the next office visit.      Thank you kindly for allowing me to participate in the diabetes care plan for this patient.    Pranav Hardy PA-C, BC-[facility]  Board Certified - Advanced Diabetes Management  09/23/17    CC:   REGLA Montiel

## 2017-09-25 ENCOUNTER — OFFICE VISIT (OUTPATIENT)
Dept: PULMONOLOGY | Facility: HOSPICE | Age: 62
End: 2017-09-25
Payer: MEDICAID

## 2017-09-25 VITALS
RESPIRATION RATE: 18 BRPM | BODY MASS INDEX: 50.02 KG/M2 | HEART RATE: 59 BPM | SYSTOLIC BLOOD PRESSURE: 98 MMHG | WEIGHT: 293 LBS | OXYGEN SATURATION: 92 % | DIASTOLIC BLOOD PRESSURE: 64 MMHG | HEIGHT: 64 IN

## 2017-09-25 DIAGNOSIS — G47.33 OBSTRUCTIVE SLEEP APNEA: ICD-10-CM

## 2017-09-25 DIAGNOSIS — I27.20 PULMONARY HYPERTENSION (HCC): ICD-10-CM

## 2017-09-25 PROCEDURE — 99213 OFFICE O/P EST LOW 20 MIN: CPT | Performed by: NURSE PRACTITIONER

## 2017-09-25 RX ORDER — DAPAGLIFLOZIN 5 MG/1
TABLET, FILM COATED ORAL
COMMUNITY
End: 2018-01-22

## 2017-09-25 NOTE — PROGRESS NOTES
Chief Complaint   Patient presents with   • Apnea     22/13 4CM H2O       HPI:  Shani Love is a 61 y.o. year old female here today for follow-up on her obstructive sleep apnea. She has a history of morbid obesity, documented nighttime desaturation, atrial fibrillation post ablation with prior embolic stroke, DM II, hypertension, hyperlipidemia and on chronic anticoagulation. BMI is 53. PSG split-night 2/14/2017 indicated severe obstructive sleep apnea with an AHI of 52.0 and minimum oxygen saturation of 64%. Patient spent 95.5% of diagnostic study between 80-89% oxygen saturation. Patient fell asleep quickly during the study but did have poor sleep efficiency during the titration portion. She was titrated on CPAP and BiPAP with no successful pressure setting. She underwent a dedicated in lab titration study 2/15/2017 which indicates successful titration to a Bipap pressure of 22/18 CM with a resultant AHI of 3.6 with a low 02 saturation of 90%. However, she was only on this pressure for 16 minutes and no REM sleep on this setting. She was started on Auto Bipap with an EPAP min of 13, IPAP max of 22 with a PS of 4. Her compliance card download today in the office indicates an AHI of 7.2 with an average use of 7 hours at night. She does have evidence of a mask leak. She states she is due for a new mask. Her current mask is broken. She has a nasal mask and feels she is opening her mouth at night. She tolerates the pressure. She does feels she sleeps better on therapy and wakes more refreshed. She does wake a couple times during the night to urinate. She denies any morning headaches.        Echo 3/23/2017;   CONCLUSIONS  Normal left ventricular systolic function.  Left ventricular ejection fraction is visually estimated to be 65%.  Mild left ventricular hypertrophy.  Moderate mitral stenosis.  Estimated right ventricular systolic pressure  is at least 35 mmHg but   is underestimated.  Dilated right ventricle.  Normal  right ventricular systolic function.  Aortic sclerosis without stenosis.        Past Medical History:   Diagnosis Date   • Pneumonia 4/6/2016   • Stroke (CMS-HCC) 11/18/2015    Acute ischemic right PCA stroke-Hemorrhage transformation is noted within the infarct [I63.531]   • Atrial flutter (CMS-HCC) 11/18/2015   • Atrial fibrillation (CMS-HCC)    • Diabetes    • Heart murmur     childhood   • Hyperlipidemia    • Hypertension    • Morbid obesity (CMS-HCC)    • Osteoarthritis    • Valvular heart disease     Mitral stenosis       Past Surgical History:   Procedure Laterality Date   • RECOVERY  5/9/2016    Procedure: CATH LAB FLUTTER ABLATION, CAMILO WITH ANESTHESIA DR. ARMIJO;  Surgeon: [facility];  Location: SURGERY PRE-POST PROC UNIT [facility];  Service:    • PILONIDAL CYST EXCISION     • TONSILLECTOMY         Family History   Problem Relation Age of Onset   • Stroke Mother 71   • Cancer Father 71   • Heart Disease Brother        Social History     Social History   • Marital status:      Spouse name: N/A   • Number of children: N/A   • Years of education: N/A     Occupational History   • Not on file.     Social History Main Topics   • Smoking status: Never Smoker   • Smokeless tobacco: Never Used   • Alcohol use No   • Drug use: No   • Sexual activity: Not Currently     Partners: Male     Other Topics Concern   • Not on file     Social History Narrative   • No narrative on file       ROS:  Constitutional: Denies fevers, chills, sweats, fatigue, weight loss  Eyes: Denies vision loss, pain, drainage, double vision. Wears glasses  Ears/Nose/Mouth/Throat: Denies rhinitis, nasal congestion, ear ache, difficulty hearing, sore throat, persistent hoarseness, decayed teeth/toothache  Cardiovascular: Denies chest pain, tightness, palpitations, fainting, difficulty breathing when laying down  Respiratory: Denies shortness of breath, cough, sputum, wheezing, painful breathing, coughing up blood  GI: Denies heartburn,  difficulty swallowing, nausea, vomiting, abdominal pain, diarrhea, constipation  : Denies frequent urination, painful urination  Integumentary: Denies rashes, lumps or color changes  MSK: Denies painful joints, sore muscles, and back pain.   Neurological: Denies frequent headaches, dizziness, weakness  Sleep: See HPI       Current Outpatient Prescriptions   Medication Sig Dispense Refill   • Dapagliflozin Propanediol (FARXIGA) 5 MG Tab Take  by mouth.     • Empagliflozin 10 MG Tab Take 1 tablet by mouth every morning with breakfast. 30 Tab 11   • Insulin Pen Needle (NOVOFINE PLUS) 32G X 4 MM Misc 1 Applicator by Does not apply route every day. Using one needle tip with victoza per day 100 Each 3   • VICTOZA 18 MG/3ML Solution Pen-injector injection Inject 0.3 mL as instructed every day. 3 PEN 6   • citalopram (CELEXA) 20 MG Tab Take 1 Tab by mouth every day. 30 Tab 2   • Insulin Degludec (TRESIBA FLEXTOUCH) 200 UNIT/ML Solution Pen-injector Inject 50 Units as instructed every bedtime. 3 PEN 5   • pioglitazone (ACTOS) 30 MG Tab Take 1 Tab by mouth every day. 30 Tab 11   • metoprolol (LOPRESSOR) 50 MG Tab Take 1 Tab by mouth 2 times a day. 180 Tab 2   • furosemide (LASIX) 40 MG Tab Take 1 Tab by mouth every day. 90 Tab 2   • nystatin (MYCOSTATIN) powder Apply to affected area 1-2 times daily PRN 15 g 2   • magnesium oxide (MAG-OX) 400 (241.3 MG) MG Tab tablet Take 1 Tab by mouth every day. 60 Tab 2   • glucose blood strip 1 Strip by Other route as needed (one touch ultra meter test bid dx- E11.65). 100 Strip 11   • simvastatin (ZOCOR) 20 MG Tab Take 1 Tab by mouth every evening. 90 Tab 3   • warfarin (COUMADIN) 5 MG Tab Take one to 2  tablets daily as directed by coumadin clinic 180 Tab 1   • lisinopril (PRINIVIL) 5 MG Tab Take 1 Tab by mouth every day. 90 Tab 3   • diltiazem CD (CARDIZEM CD) 120 MG CAPSULE SR 24 HR Take 1 Cap by mouth 2 Times a Day. 180 Cap 3   • cholecalciferol (VITAMIN D3) 5000 UNIT Cap Take 1  "Cap by mouth every day. 30 Cap 5     No current facility-administered medications for this visit.        No Known Allergies    Blood pressure (!) 98/64, pulse (!) 59, resp. rate 18, height 1.626 m (5' 4\"), weight (!) 139.6 kg (307 lb 12.8 oz), SpO2 92 %.    PE:   Appearance: Obese female, no acute distress  Eyes: PERRL, EOM intact, sclera white, conjunctiva moist  Ears: no lesions or deformities  Hearing: grossly intact  Nose: no lesions or deformities  Oropharynx: tongue normal, posterior pharynx without erythema or exudate  Mallampati Classification: class 4  Neck: supple, trachea midline, no masses   Respiratory effort: no intercostal retractions or use of accessory muscles  Lung auscultation: no rales, rhonchi or wheezes  Heart auscultation: no murmur rub or gallop  Extremities: no cyanosis. Trace non pitting bilateral lower extremity edema   Abdomen: soft ,non tender, no masses  Gait and Station: wheelchair  Digits and nails: no clubbing, cyanosis, petechiae or nodes.  Cranial nerves: grossly intact  Skin: no rashes, lesions or ulcers noted  Orientation: Oriented to time, person and place  Mood and affect: mood and affect appropriate, normal interaction with examiner  Judgement: Intact          Assessment:  1. Obstructive sleep apnea  DME MASK AND SUPPLIES   2. Pulmonary hypertension (CMS-[facility])     3. BMI 50.0-59.9, adult (CMS-[facility])           Plan:    1) Continue Auto Bipap @ current pressures. RX for new mask and supplies sent to her DME with request to fit for a full face mask.   2) Sleep hygiene discussed.   3) Weight loss recommended.  4) She is up to date on Prevnar 13, Pneumovax 23 and Influenza vaccines.   5) Continue to follow with Cardiology.   6) Follow up in 6 months with compliance card download, sooner if needed.       "

## 2017-09-25 NOTE — PATIENT INSTRUCTIONS
Plan:    1) Continue Auto Bipap @ current pressures. RX for new mask and supplies sent to her DME with request to fit for a full face mask.   2) Sleep hygiene discussed.   3) Weight loss recommended.  4) She is up to date on Prevnar 13, Pneumovax 23 and Influenza vaccines.   5) Continue to follow with Cardiology.   6) Follow up in 6 months with compliance card download, sooner if needed.

## 2017-10-07 ENCOUNTER — OFFICE VISIT (OUTPATIENT)
Dept: MEDICAL GROUP | Facility: CLINIC | Age: 62
End: 2017-10-07
Payer: MEDICAID

## 2017-10-07 VITALS
HEIGHT: 64 IN | BODY MASS INDEX: 50.02 KG/M2 | HEART RATE: 114 BPM | SYSTOLIC BLOOD PRESSURE: 142 MMHG | OXYGEN SATURATION: 93 % | DIASTOLIC BLOOD PRESSURE: 92 MMHG | TEMPERATURE: 97.4 F | WEIGHT: 293 LBS | RESPIRATION RATE: 18 BRPM

## 2017-10-07 DIAGNOSIS — I10 ESSENTIAL HYPERTENSION: Primary | ICD-10-CM

## 2017-10-07 DIAGNOSIS — M79.605 LEFT LEG PAIN: ICD-10-CM

## 2017-10-07 DIAGNOSIS — L03.116 CELLULITIS OF LEFT LOWER LEG: ICD-10-CM

## 2017-10-07 PROCEDURE — 99215 OFFICE O/P EST HI 40 MIN: CPT | Performed by: PHYSICIAN ASSISTANT

## 2017-10-07 RX ORDER — SULFAMETHOXAZOLE AND TRIMETHOPRIM 800; 160 MG/1; MG/1
1 TABLET ORAL 2 TIMES DAILY
Qty: 40 TAB | Refills: 0 | Status: SHIPPED | OUTPATIENT
Start: 2017-10-07 | End: 2018-01-22

## 2017-10-07 NOTE — PROGRESS NOTES
Return to office Patient Consult Note  Referred by: REGLA Montiel    Reason for consult: Diabetes Management Type 2    HPI:  Shani Love is a 61 y.o. old patient who is seeing us today for diabetes care.  This is a pleasant patient with diabetes and I appreciate the opportunity to participate in the care of this patient.    BG Diary:10/7/2017  In the AM:296, 191, 178, 192, 183, 268,   Before meal:  Before Bed:    BG Diary:9/23/2017  In the AM:  Did not bring     Labs of 9/7/17 show a HbA1c of 10.0  Labs of 6/16/17 show a microalbumin/Cr ratio of 173, GFR 52     New labs of 9/13/17  c-peptide 3.7, GFR 58, Vit D. 55, B12 268, TSH 3.9, microal/Cr ratio 80     BG Diary:9/9/2017  In the AM: 300, 171,   Just before meal:  Just before Bed: 342     Has been Diabetic since 10 years  Has a Glucagon pen at home: no     Hx of TIA     Weight:  On 9/9/17 she was 320 pounds and today she is 303     She has lost 17 pounds in just 4 weeks.  This is great.  She stopped drinking SODA.  My discussions last visit helped her.        1. 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   Victoza 1.2 at night     2.   Tresiba 20units at night  3.   Actos 30mg once a day in the Am  4.   Jardiance 10mg one in the AM    2. Essential hypertension  This is slightly high and I will follow        ROS:   Constitutional: No night sweats.  Eyes:  No visual changes.  Cardiac: No chest pain, No palpitations or racing heart rate.  Resp: No shortness of breath, No cough,   Gi: No Diarrhea    All other systems were reviewed and were/are negative.  The ROS was revised/revisited during this office visit from the patients first office visit with me on 9/9/17  Please review the full ROS during the first office visit.    Past Medical History:  Patient Active  Problem List    Diagnosis Date Noted   • Status post ablation of atrial flutter 05/18/2016     Priority: Medium   • Mitral stenosis with regurgitation 04/08/2016     Priority: Medium   • Pulmonary hypertension 04/08/2016     Priority: Medium   • Essential hypertension 12/01/2015     Priority: Medium   • Persistent atrial fibrillation (CMS-HCC) 12/01/2015     Priority: Medium   • History of stroke 11/30/2015     Priority: Medium   • Hyperlipidemia 11/19/2015     Priority: Medium   • Obesity 11/17/2015     Priority: Medium   • Uncontrolled type 2 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, with long-term current use of insulin (CMS-HCC) 11/17/2015     Priority: Medium   • Retinal hemorrhage of both eyes 10/26/2016     Priority: Low   • Left leg pain 10/07/2017   • Cellulitis of left lower leg 10/07/2017   • Medication management 09/07/2017   • Anxiety 09/07/2017   • Candidiasis of skin 07/20/2017   • BMI 50.0-59.9, adult (CMS-HCC) 06/28/2017   • Moderate single current episode of major depressive disorder (CMS-HCC) 06/15/2017   • Severe nonproliferative diabetic retinopathy of both eyes associated with type 2 diabetes mellitus (CMS-HCC) 05/04/2017   • Chronic kidney disease, stage III (moderate) 03/15/2017   • Paroxysmal atrial flutter (CMS-HCC) 02/23/2017   • Obstructive sleep apnea 02/15/2017   • Chronic anticoagulation 02/15/2017   • Fecal occult blood test positive 12/16/2016   • Vitamin D deficiency 12/16/2016       Past Surgical History:  Past Surgical History:   Procedure Laterality Date   • RECOVERY  5/9/2016    Procedure: CATH LAB FLUTTER ABLATION, CAMILO WITH ANESTHESIA DR. ARMIJO;  Surgeon: Recoveryonly Surgery;  Location: SURGERY PRE-POST [facility] UNIT [facility];  Service:    • PILONIDAL CYST EXCISION     • TONSILLECTOMY         Allergies:  Review of patient's allergies indicates no known allergies.    Social History:  Social History     Social History   • Marital status:      Spouse  name: N/A   • Number of children: N/A   • Years of education: N/A     Occupational History   • Not on file.     Social History Main Topics   • Smoking status: Never Smoker   • Smokeless tobacco: Never Used   • Alcohol use No   • Drug use: No   • Sexual activity: Not Currently     Partners: Male     Other Topics Concern   • Not on file     Social History Narrative   • No narrative on file       Family History:  Family History   Problem Relation Age of Onset   • Stroke Mother 71   • Cancer Father 71   • Heart Disease Brother        Medications:    Current Outpatient Prescriptions:   •  sulfamethoxazole-trimethoprim (BACTRIM DS) 800-160 MG tablet, Take 1 Tab by mouth 2 times a day., Disp: 40 Tab, Rfl: 0  •  Empagliflozin 10 MG Tab, Take 1 tablet by mouth every morning with breakfast., Disp: 30 Tab, Rfl: 11  •  Insulin Pen Needle (NOVOFINE PLUS) 32G X 4 MM Misc, 1 Applicator by Does not apply route every day. Using one needle tip with victoza per day, Disp: 100 Each, Rfl: 3  •  VICTOZA 18 MG/3ML Solution Pen-injector injection, Inject 0.3 mL as instructed every day., Disp: 3 PEN, Rfl: 6  •  citalopram (CELEXA) 20 MG Tab, Take 1 Tab by mouth every day., Disp: 30 Tab, Rfl: 2  •  Insulin Degludec (TRESIBA FLEXTOUCH) 200 UNIT/ML Solution Pen-injector, Inject 50 Units as instructed every bedtime., Disp: 3 PEN, Rfl: 5  •  pioglitazone (ACTOS) 30 MG Tab, Take 1 Tab by mouth every day., Disp: 30 Tab, Rfl: 11  •  metoprolol (LOPRESSOR) 50 MG Tab, Take 1 Tab by mouth 2 times a day., Disp: 180 Tab, Rfl: 2  •  furosemide (LASIX) 40 MG Tab, Take 1 Tab by mouth every day., Disp: 90 Tab, Rfl: 2  •  nystatin (MYCOSTATIN) powder, Apply to affected area 1-2 times daily PRN, Disp: 15 g, Rfl: 2  •  magnesium oxide (MAG-OX) 400 (241.3 MG) MG Tab tablet, Take 1 Tab by mouth every day., Disp: 60 Tab, Rfl: 2  •  glucose blood strip, 1 Strip by Other route as needed (one touch ultra meter test bid dx- E11.65)., Disp: 100 Strip, Rfl: 11  •   "simvastatin (ZOCOR) 20 MG Tab, Take 1 Tab by mouth every evening., Disp: 90 Tab, Rfl: 3  •  warfarin (COUMADIN) 5 MG Tab, Take one to 2  tablets daily as directed by coumadin clinic, Disp: 180 Tab, Rfl: 1  •  lisinopril (PRINIVIL) 5 MG Tab, Take 1 Tab by mouth every day., Disp: 90 Tab, Rfl: 3  •  diltiazem CD (CARDIZEM CD) 120 MG CAPSULE SR 24 HR, Take 1 Cap by mouth 2 Times a Day., Disp: 180 Cap, Rfl: 3  •  cholecalciferol (VITAMIN D3) 5000 UNIT Cap, Take 1 Cap by mouth every day., Disp: 30 Cap, Rfl: 5  •  Dapagliflozin Propanediol (FARXIGA) 5 MG Tab, Take  by mouth., Disp: , Rfl:         Physical Examination:  Vital signs: /92   Pulse (!) 114   Temp 36.3 °C (97.4 °F)   Resp 18   Ht 1.626 m (5' 4\")   Wt (!) 137.4 kg (303 lb)   SpO2 93%   BMI 52.01 kg/m²   General: No distress, cooperative, well dressed and well nourished.   Eyes: No scleral icterus or discharge, No hyposphagma  ENMT: Normal on external inspection of nose, lips, No nasal drainage   Neck: No abnormal masses on inspection  Resp: Normal effort, Bilateral clear to auscultation, No wheezing, No rales  CVS: Regular rate and rhythm, S1 S2 normal, No murmur. No gallop  Extremities: No edema bilateral extremities  Neuro: Alert and oriented  Skin: left lower leg is warm dry red and typical cellulitis.  But also on the left lower mid lateral leg is a 3cm lump that is red and warm.  Psych: Normal mood and affect      Assessment and Plan:    1. 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   Victoza 1.2 at night   (Increase to 1.8)  2.   Tresiba 20units at night (INcrease to 30)  3.   Actos 30mg once a day in the Am  4.   Jardiance 10mg one in the AM    2. Cellutitis   Left leg lower lateral warm red lump.  With cellulitis in the leg  Septra DS ordered and an US for the lump she is also having ordered.  She should " receive a call from radiology to schedule the US      3. Essential hypertension  This is slightly high and I will follow    The total time spent seeing this patient today face to face in consultation, and formulating an action plan for this visit was greater than 40 minutes. > Than 50% of this time was spent counseling, discussing problems documented above and below, coordinating care and answering questions by the physician assistant.  We developed a diabetes care plan for this patient today.     Return in about 2 weeks (around 10/21/2017).    Blood glucose log: Check BG in the morning when wake up, before lunch or dinner and before bed.  So three times a day.  Always bring BG diary to the next office visit.       Thank you kindly for allowing me to participate in the diabetes care plan for this patient.    Pranav Hardy PA-C, BC-ADM  Board Certified - Advanced Diabetes Management  10/07/17    CC:   REGLA Montiel

## 2017-10-07 NOTE — PATIENT INSTRUCTIONS
Now on:  1.   Victoza 1.2 at night   (Increase to 1.8)  2.   Tresiba 20units at night (INcrease to 30)  3.   Actos 30mg once a day in the Am  4.   Jardiance 10mg one in the AM

## 2017-10-09 ENCOUNTER — ANTICOAGULATION MONITORING (OUTPATIENT)
Dept: VASCULAR LAB | Facility: MEDICAL CENTER | Age: 62
End: 2017-10-09

## 2017-10-09 DIAGNOSIS — I48.19 PERSISTENT ATRIAL FIBRILLATION (HCC): ICD-10-CM

## 2017-10-10 NOTE — PROGRESS NOTES
Received message pt started on Bactrim. Called and instructed pt to reduce regimen to 2.5 mg daily (36% dose reduction), pt to follow up in 4 days.    Anju Valdez, FredD

## 2017-10-11 ENCOUNTER — HOSPITAL ENCOUNTER (OUTPATIENT)
Dept: RADIOLOGY | Facility: MEDICAL CENTER | Age: 62
End: 2017-10-11
Attending: PHYSICIAN ASSISTANT
Payer: MEDICAID

## 2017-10-11 DIAGNOSIS — I10 ESSENTIAL HYPERTENSION: ICD-10-CM

## 2017-10-11 PROCEDURE — 93971 EXTREMITY STUDY: CPT | Mod: LT

## 2017-10-12 ENCOUNTER — TELEPHONE (OUTPATIENT)
Dept: ENDOCRINOLOGY | Facility: MEDICAL CENTER | Age: 62
End: 2017-10-12

## 2017-10-12 NOTE — TELEPHONE ENCOUNTER
----- Message from Pranav Hardy P.A.-C. sent at 10/12/2017 12:47 PM PDT -----  Please let know normal results of recent labs

## 2017-10-13 ENCOUNTER — ANTICOAGULATION VISIT (OUTPATIENT)
Dept: MEDICAL GROUP | Facility: PHYSICIAN GROUP | Age: 62
End: 2017-10-13
Payer: MEDICAID

## 2017-10-13 VITALS — SYSTOLIC BLOOD PRESSURE: 129 MMHG | HEART RATE: 81 BPM | DIASTOLIC BLOOD PRESSURE: 93 MMHG

## 2017-10-13 DIAGNOSIS — I48.19 PERSISTENT ATRIAL FIBRILLATION (HCC): ICD-10-CM

## 2017-10-13 LAB — INR PPP: 2.2 (ref 2–3.5)

## 2017-10-13 PROCEDURE — 85610 PROTHROMBIN TIME: CPT | Performed by: NURSE PRACTITIONER

## 2017-10-13 NOTE — PROGRESS NOTES
Anticoagulation Summary  As of 10/13/2017    INR goal:   2.0-3.0   TTR:   69.2 % (1.5 y)   Today's INR:   2.2   Maintenance plan:   2.5 mg (5 mg x 0.5) on Mon, Wed, Fri; 5 mg (5 mg x 1) all other days   Weekly total:   27.5 mg   Plan last modified:   Fred NewtonD (11/18/2016)   Next INR check:   10/23/2017   Target end date:   Indefinite    Indications    Atrial flutter (CMS-HCC) (Resolved) [I48.92]  Acute ischemic right PCA stroke-Hemorrhage transformation is noted within the infarct (Resolved) [I63.531]  Persistent atrial fibrillation (CMS-HCC) [I48.1]             Anticoagulation Episode Summary     INR check location:   [facility]    Preferred lab:       Send INR reminders to:       Comments:         Anticoagulation Care Providers     Provider Role Specialty Phone number    Bijan Sampson M.D. Referring Cardiology [phone]    [facility] Responsible  [phone]        Anticoagulation Patient Findings      HPI:  Shani Chikicornell seen in clinic today, on anticoagulation therapy with warfarin for afib  Changes to current medical/health status since last appt: on Septra, dose decreased.  Denies signs/symptoms of bleeding and/or thrombosis since the last appt.    Denies any interval changes to diet  Denies any interval changes to medications since last appt.   Denies any complications or cost restrictions with current therapy.   BP recorded in vitals.    A/P   INR  therapeutic.   Continue weekly warfarin dose as noted    Follow up appointment in 10 days, via the lab     Loc Chan PharmD

## 2017-10-21 ENCOUNTER — OFFICE VISIT (OUTPATIENT)
Dept: MEDICAL GROUP | Facility: CLINIC | Age: 62
End: 2017-10-21
Payer: MEDICAID

## 2017-10-21 VITALS
SYSTOLIC BLOOD PRESSURE: 116 MMHG | WEIGHT: 293 LBS | TEMPERATURE: 97.7 F | DIASTOLIC BLOOD PRESSURE: 78 MMHG | HEART RATE: 72 BPM | BODY MASS INDEX: 50.02 KG/M2 | RESPIRATION RATE: 20 BRPM | OXYGEN SATURATION: 91 % | HEIGHT: 64 IN

## 2017-10-21 DIAGNOSIS — E11.3413 SEVERE NONPROLIFERATIVE DIABETIC RETINOPATHY OF BOTH EYES WITH MACULAR EDEMA ASSOCIATED WITH TYPE 2 DIABETES MELLITUS (HCC): ICD-10-CM

## 2017-10-21 DIAGNOSIS — I10 ESSENTIAL HYPERTENSION: ICD-10-CM

## 2017-10-21 DIAGNOSIS — N18.30 CHRONIC KIDNEY DISEASE, STAGE III (MODERATE) (HCC): ICD-10-CM

## 2017-10-21 PROCEDURE — 99214 OFFICE O/P EST MOD 30 MIN: CPT | Performed by: PHYSICIAN ASSISTANT

## 2017-10-21 NOTE — PROGRESS NOTES
Return to office Patient Consult Note  Referred by: Estela Gunderson P.A.-C.    Reason for consult: Diabetes Management Type 2    HPI:  Shani Love is a 61 y.o. old patient who is seeing us today for diabetes care.  This is a pleasant patient with diabetes and I appreciate the opportunity to participate in the care of this patient.    Labs of 9/7/17 show a HbA1c of 10.0  Labs of 6/16/17 show a microalbumin/Cr ratio of 173, GFR 52     New labs of 9/13/17  c-peptide 3.7, GFR 58, Vit D. 55, B12 268, TSH 3.9, microal/Cr ratio 80    BG Diary:10/21/2017  In the AM: 131, 192, 160, 141, 182, 163  Before meal:  Before Bed: 131, 204, 215, 228, 203, 94, 163,     BG Diary:10/7/2017  In the AM:296, 191, 178, 192, 183, 268,      BG Diary:9/23/2017  In the AM:  Did not bring    BG Diary:9/9/2017  In the AM: 300, 171,   Just before Bed: 342     Has been Diabetic since 10 years  Has a Glucagon pen at home: no     Hx of TIA     Weight:  On 9/9/17 she was 320 pounds and today she is 303     She has lost 17 pounds in just 4 weeks.  This is great.  She stopped drinking SODA.  My discussions last visit helped her.          1. 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   Victoza 1.8 at night     2.   Tresiba 30 units at night  3.   Actos 30mg once a day in the Am  4.   Jardiance 10mg one in the AM    Mat need to start Novolog 6 units before dinner will wait and see in two weeks.       2. Essential hypertension  This is slightly high and I will follow       2. Chronic kidney disease, stage III (moderate)    Is on a high risk medication Insulin and we will continue to follow no hypoglycemic events since last visit    3. Severe nonproliferative diabetic retinopathy of both eyes with macular edema associated with type 2 diabetes mellitus  (CMS-HCC)    4. Essential hypertension          ROS:   Constitutional: No night sweats.  Eyes:  No visual changes.  Cardiac: No chest pain, No palpitations or racing heart rate.  Resp: No shortness of breath, No cough,   Gi: No Diarrhea    All other systems were reviewed and were/are negative.  The ROS was revised/revisited during this office visit from the patients first office visit with me on 9/9/17 Please review the full ROS during the first office visit.    Past Medical History:  Patient Active Problem List    Diagnosis Date Noted   • Status post ablation of atrial flutter 05/18/2016     Priority: Medium   • Mitral stenosis with regurgitation 04/08/2016     Priority: Medium   • Pulmonary hypertension 04/08/2016     Priority: Medium   • Essential hypertension 12/01/2015     Priority: Medium   • Persistent atrial fibrillation (CMS-HCC) 12/01/2015     Priority: Medium   • History of stroke 11/30/2015     Priority: Medium   • Hyperlipidemia 11/19/2015     Priority: Medium   • Obesity 11/17/2015     Priority: Medium   • Uncontrolled type 2 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, with long-term current use of insulin (CMS-HCC) 11/17/2015     Priority: Medium   • Retinal hemorrhage of both eyes 10/26/2016     Priority: Low   • Left leg pain 10/07/2017   • Cellulitis of left lower leg 10/07/2017   • Medication management 09/07/2017   • Anxiety 09/07/2017   • Candidiasis of skin 07/20/2017   • BMI 50.0-59.9, adult (CMS-HCC) 06/28/2017   • Moderate single current episode of major depressive disorder (CMS-HCC) 06/15/2017   • Severe nonproliferative diabetic retinopathy of both eyes associated with type 2 diabetes mellitus (CMS-HCC) 05/04/2017   • Chronic kidney disease, stage III (moderate) 03/15/2017   • Paroxysmal atrial flutter (CMS-HCC) 02/23/2017   • Obstructive sleep apnea 02/15/2017   • Chronic anticoagulation 02/15/2017   • Fecal occult blood test positive 12/16/2016   • Vitamin D  deficiency 12/16/2016       Past Surgical History:  Past Surgical History:   Procedure Laterality Date   • RECOVERY  5/9/2016    Procedure: CATH LAB FLUTTER ABLATION, CAMILO WITH ANESTHESIA DR. ARMIJO;  Surgeon: Recoveryonly Surgery;  [place] [facility] [place]  Service:    • PILONIDAL CYST EXCISION     • TONSILLECTOMY         Allergies:  Review of patient's allergies indicates no known allergies.    Social History:  Social History     Social History   • Marital status:      Spouse name: N/A   • Number of children: N/A   • Years of education: N/A     Occupational History   • Not on file.     Social History Main Topics   • Smoking status: Never Smoker   • Smokeless tobacco: Never Used   • Alcohol use No   • Drug use: No   • Sexual activity: Not Currently     Partners: Male     Other Topics Concern   • Not on file     Social History Narrative   • No narrative on file       Family History:  Family History   Problem Relation Age of Onset   • Stroke Mother 71   • Cancer Father 71   • Heart Disease Brother        Medications:    Current Outpatient Prescriptions:   •  sulfamethoxazole-trimethoprim (BACTRIM DS) 800-160 MG tablet, Take 1 Tab by mouth 2 times a day., Disp: 40 Tab, Rfl: 0  •  Dapagliflozin Propanediol (FARXIGA) 5 MG Tab, Take  by mouth., Disp: , Rfl:   •  Empagliflozin 10 MG Tab, Take 1 tablet by mouth every morning with breakfast., Disp: 30 Tab, Rfl: 11  •  Insulin Pen Needle (NOVOFINE PLUS) 32G X 4 MM Misc, 1 Applicator by Does not apply route every day. Using one needle tip with victoza per day, Disp: 100 Each, Rfl: 3  •  VICTOZA 18 MG/3ML Solution Pen-injector injection, Inject 0.3 mL as instructed every day., Disp: 3 PEN, Rfl: 6  •  citalopram (CELEXA) 20 MG Tab, Take 1 Tab by mouth every day., Disp: 30 Tab, Rfl: 2  •  Insulin Degludec (TRESIBA FLEXTOUCH) 200 UNIT/ML Solution Pen-injector, Inject 50 Units as instructed every bedtime., Disp: 3 PEN, Rfl: 5  •  pioglitazone (ACTOS) 30 MG  "Tab, Take 1 Tab by mouth every day., Disp: 30 Tab, Rfl: 11  •  metoprolol (LOPRESSOR) 50 MG Tab, Take 1 Tab by mouth 2 times a day., Disp: 180 Tab, Rfl: 2  •  furosemide (LASIX) 40 MG Tab, Take 1 Tab by mouth every day., Disp: 90 Tab, Rfl: 2  •  nystatin (MYCOSTATIN) powder, Apply to affected area 1-2 times daily PRN, Disp: 15 g, Rfl: 2  •  magnesium oxide (MAG-OX) 400 (241.3 MG) MG Tab tablet, Take 1 Tab by mouth every day., Disp: 60 Tab, Rfl: 2  •  glucose blood strip, 1 Strip by Other route as needed (one touch ultra meter test bid dx- E11.65)., Disp: 100 Strip, Rfl: 11  •  simvastatin (ZOCOR) 20 MG Tab, Take 1 Tab by mouth every evening., Disp: 90 Tab, Rfl: 3  •  warfarin (COUMADIN) 5 MG Tab, Take one to 2  tablets daily as directed by coumadin clinic, Disp: 180 Tab, Rfl: 1  •  lisinopril (PRINIVIL) 5 MG Tab, Take 1 Tab by mouth every day., Disp: 90 Tab, Rfl: 3  •  diltiazem CD (CARDIZEM CD) 120 MG CAPSULE SR 24 HR, Take 1 Cap by mouth 2 Times a Day., Disp: 180 Cap, Rfl: 3  •  cholecalciferol (VITAMIN D3) 5000 UNIT Cap, Take 1 Cap by mouth every day., Disp: 30 Cap, Rfl: 5        Physical Examination:   Vital signs: /78   Pulse 72   Temp 36.5 °C (97.7 °F)   Resp 20   Ht 1.626 m (5' 4\")   Wt (!) 137.7 kg (303 lb 8 oz)   SpO2 91%   BMI 52.10 kg/m²   General: No distress, cooperative, well dressed and well nourished.   Eyes: No scleral icterus or discharge, No hyposphagma  ENMT: Normal on external inspection of nose, lips, No nasal drainage   Neck: No abnormal masses on inspection  Resp: Normal effort, Bilateral clear to auscultation, No wheezing, No rales  CVS: Regular rate and rhythm, S1 S2 normal, No murmur. No gallop  Extremities: No edema bilateral extremities  Neuro: Alert and oriented  Skin: No rash, No Ulcers  Psych: Normal mood and affect      Assessment and Plan:    1. 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   Victoza 1.8 at night     2.   Tresiba 30 units at night (INCREASE to 40)  3.   Actos 30mg once a day in the Am  4.   Jardiance 10mg one in the AM     Doing better but needs to do better with eating dinner    2. Chronic kidney disease, stage III (moderate)  The total time spent seeing this patient today face to face in consultation, and formulating an action plan for this visit was greater than 25 minutes. > Than 50% of this time was spent counseling, discussing problems documented above and below, coordinating care and answering questions by the physician assistant.  We developed a diabetes care plan for this patient today.      3. Severe nonproliferative diabetic retinopathy of both eyes with macular edema associated with type 2 diabetes mellitus (CMS-[facility])    4. Essential hypertension  This is great and stable      Return in about 2 weeks (around 11/4/2017).    Blood glucose log: Check BG in the morning when wake up, before lunch or dinner and before bed.  So three times a day.  Always bring BG diary to the next office visit.       Thank you kindly for allowing me to participate in the diabetes care plan for this patient.    Pranav Hardy PA-C, BC-ADM  Board Certified - Advanced Diabetes Management  10/21/17    CC:   Estela Gunderson P.A.-C.

## 2017-11-04 ENCOUNTER — OFFICE VISIT (OUTPATIENT)
Dept: MEDICAL GROUP | Facility: CLINIC | Age: 62
End: 2017-11-04
Payer: MEDICAID

## 2017-11-04 VITALS
HEIGHT: 64 IN | SYSTOLIC BLOOD PRESSURE: 148 MMHG | BODY MASS INDEX: 50.02 KG/M2 | RESPIRATION RATE: 20 BRPM | DIASTOLIC BLOOD PRESSURE: 68 MMHG | WEIGHT: 293 LBS | OXYGEN SATURATION: 93 % | TEMPERATURE: 97 F | HEART RATE: 63 BPM

## 2017-11-04 DIAGNOSIS — I10 ESSENTIAL HYPERTENSION: ICD-10-CM

## 2017-11-04 PROCEDURE — 99214 OFFICE O/P EST MOD 30 MIN: CPT | Performed by: PHYSICIAN ASSISTANT

## 2017-11-04 RX ORDER — LIRAGLUTIDE 6 MG/ML
1.8 INJECTION SUBCUTANEOUS DAILY
Qty: 3 PEN | Refills: 6 | Status: SHIPPED | OUTPATIENT
Start: 2017-11-04 | End: 2017-12-05 | Stop reason: SDUPTHER

## 2017-11-04 NOTE — PROGRESS NOTES
Return to office Patient Consult Note  Referred by: Estela Gunderson P.A.-C.    Reason for consult: Diabetes Management Type 2    HPI:  Shani Love is a 61 y.o. old patient who is seeing us today for diabetes care.  This is a pleasant patient with diabetes and I appreciate the opportunity to participate in the care of this patient.    BG Diary:11/4/2017  In the AM: 107, 141, 98, 118, 100, 104, 94, 133, 196, 244, 176 (ran out of victoza)  Before meal:  Before Bed:    Labs of 9/7/17 show a HbA1c of 10.0  Labs of 6/16/17 show a microalbumin/Cr ratio of 173, GFR 52     New labs of 9/13/17  c-peptide 3.7, GFR 58, Vit D. 55, B12 268, TSH 3.9, microal/Cr ratio 80     BG Diary:10/21/2017  In the AM: 131, 192, 160, 141, 182, 163  Before meal:  Before Bed: 131, 204, 215, 228, 203, 94, 163,      BG Diary:10/7/2017  In the AM:296, 191, 178, 192, 183, 268,      BG Diary:9/23/2017  In the AM:  Did not bring     BG Diary:9/9/2017  In the AM: 300, 171,   Just before Bed: 342     Has been Diabetic since 10 years  Has a Glucagon pen at home: no     Hx of TIA     Weight:  On 9/9/17 she was 320 pounds and today she is 303     She has lost 17 pounds in just 4 weeks.  This is great.  She stopped drinking SODA.  My discussions last visit helped her.           1. 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   Victoza 1.8 at night     2.   Tresiba 40 units at night  3.   Actos 30mg once a day in the Am  4.   Jardiance 10mg one in the AM     Mat need to start Novolog 6 units before dinner will wait and see in two weeks.         2. Essential hypertension  This is stable        ROS:   Constitutional: No night sweats.  Eyes:  No visual changes.  Cardiac: No chest pain, No palpitations or racing heart rate.  Resp: No shortness of breath, No cough,   Gi: No  Diarrhea    All other systems were reviewed and were/are negative.  The ROS was revised/revisited during this office visit from the patients first office visit with me on 9/9/17 Please review the full ROS during the first office visit.    Past Medical History:  Patient Active Problem List    Diagnosis Date Noted   • Status post ablation of atrial flutter 05/18/2016     Priority: Medium   • Mitral stenosis with regurgitation 04/08/2016     Priority: Medium   • Pulmonary hypertension 04/08/2016     Priority: Medium   • Essential hypertension 12/01/2015     Priority: Medium   • Persistent atrial fibrillation (CMS-HCC) 12/01/2015     Priority: Medium   • History of stroke 11/30/2015     Priority: Medium   • Hyperlipidemia 11/19/2015     Priority: Medium   • Obesity 11/17/2015     Priority: Medium   • Uncontrolled type 2 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, with long-term current use of insulin (CMS-HCC) 11/17/2015     Priority: Medium   • Retinal hemorrhage of both eyes 10/26/2016     Priority: Low   • Left leg pain 10/07/2017   • Cellulitis of left lower leg 10/07/2017   • Medication management 09/07/2017   • Anxiety 09/07/2017   • Candidiasis of skin 07/20/2017   • BMI 50.0-59.9, adult (CMS-HCC) 06/28/2017   • Moderate single current episode of major depressive disorder (CMS-HCC) 06/15/2017   • Severe nonproliferative diabetic retinopathy of both eyes associated with type 2 diabetes mellitus (CMS-HCC) 05/04/2017   • Chronic kidney disease, stage III (moderate) 03/15/2017   • Paroxysmal atrial flutter (CMS-HCC) 02/23/2017   • Obstructive sleep apnea 02/15/2017   • Chronic anticoagulation 02/15/2017   • Fecal occult blood test positive 12/16/2016   • Vitamin D deficiency 12/16/2016       Past Surgical History:  Past Surgical History:   Procedure Laterality Date   • RECOVERY  5/9/2016    Procedure: CATH LAB FLUTTER ABLATION, CAMILO WITH ANESTHESIA DR. ARMIJO;  Surgeon: Tyler  Surgery;  Location: SURGERY PRE-POST PROC UNIT [facility];  Service:    • PILONIDAL CYST EXCISION     • TONSILLECTOMY         Allergies:  Review of patient's allergies indicates no known allergies.    Social History:  Social History     Social History   • Marital status:      Spouse name: N/A   • Number of children: N/A   • Years of education: N/A     Occupational History   • Not on file.     Social History Main Topics   • Smoking status: Never Smoker   • Smokeless tobacco: Never Used   • Alcohol use No   • Drug use: No   • Sexual activity: Not Currently     Partners: Male     Other Topics Concern   • Not on file     Social History Narrative   • No narrative on file       Family History:  Family History   Problem Relation Age of Onset   • Stroke Mother 71   • Cancer Father 71   • Heart Disease Brother        Medications:    Current Outpatient Prescriptions:   •  VICTOZA 18 MG/3ML Solution Pen-injector injection, Inject 0.3 mL as instructed every day., Disp: 3 PEN, Rfl: 6  •  Dapagliflozin Propanediol (FARXIGA) 5 MG Tab, Take  by mouth., Disp: , Rfl:   •  Empagliflozin 10 MG Tab, Take 1 tablet by mouth every morning with breakfast., Disp: 30 Tab, Rfl: 11  •  citalopram (CELEXA) 20 MG Tab, Take 1 Tab by mouth every day., Disp: 30 Tab, Rfl: 2  •  Insulin Degludec (TRESIBA FLEXTOUCH) 200 UNIT/ML Solution Pen-injector, Inject 50 Units as instructed every bedtime., Disp: 3 PEN, Rfl: 5  •  pioglitazone (ACTOS) 30 MG Tab, Take 1 Tab by mouth every day., Disp: 30 Tab, Rfl: 11  •  metoprolol (LOPRESSOR) 50 MG Tab, Take 1 Tab by mouth 2 times a day., Disp: 180 Tab, Rfl: 2  •  furosemide (LASIX) 40 MG Tab, Take 1 Tab by mouth every day., Disp: 90 Tab, Rfl: 2  •  magnesium oxide (MAG-OX) 400 (241.3 MG) MG Tab tablet, Take 1 Tab by mouth every day., Disp: 60 Tab, Rfl: 2  •  glucose blood strip, 1 Strip by Other route as needed (one touch ultra meter test bid dx- E11.65)., Disp: 100 Strip, Rfl: 11  •  simvastatin (ZOCOR) 20 MG  "Tab, Take 1 Tab by mouth every evening., Disp: 90 Tab, Rfl: 3  •  warfarin (COUMADIN) 5 MG Tab, Take one to 2  tablets daily as directed by coumadin clinic, Disp: 180 Tab, Rfl: 1  •  lisinopril (PRINIVIL) 5 MG Tab, Take 1 Tab by mouth every day., Disp: 90 Tab, Rfl: 3  •  diltiazem CD (CARDIZEM CD) 120 MG CAPSULE SR 24 HR, Take 1 Cap by mouth 2 Times a Day., Disp: 180 Cap, Rfl: 3  •  cholecalciferol (VITAMIN D3) 5000 UNIT Cap, Take 1 Cap by mouth every day., Disp: 30 Cap, Rfl: 5  •  sulfamethoxazole-trimethoprim (BACTRIM DS) 800-160 MG tablet, Take 1 Tab by mouth 2 times a day., Disp: 40 Tab, Rfl: 0  •  Insulin Pen Needle (NOVOFINE PLUS) 32G X 4 MM Misc, 1 Applicator by Does not apply route every day. Using one needle tip with victoza per day, Disp: 100 Each, Rfl: 3  •  nystatin (MYCOSTATIN) powder, Apply to affected area 1-2 times daily PRN, Disp: 15 g, Rfl: 2        Physical Examination:   Vital signs: /68   Pulse 63   Temp 36.1 °C (97 °F)   Resp 20   Ht 1.626 m (5' 4\")   Wt (!) 138.3 kg (305 lb)   SpO2 93%   BMI 52.35 kg/m²   General: No distress, cooperative, well dressed and well nourished.   Eyes: No scleral icterus or discharge, No hyposphagma  ENMT: Normal on external inspection of nose, lips, No nasal drainage   Neck: No abnormal masses on inspection  Resp: Normal effort, Bilateral clear to auscultation, No wheezing, No rales  CVS: Regular rate and rhythm, S1 S2 normal, No murmur. No gallop  Extremities: No edema bilateral extremities  Neuro: Alert and oriented  Skin: No rash, No Ulcers  Psych: Normal mood and affect      Assessment and Plan:    1. 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   Victoza 1.8 at night     2.   Tresiba 40 units at night   3.   Actos 30mg once a day in the Am  4.   Jardiance 10mg one in the AM     Doing better but needs to " do better with eating dinner.  Does not need more meds she needs to work on eating habits.  We discussed today.      2. Essential hypertension  The total time spent seeing this patient today face to face in consultation, and formulating an action plan for this visit was greater than 25 minutes. > Than 50% of this time was spent counseling, discussing problems documented above and below, coordinating care and answering questions by the physician assistant.  We developed a diabetes care plan for this patient today.    Return in about 1 month (around 12/4/2017).    Blood glucose log: Check BG in the morning when wake up, before lunch or dinner and before bed.  So three times a day.  Always bring BG diary to the next office visit.     Thank you kindly for allowing me to participate in the diabetes care plan for this patient.    Pranav Hardy PA-C, BC-ADM  Board Certified - Advanced Diabetes Management  11/04/17    CC:   Estela Gunderson P.A.-C.

## 2017-12-05 ENCOUNTER — TELEPHONE (OUTPATIENT)
Dept: ENDOCRINOLOGY | Facility: MEDICAL CENTER | Age: 62
End: 2017-12-05

## 2017-12-05 DIAGNOSIS — I10 ESSENTIAL HYPERTENSION: ICD-10-CM

## 2017-12-05 RX ORDER — LIRAGLUTIDE 6 MG/ML
1.8 INJECTION SUBCUTANEOUS DAILY
Qty: 3 PEN | Refills: 6 | Status: SHIPPED | OUTPATIENT
Start: 2017-12-05 | End: 2019-01-24 | Stop reason: SDUPTHER

## 2017-12-05 NOTE — TELEPHONE ENCOUNTER
Pranav Hardy P.A.-C.  Zita Paiz, Christopher Ass't   Caller: Unspecified (Today,  9:15 AM)             I sent the refill again.  I prescribed it correctly.  It is 1.8mg a day or 0.3ml a day.  Please let patient know her pharmacy is doing the math wrong if they are not giving her 3 pens.     Pranav      Called back. Pt notified, agreed

## 2017-12-05 NOTE — TELEPHONE ENCOUNTER
Pt requesting Victoza to pharmacy, but to please change dose because the pharmacy won't refill sooner and they have pt taking .3 and she is taking 1.8.

## 2017-12-08 ENCOUNTER — ANTICOAGULATION VISIT (OUTPATIENT)
Dept: MEDICAL GROUP | Facility: PHYSICIAN GROUP | Age: 62
End: 2017-12-08
Payer: MEDICAID

## 2017-12-08 DIAGNOSIS — I48.19 PERSISTENT ATRIAL FIBRILLATION (HCC): ICD-10-CM

## 2017-12-08 LAB — INR PPP: 1.8 (ref 2–3.5)

## 2017-12-08 PROCEDURE — 85610 PROTHROMBIN TIME: CPT | Performed by: NURSE PRACTITIONER

## 2017-12-08 PROCEDURE — 99211 OFF/OP EST MAY X REQ PHY/QHP: CPT | Performed by: NURSE PRACTITIONER

## 2017-12-08 NOTE — PROGRESS NOTES
OP Anticoagulation Service Note    Date: 12/8/2017  There were no vitals filed for this visit.    Anticoagulation Summary  As of 12/8/2017    INR goal:   2.0-3.0   TTR:   67.5 % (1.7 y)   Today's INR:   1.8!   Maintenance plan:   2.5 mg (5 mg x 0.5) on Mon, Wed; 5 mg (5 mg x 1) all other days   Weekly total:   30 mg   Plan last modified:   Loc Chan, PharmD (12/8/2017)   Next INR check:   12/29/2017   Target end date:   Indefinite    Indications    Atrial flutter (CMS-HCC) (Resolved) [I48.92]  Acute ischemic right PCA stroke-Hemorrhage transformation is noted within the infarct (Resolved) [I63.531]  Persistent atrial fibrillation (CMS-HCC) [I48.1]             Anticoagulation Episode Summary     INR check location:   [facility]    Preferred lab:       Send INR reminders to:       Comments:         Anticoagulation Care Providers     Provider Role Specialty Phone number    Bijan Sampson M.D. Referring Cardiology [phone]    [facility] Responsible  [phone]        Anticoagulation Patient Findings      HPI:   Shani Lvoe seen in clinic today, they are here today for a INR check on anticoagulation therapy with warfarin because they have afib and stroke    The reason for today's visit is to prevent morbidity and mortality from a  stroke and to reduce the risk of bleeding while on a anticoagulant.     CHADS-VASC = 5    Stroke risk high, as seen below      [id number]   SCORE PATIENTS (g=[id number]) ADJUSTED STROKE RATE (% year)   0 1 0%   1 422 1,3%   2 1230 2,2%   3 1730 3,2%   4 1718 4,0%   5 1159 6,7%   6 679 9,8%   7 294 9,6%   8 82 6,7%   9 14 15,2%       Confirmed warfarin dosing regimen  Interval Changes with foods rich in vitamin K:Yes more greens, new diet   Interval Changes in ETOH:  No  Interval Changes in smoking status: No  Interval Changes in medication: No   Cost restriction: No    S/S of bleeding or bruising:  No  Signs/symptoms  thrombosis since the last appt:  No  Bleed risk is: low,     3 vitals included with today's appt:No  declined     A/P   INR  sub-therapeutic.     Possible reason(s) INR is not in range today:   Diet     Intervention required today to prevent:    Risk of stroke    Increase weekly warfarin dose as noted    Additional education provided today regarding reducing bleed risk and dietary constraints: Yes      Pt educated to contact our clinic with any changes in medications or s/s of bleeding or thrombosis    Follow up appointment in 3 week(s)    Follow up interval reason: per our collaborative practice policy as their last INR was 2.2 on 10/3/2017    Justify length:   They are at a increased risk of stroke because INR is below goal.    They have a TTR of 67.5 which is not at target (TTR target/goal is 100%) and requires close follow up to prevent a adverse event (the lower the TTR the higher risk of clots, strokes, or bleeding).     CHEST guidelines recommend frequent INR monitoring at regular intervals (a few days up to a max of 12 weeks) to ensure they are on the proper dose of warfarin and not having any complications from therapy.  INRs can dramatically change over a short time period due to diet, medications, and medical conditions.

## 2017-12-22 DIAGNOSIS — E55.9 VITAMIN D DEFICIENCY: ICD-10-CM

## 2017-12-29 ENCOUNTER — ANTICOAGULATION VISIT (OUTPATIENT)
Dept: MEDICAL GROUP | Facility: PHYSICIAN GROUP | Age: 62
End: 2017-12-29
Payer: MEDICAID

## 2017-12-29 DIAGNOSIS — I48.19 PERSISTENT ATRIAL FIBRILLATION (HCC): ICD-10-CM

## 2017-12-29 LAB — INR PPP: 3.3 (ref 2–3.5)

## 2017-12-29 PROCEDURE — 85610 PROTHROMBIN TIME: CPT | Performed by: NURSE PRACTITIONER

## 2018-01-22 ENCOUNTER — HOSPITAL ENCOUNTER (INPATIENT)
Facility: MEDICAL CENTER | Age: 63
LOS: 6 days | DRG: 193 | End: 2018-01-28
Attending: EMERGENCY MEDICINE | Admitting: HOSPITALIST
Payer: MEDICAID

## 2018-01-22 ENCOUNTER — RESOLUTE PROFESSIONAL BILLING HOSPITAL PROF FEE (OUTPATIENT)
Dept: HOSPITALIST | Facility: MEDICAL CENTER | Age: 63
End: 2018-01-22
Payer: MEDICAID

## 2018-01-22 ENCOUNTER — APPOINTMENT (OUTPATIENT)
Dept: RADIOLOGY | Facility: MEDICAL CENTER | Age: 63
DRG: 193 | End: 2018-01-22
Attending: EMERGENCY MEDICINE
Payer: MEDICAID

## 2018-01-22 DIAGNOSIS — R09.02 HYPOXIA: ICD-10-CM

## 2018-01-22 DIAGNOSIS — J81.0 ACUTE PULMONARY EDEMA (HCC): ICD-10-CM

## 2018-01-22 DIAGNOSIS — J44.9 CHRONIC OBSTRUCTIVE PULMONARY DISEASE, UNSPECIFIED COPD TYPE (HCC): ICD-10-CM

## 2018-01-22 DIAGNOSIS — J11.1 INFLUENZA: ICD-10-CM

## 2018-01-22 DIAGNOSIS — Z86.73 HISTORY OF STROKE: ICD-10-CM

## 2018-01-22 PROBLEM — D64.9 NORMOCHROMIC NORMOCYTIC ANEMIA: Status: ACTIVE | Noted: 2018-01-22

## 2018-01-22 PROBLEM — J96.01 ACUTE RESPIRATORY FAILURE WITH HYPOXIA (HCC): Status: ACTIVE | Noted: 2018-01-22

## 2018-01-22 PROBLEM — J18.9 CAP (COMMUNITY ACQUIRED PNEUMONIA): Status: ACTIVE | Noted: 2018-01-22

## 2018-01-22 PROBLEM — E87.1 HYPONATREMIA: Status: ACTIVE | Noted: 2018-01-22

## 2018-01-22 PROBLEM — J10.1 INFLUENZA A: Status: ACTIVE | Noted: 2018-01-22

## 2018-01-22 LAB
ALBUMIN SERPL BCP-MCNC: 3.4 G/DL (ref 3.2–4.9)
ALBUMIN/GLOB SERPL: 1.1 G/DL
ALP SERPL-CCNC: 68 U/L (ref 30–99)
ALT SERPL-CCNC: 10 U/L (ref 2–50)
ANION GAP SERPL CALC-SCNC: 7 MMOL/L (ref 0–11.9)
APPEARANCE UR: CLEAR
AST SERPL-CCNC: 18 U/L (ref 12–45)
BASOPHILS # BLD AUTO: 0.3 % (ref 0–1.8)
BASOPHILS # BLD: 0.02 K/UL (ref 0–0.12)
BILIRUB SERPL-MCNC: 0.8 MG/DL (ref 0.1–1.5)
BILIRUB UR QL STRIP.AUTO: NEGATIVE
BNP SERPL-MCNC: 1012 PG/ML (ref 0–100)
BUN SERPL-MCNC: 13 MG/DL (ref 8–22)
CALCIUM SERPL-MCNC: 9.1 MG/DL (ref 8.5–10.5)
CHLORIDE SERPL-SCNC: 100 MMOL/L (ref 96–112)
CO2 SERPL-SCNC: 27 MMOL/L (ref 20–33)
COLOR UR: YELLOW
CREAT SERPL-MCNC: 1.22 MG/DL (ref 0.5–1.4)
EOSINOPHIL # BLD AUTO: 0 K/UL (ref 0–0.51)
EOSINOPHIL NFR BLD: 0 % (ref 0–6.9)
ERYTHROCYTE [DISTWIDTH] IN BLOOD BY AUTOMATED COUNT: 55.3 FL (ref 35.9–50)
FLUAV RNA SPEC QL NAA+PROBE: POSITIVE
FLUBV RNA SPEC QL NAA+PROBE: NEGATIVE
GLOBULIN SER CALC-MCNC: 3.2 G/DL (ref 1.9–3.5)
GLUCOSE BLD-MCNC: 165 MG/DL (ref 65–99)
GLUCOSE SERPL-MCNC: 230 MG/DL (ref 65–99)
GLUCOSE UR STRIP.AUTO-MCNC: >=1000 MG/DL
HCT VFR BLD AUTO: 35.5 % (ref 37–47)
HGB BLD-MCNC: 11.4 G/DL (ref 12–16)
IMM GRANULOCYTES # BLD AUTO: 0.02 K/UL (ref 0–0.11)
IMM GRANULOCYTES NFR BLD AUTO: 0.3 % (ref 0–0.9)
INR PPP: 1.69 (ref 0.87–1.13)
KETONES UR STRIP.AUTO-MCNC: NEGATIVE MG/DL
LACTATE BLD-SCNC: 2.2 MMOL/L (ref 0.5–2)
LEUKOCYTE ESTERASE UR QL STRIP.AUTO: NEGATIVE
LYMPHOCYTES # BLD AUTO: 0.22 K/UL (ref 1–4.8)
LYMPHOCYTES NFR BLD: 3.3 % (ref 22–41)
MAGNESIUM SERPL-MCNC: 2 MG/DL (ref 1.5–2.5)
MCH RBC QN AUTO: 29.1 PG (ref 27–33)
MCHC RBC AUTO-ENTMCNC: 32.1 G/DL (ref 33.6–35)
MCV RBC AUTO: 90.6 FL (ref 81.4–97.8)
MICRO URNS: ABNORMAL
MONOCYTES # BLD AUTO: 0.63 K/UL (ref 0–0.85)
MONOCYTES NFR BLD AUTO: 9.3 % (ref 0–13.4)
NEUTROPHILS # BLD AUTO: 5.87 K/UL (ref 2–7.15)
NEUTROPHILS NFR BLD: 86.8 % (ref 44–72)
NITRITE UR QL STRIP.AUTO: NEGATIVE
NRBC # BLD AUTO: 0 K/UL
NRBC BLD-RTO: 0 /100 WBC
PH UR STRIP.AUTO: 5 [PH]
PLATELET # BLD AUTO: 196 K/UL (ref 164–446)
PMV BLD AUTO: 11 FL (ref 9–12.9)
POTASSIUM SERPL-SCNC: 4.3 MMOL/L (ref 3.6–5.5)
PROT SERPL-MCNC: 6.6 G/DL (ref 6–8.2)
PROT UR QL STRIP: NEGATIVE MG/DL
PROTHROMBIN TIME: 19.6 SEC (ref 12–14.6)
RBC # BLD AUTO: 3.92 M/UL (ref 4.2–5.4)
RBC UR QL AUTO: NEGATIVE
SODIUM SERPL-SCNC: 134 MMOL/L (ref 135–145)
SP GR UR STRIP.AUTO: 1.02
TROPONIN I SERPL-MCNC: 0.17 NG/ML (ref 0–0.04)
TSH SERPL DL<=0.005 MIU/L-ACNC: 0.72 UIU/ML (ref 0.38–5.33)
UROBILINOGEN UR STRIP.AUTO-MCNC: 0.2 MG/DL
WBC # BLD AUTO: 6.8 K/UL (ref 4.8–10.8)

## 2018-01-22 PROCEDURE — 84484 ASSAY OF TROPONIN QUANT: CPT

## 2018-01-22 PROCEDURE — 700111 HCHG RX REV CODE 636 W/ 250 OVERRIDE (IP): Performed by: INTERNAL MEDICINE

## 2018-01-22 PROCEDURE — 83605 ASSAY OF LACTIC ACID: CPT

## 2018-01-22 PROCEDURE — 80048 BASIC METABOLIC PNL TOTAL CA: CPT

## 2018-01-22 PROCEDURE — 96366 THER/PROPH/DIAG IV INF ADDON: CPT

## 2018-01-22 PROCEDURE — 82962 GLUCOSE BLOOD TEST: CPT

## 2018-01-22 PROCEDURE — 700102 HCHG RX REV CODE 250 W/ 637 OVERRIDE(OP): Performed by: HOSPITALIST

## 2018-01-22 PROCEDURE — 83735 ASSAY OF MAGNESIUM: CPT

## 2018-01-22 PROCEDURE — 81003 URINALYSIS AUTO W/O SCOPE: CPT

## 2018-01-22 PROCEDURE — 87086 URINE CULTURE/COLONY COUNT: CPT

## 2018-01-22 PROCEDURE — 80061 LIPID PANEL: CPT

## 2018-01-22 PROCEDURE — 83880 ASSAY OF NATRIURETIC PEPTIDE: CPT

## 2018-01-22 PROCEDURE — 700111 HCHG RX REV CODE 636 W/ 250 OVERRIDE (IP): Performed by: HOSPITALIST

## 2018-01-22 PROCEDURE — 96365 THER/PROPH/DIAG IV INF INIT: CPT

## 2018-01-22 PROCEDURE — 85025 COMPLETE CBC W/AUTO DIFF WBC: CPT

## 2018-01-22 PROCEDURE — 85610 PROTHROMBIN TIME: CPT

## 2018-01-22 PROCEDURE — A9270 NON-COVERED ITEM OR SERVICE: HCPCS | Performed by: HOSPITALIST

## 2018-01-22 PROCEDURE — 87205 SMEAR GRAM STAIN: CPT

## 2018-01-22 PROCEDURE — 87070 CULTURE OTHR SPECIMN AEROBIC: CPT

## 2018-01-22 PROCEDURE — 71045 X-RAY EXAM CHEST 1 VIEW: CPT

## 2018-01-22 PROCEDURE — 80053 COMPREHEN METABOLIC PANEL: CPT

## 2018-01-22 PROCEDURE — 94660 CPAP INITIATION&MGMT: CPT

## 2018-01-22 PROCEDURE — 84443 ASSAY THYROID STIM HORMONE: CPT

## 2018-01-22 PROCEDURE — 93005 ELECTROCARDIOGRAM TRACING: CPT | Performed by: HOSPITALIST

## 2018-01-22 PROCEDURE — 36415 COLL VENOUS BLD VENIPUNCTURE: CPT

## 2018-01-22 PROCEDURE — 85027 COMPLETE CBC AUTOMATED: CPT

## 2018-01-22 PROCEDURE — 99285 EMERGENCY DEPT VISIT HI MDM: CPT

## 2018-01-22 PROCEDURE — 99223 1ST HOSP IP/OBS HIGH 75: CPT | Performed by: HOSPITALIST

## 2018-01-22 PROCEDURE — 770020 HCHG ROOM/CARE - TELE (206)

## 2018-01-22 PROCEDURE — 700102 HCHG RX REV CODE 250 W/ 637 OVERRIDE(OP): Performed by: EMERGENCY MEDICINE

## 2018-01-22 PROCEDURE — 700105 HCHG RX REV CODE 258: Performed by: HOSPITALIST

## 2018-01-22 PROCEDURE — 87040 BLOOD CULTURE FOR BACTERIA: CPT | Mod: 91

## 2018-01-22 PROCEDURE — 87502 INFLUENZA DNA AMP PROBE: CPT

## 2018-01-22 PROCEDURE — A9270 NON-COVERED ITEM OR SERVICE: HCPCS | Performed by: EMERGENCY MEDICINE

## 2018-01-22 RX ORDER — IBUPROFEN 800 MG/1
800 TABLET ORAL ONCE
Status: ON HOLD | COMMUNITY
End: 2018-01-28

## 2018-01-22 RX ORDER — IBUPROFEN 800 MG/1
800 TABLET ORAL EVERY 6 HOURS PRN
Status: DISCONTINUED | OUTPATIENT
Start: 2018-01-22 | End: 2018-01-22

## 2018-01-22 RX ORDER — DEXTROSE MONOHYDRATE 25 G/50ML
25 INJECTION, SOLUTION INTRAVENOUS
Status: DISCONTINUED | OUTPATIENT
Start: 2018-01-22 | End: 2018-01-28 | Stop reason: HOSPADM

## 2018-01-22 RX ORDER — ONDANSETRON 4 MG/1
4 TABLET, ORALLY DISINTEGRATING ORAL EVERY 4 HOURS PRN
Status: DISCONTINUED | OUTPATIENT
Start: 2018-01-22 | End: 2018-01-28 | Stop reason: HOSPADM

## 2018-01-22 RX ORDER — HALOPERIDOL 5 MG/ML
5 INJECTION INTRAMUSCULAR EVERY 4 HOURS PRN
Status: DISCONTINUED | OUTPATIENT
Start: 2018-01-22 | End: 2018-01-28 | Stop reason: HOSPADM

## 2018-01-22 RX ORDER — PROMETHAZINE HYDROCHLORIDE 25 MG/1
12.5-25 TABLET ORAL EVERY 4 HOURS PRN
Status: DISCONTINUED | OUTPATIENT
Start: 2018-01-22 | End: 2018-01-28 | Stop reason: HOSPADM

## 2018-01-22 RX ORDER — AMOXICILLIN 250 MG
2 CAPSULE ORAL 2 TIMES DAILY
Status: DISCONTINUED | OUTPATIENT
Start: 2018-01-22 | End: 2018-01-28 | Stop reason: HOSPADM

## 2018-01-22 RX ORDER — LISINOPRIL 5 MG/1
5 TABLET ORAL DAILY
Status: DISCONTINUED | OUTPATIENT
Start: 2018-01-23 | End: 2018-01-28 | Stop reason: HOSPADM

## 2018-01-22 RX ORDER — BISACODYL 10 MG
10 SUPPOSITORY, RECTAL RECTAL
Status: DISCONTINUED | OUTPATIENT
Start: 2018-01-22 | End: 2018-01-28 | Stop reason: HOSPADM

## 2018-01-22 RX ORDER — SIMVASTATIN 20 MG
20 TABLET ORAL EVERY EVENING
Status: DISCONTINUED | OUTPATIENT
Start: 2018-01-22 | End: 2018-01-28 | Stop reason: HOSPADM

## 2018-01-22 RX ORDER — PROMETHAZINE HYDROCHLORIDE 25 MG/1
12.5-25 SUPPOSITORY RECTAL EVERY 4 HOURS PRN
Status: DISCONTINUED | OUTPATIENT
Start: 2018-01-22 | End: 2018-01-28 | Stop reason: HOSPADM

## 2018-01-22 RX ORDER — CITALOPRAM 20 MG/1
20 TABLET ORAL DAILY
Status: DISCONTINUED | OUTPATIENT
Start: 2018-01-22 | End: 2018-01-28 | Stop reason: HOSPADM

## 2018-01-22 RX ORDER — AZITHROMYCIN 250 MG/1
250 TABLET, FILM COATED ORAL DAILY
Status: COMPLETED | OUTPATIENT
Start: 2018-01-23 | End: 2018-01-26

## 2018-01-22 RX ORDER — ACETAMINOPHEN 325 MG/1
650 TABLET ORAL EVERY 6 HOURS PRN
Status: DISCONTINUED | OUTPATIENT
Start: 2018-01-22 | End: 2018-01-28 | Stop reason: HOSPADM

## 2018-01-22 RX ORDER — METOPROLOL TARTRATE 50 MG/1
50 TABLET, FILM COATED ORAL 2 TIMES DAILY
Status: DISCONTINUED | OUTPATIENT
Start: 2018-01-22 | End: 2018-01-28 | Stop reason: HOSPADM

## 2018-01-22 RX ORDER — NYSTATIN 100000 [USP'U]/G
POWDER TOPICAL 2 TIMES DAILY
Status: DISCONTINUED | OUTPATIENT
Start: 2018-01-22 | End: 2018-01-28 | Stop reason: HOSPADM

## 2018-01-22 RX ORDER — WARFARIN SODIUM 5 MG/1
2.5-5 TABLET ORAL DAILY
COMMUNITY
End: 2019-01-24 | Stop reason: SDUPTHER

## 2018-01-22 RX ORDER — SODIUM CHLORIDE 9 MG/ML
INJECTION, SOLUTION INTRAVENOUS CONTINUOUS
Status: DISCONTINUED | OUTPATIENT
Start: 2018-01-22 | End: 2018-01-23

## 2018-01-22 RX ORDER — FUROSEMIDE 10 MG/ML
20 INJECTION INTRAMUSCULAR; INTRAVENOUS ONCE
Status: COMPLETED | OUTPATIENT
Start: 2018-01-22 | End: 2018-01-22

## 2018-01-22 RX ORDER — AZITHROMYCIN 250 MG/1
500 TABLET, FILM COATED ORAL ONCE
Status: COMPLETED | OUTPATIENT
Start: 2018-01-22 | End: 2018-01-22

## 2018-01-22 RX ORDER — ONDANSETRON 2 MG/ML
4 INJECTION INTRAMUSCULAR; INTRAVENOUS EVERY 4 HOURS PRN
Status: DISCONTINUED | OUTPATIENT
Start: 2018-01-22 | End: 2018-01-28 | Stop reason: HOSPADM

## 2018-01-22 RX ORDER — ZOLPIDEM TARTRATE 5 MG/1
5 TABLET ORAL
Status: DISCONTINUED | OUTPATIENT
Start: 2018-01-22 | End: 2018-01-28 | Stop reason: HOSPADM

## 2018-01-22 RX ORDER — OSELTAMIVIR PHOSPHATE 75 MG/1
75 CAPSULE ORAL ONCE
Status: COMPLETED | OUTPATIENT
Start: 2018-01-22 | End: 2018-01-22

## 2018-01-22 RX ORDER — IPRATROPIUM BROMIDE AND ALBUTEROL SULFATE 2.5; .5 MG/3ML; MG/3ML
3 SOLUTION RESPIRATORY (INHALATION)
Status: DISCONTINUED | OUTPATIENT
Start: 2018-01-22 | End: 2018-01-28 | Stop reason: HOSPADM

## 2018-01-22 RX ORDER — LABETALOL HYDROCHLORIDE 5 MG/ML
10 INJECTION, SOLUTION INTRAVENOUS EVERY 4 HOURS PRN
Status: DISCONTINUED | OUTPATIENT
Start: 2018-01-22 | End: 2018-01-28 | Stop reason: HOSPADM

## 2018-01-22 RX ORDER — DILTIAZEM HYDROCHLORIDE 120 MG/1
120 CAPSULE, COATED, EXTENDED RELEASE ORAL 2 TIMES DAILY
Status: DISCONTINUED | OUTPATIENT
Start: 2018-01-22 | End: 2018-01-28 | Stop reason: HOSPADM

## 2018-01-22 RX ORDER — PIOGLITAZONEHYDROCHLORIDE 30 MG/1
30 TABLET ORAL DAILY
Status: DISCONTINUED | OUTPATIENT
Start: 2018-01-23 | End: 2018-01-28 | Stop reason: HOSPADM

## 2018-01-22 RX ORDER — INSULIN GLARGINE 100 [IU]/ML
40 INJECTION, SOLUTION SUBCUTANEOUS
Status: DISCONTINUED | OUTPATIENT
Start: 2018-01-22 | End: 2018-01-28 | Stop reason: HOSPADM

## 2018-01-22 RX ORDER — POLYETHYLENE GLYCOL 3350 17 G/17G
1 POWDER, FOR SOLUTION ORAL
Status: DISCONTINUED | OUTPATIENT
Start: 2018-01-22 | End: 2018-01-28 | Stop reason: HOSPADM

## 2018-01-22 RX ORDER — FUROSEMIDE 40 MG/1
40 TABLET ORAL
Status: DISCONTINUED | OUTPATIENT
Start: 2018-01-22 | End: 2018-01-27

## 2018-01-22 RX ORDER — OSELTAMIVIR PHOSPHATE 75 MG/1
75 CAPSULE ORAL EVERY 12 HOURS
Status: COMPLETED | OUTPATIENT
Start: 2018-01-22 | End: 2018-01-26

## 2018-01-22 RX ORDER — WARFARIN SODIUM 5 MG/1
5 TABLET ORAL
Status: COMPLETED | OUTPATIENT
Start: 2018-01-22 | End: 2018-01-22

## 2018-01-22 RX ADMIN — VITAMIN D, TAB 1000IU (100/BT) 5000 UNITS: 25 TAB at 21:28

## 2018-01-22 RX ADMIN — FUROSEMIDE 40 MG: 40 TABLET ORAL at 21:29

## 2018-01-22 RX ADMIN — SODIUM CHLORIDE: 9 INJECTION, SOLUTION INTRAVENOUS at 21:32

## 2018-01-22 RX ADMIN — DILTIAZEM HYDROCHLORIDE 120 MG: 120 CAPSULE, COATED, EXTENDED RELEASE ORAL at 21:27

## 2018-01-22 RX ADMIN — AZITHROMYCIN 500 MG: 250 TABLET, FILM COATED ORAL at 21:49

## 2018-01-22 RX ADMIN — MAGNESIUM GLUCONATE 500 MG ORAL TABLET 400 MG: 500 TABLET ORAL at 21:29

## 2018-01-22 RX ADMIN — METOPROLOL TARTRATE 50 MG: 50 TABLET, FILM COATED ORAL at 21:29

## 2018-01-22 RX ADMIN — CITALOPRAM HYDROBROMIDE 20 MG: 20 TABLET ORAL at 21:29

## 2018-01-22 RX ADMIN — WARFARIN SODIUM 5 MG: 5 TABLET ORAL at 23:03

## 2018-01-22 RX ADMIN — SIMVASTATIN 20 MG: 20 TABLET, FILM COATED ORAL at 21:29

## 2018-01-22 RX ADMIN — AMPICILLIN SODIUM AND SULBACTAM SODIUM 3 G: 2; 1 INJECTION, POWDER, FOR SOLUTION INTRAMUSCULAR; INTRAVENOUS at 18:34

## 2018-01-22 RX ADMIN — FUROSEMIDE 20 MG: 10 INJECTION, SOLUTION INTRAMUSCULAR; INTRAVENOUS at 23:43

## 2018-01-22 RX ADMIN — OSELTAMIVIR PHOSPHATE 75 MG: 75 CAPSULE ORAL at 21:49

## 2018-01-22 RX ADMIN — OSELTAMIVIR PHOSPHATE 75 MG: 75 CAPSULE ORAL at 15:03

## 2018-01-22 RX ADMIN — NYSTATIN 1500000 UNITS: 100000 POWDER TOPICAL at 21:50

## 2018-01-22 RX ADMIN — INSULIN GLARGINE 40 UNITS: 100 INJECTION, SOLUTION SUBCUTANEOUS at 22:58

## 2018-01-22 RX ADMIN — STANDARDIZED SENNA CONCENTRATE AND DOCUSATE SODIUM 2 TABLET: 8.6; 5 TABLET, FILM COATED ORAL at 21:28

## 2018-01-22 RX ADMIN — AMPICILLIN SODIUM AND SULBACTAM SODIUM: 100; 50 INJECTION, POWDER, FOR SOLUTION INTRAVENOUS at 19:01

## 2018-01-22 ASSESSMENT — ENCOUNTER SYMPTOMS
DIZZINESS: 0
SPUTUM PRODUCTION: 1
PSYCHIATRIC NEGATIVE: 1
HEARTBURN: 0
CHILLS: 1
DIAPHORESIS: 0
WEIGHT LOSS: 1
SEIZURES: 0
NAUSEA: 1
HEADACHES: 0
FEVER: 1
HEMOPTYSIS: 0
WHEEZING: 0
BLURRED VISION: 1
DOUBLE VISION: 0
MYALGIAS: 1
VOMITING: 0
BRUISES/BLEEDS EASILY: 0
WEAKNESS: 1
DIARRHEA: 1
SHORTNESS OF BREATH: 1
CONSTIPATION: 0
ABDOMINAL PAIN: 0
LOSS OF CONSCIOUSNESS: 0
SORE THROAT: 1
FOCAL WEAKNESS: 0
COUGH: 1
BLOOD IN STOOL: 0
NERVOUS/ANXIOUS: 0
PALPITATIONS: 0

## 2018-01-22 ASSESSMENT — COPD QUESTIONNAIRES
DO YOU EVER COUGH UP ANY MUCUS OR PHLEGM?: NO/ONLY WITH OCCASIONAL COLDS OR INFECTIONS
HAVE YOU SMOKED AT LEAST 100 CIGARETTES IN YOUR ENTIRE LIFE: NO/DON'T KNOW
COPD SCREENING SCORE: 2
DURING THE PAST 4 WEEKS HOW MUCH DID YOU FEEL SHORT OF BREATH: NONE/LITTLE OF THE TIME

## 2018-01-22 ASSESSMENT — LIFESTYLE VARIABLES
DO YOU DRINK ALCOHOL: NO
EVER_SMOKED: NEVER
DO YOU DRINK ALCOHOL: NO
EVER_SMOKED: NEVER

## 2018-01-22 ASSESSMENT — CHA2DS2 SCORE
CHA2DS2 VASC SCORE: 5
AGE 65 TO 74: NO
SEX: FEMALE
DIABETES: YES
CHF OR LEFT VENTRICULAR DYSFUNCTION: NO
VASCULAR DISEASE: NO
AGE 75 OR GREATER: NO
PRIOR STROKE OR TIA OR THROMBOEMBOLISM: YES
HYPERTENSION: YES

## 2018-01-22 ASSESSMENT — PATIENT HEALTH QUESTIONNAIRE - PHQ9
2. FEELING DOWN, DEPRESSED, IRRITABLE, OR HOPELESS: NOT AT ALL
SUM OF ALL RESPONSES TO PHQ9 QUESTIONS 1 AND 2: 0
SUM OF ALL RESPONSES TO PHQ QUESTIONS 1-9: 0
1. LITTLE INTEREST OR PLEASURE IN DOING THINGS: NOT AT ALL

## 2018-01-22 ASSESSMENT — PAIN SCALES - GENERAL: PAINLEVEL_OUTOF10: 0

## 2018-01-22 NOTE — ED NOTES
Med rec complete per pt, family, [facility], and notes from [facility].   Allergies reviewed

## 2018-01-22 NOTE — ED PROVIDER NOTES
ED Provider Note    Scribed for Quinn Campos M.D. by Mabel Cardona. 1/22/2018  2:24 PM    Primary care provider: Estela Gunderson P.A.-C.  Means of arrival: ambulance   History obtained from: patient   History limited by: none     CHIEF COMPLAINT  Chief Complaint   Patient presents with   • Flu Like Symptoms     pt transferred from [place]   • Pulmonary Edema     lasix given pta       HPI  Shani Love is a 62 y.o. female who presents to the Emergency Department via ambulance as a transfer from [place] with complaints of flu like symptoms onset 2 days ago. Patient reports associated coughing, rhinorrhea, worsening shortness of breath, and lower extremity swelling. Patient denies fever (but did have a fever at the transferring facility). Patient denies any exacerbating or alleviating factors. She denies a history of CHF. She reports that her cardiologist is [facility]. Patient reports taking Coumadin secondary to having a stroke. She states that she has been taking her Lasix appropriately.     Per transfer records the patient was transferred from [place] for evaluation of pulmonary edema. She is Influenza A positive. The patient is on Coumadin and Lasix. Her O2 saturations were 75%, her temperature was 101.8 °F, she showed diffuse wheezing with rhonchi and crackles on examination. Her , creatinine is 1.5, D-dimer was negative, BNP is 1090, and her Chest x-ray showed pulmonary edema.     REVIEW OF SYSTEMS  Pertinent positives include fever, coughing, rhinorrhea, shortness of breath, and lower extremity swelling. Pertinent negatives include no fever.  All other systems reviewed and negative. C    PAST MEDICAL HISTORY   has a past medical history of Atrial fibrillation (CMS-HCC); Atrial flutter (CMS-HCC) (11/18/2015); Diabetes; Heart murmur; Hyperlipidemia; Hypertension; Morbid obesity (CMS-HCC); Osteoarthritis; Pneumonia (4/6/2016); Stroke (CMS-HCC) (11/18/2015); and Valvular heart  "disease.    SURGICAL HISTORY   has a past surgical history that includes tonsillectomy; pilonidal cyst excision; and recovery (5/9/2016).    SOCIAL HISTORY  Social History   Substance Use Topics   • Smoking status: Never Smoker   • Smokeless tobacco: Never Used   • Alcohol use No      History   Drug Use No       FAMILY HISTORY  Family History   Problem Relation Age of Onset   • Stroke Mother 71   • Cancer Father 71   • Heart Disease Brother        CURRENT MEDICATIONS  Current medications can be reviewed in the nurse's note.     ALLERGIES  No Known Allergies    PHYSICAL EXAM  VITAL SIGNS: /70   Pulse 96   Temp 36.5 °C (97.7 °F)   Resp 18   Ht 1.626 m (5' 4\")   Wt (!) 147 kg (324 lb)   BMI 55.61 kg/m²     Constitutional: Well developed, Well nourished, Non-toxic appearance. Morbidly obese   HENT: Normocephalic, Atraumatic, Bilateral external ears normal, Oropharynx moist, No oral exudates.   Eyes: PERRLA, EOMI, Conjunctiva normal, No discharge.   Neck: No tenderness, Supple, No stridor.   Lymphatic: No lymphadenopathy noted.   Cardiovascular: Normal heart rate, Normal rhythm.   Thorax & Lungs:  Moderate respiratory distress. Diffuse crackles and wheezes throughout.   Abdomen: Soft, No tenderness, No masses, No pulsatile masses. Obese abdomen.   Skin: Warm, Dry, No erythema, No rash.   Extremities: No cyanosis. 1-2+ lower extremity edema.   Musculoskeletal: No tenderness to palpation or major deformities noted.  Intact distal pulses  Neurologic: Awake, alert. Moves all extremities spontaneously.  Psychiatric: Affect normal, Judgment normal, Mood normal.     LABS  Results for orders placed or performed during the hospital encounter of 01/22/18   LACTIC ACID   Result Value Ref Range    Lactic Acid 2.2 (H) 0.5 - 2.0 mmol/L   CBC WITH DIFFERENTIAL   Result Value Ref Range    WBC 6.8 4.8 - 10.8 K/uL    RBC 3.92 (L) 4.20 - 5.40 M/uL    Hemoglobin 11.4 (L) 12.0 - 16.0 g/dL    Hematocrit 35.5 (L) 37.0 - 47.0 %    " MCV 90.6 81.4 - 97.8 fL    MCH 29.1 27.0 - 33.0 pg    MCHC 32.1 (L) 33.6 - 35.0 g/dL    RDW 55.3 (H) 35.9 - 50.0 fL    Platelet Count 196 164 - 446 K/uL    MPV 11.0 9.0 - 12.9 fL    Neutrophils-Polys 86.80 (H) 44.00 - 72.00 %    Lymphocytes 3.30 (L) 22.00 - 41.00 %    Monocytes 9.30 0.00 - 13.40 %    Eosinophils 0.00 0.00 - 6.90 %    Basophils 0.30 0.00 - 1.80 %    Immature Granulocytes 0.30 0.00 - 0.90 %    Nucleated RBC 0.00 /100 WBC    Neutrophils (Absolute) 5.87 2.00 - 7.15 K/uL    Lymphs (Absolute) 0.22 (L) 1.00 - 4.80 K/uL    Monos (Absolute) 0.63 0.00 - 0.85 K/uL    Eos (Absolute) 0.00 0.00 - 0.51 K/uL    Baso (Absolute) 0.02 0.00 - 0.12 K/uL    Immature Granulocytes (abs) 0.02 0.00 - 0.11 K/uL    NRBC (Absolute) 0.00 K/uL   COMP METABOLIC PANEL   Result Value Ref Range    Sodium 134 (L) 135 - 145 mmol/L    Potassium 4.3 3.6 - 5.5 mmol/L    Chloride 100 96 - 112 mmol/L    Co2 27 20 - 33 mmol/L    Anion Gap 7.0 0.0 - 11.9    Glucose 230 (H) 65 - 99 mg/dL    Bun 13 8 - 22 mg/dL    Creatinine 1.22 0.50 - 1.40 mg/dL    Calcium 9.1 8.5 - 10.5 mg/dL    AST(SGOT) 18 12 - 45 U/L    ALT(SGPT) 10 2 - 50 U/L    Alkaline Phosphatase 68 30 - 99 U/L    Total Bilirubin 0.8 0.1 - 1.5 mg/dL    Albumin 3.4 3.2 - 4.9 g/dL    Total Protein 6.6 6.0 - 8.2 g/dL    Globulin 3.2 1.9 - 3.5 g/dL    A-G Ratio 1.1 g/dL   URINALYSIS   Result Value Ref Range    Color Yellow     Character Clear     Specific Gravity 1.017 <1.035    Ph 5.0 5.0 - 8.0    Glucose >=1000 (A) Negative mg/dL    Ketones Negative Negative mg/dL    Protein Negative Negative mg/dL    Bilirubin Negative Negative    Urobilinogen, Urine 0.2 Negative    Nitrite Negative Negative    Leukocyte Esterase Negative Negative    Occult Blood Negative Negative    Micro Urine Req see below    Influenza By PCR, A/B   Result Value Ref Range    Influenza virus A RNA POSITIVE (A) Negative    Influenza virus B, PCR Negative Negative   BTYPE NATRIURETIC PEPTIDE   Result Value Ref Range     B Natriuretic Peptide 1012 (H) 0 - 100 pg/mL   TROPONIN   Result Value Ref Range    Troponin I 0.17 (H) 0.00 - 0.04 ng/mL   ESTIMATED GFR   Result Value Ref Range    GFR If  54 (A) >60 mL/min/1.73 m 2    GFR If Non  44 (A) >60 mL/min/1.73 m 2     All labs reviewed by me.    RADIOLOGY  DX-CHEST-PORTABLE (1 VIEW)   Final Result      Mild diffuse atelectasis/possible edema or pneumonitis.        The radiologist's interpretation of all radiological studies have been reviewed by me.    COURSE & MEDICAL DECISION MAKING  Pertinent Labs & Imaging studies reviewed. (See chart for details)    I reviewed the patient's medical records which showed the patient was transferred from [place] for evaluation of pulmonary edema. She is Influenza A positive. The patient is on Coumadin, Lasix. Her O2 saturations were 75%, her temperature was 101.8 °F, she showed diffuse wheezing with rhonchi and crackles on examination. , creatinine 1.5, D-dimer negative, BNP 1090, and her Chest x-ray showed pulmonary edema.     2:24 PM - Patient seen and examined at bedside. Patient will be treated with Tamiflu 75 mg. Ordered lactic acid, BNP, troponin, cardiac monitoring  to evaluate her symptoms.     3:51 PM- Reviewed the patient's lab and imaging results.     4:31 PM- Spoke with Dr. Steen (hospitalist) who agrees to admit the patient.     Decision Making:  Patient with influenza, pulmonary edema, give the patient Tamiflu, discussed the case with the hospitalist for admission to hospital.    DISPOSITION:  Patient will be admitted to Dr. Steen (hospitalist) in guarded condition.    FINAL IMPRESSION  1. Influenza    2. Acute pulmonary edema (CMS-[facility])        Mabel OAKES (Scrcolleen), am scribing for, and in the presence of, Quinn Campos M.D..    Electronically signed by: Mabel Cardona (Cinda), 1/22/2018    Quinn OAKES M.D. personally performed the services described in this documentation, as scribed  by Mabel Cardona in my presence, and it is both accurate and complete.    The note accurately reflects work and decisions made by me.  Quinn Campos  1/22/2018  7:46 PM

## 2018-01-22 NOTE — ED NOTES
Chief Complaint   Patient presents with   • Flu Like Symptoms     pt transferred from [place]   • Pulmonary Edema     lasix given pta

## 2018-01-23 ENCOUNTER — APPOINTMENT (OUTPATIENT)
Dept: RADIOLOGY | Facility: MEDICAL CENTER | Age: 63
DRG: 193 | End: 2018-01-23
Attending: HOSPITALIST
Payer: MEDICAID

## 2018-01-23 ENCOUNTER — APPOINTMENT (OUTPATIENT)
Dept: RADIOLOGY | Facility: MEDICAL CENTER | Age: 63
DRG: 193 | End: 2018-01-23
Attending: INTERNAL MEDICINE
Payer: MEDICAID

## 2018-01-23 PROBLEM — I50.33 ACUTE ON CHRONIC DIASTOLIC HEART FAILURE (HCC): Status: ACTIVE | Noted: 2018-01-23

## 2018-01-23 LAB
ANION GAP SERPL CALC-SCNC: 7 MMOL/L (ref 0–11.9)
BASE EXCESS BLDA CALC-SCNC: 3 MMOL/L (ref -4–3)
BNP SERPL-MCNC: 523 PG/ML (ref 0–100)
BODY TEMPERATURE: ABNORMAL CENTIGRADE
BUN SERPL-MCNC: 13 MG/DL (ref 8–22)
CALCIUM SERPL-MCNC: 8.4 MG/DL (ref 8.5–10.5)
CHLORIDE SERPL-SCNC: 100 MMOL/L (ref 96–112)
CHOLEST SERPL-MCNC: 96 MG/DL (ref 100–199)
CO2 SERPL-SCNC: 27 MMOL/L (ref 20–33)
CREAT SERPL-MCNC: 1.05 MG/DL (ref 0.5–1.4)
EKG IMPRESSION: NORMAL
ERYTHROCYTE [DISTWIDTH] IN BLOOD BY AUTOMATED COUNT: 56.2 FL (ref 35.9–50)
GLUCOSE BLD-MCNC: 120 MG/DL (ref 65–99)
GLUCOSE BLD-MCNC: 122 MG/DL (ref 65–99)
GLUCOSE BLD-MCNC: 257 MG/DL (ref 65–99)
GLUCOSE BLD-MCNC: 98 MG/DL (ref 65–99)
GLUCOSE SERPL-MCNC: 139 MG/DL (ref 65–99)
HCO3 BLDA-SCNC: 28 MMOL/L (ref 17–25)
HCT VFR BLD AUTO: 36.3 % (ref 37–47)
HDLC SERPL-MCNC: 32 MG/DL
HGB BLD-MCNC: 11.5 G/DL (ref 12–16)
INR PPP: 1.94 (ref 0.87–1.13)
LDLC SERPL CALC-MCNC: 51 MG/DL
MCH RBC QN AUTO: 28.7 PG (ref 27–33)
MCHC RBC AUTO-ENTMCNC: 31.7 G/DL (ref 33.6–35)
MCV RBC AUTO: 90.5 FL (ref 81.4–97.8)
PCO2 BLDA: 43.7 MMHG (ref 26–37)
PH BLDA: 7.43 [PH] (ref 7.4–7.5)
PLATELET # BLD AUTO: 176 K/UL (ref 164–446)
PMV BLD AUTO: 10.8 FL (ref 9–12.9)
PO2 BLDA: 81.8 MMHG (ref 64–87)
POTASSIUM SERPL-SCNC: 3.9 MMOL/L (ref 3.6–5.5)
PROTHROMBIN TIME: 21.8 SEC (ref 12–14.6)
RBC # BLD AUTO: 4.01 M/UL (ref 4.2–5.4)
SAO2 % BLDA: 95.2 % (ref 93–99)
SODIUM SERPL-SCNC: 134 MMOL/L (ref 135–145)
TRIGL SERPL-MCNC: 63 MG/DL (ref 0–149)
TROPONIN I SERPL-MCNC: 0.14 NG/ML (ref 0–0.04)
TROPONIN I SERPL-MCNC: 0.16 NG/ML (ref 0–0.04)
WBC # BLD AUTO: 6 K/UL (ref 4.8–10.8)

## 2018-01-23 PROCEDURE — 700101 HCHG RX REV CODE 250: Performed by: INTERNAL MEDICINE

## 2018-01-23 PROCEDURE — 700102 HCHG RX REV CODE 250 W/ 637 OVERRIDE(OP): Performed by: HOSPITALIST

## 2018-01-23 PROCEDURE — 94667 MNPJ CHEST WALL 1ST: CPT

## 2018-01-23 PROCEDURE — G8979 MOBILITY GOAL STATUS: HCPCS | Mod: CI

## 2018-01-23 PROCEDURE — 94668 MNPJ CHEST WALL SBSQ: CPT

## 2018-01-23 PROCEDURE — 99232 SBSQ HOSP IP/OBS MODERATE 35: CPT | Performed by: HOSPITALIST

## 2018-01-23 PROCEDURE — 83880 ASSAY OF NATRIURETIC PEPTIDE: CPT

## 2018-01-23 PROCEDURE — 82962 GLUCOSE BLOOD TEST: CPT

## 2018-01-23 PROCEDURE — 94760 N-INVAS EAR/PLS OXIMETRY 1: CPT

## 2018-01-23 PROCEDURE — 770020 HCHG ROOM/CARE - TELE (206)

## 2018-01-23 PROCEDURE — G8988 SELF CARE GOAL STATUS: HCPCS | Mod: CI

## 2018-01-23 PROCEDURE — 84484 ASSAY OF TROPONIN QUANT: CPT

## 2018-01-23 PROCEDURE — 94640 AIRWAY INHALATION TREATMENT: CPT

## 2018-01-23 PROCEDURE — 97162 PT EVAL MOD COMPLEX 30 MIN: CPT

## 2018-01-23 PROCEDURE — 71045 X-RAY EXAM CHEST 1 VIEW: CPT

## 2018-01-23 PROCEDURE — 700105 HCHG RX REV CODE 258: Performed by: HOSPITALIST

## 2018-01-23 PROCEDURE — 97165 OT EVAL LOW COMPLEX 30 MIN: CPT

## 2018-01-23 PROCEDURE — A9270 NON-COVERED ITEM OR SERVICE: HCPCS | Performed by: HOSPITALIST

## 2018-01-23 PROCEDURE — 94669 MECHANICAL CHEST WALL OSCILL: CPT

## 2018-01-23 PROCEDURE — 82803 BLOOD GASES ANY COMBINATION: CPT

## 2018-01-23 PROCEDURE — 36415 COLL VENOUS BLD VENIPUNCTURE: CPT

## 2018-01-23 PROCEDURE — G8987 SELF CARE CURRENT STATUS: HCPCS | Mod: CJ

## 2018-01-23 PROCEDURE — G8978 MOBILITY CURRENT STATUS: HCPCS | Mod: CJ

## 2018-01-23 PROCEDURE — 700111 HCHG RX REV CODE 636 W/ 250 OVERRIDE (IP): Performed by: HOSPITALIST

## 2018-01-23 RX ORDER — IPRATROPIUM BROMIDE AND ALBUTEROL SULFATE 2.5; .5 MG/3ML; MG/3ML
3 SOLUTION RESPIRATORY (INHALATION)
Status: DISCONTINUED | OUTPATIENT
Start: 2018-01-23 | End: 2018-01-26

## 2018-01-23 RX ORDER — WARFARIN SODIUM 2.5 MG/1
2.5 TABLET ORAL
Status: DISCONTINUED | OUTPATIENT
Start: 2018-01-24 | End: 2018-01-24

## 2018-01-23 RX ORDER — WARFARIN SODIUM 5 MG/1
5 TABLET ORAL
Status: DISCONTINUED | OUTPATIENT
Start: 2018-01-23 | End: 2018-01-24

## 2018-01-23 RX ADMIN — SIMVASTATIN 20 MG: 20 TABLET, FILM COATED ORAL at 20:25

## 2018-01-23 RX ADMIN — IPRATROPIUM BROMIDE AND ALBUTEROL SULFATE 3 ML: .5; 3 SOLUTION RESPIRATORY (INHALATION) at 06:51

## 2018-01-23 RX ADMIN — IPRATROPIUM BROMIDE AND ALBUTEROL SULFATE 3 ML: .5; 3 SOLUTION RESPIRATORY (INHALATION) at 02:41

## 2018-01-23 RX ADMIN — WARFARIN SODIUM 5 MG: 5 TABLET ORAL at 17:37

## 2018-01-23 RX ADMIN — PIOGLITAZONE 30 MG: 30 TABLET ORAL at 09:40

## 2018-01-23 RX ADMIN — FUROSEMIDE 40 MG: 40 TABLET ORAL at 16:19

## 2018-01-23 RX ADMIN — DILTIAZEM HYDROCHLORIDE 120 MG: 120 CAPSULE, COATED, EXTENDED RELEASE ORAL at 20:25

## 2018-01-23 RX ADMIN — NYSTATIN 1500000 UNITS: 100000 POWDER TOPICAL at 09:41

## 2018-01-23 RX ADMIN — AMPICILLIN SODIUM AND SULBACTAM SODIUM: 100; 50 INJECTION, POWDER, FOR SOLUTION INTRAVENOUS at 18:40

## 2018-01-23 RX ADMIN — OSELTAMIVIR PHOSPHATE 75 MG: 75 CAPSULE ORAL at 20:25

## 2018-01-23 RX ADMIN — OSELTAMIVIR PHOSPHATE 75 MG: 75 CAPSULE ORAL at 09:40

## 2018-01-23 RX ADMIN — FUROSEMIDE 40 MG: 40 TABLET ORAL at 06:30

## 2018-01-23 RX ADMIN — IPRATROPIUM BROMIDE AND ALBUTEROL SULFATE 3 ML: .5; 3 SOLUTION RESPIRATORY (INHALATION) at 22:24

## 2018-01-23 RX ADMIN — VITAMIN D, TAB 1000IU (100/BT) 5000 UNITS: 25 TAB at 09:35

## 2018-01-23 RX ADMIN — NYSTATIN 1500000 UNITS: 100000 POWDER TOPICAL at 20:26

## 2018-01-23 RX ADMIN — IPRATROPIUM BROMIDE AND ALBUTEROL SULFATE 3 ML: .5; 3 SOLUTION RESPIRATORY (INHALATION) at 10:21

## 2018-01-23 RX ADMIN — MAGNESIUM GLUCONATE 500 MG ORAL TABLET 400 MG: 500 TABLET ORAL at 09:36

## 2018-01-23 RX ADMIN — DILTIAZEM HYDROCHLORIDE 120 MG: 120 CAPSULE, COATED, EXTENDED RELEASE ORAL at 09:36

## 2018-01-23 RX ADMIN — INSULIN HUMAN 7 UNITS: 100 INJECTION, SOLUTION PARENTERAL at 20:34

## 2018-01-23 RX ADMIN — IPRATROPIUM BROMIDE AND ALBUTEROL SULFATE 3 ML: .5; 3 SOLUTION RESPIRATORY (INHALATION) at 18:32

## 2018-01-23 RX ADMIN — INSULIN GLARGINE 40 UNITS: 100 INJECTION, SOLUTION SUBCUTANEOUS at 20:35

## 2018-01-23 RX ADMIN — CITALOPRAM HYDROBROMIDE 20 MG: 20 TABLET ORAL at 09:35

## 2018-01-23 RX ADMIN — AZITHROMYCIN 250 MG: 250 TABLET, FILM COATED ORAL at 09:35

## 2018-01-23 RX ADMIN — METOPROLOL TARTRATE 50 MG: 50 TABLET, FILM COATED ORAL at 20:26

## 2018-01-23 RX ADMIN — METOPROLOL TARTRATE 50 MG: 50 TABLET, FILM COATED ORAL at 09:36

## 2018-01-23 RX ADMIN — LISINOPRIL 5 MG: 5 TABLET ORAL at 09:36

## 2018-01-23 ASSESSMENT — COGNITIVE AND FUNCTIONAL STATUS - GENERAL
WALKING IN HOSPITAL ROOM: A LITTLE
MOVING TO AND FROM BED TO CHAIR: UNABLE
SUGGESTED CMS G CODE MODIFIER DAILY ACTIVITY: CK
DRESSING REGULAR LOWER BODY CLOTHING: A LITTLE
SUGGESTED CMS G CODE MODIFIER MOBILITY: CL
STANDING UP FROM CHAIR USING ARMS: A LITTLE
MOBILITY SCORE: 13
HELP NEEDED FOR BATHING: A LOT
TURNING FROM BACK TO SIDE WHILE IN FLAT BAD: A LITTLE
MOVING FROM LYING ON BACK TO SITTING ON SIDE OF FLAT BED: UNABLE
DRESSING REGULAR UPPER BODY CLOTHING: A LITTLE
CLIMB 3 TO 5 STEPS WITH RAILING: A LOT
DAILY ACTIVITIY SCORE: 19
TOILETING: A LITTLE

## 2018-01-23 ASSESSMENT — PAIN SCALES - GENERAL
PAINLEVEL_OUTOF10: 0

## 2018-01-23 ASSESSMENT — ENCOUNTER SYMPTOMS
NAUSEA: 0
NECK PAIN: 0
PALPITATIONS: 0
BRUISES/BLEEDS EASILY: 0
DEPRESSION: 0
DIZZINESS: 0
WEIGHT LOSS: 0
CHILLS: 0
HEMOPTYSIS: 0
DOUBLE VISION: 0
PHOTOPHOBIA: 0
HEARTBURN: 0
MYALGIAS: 0
COUGH: 0

## 2018-01-23 ASSESSMENT — GAIT ASSESSMENTS
DISTANCE (FEET): 30
GAIT LEVEL OF ASSIST: CONTACT GUARD ASSIST
DEVIATION: SHUFFLED GAIT;BRADYKINETIC;INCREASED BASE OF SUPPORT
ASSISTIVE DEVICE: FRONT WHEEL WALKER

## 2018-01-23 ASSESSMENT — ACTIVITIES OF DAILY LIVING (ADL): TOILETING: INDEPENDENT

## 2018-01-23 NOTE — THERAPY
"Physical Therapy Evaluation completed.   Bed Mobility:  Supine to Sit:  (up in chair)  Transfers: Sit to Stand: Contact Guard Assist  Gait: Level Of Assist: Contact Guard Assist with Front-Wheel Walker       Plan of Care: Will benefit from Physical Therapy 3 times per week  Discharge Recommendations: Equipment: Will Continue to Assess for Equipment Needs. Post-acute therapy Discharge to home with outpatient or home health for additional skilled therapy services.    See \"Rehab Therapy-Acute\" Patient Summary Report for complete documentation.     "

## 2018-01-23 NOTE — PROGRESS NOTES
After turning and cleaning, the patient became very short of breath and began complaining of chest pressure. Stat EKG ordered and continuous pulse ox placed on patient. Patient's O2 saturation was low 80s on 3L NC . Assisted patient in repositioning and sitting up straight in bed and placed a 100% non-rebreather on. Patient's saturation came up to 100% on the non-rebreather. Respiratory called to the bedside and patient was placed on her home cpap with blended O2 at 7 liters. O2 saturation maintained at 93%. Patient stated that she felt better and her chest pain had resolved. Call placed to the hospitalist, awaiting response.

## 2018-01-23 NOTE — RESPIRATORY CARE
COPD EDUCATION by COPD CLINICAL EDUCATOR  1/23/2018 at 7:18 AM by Jeanette Montalvo     Patient reviewed by COPD education team. Patient does not qualify for COPD program.

## 2018-01-23 NOTE — ASSESSMENT & PLAN NOTE
Patient dismissed one is started on Tamiflu.  Patient is started on fluid resuscitation.  Continue at this point with Tylenol for muscle aches.  Continue with supportive management.  Stable, continue supportive treatment.   Breathing better. On 1.5L of o2.  On 0.5L

## 2018-01-23 NOTE — ASSESSMENT & PLAN NOTE
Patient this one started on Unasyn along with azithromycin for her community acquired pneumonia.  Continue discipline with RT protocol nebulizer treatments. Sputum culture and blood culture at this point are pending.  Continue iv atb for now.   o2 requirements improved. 1.5L  Doing much better but still requiring 0.5L keep working with IS

## 2018-01-23 NOTE — PROGRESS NOTES
Inpatient Anticoagulation Service Note    Date: 1/22/2018  Reason for Anticoagulation: Atrial Fibrillation   [id number] VASc Score: 5    Hemoglobin Value: (!) 11.4  Hematocrit Value: (!) 35.5  Lab Platelet Value: 196  Target INR: 2.0 to 3.0    INR from last 7 days     Date/Time INR Value    01/22/18 1409 (!)  1.69        Dose from last 7 days     Date/Time Dose (mg)    01/22/18 2200  5        Average Dose (mg):  (Home dose: 2.5 mg MWF, 5 mg AOD)  Significant Interactions: Antibiotics, Statin (SSRI)  Bridge Therapy: No     Comments: New admission with influenza and pneumonia. Patient chronically anticoagulated for history of atrial fibrillation. INR is subtherapeutic today and patient missed a dose of warfarin yesterday. New interactions with antibiotics identified. If INR does not trend up tomorrow, would suggest bridge therapy given history of stroke. Normal dose is 2.5 mg MWF, 5 mg all other days. Will give 5 mg tonight in light of subtherapeutic INR. Warfarin protocol ordered. Will continue to follow.    Plan:  5 mg tonight, INR tomorrow  Education Material Provided?: No (chronic warfarin patient)  Pharmacist suggested discharge dosing: likely home dose of 2.5 mg Mondays, Wednesdays and Fridays, 5 mg all other days     Catia Gill, MARCELD

## 2018-01-23 NOTE — THERAPY
"Occupational Therapy Evaluation completed.   Functional Status:  Pt s/p flu, acute respiratory failure demonstrating mild deficits with ADLs at the time of eval. Pt primarily limited 2/2 decreased activity tolerance and generalized weakness. Performed standing h/g sink side with cga and cues for proper breathing techniques, STS from chair with cga and ambulated within room using FWW and given cga for line management.  Pt would benefit from acute skilled services while in house and will continue to assess for safe d/c dispositon pending progress with therapy and medical needs.   Plan of Care: Will benefit from Occupational Therapy 3 times per week  Discharge Recommendations:  Equipment: Will Continue to Assess for Equipment Needs. Post-acute therapy Discharge to a transitional care facility for continued skilled therapy services. and Discharge to home with outpatient or home health for additional skilled therapy services pending progress with therapy and medical needs    See \"Rehab Therapy-Acute\" Patient Summary Report for complete documentation.    "

## 2018-01-23 NOTE — H&P
Hospital Medicine History and Physical    Date of Service  1/22/2018    Chief Complaint  Chief Complaint   Patient presents with   • Flu Like Symptoms     pt transferred from [facility]   • Pulmonary Edema     lasix given pta       History of Presenting Illness  62 y.o. female who presented 1/22/2018 with shortness of breath generalized weakness fevers and chills. The patient is found to have influenza A. The patient is admitted at this point with secondary community-acquired pneumonia. The patient will be placed on Unasyn and azithromycin. Cultures from the blood and sputum are pending. Will monitor at this point imaging studies using chest x-ray. Patient will continue at this point with RT protocol and oxygen support. For her diabetes will continue this point with aggressive diabetic management. Patient this point will be monitored for her blood pressure and optimize. Patient will be given pain management and patient will be given antiemetics as well as antipyretics.   Primary Care Physician  Estela Gunderson P.A.-C.    Consultants  None    Code Status  Full code    Review of Systems  Review of Systems   Constitutional: Positive for chills, fever and weight loss. Negative for diaphoresis.   HENT: Positive for sore throat.    Eyes: Positive for blurred vision. Negative for double vision.   Respiratory: Positive for cough, sputum production and shortness of breath. Negative for hemoptysis and wheezing.    Cardiovascular: Positive for chest pain. Negative for palpitations and leg swelling.   Gastrointestinal: Positive for diarrhea and nausea. Negative for abdominal pain, blood in stool, constipation, heartburn and vomiting.   Genitourinary: Negative.  Negative for frequency, hematuria and urgency.   Musculoskeletal: Positive for joint pain and myalgias.   Skin: Negative.  Negative for itching and rash.   Neurological: Positive for weakness. Negative for dizziness, focal weakness, seizures, loss of consciousness and  headaches.   Endo/Heme/Allergies: Negative.  Does not bruise/bleed easily.   Psychiatric/Behavioral: Negative.  Negative for suicidal ideas. The patient is not nervous/anxious.    All other systems reviewed and are negative.       Past Medical History  Past Medical History:   Diagnosis Date   • Pneumonia 4/6/2016   • Stroke (CMS-HCC) 11/18/2015    Acute ischemic right PCA stroke-Hemorrhage transformation is noted within the infarct [I63.531]   • Atrial flutter (CMS-HCC) 11/18/2015   • Atrial fibrillation (CMS-HCC)    • Diabetes    • Heart murmur     childhood   • Hyperlipidemia    • Hypertension    • Morbid obesity (CMS-HCC)    • Osteoarthritis    • Valvular heart disease     Mitral stenosis       Surgical History  Past Surgical History:   Procedure Laterality Date   • RECOVERY  5/9/2016    Procedure: CATH LAB FLUTTER ABLATION, CAMILO WITH ANESTHESIA DR. ARMIJO;  Surgeon: Recoveryonly Surgery;  Location: SURG[place] PRE-[place] PROC UNIT [facility] [place];  Service:    • PILONIDAL CYST EXCISION     • TONSILLECTOMY         Medications  No current facility-administered medications on file prior to encounter.      Current Outpatient Prescriptions on File Prior to Encounter   Medication Sig Dispense Refill   • cholecalciferol (VITAMIN D3) 5000 UNIT Cap Take 1 Cap by mouth every day. 30 Cap 5   • VICTOZA 18 MG/3ML Solution Pen-injector injection Inject 0.3 mL as instructed every day. 3 PEN 6   • citalopram (CELEXA) 20 MG Tab Take 1 Tab by mouth every day. 30 Tab 2   • pioglitazone (ACTOS) 30 MG Tab Take 1 Tab by mouth every day. 30 Tab 11   • metoprolol (LOPRESSOR) 50 MG Tab Take 1 Tab by mouth 2 times a day. 180 Tab 2   • furosemide (LASIX) 40 MG Tab Take 1 Tab by mouth every day. 90 Tab 2   • nystatin (MYCOSTATIN) powder Apply to affected area 1-2 times daily PRN 15 g 2   • magnesium oxide (MAG-OX) 400 (241.3 MG) MG Tab tablet Take 1 Tab by mouth every day. 60 Tab 2   • simvastatin (ZOCOR) 20 MG Tab Take 1 Tab by mouth every evening. 90 Tab 3    • lisinopril (PRINIVIL) 5 MG Tab Take 1 Tab by mouth every day. 90 Tab 3   • diltiazem CD (CARDIZEM CD) 120 MG CAPSULE SR 24 HR Take 1 Cap by mouth 2 Times a Day. 180 Cap 3       Family History  Family History   Problem Relation Age of Onset   • Stroke Mother 71   • Cancer Father 71   • Heart Disease Brother        Social History  Social History   Substance Use Topics   • Smoking status: Never Smoker   • Smokeless tobacco: Never Used   • Alcohol use No       Allergies  No Known Allergies     Physical Exam  Laboratory   Hemodynamics  Temp (24hrs), Av.5 °C (97.7 °F), Min:36.5 °C (97.7 °F), Max:36.5 °C (97.7 °F)   Temperature: 36.5 °C (97.7 °F)  Pulse  Av.8  Min: 87  Max: 96 Heart Rate (Monitored): 77  Blood Pressure: 124/70, NIBP: 138/90      Respiratory      Respiration: 18, Pulse Oximetry: 91 %, O2 Daily Delivery Respiratory : Silicone Nasal Cannula     Work Of Breathing / Effort: Mild  RUL Breath Sounds: Diminished, RML Breath Sounds: Diminished, RLL Breath Sounds: Diminished, CHRIS Breath Sounds: Diminished, LLL Breath Sounds: Diminished    Physical Exam   Constitutional: She is oriented to person, place, and time. She appears well-developed and well-nourished.   HENT:   Head: Normocephalic and atraumatic.   Right Ear: External ear normal.   Left Ear: External ear normal.   Nose: Nose normal.   Mouth/Throat: Oropharynx is clear and moist.   Eyes: Conjunctivae and EOM are normal. Pupils are equal, round, and reactive to light.   Neck: Normal range of motion. Neck supple. No JVD present. No thyromegaly present.   Cardiovascular: Normal rate and regular rhythm.  Exam reveals distant heart sounds.    No murmur heard.  Pulmonary/Chest: Accessory muscle usage present. Tachypnea noted. She has decreased breath sounds in the right upper field, the right middle field, the right lower field, the left upper field, the left middle field and the left lower field. She has rhonchi in the right upper field, the right  middle field, the right lower field, the left upper field, the left middle field and the left lower field. She exhibits no tenderness.   Abdominal: Soft. Bowel sounds are normal. She exhibits distension (obesity).   Musculoskeletal: Normal range of motion. She exhibits no edema or tenderness.   Lymphadenopathy:     She has no cervical adenopathy.   Neurological: She is alert and oriented to person, place, and time. She has normal reflexes. No cranial nerve deficit. Coordination normal.   Skin: Skin is warm and dry. No rash noted. No erythema. No pallor.   Psychiatric: She has a normal mood and affect. Her behavior is normal. Judgment and thought content normal.   Nursing note and vitals reviewed.      Recent Labs      01/22/18   1409   WBC  6.8   RBC  3.92*   HEMOGLOBIN  11.4*   HEMATOCRIT  35.5*   MCV  90.6   MCH  29.1   MCHC  32.1*   RDW  55.3*   PLATELETCT  196   MPV  11.0     Recent Labs      01/22/18   1409   SODIUM  134*   POTASSIUM  4.3   CHLORIDE  100   CO2  27   GLUCOSE  230*   BUN  13   CREATININE  1.22   CALCIUM  9.1     Recent Labs      01/22/18   1409   ALTSGPT  10   ASTSGOT  18   ALKPHOSPHAT  68   TBILIRUBIN  0.8   GLUCOSE  230*         Recent Labs      01/22/18   1409   BNPBTYPENAT  1012*         Lab Results   Component Value Date    TROPONINI 0.17 (H) 01/22/2018     Urinalysis:    Lab Results  Component Value Date/Time   SPECGRAVITY 1.017 01/22/2018 1409   GLUCOSEUR >=1000 (A) 01/22/2018 1409   KETONES Negative 01/22/2018 1409   NITRITE Negative 01/22/2018 1409   WBCURINE 0-2 04/06/2016 1445   RBCURINE 0-2 04/06/2016 1445   BACTERIA Rare (A) 04/06/2016 1445   EPITHELCELL Few 04/06/2016 1445        Imaging  DX-CHEST-PORTABLE (1 VIEW)   Final Result      Mild diffuse atelectasis/possible edema or pneumonitis.      DX-CHEST-PORTABLE (1 VIEW)    (Results Pending)        Assessment/Plan     I anticipate this patient will require at least two midnights for appropriate medical management, necessitating  inpatient admission.    Acute respiratory failure with hypoxia (CMS-HCC)- (present on admission)   Assessment & Plan    Continue at this point with oxygen support, RT protocol nebulizer treatments and monitor oxygen status.        CAP (community acquired pneumonia)- (present on admission)   Assessment & Plan    Patient this one started on Unasyn along with azithromycin for her community acquired pneumonia.  Continue discipline with RT protocol nebulizer treatments. Sputum culture and blood culture at this point are pending.        Influenza A- (present on admission)   Assessment & Plan    Patient dismissed one is started on Tamiflu.  Patient is started on fluid resuscitation.  Continue at this point with Tylenol for muscle aches.  Continue with supportive management.        Hyponatremia   Assessment & Plan    Give fluid supplementation her sodium was low at 134        Chronic kidney disease, stage III (moderate)- (present on admission)   Assessment & Plan    Monitor renal functions currently GFR is low at 44        Obstructive sleep apnea- (present on admission)   Assessment & Plan    Continue with nighttime BiPAP        History of stroke- (present on admission)   Assessment & Plan    Continue with aspirin monitor for neuro deficits seen        Essential hypertension- (present on admission)   Assessment & Plan    Continued blood pressure management optimization keep systolic blood pressure under 140 diastolic under 90 continue at this point utilizing Cardizem 120 mg twice daily, lisinopril 5 mg daily, metoprolol 50 mg twice daily, as needed  labetalol.        Hyperlipidemia- (present on admission)   Assessment & Plan    Continue a low-fat low-cholesterol diet and statin.        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        Normochromic normocytic anemia   Assessment & Plan    Monitor H&H if she drops O7 or 21 or becomes unstable transfuse.        Anxiety- (present on admission)   Assessment & Plan    Continue with anxiolytics            VTE prophylaxis: Sequentials .

## 2018-01-23 NOTE — PROGRESS NOTES
Transported patient from the ER up to the floor on a gurney with zoll monitor in place. Patient moved from gurney to bed with one person assist stand and pivot transfer. Patient assisted in repositioning for comfort in bed. Tele monitor applied. Vital signs assessed. 4L O2 via NC in place. Patient oriented to the room, bed, and unit. Fall safety and call bell use education provided. Patient verbalizes understanding and demonstrates appropriate call bell use. Bed is in the lowest position with side rails up x2. Bed alarm in place. Call bell within reach.

## 2018-01-23 NOTE — CARE PLAN
Problem: Knowledge Deficit  Goal: Knowledge of disease process/condition, treatment plan, diagnostic tests, and medications will improve  Outcome: PROGRESSING AS EXPECTED  Discussed plan of care with patient and family. Answered all questions regarding patient's treatment, disease process, and diet. Patient and family verbalized understanding and deny questions at this time.

## 2018-01-23 NOTE — DIETARY
NUTRITION SERVICES: BMI - Pt with BMI >40 (= 54.45). Weight loss counseling not appropriate in acute care setting.     RECOMMEND - Referral to outpatient nutrition services for weight management after D/C.

## 2018-01-23 NOTE — PROGRESS NOTES
2 nurse skin check:  Heels dry with flaky skin  Sacrum red blanching with evidence of old scarring  Abrasion with scabbing to the left knee  Fungal rash to the groin, abdominal folds, and underneath the left breast.   Pictures taken and uploaded.  Wound care consulted.

## 2018-01-23 NOTE — CARE PLAN
Problem: Communication  Goal: The ability to communicate needs accurately and effectively will improve  Discussed Plan of Care, daily medications with patient, reviewed diet, and activity.  Patient verbalized understanding    Problem: Safety  Goal: Will remain free from injury  Bed locked and in lowest position. Bed alarm on. Treaded socks. Call light and belongings with reach. Patient educated to call for assistance. Pt verbalized understanding. Hourly rounding in place.     Problem: Infection  Goal: Will remain free from infection  Assessed for sign and symptoms of infection. Educated pt on standard precautions, hand washing and bathroom hygiene. Assessed potential routes of infection: PIV, and current groin fungal infection. Pt is in contact precautions

## 2018-01-23 NOTE — PROGRESS NOTES
Patient is not complaining of any further distress, however she is continuing to require more and more O2 to maintain saturations greater than 90%. Dr. Washington notified and received orders for nebulizers, chest xray, and ABGs. Orders followed out. Will update MD with any critical results.

## 2018-01-23 NOTE — CARE PLAN
Problem: Respiratory:  Goal: Respiratory status will improve  Outcome: PROGRESSING SLOWER THAN EXPECTED  Patient is maintaining sats >90% on CPAP with 10L blended. Crackles and wheezing noted throughout. Continuous pulse ox in place. Lasix administered. Nebs started. Chest xray ordered. Will continue to monitor patient closely.

## 2018-01-23 NOTE — ASSESSMENT & PLAN NOTE
Continue at this point with oxygen support, RT protocol nebulizer treatments and monitor oxygen status.  o2 requirements improved, likely combination acute on chronic diastolic chf and CAP.  On 4L today, continue wheezing. Will get o2 with ambulation.   o2 improving. Almost resolved.

## 2018-01-23 NOTE — PROGRESS NOTES
Received call back from the hospitalist and updated him on patient status. Received orders for serial troponins and lasix. Orders followed out.

## 2018-01-23 NOTE — PROGRESS NOTES
Bedside report received. Patient A&O X 3, tele- SR/ST,  4L NC,  Voids- clifford, Activity- bedrest/ SOB, Diet- Regular. Complains of pain 0/10. POC discussed with patient. Pt verbalized understanding. Call light and belongings with in reach.  Bed locked and in lowest position, alarm and fall precautions in place.

## 2018-01-23 NOTE — ASSESSMENT & PLAN NOTE
Continued blood pressure management optimization keep systolic blood pressure under 140 diastolic under 90 continue at this point utilizing Cardizem 120 mg twice daily, lisinopril 5 mg daily, metoprolol 50 mg twice daily, as needed  Labetalol.  Continue monitoring.

## 2018-01-24 ENCOUNTER — APPOINTMENT (OUTPATIENT)
Dept: RADIOLOGY | Facility: MEDICAL CENTER | Age: 63
DRG: 193 | End: 2018-01-24
Attending: HOSPITALIST
Payer: MEDICAID

## 2018-01-24 LAB
ANION GAP SERPL CALC-SCNC: 8 MMOL/L (ref 0–11.9)
BACTERIA UR CULT: NORMAL
BASOPHILS # BLD AUTO: 0.3 % (ref 0–1.8)
BASOPHILS # BLD: 0.01 K/UL (ref 0–0.12)
BUN SERPL-MCNC: 19 MG/DL (ref 8–22)
CALCIUM SERPL-MCNC: 8.3 MG/DL (ref 8.5–10.5)
CHLORIDE SERPL-SCNC: 98 MMOL/L (ref 96–112)
CO2 SERPL-SCNC: 33 MMOL/L (ref 20–33)
CREAT SERPL-MCNC: 1.11 MG/DL (ref 0.5–1.4)
EOSINOPHIL # BLD AUTO: 0.01 K/UL (ref 0–0.51)
EOSINOPHIL NFR BLD: 0.3 % (ref 0–6.9)
ERYTHROCYTE [DISTWIDTH] IN BLOOD BY AUTOMATED COUNT: 54.7 FL (ref 35.9–50)
GLUCOSE BLD-MCNC: 163 MG/DL (ref 65–99)
GLUCOSE BLD-MCNC: 202 MG/DL (ref 65–99)
GLUCOSE BLD-MCNC: 296 MG/DL (ref 65–99)
GLUCOSE BLD-MCNC: 76 MG/DL (ref 65–99)
GLUCOSE SERPL-MCNC: 77 MG/DL (ref 65–99)
HCT VFR BLD AUTO: 35.3 % (ref 37–47)
HGB BLD-MCNC: 11.4 G/DL (ref 12–16)
IMM GRANULOCYTES # BLD AUTO: 0.02 K/UL (ref 0–0.11)
IMM GRANULOCYTES NFR BLD AUTO: 0.6 % (ref 0–0.9)
INR PPP: 1.77 (ref 0.87–1.13)
LV EJECT FRACT  99904: 65
LV EJECT FRACT MOD 2C 99903: 73.7
LV EJECT FRACT MOD 4C 99902: 73.38
LV EJECT FRACT MOD BP 99901: 73.69
LYMPHOCYTES # BLD AUTO: 0.81 K/UL (ref 1–4.8)
LYMPHOCYTES NFR BLD: 25.6 % (ref 22–41)
MCH RBC QN AUTO: 28.9 PG (ref 27–33)
MCHC RBC AUTO-ENTMCNC: 32.3 G/DL (ref 33.6–35)
MCV RBC AUTO: 89.6 FL (ref 81.4–97.8)
MONOCYTES # BLD AUTO: 0.54 K/UL (ref 0–0.85)
MONOCYTES NFR BLD AUTO: 17 % (ref 0–13.4)
NEUTROPHILS # BLD AUTO: 1.78 K/UL (ref 2–7.15)
NEUTROPHILS NFR BLD: 56.2 % (ref 44–72)
NRBC # BLD AUTO: 0 K/UL
NRBC BLD-RTO: 0 /100 WBC
PLATELET # BLD AUTO: 175 K/UL (ref 164–446)
PMV BLD AUTO: 10.8 FL (ref 9–12.9)
POTASSIUM SERPL-SCNC: 3.5 MMOL/L (ref 3.6–5.5)
PROCALCITONIN SERPL-MCNC: 0.1 NG/ML
PROCALCITONIN SERPL-MCNC: 0.13 NG/ML
PROTHROMBIN TIME: 20.3 SEC (ref 12–14.6)
RBC # BLD AUTO: 3.94 M/UL (ref 4.2–5.4)
SIGNIFICANT IND 70042: NORMAL
SITE SITE: NORMAL
SODIUM SERPL-SCNC: 139 MMOL/L (ref 135–145)
SOURCE SOURCE: NORMAL
WBC # BLD AUTO: 3.2 K/UL (ref 4.8–10.8)

## 2018-01-24 PROCEDURE — 700102 HCHG RX REV CODE 250 W/ 637 OVERRIDE(OP): Performed by: HOSPITALIST

## 2018-01-24 PROCEDURE — 84145 PROCALCITONIN (PCT): CPT

## 2018-01-24 PROCEDURE — 700105 HCHG RX REV CODE 258: Performed by: HOSPITALIST

## 2018-01-24 PROCEDURE — 94668 MNPJ CHEST WALL SBSQ: CPT

## 2018-01-24 PROCEDURE — 36415 COLL VENOUS BLD VENIPUNCTURE: CPT

## 2018-01-24 PROCEDURE — 770020 HCHG ROOM/CARE - TELE (206)

## 2018-01-24 PROCEDURE — A9270 NON-COVERED ITEM OR SERVICE: HCPCS | Performed by: HOSPITALIST

## 2018-01-24 PROCEDURE — 93306 TTE W/DOPPLER COMPLETE: CPT | Mod: 26 | Performed by: INTERNAL MEDICINE

## 2018-01-24 PROCEDURE — 71045 X-RAY EXAM CHEST 1 VIEW: CPT

## 2018-01-24 PROCEDURE — 82962 GLUCOSE BLOOD TEST: CPT | Mod: 91

## 2018-01-24 PROCEDURE — 80048 BASIC METABOLIC PNL TOTAL CA: CPT

## 2018-01-24 PROCEDURE — 94669 MECHANICAL CHEST WALL OSCILL: CPT

## 2018-01-24 PROCEDURE — 93306 TTE W/DOPPLER COMPLETE: CPT

## 2018-01-24 PROCEDURE — 99232 SBSQ HOSP IP/OBS MODERATE 35: CPT | Performed by: HOSPITALIST

## 2018-01-24 PROCEDURE — A6250 SKIN SEAL PROTECT MOISTURIZR: HCPCS | Performed by: HOSPITALIST

## 2018-01-24 PROCEDURE — 94640 AIRWAY INHALATION TREATMENT: CPT

## 2018-01-24 PROCEDURE — 85610 PROTHROMBIN TIME: CPT

## 2018-01-24 PROCEDURE — 700111 HCHG RX REV CODE 636 W/ 250 OVERRIDE (IP): Performed by: HOSPITALIST

## 2018-01-24 PROCEDURE — 85025 COMPLETE CBC W/AUTO DIFF WBC: CPT

## 2018-01-24 PROCEDURE — 700101 HCHG RX REV CODE 250: Performed by: INTERNAL MEDICINE

## 2018-01-24 PROCEDURE — 94760 N-INVAS EAR/PLS OXIMETRY 1: CPT

## 2018-01-24 RX ORDER — PREDNISONE 20 MG/1
40 TABLET ORAL DAILY
Status: COMPLETED | OUTPATIENT
Start: 2018-01-24 | End: 2018-01-28

## 2018-01-24 RX ORDER — WARFARIN SODIUM 7.5 MG/1
7.5 TABLET ORAL
Status: COMPLETED | OUTPATIENT
Start: 2018-01-24 | End: 2018-01-24

## 2018-01-24 RX ORDER — WARFARIN SODIUM 2.5 MG/1
2.5 TABLET ORAL
Status: DISCONTINUED | OUTPATIENT
Start: 2018-01-26 | End: 2018-01-28 | Stop reason: HOSPADM

## 2018-01-24 RX ORDER — WARFARIN SODIUM 5 MG/1
5 TABLET ORAL
Status: DISCONTINUED | OUTPATIENT
Start: 2018-01-25 | End: 2018-01-28 | Stop reason: HOSPADM

## 2018-01-24 RX ADMIN — LISINOPRIL 5 MG: 5 TABLET ORAL at 09:24

## 2018-01-24 RX ADMIN — IPRATROPIUM BROMIDE AND ALBUTEROL SULFATE 3 ML: .5; 3 SOLUTION RESPIRATORY (INHALATION) at 06:30

## 2018-01-24 RX ADMIN — OSELTAMIVIR PHOSPHATE 75 MG: 75 CAPSULE ORAL at 09:29

## 2018-01-24 RX ADMIN — CITALOPRAM HYDROBROMIDE 20 MG: 20 TABLET ORAL at 09:24

## 2018-01-24 RX ADMIN — METOPROLOL TARTRATE 50 MG: 50 TABLET, FILM COATED ORAL at 20:54

## 2018-01-24 RX ADMIN — PREDNISONE 40 MG: 20 TABLET ORAL at 12:27

## 2018-01-24 RX ADMIN — NYSTATIN 1500000 UNITS: 100000 POWDER TOPICAL at 09:25

## 2018-01-24 RX ADMIN — MAGNESIUM GLUCONATE 500 MG ORAL TABLET 400 MG: 500 TABLET ORAL at 09:24

## 2018-01-24 RX ADMIN — INSULIN HUMAN 3 UNITS: 100 INJECTION, SOLUTION PARENTERAL at 12:34

## 2018-01-24 RX ADMIN — PIOGLITAZONE 30 MG: 30 TABLET ORAL at 09:29

## 2018-01-24 RX ADMIN — INSULIN HUMAN 4 UNITS: 100 INJECTION, SOLUTION PARENTERAL at 17:46

## 2018-01-24 RX ADMIN — IPRATROPIUM BROMIDE AND ALBUTEROL SULFATE 3 ML: .5; 3 SOLUTION RESPIRATORY (INHALATION) at 04:02

## 2018-01-24 RX ADMIN — DILTIAZEM HYDROCHLORIDE 120 MG: 120 CAPSULE, COATED, EXTENDED RELEASE ORAL at 20:54

## 2018-01-24 RX ADMIN — DILTIAZEM HYDROCHLORIDE 120 MG: 120 CAPSULE, COATED, EXTENDED RELEASE ORAL at 09:24

## 2018-01-24 RX ADMIN — OSELTAMIVIR PHOSPHATE 75 MG: 75 CAPSULE ORAL at 20:55

## 2018-01-24 RX ADMIN — WARFARIN SODIUM 7.5 MG: 7.5 TABLET ORAL at 17:38

## 2018-01-24 RX ADMIN — IPRATROPIUM BROMIDE AND ALBUTEROL SULFATE 3 ML: .5; 3 SOLUTION RESPIRATORY (INHALATION) at 23:39

## 2018-01-24 RX ADMIN — INSULIN HUMAN 7 UNITS: 100 INJECTION, SOLUTION PARENTERAL at 20:55

## 2018-01-24 RX ADMIN — IPRATROPIUM BROMIDE AND ALBUTEROL SULFATE 3 ML: .5; 3 SOLUTION RESPIRATORY (INHALATION) at 10:07

## 2018-01-24 RX ADMIN — METOPROLOL TARTRATE 50 MG: 50 TABLET, FILM COATED ORAL at 09:24

## 2018-01-24 RX ADMIN — AZITHROMYCIN 250 MG: 250 TABLET, FILM COATED ORAL at 09:25

## 2018-01-24 RX ADMIN — FUROSEMIDE 40 MG: 40 TABLET ORAL at 06:37

## 2018-01-24 RX ADMIN — AMPICILLIN SODIUM AND SULBACTAM SODIUM: 100; 50 INJECTION, POWDER, FOR SOLUTION INTRAVENOUS at 17:38

## 2018-01-24 RX ADMIN — INSULIN GLARGINE 40 UNITS: 100 INJECTION, SOLUTION SUBCUTANEOUS at 20:55

## 2018-01-24 RX ADMIN — NYSTATIN 1500000 UNITS: 100000 POWDER TOPICAL at 21:00

## 2018-01-24 RX ADMIN — FUROSEMIDE 40 MG: 40 TABLET ORAL at 17:38

## 2018-01-24 RX ADMIN — SIMVASTATIN 20 MG: 20 TABLET, FILM COATED ORAL at 20:55

## 2018-01-24 RX ADMIN — VITAMIN D, TAB 1000IU (100/BT) 5000 UNITS: 25 TAB at 11:27

## 2018-01-24 ASSESSMENT — ENCOUNTER SYMPTOMS
FEVER: 0
BRUISES/BLEEDS EASILY: 0
CHILLS: 0
NECK PAIN: 0
PHOTOPHOBIA: 0
MYALGIAS: 0
HEMOPTYSIS: 0
HEARTBURN: 0
COUGH: 0
DIZZINESS: 0
DOUBLE VISION: 0
ORTHOPNEA: 0
DEPRESSION: 0

## 2018-01-24 ASSESSMENT — COGNITIVE AND FUNCTIONAL STATUS - GENERAL
DRESSING REGULAR UPPER BODY CLOTHING: A LITTLE
MOVING TO AND FROM BED TO CHAIR: UNABLE
DRESSING REGULAR LOWER BODY CLOTHING: A LITTLE
HELP NEEDED FOR BATHING: A LOT
WALKING IN HOSPITAL ROOM: A LITTLE
CLIMB 3 TO 5 STEPS WITH RAILING: A LOT
TURNING FROM BACK TO SIDE WHILE IN FLAT BAD: A LITTLE
SUGGESTED CMS G CODE MODIFIER MOBILITY: CL
STANDING UP FROM CHAIR USING ARMS: A LITTLE
MOBILITY SCORE: 13
TOILETING: A LITTLE
SUGGESTED CMS G CODE MODIFIER DAILY ACTIVITY: CK
DAILY ACTIVITIY SCORE: 19
MOVING FROM LYING ON BACK TO SITTING ON SIDE OF FLAT BED: UNABLE

## 2018-01-24 ASSESSMENT — COPD QUESTIONNAIRES
DO YOU EVER COUGH UP ANY MUCUS OR PHLEGM?: YES, A FEW DAYS A WEEK OR MONTH
HAVE YOU SMOKED AT LEAST 100 CIGARETTES IN YOUR ENTIRE LIFE: YES
COPD SCREENING SCORE: 7
DURING THE PAST 4 WEEKS HOW MUCH DID YOU FEEL SHORT OF BREATH: SOME OF THE TIME

## 2018-01-24 ASSESSMENT — PATIENT HEALTH QUESTIONNAIRE - PHQ9
1. LITTLE INTEREST OR PLEASURE IN DOING THINGS: NOT AT ALL
SUM OF ALL RESPONSES TO PHQ QUESTIONS 1-9: 0
2. FEELING DOWN, DEPRESSED, IRRITABLE, OR HOPELESS: NOT AT ALL
SUM OF ALL RESPONSES TO PHQ9 QUESTIONS 1 AND 2: 0

## 2018-01-24 ASSESSMENT — PAIN SCALES - GENERAL
PAINLEVEL_OUTOF10: 0

## 2018-01-24 NOTE — PROGRESS NOTES
[facility] Hospitalist Progress Note    Date of Service: 2018    Chief Complaint  62 y.o. female admitted 2018 with respiratory failure chf and pneumonia probably copd exacerbation.     Interval Problem Update  Improved, feels better, no fever, no new complains.    Consultants/Specialty  none    Disposition  Tbd.         Review of Systems   Constitutional: Negative for chills and weight loss.   HENT: Negative for ear pain.    Eyes: Negative for double vision and photophobia.   Respiratory: Negative for cough and hemoptysis.    Cardiovascular: Negative for chest pain and palpitations.   Gastrointestinal: Negative for heartburn and nausea.   Genitourinary: Negative for dysuria and urgency.   Musculoskeletal: Negative for myalgias and neck pain.   Skin: Negative for itching.   Neurological: Negative for dizziness.   Endo/Heme/Allergies: Does not bruise/bleed easily.   Psychiatric/Behavioral: Negative for depression.      Physical Exam  Laboratory/Imaging   Hemodynamics  Temp (24hrs), Av.6 °C (97.8 °F), Min:36.2 °C (97.1 °F), Max:36.9 °C (98.4 °F)   Temperature: 36.9 °C (98.4 °F)  Pulse  Av.4  Min: 82  Max: 102 Heart Rate (Monitored): 77  Blood Pressure: 153/81, NIBP: 138/90      Respiratory      Respiration: (!) 23, Pulse Oximetry: 95 %, O2 Daily Delivery Respiratory : Silicone Nasal Cannula     Given By:: Mouthpiece, PEP/CPT Method: Positive Airway Pressure Device, Work Of Breathing / Effort: Moderate  RUL Breath Sounds: Expiratory Wheezes, RML Breath Sounds: Expiratory Wheezes, RLL Breath Sounds: Diminished, CHRIS Breath Sounds: Expiratory Wheezes, LLL Breath Sounds: Diminished    Fluids    Intake/Output Summary (Last 24 hours) at 18 1603  Last data filed at 18 1300   Gross per 24 hour   Intake              720 ml   Output             5170 ml   Net            -4450 ml       Nutrition  Orders Placed This Encounter   Procedures   • Diet Order     Standing Status:   Standing     Number of  Occurrences:   1     Order Specific Question:   Diet:     Answer:   Regular [1]     Physical Exam   Constitutional: She is oriented to person, place, and time. No distress.   HENT:   Mouth/Throat: No oropharyngeal exudate.   Eyes: Conjunctivae are normal. No scleral icterus.   Neck: Neck supple. No JVD present.   Cardiovascular: Normal rate, regular rhythm and normal heart sounds.    Pulmonary/Chest: Effort normal. No stridor. No respiratory distress. She has wheezes. She has rales.   Abdominal: Soft. Bowel sounds are normal. She exhibits no distension. There is no tenderness.   Musculoskeletal: Normal range of motion. She exhibits no tenderness.   Lymphadenopathy:     She has no cervical adenopathy.   Neurological: She is alert and oriented to person, place, and time. She exhibits normal muscle tone.   Skin: No erythema.   Psychiatric: She has a normal mood and affect.   Nursing note and vitals reviewed.      Recent Labs      01/22/18   1409  01/22/18   2345   WBC  6.8  6.0   RBC  3.92*  4.01*   HEMOGLOBIN  11.4*  11.5*   HEMATOCRIT  35.5*  36.3*   MCV  90.6  90.5   MCH  29.1  28.7   MCHC  32.1*  31.7*   RDW  55.3*  56.2*   PLATELETCT  196  176   MPV  11.0  10.8     Recent Labs      01/22/18   1409  01/22/18   2345   SODIUM  134*  134*   POTASSIUM  4.3  3.9   CHLORIDE  100  100   CO2  27  27   GLUCOSE  230*  139*   BUN  13  13   CREATININE  1.22  1.05   CALCIUM  9.1  8.4*     Recent Labs      01/22/18   1409  01/22/18   2345   INR  1.69*  1.94*     Recent Labs      01/22/18   1409  01/23/18   0918   BNPBTYPENAT  1012*  523*     Recent Labs      01/22/18   2345   TRIGLYCERIDE  63   HDL  32*   LDL  51          Assessment/Plan     Acute respiratory failure with hypoxia (CMS-HCC)- (present on admission)   Assessment & Plan    Continue at this point with oxygen support, RT protocol nebulizer treatments and monitor oxygen status.  o2 requirements improved, likely combination acute on chronic diastolic chf and CAP.          CAP (community acquired pneumonia)- (present on admission)   Assessment & Plan    Patient this one started on Unasyn along with azithromycin for her community acquired pneumonia.  Continue discipline with RT protocol nebulizer treatments. Sputum culture and blood culture at this point are pending.  Continue iv atb for now.         Influenza A- (present on admission)   Assessment & Plan    Patient dismissed one is started on Tamiflu.  Patient is started on fluid resuscitation.  Continue at this point with Tylenol for muscle aches.  Continue with supportive management.  Stable.         Hyponatremia   Assessment & Plan    Give fluid supplementation her sodium was low at 134  Stable.         Chronic kidney disease, stage III (moderate)- (present on admission)   Assessment & Plan    Stable.         Obstructive sleep apnea- (present on admission)   Assessment & Plan    Continue with nighttime BiPAP        History of stroke- (present on admission)   Assessment & Plan    Continue with aspirin.         Essential hypertension- (present on admission)   Assessment & Plan    Continued blood pressure management optimization keep systolic blood pressure under 140 diastolic under 90 continue at this point utilizing Cardizem 120 mg twice daily, lisinopril 5 mg daily, metoprolol 50 mg twice daily, as needed  Labetalol.  Continue monitoring.         Hyperlipidemia- (present on admission)   Assessment & Plan    Continue a low-fat low-cholesterol diet and statin.        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         Obesity- (present on admission)   Assessment & Plan    She will need outpatient weight loss management with bariatric evaluation.        Acute on chronic diastolic heart failure (CMS-HCC)- (present on admission)   Assessment & Plan    bnp >1000 on  admission received lasix had good urine output neg balance 3.4L her o2 requirements improved.         Chronic anticoagulation- (present on admission)   Assessment & Plan    On warfarin per pharmacy.         Normochromic normocytic anemia   Assessment & Plan    Stable keep monitoring.         Anxiety- (present on admission)   Assessment & Plan    Continue with anxiolytics            Reviewed items::  EKG reviewed, Labs reviewed, Medications reviewed and Radiology images reviewed  DVT prophylaxis - mechanical:  SCDs

## 2018-01-24 NOTE — DISCHARGE PLANNING
Transitional Care Navigator:    Per chart review, therapy is recommending home with home health pending progress while in hospital.   Please consider home health order for post acute needs.     TCN/SW to discuss d/c plan with pt.

## 2018-01-24 NOTE — PROGRESS NOTES
Pt awake, sitting in recliner.  Denies any pain.  Assisted to bathroom, appeared SOB with activity but denies being SOB.  O2 at 4L [place].  IS encouraged.    Bed alarm in use, hourly rounding, treaded slipper socks with activity, upper side rails elevated, bed in low position, call light in reach.  Pt able to demonstrate how to use call light.  Continue to wait for sputum sample

## 2018-01-24 NOTE — PROGRESS NOTES
Pt requesting that pitcher of water be filled. Dr. Fonseca aware. Pt not on fluid restriction per Dr. Hendricks, but strict Intake and output as pt on lasix. Pt informed.

## 2018-01-24 NOTE — PROGRESS NOTES
[facility] Hospitalist Progress Note    Date of Service: 2018    Chief Complaint  62 y.o. female admitted 2018 with respiratory failure chf and pneumonia probably copd exacerbation.     Interval Problem Update  Better, on 3L of o2, she is wheezing today will add po prednisone, no new complains. No fever or chills, continue using IS.     Consultants/Specialty  none    Disposition  Tbd.         Review of Systems   Constitutional: Negative for chills and fever.   HENT: Negative for ear pain.    Eyes: Negative for double vision and photophobia.   Respiratory: Negative for cough and hemoptysis.    Cardiovascular: Negative for chest pain and orthopnea.   Gastrointestinal: Negative for heartburn.   Genitourinary: Negative for dysuria and urgency.   Musculoskeletal: Negative for myalgias and neck pain.   Skin: Negative for itching.   Neurological: Negative for dizziness.   Endo/Heme/Allergies: Does not bruise/bleed easily.   Psychiatric/Behavioral: Negative for depression.      Physical Exam  Laboratory/Imaging   Hemodynamics  Temp (24hrs), Av.6 °C (97.8 °F), Min:36.4 °C (97.5 °F), Max:37.1 °C (98.7 °F)   Temperature: 36.4 °C (97.6 °F)  Pulse  Av.4  Min: 54  Max: 102    Blood Pressure: 100/66      Respiratory      Respiration: 20, Pulse Oximetry: 95 %, O2 Daily Delivery Respiratory : Silicone Nasal Cannula     Given By:: Mouthpiece, PEP/CPT Method: Positive Airway Pressure Device, Work Of Breathing / Effort: Mild  RUL Breath Sounds: Expiratory Wheezes, RML Breath Sounds: Expiratory Wheezes, RLL Breath Sounds: Diminished, CHRIS Breath Sounds: Expiratory Wheezes, LLL Breath Sounds: Diminished    Fluids    Intake/Output Summary (Last 24 hours) at 18 1532  Last data filed at 18 1424   Gross per 24 hour   Intake             2560 ml   Output             3150 ml   Net             -590 ml       Nutrition  Orders Placed This Encounter   Procedures   • Diet Order     Standing Status:   Standing     Number of  Occurrences:   1     Order Specific Question:   Diet:     Answer:   Regular [1]     Physical Exam   Constitutional: She is oriented to person, place, and time. No distress.   Neck: Neck supple.   Cardiovascular: Normal rate, regular rhythm and normal heart sounds.    Pulmonary/Chest: Effort normal. No respiratory distress. She has wheezes (better. ). She has rales.   Abdominal: Soft. Bowel sounds are normal. She exhibits no distension. There is no tenderness.   Musculoskeletal: Normal range of motion. She exhibits no tenderness.   Neurological: She is alert and oriented to person, place, and time. She exhibits normal muscle tone.   Psychiatric: She has a normal mood and affect.   Nursing note and vitals reviewed.      Recent Labs      01/22/18   1409 01/22/18 2345 01/24/18 0229   WBC  6.8  6.0  3.2*   RBC  3.92*  4.01*  3.94*   HEMOGLOBIN  11.4*  11.5*  11.4*   HEMATOCRIT  35.5*  36.3*  35.3*   MCV  90.6  90.5  89.6   MCH  29.1  28.7  28.9   MCHC  32.1*  31.7*  32.3*   RDW  55.3*  56.2*  54.7*   PLATELETCT  196  176  175   MPV  11.0  10.8  10.8     Recent Labs      01/22/18   1409 01/22/18 2345 01/24/18 0229   SODIUM  134*  134*  139   POTASSIUM  4.3  3.9  3.5*   CHLORIDE  100  100  98   CO2  27  27  33   GLUCOSE  230*  139*  77   BUN  13  13  19   CREATININE  1.22  1.05  1.11   CALCIUM  9.1  8.4*  8.3*     Recent Labs      01/22/18   1409  01/22/18   2345 01/24/18 0229   INR  1.69*  1.94*  1.77*     Recent Labs      01/22/18   1409  01/23/18   0918   BNPBTYPENAT  1012*  523*     Recent Labs      01/22/18 2345   TRIGLYCERIDE  63   HDL  32*   LDL  51          Assessment/Plan     Acute respiratory failure with hypoxia (CMS-HCC)- (present on admission)   Assessment & Plan    Continue at this point with oxygen support, RT protocol nebulizer treatments and monitor oxygen status.  o2 requirements improved, likely combination acute on chronic diastolic chf and CAP.  On 3L of o2 today.          CAP  (community acquired pneumonia)- (present on admission)   Assessment & Plan    Patient this one started on Unasyn along with azithromycin for her community acquired pneumonia.  Continue discipline with RT protocol nebulizer treatments. Sputum culture and blood culture at this point are pending.  Continue iv atb for now.   o2 requirements improved.         Influenza A- (present on admission)   Assessment & Plan    Patient dismissed one is started on Tamiflu.  Patient is started on fluid resuscitation.  Continue at this point with Tylenol for muscle aches.  Continue with supportive management.  Stable, continue supportive treatment.         Hyponatremia   Assessment & Plan    Give fluid supplementation her sodium was low at 134  Stable.   Resolved.         Chronic kidney disease, stage III (moderate)- (present on admission)   Assessment & Plan    Stable.         Obstructive sleep apnea- (present on admission)   Assessment & Plan    Continue with nighttime BiPAP        History of stroke- (present on admission)   Assessment & Plan    Continue with aspirin.         Essential hypertension- (present on admission)   Assessment & Plan    Continued blood pressure management optimization keep systolic blood pressure under 140 diastolic under 90 continue at this point utilizing Cardizem 120 mg twice daily, lisinopril 5 mg daily, metoprolol 50 mg twice daily, as needed  Labetalol.  Continue monitoring.         Hyperlipidemia- (present on admission)   Assessment & Plan    Continue a low-fat low-cholesterol diet and statin.        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         Class 3 obesity in adult- (present on admission)   Assessment & Plan    She will need outpatient weight loss management with bariatric evaluation.  Body mass index is 53.28 kg/m². morbid  obesity.         Acute on chronic diastolic heart failure (CMS-HCC)- (present on admission)   Assessment & Plan    bnp >1000 on admission received lasix had good urine output neg balance 3.4L her o2 requirements improved.   Better today, on 3L of o2.         Chronic anticoagulation- (present on admission)   Assessment & Plan    On warfarin per pharmacy.         Normochromic normocytic anemia   Assessment & Plan    Stable keep monitoring.         Anxiety- (present on admission)   Assessment & Plan    Continue with anxiolytics            Reviewed items::  Labs reviewed, Medications reviewed and Radiology images reviewed  DVT prophylaxis pharmacological::  Warfarin (Coumadin)  DVT prophylaxis - mechanical:  SCDs

## 2018-01-24 NOTE — HEART FAILURE PROGRAM
Cardiovascular Nurse Navigator () Progress Note:      Chief Complaint: Flu like symptoms, pulmonary edema    Please note that patient has a possible diagnosis of HF. Education should be deferred until this diagnosis is confirmed and an MD has discussed it with the patient.    BNP 1012, 523   March of 2016 echocardiogram: EF 65% diastolic difficult, mod MS, RVSP at least 35. Repeat echo ordered, not completed.  Five doses of IV diuresis given  Patient follows with Dr. Sampson in clinic, he has not previously diagnosed HF.      Thank you, please call with questions.

## 2018-01-24 NOTE — PROGRESS NOTES
No changes in pt status, AOX4,  4L [place], tele/HR - 83, voids- bathroom, tolerates regular diet.  0/10 Pain. Reviewed plan of care, continue to monitor vitals, fungal rash and manage pain.

## 2018-01-24 NOTE — PROGRESS NOTES
Inpatient Anticoagulation Service Note    Date: 1/24/2018  Reason for Anticoagulation: Atrial Fibrillation   [id number] VASc Score: 5    Hemoglobin Value: (!) 11.4  Hematocrit Value: (!) 35.3  Lab Platelet Value: 175  Target INR: 2.0 to 3.0    INR from last 7 days     Date/Time INR Value    01/24/18 0229 (!)  1.77    01/22/18 2345 (!)  1.94    01/22/18 1409 (!)  1.69        Dose from last 7 days     Date/Time Dose (mg)    01/24/18 1400  7.5    01/23/18 1600  5    01/22/18 2200  5        Average Dose (mg):  (Home dose: 2.5 mg MWF, 5 mg AOD)  Significant Interactions: Antibiotics, Statin, Other (Comments) (SSRI)  Bridge Therapy: No     HPI: 61 yo female admitted 1/22/18 for influenza and pneumonia is chronically anticoagulated outpatient for h/o Afib ([id number] VASc 5, low/mod risk). Warfarin is managed outpatient by the [facility] and per records, patient is most recently prescribed Warfarin 2.5mg po every Mon, Wed, Fri and Warfarin 5mg po daily. Patient previously prescribed Warfarin 2.5mg po every Mon, Wed and Warfarin 5mg po on all other days. INRs appearing moderately labile.      Assessment: INR remains SUBtherapeutic with trend downward away from goal range, 1.77 from 1.94, following resumption of home dosing regimen. Patient has received warfarin 5mg po x 2 does thus far inpatient.     Potential drug-drug interactions identified with acute inpatient medications: Antibiotics    Potential drug-drug interactions identified with chronic home medications: Simvastatin and Celexa which are established interactions.   Diet: Regular      Plan:  Give warfarin bolus dose of 7.5mg x one tonight, then resume usual home dosing regimen. May need additional bolus doses. Continue INR monitoring daily until re-stabilized on warfarin regimen.      Education Material Provided?: No (Chronic warfarin patient)     Pharmacist suggested discharge dosing: Warfarin 2.5mg po every Mon, Wed, Fri and Warfarin 5mg po on all other days      Mabel SALAS  Brady, PharmD

## 2018-01-24 NOTE — CARE PLAN
Problem: Safety  Goal: Will remain free from injury  Outcome: PROGRESSING AS EXPECTED    Intervention: Provide assistance with mobility   01/23/18 2030   OTHER   Assistance / Tolerance Standby Assist     Intervention: Collaborate with Interdisciplinary Team for safe transfer and mobilization techniques   01/24/18 0016   OTHER   Assistive Devices Hand held assist;Rails;Walker - front wheel       Goal: Will remain free from falls  Outcome: PROGRESSING AS EXPECTED    Intervention: Assess risk factors for falls   01/23/18 2030   OTHER   Fall Risk High Risk to Fall - 2 or more points    Risk for Injury-Any positive answers results in the pt being at high risk for fall related injury Not Applicable   History of fall 2   Date of Last Fall 01/18/18   Pt Calls for Assistance Yes     Intervention: Implement fall precautions   01/24/18 0016   OTHER   Environmental Precautions Treaded Slipper Socks on Patient;Personal Belongings, Wastebasket, Call Bell etc. in Easy Reach;Transferred to Stronger Side;Bed in Low Position;Report Given to Other Health Care Providers Regarding Fall Risk   IV Pole on Same Side of Bed as Bathroom Yes   Bedrails Alternative to Bedrails Used   Chair/Bed Strip Alarm Yes - Alarm On         Problem: Respiratory:  Goal: Respiratory status will improve  Outcome: PROGRESSING AS EXPECTED    Intervention: Assess and monitor pulmonary status   01/23/18 2030 01/23/18 2223   OTHER   O2 (LPM) --  4   Work Of Breathing / Effort --  Moderate   RUL Breath Sounds --  Expiratory Wheezes   RML Breath Sounds --  Expiratory Wheezes   RLL Breath Sounds --  Expiratory Wheezes   CHRIS Breath Sounds --  Expiratory Wheezes   LLL Breath Sounds --  Expiratory Wheezes   Vitals   Respiration --  20   Pulse Oximetry --  96 %   Respiratory   Cough Dry;Strong  (coarse) --      Intervention: Administer and titrate oxygen therapy   01/23/18 2223   OTHER   O2 (LPM) 4     Intervention: Educate and encourage coughing and deep  breathing  Coarse non productive cough  Intervention: Educate and encourage incentive spirometry usage  Encouraged to use IS every hour while awake for 10 breaths

## 2018-01-24 NOTE — ASSESSMENT & PLAN NOTE
bnp >1000 on admission received lasix had good urine output neg balance 3.4L her o2 requirements improved.   Better today, on 3L of o2.   Pulmonary edema due to fluid overload due to valvular disease. No HF.   o2 requirements better 1.5L.  0.5L today

## 2018-01-24 NOTE — PROGRESS NOTES
Inpatient Anticoagulation Service Note    Date: 1/23/2018  Reason for Anticoagulation: Atrial Fibrillation   [id number] VASc Score: 5    Hemoglobin Value: (!) 11.5  Hematocrit Value: (!) 36.3  Lab Platelet Value: 176  Target INR: 2.0 to 3.0    INR from last 7 days     Date/Time INR Value    01/22/18 2345 (!)  1.94    01/22/18 1409 (!)  1.69        Dose from last 7 days     Date/Time Dose (mg)    01/23/18 1600  5    01/22/18 2200  5        Average Dose (mg):  (Home dose: 2.5 mg MWF, 5 mg AOD)  Significant Interactions: Antibiotics, Statin, Other (Comments) (SSRI)  Bridge Therapy: No     HPI: 61 yo female admitted 1/22/18 for influenza and pneumonia is chronically anticoagulated outpatient for h/o Afib ([id number] VASc 5, low/mod risk). Warfarin is managed outpatient by the [facility] and per records, patient is most recently prescribed Warfarin 2.5mg po every Mon, Wed, Fri and Warfarin 5mg po daily. Patient previously prescribed Warfarin 2.5mg po every Mon, Wed and Warfarin 5mg po on all other days. INRs appearing moderately labile.     Assessment: INR remains slightly SUBtherapeutic with trend upward in positive direction almost to goal.   Potential drug-drug interactions identified with acute inpatient medications: Antibiotics    Potential drug-drug interactions identified with chronic home medications: Simvastatin and Celexa which are established interactions.   Diet: Regular     Plan:  Resume Warfarin 2.5mg po every Mon, Wed, Fri and Warfarin 5mg po on all other days and continue INR monitoring until re-stabilized on warfarin regimen.     Education Material Provided?: No (Chronic warfarin patient)    Pharmacist suggested discharge dosing: Warfarin 2.5mg po every Mon, Wed, Fri and Warfarin 5mg po on all other days     Mabel Abreu, PharmD

## 2018-01-25 LAB
GLUCOSE BLD-MCNC: 128 MG/DL (ref 65–99)
GLUCOSE BLD-MCNC: 204 MG/DL (ref 65–99)
GLUCOSE BLD-MCNC: 275 MG/DL (ref 65–99)
GLUCOSE BLD-MCNC: 328 MG/DL (ref 65–99)
GRAM STN SPEC: NORMAL
INR PPP: 1.95 (ref 0.87–1.13)
PROTHROMBIN TIME: 21.9 SEC (ref 12–14.6)
SIGNIFICANT IND 70042: NORMAL
SITE SITE: NORMAL
SOURCE SOURCE: NORMAL

## 2018-01-25 PROCEDURE — 94640 AIRWAY INHALATION TREATMENT: CPT

## 2018-01-25 PROCEDURE — 94660 CPAP INITIATION&MGMT: CPT

## 2018-01-25 PROCEDURE — 700102 HCHG RX REV CODE 250 W/ 637 OVERRIDE(OP): Performed by: HOSPITALIST

## 2018-01-25 PROCEDURE — 94760 N-INVAS EAR/PLS OXIMETRY 1: CPT

## 2018-01-25 PROCEDURE — 36415 COLL VENOUS BLD VENIPUNCTURE: CPT

## 2018-01-25 PROCEDURE — A9270 NON-COVERED ITEM OR SERVICE: HCPCS | Performed by: HOSPITALIST

## 2018-01-25 PROCEDURE — 700111 HCHG RX REV CODE 636 W/ 250 OVERRIDE (IP): Performed by: HOSPITALIST

## 2018-01-25 PROCEDURE — 700101 HCHG RX REV CODE 250: Performed by: INTERNAL MEDICINE

## 2018-01-25 PROCEDURE — 85610 PROTHROMBIN TIME: CPT

## 2018-01-25 PROCEDURE — 99232 SBSQ HOSP IP/OBS MODERATE 35: CPT | Performed by: HOSPITALIST

## 2018-01-25 PROCEDURE — 94668 MNPJ CHEST WALL SBSQ: CPT

## 2018-01-25 PROCEDURE — 770020 HCHG ROOM/CARE - TELE (206)

## 2018-01-25 PROCEDURE — 82962 GLUCOSE BLOOD TEST: CPT | Mod: 91

## 2018-01-25 RX ORDER — AMOXICILLIN AND CLAVULANATE POTASSIUM 875; 125 MG/1; MG/1
1 TABLET, FILM COATED ORAL EVERY 12 HOURS
Status: DISCONTINUED | OUTPATIENT
Start: 2018-01-25 | End: 2018-01-28 | Stop reason: HOSPADM

## 2018-01-25 RX ADMIN — CITALOPRAM HYDROBROMIDE 20 MG: 20 TABLET ORAL at 08:51

## 2018-01-25 RX ADMIN — INSULIN HUMAN 7 UNITS: 100 INJECTION, SOLUTION PARENTERAL at 17:11

## 2018-01-25 RX ADMIN — PREDNISONE 40 MG: 20 TABLET ORAL at 08:52

## 2018-01-25 RX ADMIN — LISINOPRIL 5 MG: 5 TABLET ORAL at 08:51

## 2018-01-25 RX ADMIN — METOPROLOL TARTRATE 50 MG: 50 TABLET, FILM COATED ORAL at 20:09

## 2018-01-25 RX ADMIN — METOPROLOL TARTRATE 50 MG: 50 TABLET, FILM COATED ORAL at 08:52

## 2018-01-25 RX ADMIN — INSULIN HUMAN 10 UNITS: 100 INJECTION, SOLUTION PARENTERAL at 22:31

## 2018-01-25 RX ADMIN — AMOXICILLIN AND CLAVULANATE POTASSIUM 1 TABLET: 875; 125 TABLET, FILM COATED ORAL at 20:08

## 2018-01-25 RX ADMIN — NYSTATIN 1500000 UNITS: 100000 POWDER TOPICAL at 20:09

## 2018-01-25 RX ADMIN — NYSTATIN 1500000 UNITS: 100000 POWDER TOPICAL at 09:08

## 2018-01-25 RX ADMIN — INSULIN HUMAN 4 UNITS: 100 INJECTION, SOLUTION PARENTERAL at 11:46

## 2018-01-25 RX ADMIN — WARFARIN SODIUM 5 MG: 5 TABLET ORAL at 17:09

## 2018-01-25 RX ADMIN — IPRATROPIUM BROMIDE AND ALBUTEROL SULFATE 3 ML: .5; 3 SOLUTION RESPIRATORY (INHALATION) at 14:14

## 2018-01-25 RX ADMIN — FUROSEMIDE 40 MG: 40 TABLET ORAL at 15:23

## 2018-01-25 RX ADMIN — IPRATROPIUM BROMIDE AND ALBUTEROL SULFATE 3 ML: .5; 3 SOLUTION RESPIRATORY (INHALATION) at 10:10

## 2018-01-25 RX ADMIN — INSULIN GLARGINE 40 UNITS: 100 INJECTION, SOLUTION SUBCUTANEOUS at 22:31

## 2018-01-25 RX ADMIN — OSELTAMIVIR PHOSPHATE 75 MG: 75 CAPSULE ORAL at 08:51

## 2018-01-25 RX ADMIN — MAGNESIUM GLUCONATE 500 MG ORAL TABLET 400 MG: 500 TABLET ORAL at 08:52

## 2018-01-25 RX ADMIN — FUROSEMIDE 40 MG: 40 TABLET ORAL at 06:15

## 2018-01-25 RX ADMIN — AZITHROMYCIN 250 MG: 250 TABLET, FILM COATED ORAL at 08:51

## 2018-01-25 RX ADMIN — IPRATROPIUM BROMIDE AND ALBUTEROL SULFATE 3 ML: .5; 3 SOLUTION RESPIRATORY (INHALATION) at 22:29

## 2018-01-25 RX ADMIN — OSELTAMIVIR PHOSPHATE 75 MG: 75 CAPSULE ORAL at 20:08

## 2018-01-25 RX ADMIN — AMOXICILLIN AND CLAVULANATE POTASSIUM 1 TABLET: 875; 125 TABLET, FILM COATED ORAL at 11:45

## 2018-01-25 RX ADMIN — DILTIAZEM HYDROCHLORIDE 120 MG: 120 CAPSULE, COATED, EXTENDED RELEASE ORAL at 20:09

## 2018-01-25 RX ADMIN — IPRATROPIUM BROMIDE AND ALBUTEROL SULFATE 3 ML: .5; 3 SOLUTION RESPIRATORY (INHALATION) at 06:42

## 2018-01-25 RX ADMIN — DILTIAZEM HYDROCHLORIDE 120 MG: 120 CAPSULE, COATED, EXTENDED RELEASE ORAL at 08:51

## 2018-01-25 RX ADMIN — VITAMIN D, TAB 1000IU (100/BT) 5000 UNITS: 25 TAB at 08:53

## 2018-01-25 RX ADMIN — PIOGLITAZONE 30 MG: 30 TABLET ORAL at 08:53

## 2018-01-25 RX ADMIN — SIMVASTATIN 20 MG: 20 TABLET, FILM COATED ORAL at 20:09

## 2018-01-25 ASSESSMENT — ENCOUNTER SYMPTOMS
HEMOPTYSIS: 0
DEPRESSION: 0
COUGH: 0
DOUBLE VISION: 0
MYALGIAS: 0
PALPITATIONS: 0
BRUISES/BLEEDS EASILY: 0
HEARTBURN: 0
FEVER: 0
BLURRED VISION: 0
DIZZINESS: 0

## 2018-01-25 ASSESSMENT — PAIN SCALES - GENERAL
PAINLEVEL_OUTOF10: 0

## 2018-01-25 ASSESSMENT — COPD QUESTIONNAIRES
COPD SCREENING SCORE: 5
DO YOU EVER COUGH UP ANY MUCUS OR PHLEGM?: YES, A FEW DAYS A WEEK OR MONTH
HAVE YOU SMOKED AT LEAST 100 CIGARETTES IN YOUR ENTIRE LIFE: NO/DON'T KNOW
DURING THE PAST 4 WEEKS HOW MUCH DID YOU FEEL SHORT OF BREATH: SOME OF THE TIME

## 2018-01-25 ASSESSMENT — LIFESTYLE VARIABLES: EVER_SMOKED: NEVER

## 2018-01-25 NOTE — FACE TO FACE
Face to Face Supporting Documentation - Home Health    The encounter with this patient was in whole or in part the primary reason for home health admission.    Date of encounter:   Patient:                    MRN:                       YOB: 2020  Shani Love  [id number]  1955     Home health to see patient for:  Skilled Nursing care for assessment, interventions & education    Skilled need for:  Comment: needs to continue PT    Skilled nursing interventions to include:  Comment: needs to continue PT.     Homebound status evidenced by:  Needs the assistance of another person in order to leave the home. Leaving home requires a considerable and taxing effort. There is a normal inability to leave the home.    Community Physician to provide follow up care: Estela Gunderson P.A.-C.     Optional Interventions? No      I certify the face to face encounter for this home health care referral meets the CMS requirements and the encounter/clinical assessment with the patient was, in whole, or in part, for the medical condition(s) listed above, which is the primary reason for home health care. Based on my clinical findings: the service(s) are medically necessary, support the need for home health care, and the homebound criteria are met.  I certify that this patient has had a face to face encounter by myself.  Benoit Dawson M.D. - NPI: [id number]

## 2018-01-25 NOTE — PROGRESS NOTES
Report received at bedside, assumed care of patient. Denies needs at this time. Call light within reach. Oxygen via nasal cannula, patient in NAD.

## 2018-01-25 NOTE — CARE PLAN
Problem: Bowel/Gastric:  Goal: Normal bowel function is maintained or improved  Outcome: PROGRESSING AS EXPECTED    Goal: Will not experience complications related to bowel motility  Outcome: PROGRESSING AS EXPECTED

## 2018-01-25 NOTE — PROGRESS NOTES
Uses call light for assistance with ambulation to bathroom to void.  Pt states she gets up frequently to void in the middle of the night at home as well.

## 2018-01-25 NOTE — PROGRESS NOTES
[facility] Hospitalist Progress Note    Date of Service: 2018    Chief Complaint  62 y.o. female admitted 2018 with respiratory failure chf and pneumonia probably copd exacerbation.     Interval Problem Update  Feeling better, no new complains, she is on 3L of o2 today, wheezing is improved patient has long h/o second hand smoking, she probably has some undiagnosed copd will need PFT's as outpatient.     Consultants/Specialty  none    Disposition  Home with HH.          Review of Systems   Constitutional: Negative for fever.   HENT: Negative for ear pain.    Eyes: Negative for blurred vision and double vision.   Respiratory: Negative for cough and hemoptysis.    Cardiovascular: Negative for chest pain and palpitations.   Gastrointestinal: Negative for heartburn.   Genitourinary: Negative for dysuria and urgency.   Musculoskeletal: Negative for myalgias.   Neurological: Negative for dizziness.   Endo/Heme/Allergies: Does not bruise/bleed easily.   Psychiatric/Behavioral: Negative for depression.      Physical Exam  Laboratory/Imaging   Hemodynamics  Temp (24hrs), Av.2 °C (97.1 °F), Min:36 °C (96.8 °F), Max:36.4 °C (97.6 °F)   Temperature: 36.2 °C (97.1 °F)  Pulse  Av.5  Min: 54  Max: 102    Blood Pressure: 134/79      Respiratory      Respiration: 20, Pulse Oximetry: 93 %, O2 Daily Delivery Respiratory : Silicone Nasal Cannula     Given By:: Mouthpiece, PEP/CPT Method: Positive Airway Pressure Device, Work Of Breathing / Effort: Mild  RUL Breath Sounds: Expiratory Wheezes, RML Breath Sounds: Expiratory Wheezes, RLL Breath Sounds: Diminished, CHRIS Breath Sounds: Expiratory Wheezes, LLL Breath Sounds: Diminished    Fluids    Intake/Output Summary (Last 24 hours) at 18 1512  Last data filed at 18 1200   Gross per 24 hour   Intake           1545.7 ml   Output             4350 ml   Net          -2804.3 ml       Nutrition  Orders Placed This Encounter   Procedures   • Diet Order     Standing  Status:   Standing     Number of Occurrences:   1     Order Specific Question:   Diet:     Answer:   Regular [1]     Physical Exam   Constitutional: She is oriented to person, place, and time. No distress.   Cardiovascular: Normal rate, regular rhythm and normal heart sounds.    Pulmonary/Chest: Effort normal. No respiratory distress. She has wheezes (better. ). She has no rales.   Abdominal: Soft. Bowel sounds are normal. She exhibits no distension. There is no tenderness.   Musculoskeletal: Normal range of motion. She exhibits no tenderness.   Neurological: She is alert and oriented to person, place, and time. She exhibits normal muscle tone.   Psychiatric: She has a normal mood and affect.   Nursing note and vitals reviewed.      Recent Labs      01/22/18 2345 01/24/18 0229   WBC  6.0  3.2*   RBC  4.01*  3.94*   HEMOGLOBIN  11.5*  11.4*   HEMATOCRIT  36.3*  35.3*   MCV  90.5  89.6   MCH  28.7  28.9   MCHC  31.7*  32.3*   RDW  56.2*  54.7*   PLATELETCT  176  175   MPV  10.8  10.8     Recent Labs      01/22/18 2345 01/24/18 0229   SODIUM  134*  139   POTASSIUM  3.9  3.5*   CHLORIDE  100  98   CO2  27  33   GLUCOSE  139*  77   BUN  13  19   CREATININE  1.05  1.11   CALCIUM  8.4*  8.3*     Recent Labs      01/22/18 2345 01/24/18 0229 01/25/18   0305   INR  1.94*  1.77*  1.95*     Recent Labs      01/23/18   0918   BNPBTYPENAT  523*     Recent Labs      01/22/18 2345   TRIGLYCERIDE  63   HDL  32*   LDL  51          Assessment/Plan     Acute respiratory failure with hypoxia (CMS-[facility])- (present on admission)   Assessment & Plan    Continue at this point with oxygen support, RT protocol nebulizer treatments and monitor oxygen status.  o2 requirements improved, likely combination acute on chronic diastolic chf and CAP.  On 4L today, continue wheezing. Will get o2 with ambulation.         CAP (community acquired pneumonia)- (present on admission)   Assessment & Plan    Patient this one started on Unasyn  along with azithromycin for her community acquired pneumonia.  Continue discipline with RT protocol nebulizer treatments. Sputum culture and blood culture at this point are pending.  Continue iv atb for now.   o2 requirements improved.         Influenza A- (present on admission)   Assessment & Plan    Patient dismissed one is started on Tamiflu.  Patient is started on fluid resuscitation.  Continue at this point with Tylenol for muscle aches.  Continue with supportive management.  Stable, continue supportive treatment.         Hyponatremia   Assessment & Plan    Give fluid supplementation her sodium was low at 134  Stable.   Resolved.         Chronic kidney disease, stage III (moderate)- (present on admission)   Assessment & Plan    Stable.         Obstructive sleep apnea- (present on admission)   Assessment & Plan    Continue with nighttime BiPAP        History of stroke- (present on admission)   Assessment & Plan    Continue with aspirin.         Essential hypertension- (present on admission)   Assessment & Plan    Continued blood pressure management optimization keep systolic blood pressure under 140 diastolic under 90 continue at this point utilizing Cardizem 120 mg twice daily, lisinopril 5 mg daily, metoprolol 50 mg twice daily, as needed  Labetalol.  Continue monitoring.         Hyperlipidemia- (present on admission)   Assessment & Plan    Continue a low-fat low-cholesterol diet and statin.        Uncontrolled type 2 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, with long-term current use of insulin (CMS-HCC)- (present on admission)   Assessment & Plan    Continue lantus and SSI.         Class 3 obesity in adult- (present on admission)   Assessment & Plan    She will need outpatient weight loss management with bariatric evaluation.  Body mass index is 53.28 kg/m². morbid obesity.         Acute on chronic diastolic heart failure (CMS-[facility])- (present on admission)   Assessment & Plan     bnp >1000 on admission received lasix had good urine output neg balance 3.4L her o2 requirements improved.   Better today, on 3L of o2.         Chronic anticoagulation- (present on admission)   Assessment & Plan    On warfarin per pharmacy.         Normochromic normocytic anemia   Assessment & Plan    Stable keep monitoring.         Anxiety- (present on admission)   Assessment & Plan    Continue with anxiolytics            Reviewed items::  Labs reviewed and Medications reviewed  DVT prophylaxis pharmacological::  Warfarin (Coumadin)  DVT prophylaxis - mechanical:  SCDs

## 2018-01-25 NOTE — PROGRESS NOTES
Pt awake, watching Tv.  Denies any complaints.  Coarse loose non productive cough, still unable to produce sputum sample.    Call light in reach, able to return demonstration how to use call light, bed alarm in use, hourly rounding, bed in low position, treaded slipper socks with ambulation, upper side rails elevated.

## 2018-01-25 NOTE — PROGRESS NOTES
Inpatient Anticoagulation Service Note    Date: 1/25/2018  Reason for Anticoagulation: Atrial Fibrillation   [id number] VASc Score: 5    Hemoglobin Value: (!) 11.4  Hematocrit Value: (!) 35.3  Lab Platelet Value: 175  Target INR: 2.0 to 3.0    INR from last 7 days     Date/Time INR Value    01/25/18 0305 (!)  1.95    01/24/18 0229 (!)  1.77    01/22/18 2345 (!)  1.94    01/22/18 1409 (!)  1.69        Dose from last 7 days     Date/Time Dose (mg)    01/25/18 1000  5    01/24/18 1400  7.5    01/23/18 1600  5    01/22/18 2200  5        Average Dose (mg):  (Home dose: 2.5 mg MWF, 5 mg AOD)  Significant Interactions: Antibiotics, Corticosteroids, Statin, Other (Comments) (SSRI)  Bridge Therapy: No     HPI: 61 yo female admitted 1/22/18 for influenza and pneumonia is chronically anticoagulated outpatient for h/o Afib ([id number] VASc 5, low/mod risk). Warfarin is managed outpatient by the [facility] and per records, patient is most recently prescribed Warfarin 2.5mg po every Mon, Wed, Fri and Warfarin 5mg po daily. Patient previously prescribed Warfarin 2.5mg po every Mon, Wed and Warfarin 5mg po on all other days. INRs appearing moderately labile.      Assessment: INR remains slightly SUBtherapeutic with continued trend upward towards goal. 1.95 from 1.77, following a one time bolus dose of warfarin 7.5mg last night.  New drug-drug interaction with short course of prednisone 40mg - potential to increase or decrease the INR.      Potential drug-drug interactions identified with acute inpatient medications: Antibiotics    Potential drug-drug interactions identified with chronic home medications: Simvastatin and Celexa which are established interactions.   Diet: Regular      Plan:  Resume usual home dosing regimen with continued INR monitoring daily - will reduce the frequency of INR draws as patient demonstrates re-stabilization on warfarin regimen.       Education Material Provided?: No (Chronic warfarin patient)     Pharmacist  suggested discharge dosing: Warfarin 2.5mg po every Mon, Wed, Fri and Warfarin 5mg po on all other days      Mabel K. Brady, PharmD

## 2018-01-25 NOTE — CARE PLAN
Problem: Safety  Goal: Will remain free from injury  Outcome: PROGRESSING AS EXPECTED    Intervention: Provide assistance with mobility   01/24/18 1930   OTHER   Assistance / Tolerance Standby Assist;Assistance of One     Intervention: Collaborate with Interdisciplinary Team for safe transfer and mobilization techniques   01/25/18 0032   OTHER   Assistive Devices Hand held assist;Rails;Walker - front wheel         Problem: Respiratory:  Goal: Respiratory status will improve  Outcome: PROGRESSING AS EXPECTED    Intervention: Assess and monitor pulmonary status   01/24/18 2341   OTHER   O2 (LPM) 4   Work Of Breathing / Effort Mild   RUL Breath Sounds Diminished;Expiratory Wheezes   RML Breath Sounds Diminished;Expiratory Wheezes   RLL Breath Sounds Fine Crackles   CHRIS Breath Sounds Expiratory Wheezes;Diminished   LLL Breath Sounds Fine Crackles   Vitals   Respiration 16   Pulse Oximetry 95 %   Respiratory   Cough Non Productive;Strong     Intervention: Administer and titrate oxygen therapy   01/24/18 2341   OTHER   O2 (LPM) 4     Intervention: Educate and encourage incentive spirometry usage  To 500

## 2018-01-25 NOTE — WOUND TEAM
Patient seen for wound consult of left knee, under breast and pannus.  Areas assessed, patient has abrasion to left knee from a recent fall, area dry and intact.  Mild redness to breast crease and pannus, Nystatin in use.  Will order interdry.  No other wound care needs at this time.  Please re-consult if any other needs come up.

## 2018-01-25 NOTE — CARE PLAN
Problem: Oxygenation:  Goal: Maintain adequate oxygenation dependent on patient condition  Outcome: PROGRESSING AS EXPECTED    Intervention: Manage oxygen therapy by monitoring pulse oximetry and/or ABG values  Pt on 3L NC; SPO2 98%      Problem: Bronchoconstriction:  Goal: Improve in air movement and diminished wheezing  Outcome: PROGRESSING AS EXPECTED    Intervention: Implement inhaled treatments  DUO Q4

## 2018-01-25 NOTE — PROGRESS NOTES
Patient sitting up in chair. Blood sugar checked, insulin administered as ordered. Continuous IV abx stopped, PO dose administered. IV flushed. Patient denies pain or needs at this time.

## 2018-01-26 PROBLEM — J81.1 PULMONARY EDEMA: Status: ACTIVE | Noted: 2018-01-23

## 2018-01-26 LAB
GLUCOSE BLD-MCNC: 207 MG/DL (ref 65–99)
GLUCOSE BLD-MCNC: 344 MG/DL (ref 65–99)
GLUCOSE BLD-MCNC: 361 MG/DL (ref 65–99)
GLUCOSE BLD-MCNC: 81 MG/DL (ref 65–99)
INR PPP: 2.51 (ref 0.87–1.13)
PROCALCITONIN SERPL-MCNC: <0.05 NG/ML
PROTHROMBIN TIME: 26.8 SEC (ref 12–14.6)

## 2018-01-26 PROCEDURE — 36415 COLL VENOUS BLD VENIPUNCTURE: CPT

## 2018-01-26 PROCEDURE — 85610 PROTHROMBIN TIME: CPT

## 2018-01-26 PROCEDURE — 94640 AIRWAY INHALATION TREATMENT: CPT

## 2018-01-26 PROCEDURE — 82962 GLUCOSE BLOOD TEST: CPT

## 2018-01-26 PROCEDURE — 700101 HCHG RX REV CODE 250: Performed by: HOSPITALIST

## 2018-01-26 PROCEDURE — 94760 N-INVAS EAR/PLS OXIMETRY 1: CPT

## 2018-01-26 PROCEDURE — 770020 HCHG ROOM/CARE - TELE (206)

## 2018-01-26 PROCEDURE — 700102 HCHG RX REV CODE 250 W/ 637 OVERRIDE(OP): Performed by: HOSPITALIST

## 2018-01-26 PROCEDURE — 84145 PROCALCITONIN (PCT): CPT

## 2018-01-26 PROCEDURE — A9270 NON-COVERED ITEM OR SERVICE: HCPCS | Performed by: HOSPITALIST

## 2018-01-26 PROCEDURE — 700101 HCHG RX REV CODE 250: Performed by: INTERNAL MEDICINE

## 2018-01-26 PROCEDURE — 97116 GAIT TRAINING THERAPY: CPT

## 2018-01-26 PROCEDURE — 97530 THERAPEUTIC ACTIVITIES: CPT

## 2018-01-26 PROCEDURE — 99232 SBSQ HOSP IP/OBS MODERATE 35: CPT | Performed by: HOSPITALIST

## 2018-01-26 PROCEDURE — 700111 HCHG RX REV CODE 636 W/ 250 OVERRIDE (IP): Performed by: HOSPITALIST

## 2018-01-26 RX ORDER — IPRATROPIUM BROMIDE AND ALBUTEROL SULFATE 2.5; .5 MG/3ML; MG/3ML
3 SOLUTION RESPIRATORY (INHALATION)
Status: DISCONTINUED | OUTPATIENT
Start: 2018-01-26 | End: 2018-01-27

## 2018-01-26 RX ADMIN — METOPROLOL TARTRATE 50 MG: 50 TABLET, FILM COATED ORAL at 20:36

## 2018-01-26 RX ADMIN — INSULIN GLARGINE 40 UNITS: 100 INJECTION, SOLUTION SUBCUTANEOUS at 20:41

## 2018-01-26 RX ADMIN — IPRATROPIUM BROMIDE AND ALBUTEROL SULFATE 3 ML: .5; 3 SOLUTION RESPIRATORY (INHALATION) at 14:57

## 2018-01-26 RX ADMIN — AMOXICILLIN AND CLAVULANATE POTASSIUM 1 TABLET: 875; 125 TABLET, FILM COATED ORAL at 09:29

## 2018-01-26 RX ADMIN — NYSTATIN 1500000 UNITS: 100000 POWDER TOPICAL at 20:37

## 2018-01-26 RX ADMIN — MAGNESIUM GLUCONATE 500 MG ORAL TABLET 400 MG: 500 TABLET ORAL at 09:29

## 2018-01-26 RX ADMIN — METOPROLOL TARTRATE 50 MG: 50 TABLET, FILM COATED ORAL at 09:29

## 2018-01-26 RX ADMIN — INSULIN HUMAN 12 UNITS: 100 INJECTION, SOLUTION PARENTERAL at 20:41

## 2018-01-26 RX ADMIN — NYSTATIN 1500000 UNITS: 100000 POWDER TOPICAL at 09:29

## 2018-01-26 RX ADMIN — CITALOPRAM HYDROBROMIDE 20 MG: 20 TABLET ORAL at 09:29

## 2018-01-26 RX ADMIN — LISINOPRIL 5 MG: 5 TABLET ORAL at 09:29

## 2018-01-26 RX ADMIN — IPRATROPIUM BROMIDE AND ALBUTEROL SULFATE 3 ML: .5; 3 SOLUTION RESPIRATORY (INHALATION) at 02:20

## 2018-01-26 RX ADMIN — INSULIN HUMAN 10 UNITS: 100 INJECTION, SOLUTION PARENTERAL at 18:17

## 2018-01-26 RX ADMIN — FUROSEMIDE 40 MG: 40 TABLET ORAL at 06:16

## 2018-01-26 RX ADMIN — PIOGLITAZONE 30 MG: 30 TABLET ORAL at 09:28

## 2018-01-26 RX ADMIN — IPRATROPIUM BROMIDE AND ALBUTEROL SULFATE 3 ML: .5; 3 SOLUTION RESPIRATORY (INHALATION) at 18:43

## 2018-01-26 RX ADMIN — AZITHROMYCIN 250 MG: 250 TABLET, FILM COATED ORAL at 09:29

## 2018-01-26 RX ADMIN — IPRATROPIUM BROMIDE AND ALBUTEROL SULFATE 3 ML: .5; 3 SOLUTION RESPIRATORY (INHALATION) at 10:48

## 2018-01-26 RX ADMIN — DILTIAZEM HYDROCHLORIDE 120 MG: 120 CAPSULE, COATED, EXTENDED RELEASE ORAL at 20:36

## 2018-01-26 RX ADMIN — OSELTAMIVIR PHOSPHATE 75 MG: 75 CAPSULE ORAL at 20:37

## 2018-01-26 RX ADMIN — FUROSEMIDE 40 MG: 40 TABLET ORAL at 17:08

## 2018-01-26 RX ADMIN — INSULIN HUMAN 4 UNITS: 100 INJECTION, SOLUTION PARENTERAL at 13:10

## 2018-01-26 RX ADMIN — VITAMIN D, TAB 1000IU (100/BT) 5000 UNITS: 25 TAB at 09:28

## 2018-01-26 RX ADMIN — IPRATROPIUM BROMIDE AND ALBUTEROL SULFATE 3 ML: .5; 3 SOLUTION RESPIRATORY (INHALATION) at 07:36

## 2018-01-26 RX ADMIN — WARFARIN SODIUM 2.5 MG: 2.5 TABLET ORAL at 18:14

## 2018-01-26 RX ADMIN — OSELTAMIVIR PHOSPHATE 75 MG: 75 CAPSULE ORAL at 09:29

## 2018-01-26 RX ADMIN — AMOXICILLIN AND CLAVULANATE POTASSIUM 1 TABLET: 875; 125 TABLET, FILM COATED ORAL at 20:36

## 2018-01-26 RX ADMIN — PREDNISONE 40 MG: 20 TABLET ORAL at 09:28

## 2018-01-26 RX ADMIN — DILTIAZEM HYDROCHLORIDE 120 MG: 120 CAPSULE, COATED, EXTENDED RELEASE ORAL at 09:28

## 2018-01-26 RX ADMIN — SIMVASTATIN 20 MG: 20 TABLET, FILM COATED ORAL at 20:37

## 2018-01-26 ASSESSMENT — PAIN SCALES - GENERAL
PAINLEVEL_OUTOF10: 0

## 2018-01-26 ASSESSMENT — ENCOUNTER SYMPTOMS
DIZZINESS: 0
MYALGIAS: 0
BLURRED VISION: 0
HEARTBURN: 0
DEPRESSION: 0
FEVER: 0
DOUBLE VISION: 0
COUGH: 0
BRUISES/BLEEDS EASILY: 0

## 2018-01-26 ASSESSMENT — LIFESTYLE VARIABLES: EVER_SMOKED: NEVER

## 2018-01-26 ASSESSMENT — COPD QUESTIONNAIRES
HAVE YOU SMOKED AT LEAST 100 CIGARETTES IN YOUR ENTIRE LIFE: NO/DON'T KNOW
DURING THE PAST 4 WEEKS HOW MUCH DID YOU FEEL SHORT OF BREATH: SOME OF THE TIME
DO YOU EVER COUGH UP ANY MUCUS OR PHLEGM?: YES, A FEW DAYS A WEEK OR MONTH
COPD SCREENING SCORE: 5

## 2018-01-26 ASSESSMENT — COGNITIVE AND FUNCTIONAL STATUS - GENERAL
MOVING FROM LYING ON BACK TO SITTING ON SIDE OF FLAT BED: A LITTLE
TURNING FROM BACK TO SIDE WHILE IN FLAT BAD: A LITTLE
MOBILITY SCORE: 20
MOVING TO AND FROM BED TO CHAIR: A LITTLE
SUGGESTED CMS G CODE MODIFIER MOBILITY: CJ
CLIMB 3 TO 5 STEPS WITH RAILING: A LITTLE

## 2018-01-26 ASSESSMENT — GAIT ASSESSMENTS
ASSISTIVE DEVICE: FRONT WHEEL WALKER
GAIT LEVEL OF ASSIST: STAND BY ASSIST
DEVIATION: INCREASED BASE OF SUPPORT;SHUFFLED GAIT;BRADYKINETIC
DISTANCE (FEET): 200

## 2018-01-26 NOTE — CARE PLAN
Problem: Safety  Goal: Will remain free from injury  Outcome: PROGRESSING AS EXPECTED  Fall precautions in place. Treaded socks on pt. Appropriate signs on doorway.  Bedrails up. Bed in lowest position and locked.  Call light and phone within reach. Patient educated on importance of calling nurses before getting out of bed, verbalizes understanding. Bed alarm active    Problem: Knowledge Deficit  Goal: Knowledge of disease process/condition, treatment plan, diagnostic tests, and medications will improve  Outcome: PROGRESSING AS EXPECTED  Patient educated about POC.  All questions answered in regards to disease process, treatment, and diet.  Patient verbalized understanding and voiced no further questions at this time.

## 2018-01-26 NOTE — DISCHARGE PLANNING
Care Transition Team Assessment    Met with pt at bedside to complete assessment and discuss discharge. Pt reports that she lives with her dtr and her family. Pt is independent with ADL's but gets assistance from dtr with cooking and cleaning. Pt states she doesn't use any O2 at home and is currently on O2. Per bedside RN pt will need O2 for discharge.     Pt agreeable to  services. Choice form completed and faxed to [facility].     Information Source  Orientation : Oriented x 4  Information Given By: Patient         Elopement Risk  Legal Hold: No  Ambulatory or Self Mobile in Wheelchair: Yes  Disoriented: No  Psychiatric Symptoms: None  History of Wandering: No  Elopement this Admit: No  Vocalizing Wanting to Leave: No  Displays Behaviors, Body Language Wanting to Leave: No-Not at Risk for Elopement  Elopement Risk: Not at Risk for Elopement    Interdisciplinary Discharge Planning  Does Admitting Nurse Feel This Could be a Complex Discharge?: Yes  Lives with - Patient's Self Care Capacity: Adult Children (daughter)  Patient or legal guardian wants to designate a caregiver (see row info): Yes  Caregiver name: Ya Bee  Caregiver relationship to patient: Daughter  Caregiver contact info: [phone]  Support Systems: Children  Housing / Facility: [address]  Do You Take your Prescribed Medications Regularly: Yes  Able to Return to Previous ADL's: Yes  Mobility Issues: Yes  Prior Services: None  Patient Expects to be Discharged to:: Home  Assistance Needed: Yes  Durable Medical Equipment: Walker    Discharge Preparedness  What is your plan after discharge?: Home health care  What are your discharge supports?: Child  Prior Functional Level: Independent with Activities of Daily Living, Independent with Medication Management, Uses Walker  Difficulity with ADLs: None  Difficulity with IADLs: Cooking, Driving    Functional Assesment  Prior Functional Level: Independent with Activities of Daily Living, Independent with  Medication Management, Uses Walker    Finances  Financial Barriers to Discharge: No  Prescription Coverage: Yes    Vision / Hearing Impairment  Vision Impairment : Yes  Right Eye Vision: Impaired, Wears Glasses  Left Eye Vision: Impaired, Wears Glasses  Hearing Impairment : No    Values / Beliefs / Concerns  Values / Beliefs Concerns : No  Special Hospitalization Concerns: n/a         Domestic Abuse  Have you ever been the victim of abuse or violence?: No  Physical Abuse or Sexual Abuse: No  Verbal Abuse or Emotional Abuse: No  Possible Abuse Reported to:: Not Applicable    Psychological Assessment  History of Substance Abuse: None  History of Psychiatric Problems: No    Discharge Risks or Barriers  Discharge risks or barriers?: No    Anticipated Discharge Information  Anticipated discharge disposition: [facility]

## 2018-01-26 NOTE — DISCHARGE PLANNING
Medical Social Worker    RYAN met with pt at bedside to complete choice on O2.  Pt reports she would like to continue services with [facility].  RYAN faxed over choice form to [facility].    Plan:  RYAN will continue to assist with pt's needs.

## 2018-01-26 NOTE — PROGRESS NOTES
Inpatient Anticoagulation Service Note    Date: 1/26/2018  Reason for Anticoagulation: Atrial Fibrillation   [id number] VASc Score: 5    Hemoglobin Value: (!) 11.4  Hematocrit Value: (!) 35.3  Lab Platelet Value: 175  Target INR: 2.0 to 3.0    INR from last 7 days     Date/Time INR Value    01/26/18 0306 (!)  2.51    01/25/18 0305 (!)  1.95    01/24/18 0229 (!)  1.77    01/22/18 2345 (!)  1.94    01/22/18 1409 (!)  1.69        Dose from last 7 days     Date/Time Dose (mg)    01/26/18 1400  2.5    01/25/18 1000  5    01/24/18 1400  7.5    01/23/18 1600  5    01/22/18 2200  5        Average Dose (mg):  (Home dose: 2.5 mg MWF, 5 mg AOD)  Significant Interactions: Antibiotics, Corticosteroids, Statin, Other (Comments) (SSRI)  Bridge Therapy: No     HPI:   63 yo female admitted 1/22/18 for influenza and pneumonia is chronically anticoagulated outpatient for h/o Afib ([id number] VASc 5, low/mod risk). Warfarin is managed outpatient by the [facility] and per records, patient is most recently prescribed Warfarin 2.5mg po every Mon, Wed, Fri and Warfarin 5mg po daily. Patient previously prescribed Warfarin 2.5mg po every Mon, Wed and Warfarin 5mg po on all other days. INRs appearing moderately labile.      Assessment:   INR @ goal with increasing trend.     Potential drug-drug interactions identified with acute inpatient medications: Antibiotics    Potential drug-drug interactions identified with chronic home medications: Simvastatin and Celexa which are established interactions.   Diet: Regular      Plan:    Warfarin 2.5mg po x1 tonight , INR in AM.   Education Material Provided?: No (Chronic warfarin patient)     Pharmacist suggested discharge dosing: Warfarin 2.5mg po every Mon, Wed, Fri and Warfarin 5mg po on all other days      John IbarraD BCPS

## 2018-01-26 NOTE — FACE TO FACE
Face to Face Note  -  Durable Medical Equipment    Benoit Dawson M.D. - NPI: [id number]  I certify that this patient is under my care and that they had a durable medical equipment(DME)face to face encounter by myself that meets the physician DME face-to-face encounter requirements with this patient on:    Date of encounter:   Patient:                    MRN:                       YOB: 2020  Shani Love  [id number]  1955     The encounter with the patient was in whole, or in part, for the following medical condition, which is the primary reason for durable medical equipment:  COPD she probably has undiagnosed copd due to second hand smoking. Need PFT's as outpatient.     I certify that, based on my findings, the following durable medical equipment is medically necessary:  Oxygen.    HOME O2 Saturation Measurements:(Values must be present for Home Oxygen orders)  Room air sat at rest: 85  Room air sat with amb: 83  With liters of O2: 2, O2 sat at rest with O2: 93  With Liters of O2: 2, O2 sat with amb with O2 : 92  Is the patient mobile?: Yes    My Clinical findings support the need for the above equipment due to:  Hypoxia    Supporting Symptoms: o2 drops w/o supplemental o2

## 2018-01-26 NOTE — PROGRESS NOTES
Weaning patient on oxygen today. Patient down to 2.5L oxygen via nasal cannula. Up in chair most of shift, ambulating with stand-by assistance. Denies pain.

## 2018-01-26 NOTE — PROGRESS NOTES
Pt awake, watching tv.  Assessed.  Denies any complaints.  Inspiratory and expiratory wheezing throughout with base crackles.  Up to bathroom with 1 standby assist.    Call light in reach, pt returned demonstration on how to use call light, bed alarm in use, upper side rails elevated, bed in low position, hourly rounding, treaded slipper socks with ambulation.

## 2018-01-26 NOTE — DISCHARGE PLANNING
[facility] received a DME choice form. Per the choice form the referral jack been sent to [facility]

## 2018-01-26 NOTE — CARE PLAN
Problem: Safety  Goal: Will remain free from injury  Outcome: PROGRESSING AS EXPECTED    Intervention: Provide assistance with mobility   01/25/18 1925   OTHER   Assistance / Tolerance Assistance of One     Intervention: Collaborate with Interdisciplinary Team for safe transfer and mobilization techniques   01/25/18 1925   OTHER   Assistive Devices Walker - front wheel       Goal: Will remain free from falls  Outcome: PROGRESSING AS EXPECTED    Intervention: Assess risk factors for falls   01/25/18 1925   OTHER   Fall Risk High Risk to Fall - 2 or more points    Risk for Injury-Any positive answers results in the pt being at high risk for fall related injury Not Applicable   Mobility Status Assessment 1-1 Healthcare Provider Required for Assistance with Ambulation & Transfer   History of fall 2   Date of Last Fall 01/18/18   Pt Calls for Assistance Yes     Intervention: Implement fall precautions   01/26/18 0251   OTHER   Environmental Precautions Treaded Slipper Socks on Patient;Personal Belongings, Wastebasket, Call Bell etc. in Easy Reach;Transferred to Stronger Side;Bed in Low Position;Communication Sign for Patients & Families;Report Given to Other Health Care Providers Regarding Fall Risk;Mobility Assessed & Appropriate Sign Placed   Bedrails Bedrails Closest to Bathroom Down;Alternative to Bedrails Used   Chair/Bed Strip Alarm Yes - Alarm On         Problem: Respiratory:  Goal: Respiratory status will improve  Outcome: PROGRESSING AS EXPECTED    Intervention: Assess and monitor pulmonary status   01/26/18 0220   OTHER   O2 (LPM) 2.5   Work Of Breathing / Effort Mild   RUL Breath Sounds Expiratory Wheezes   RML Breath Sounds Expiratory Wheezes   RLL Breath Sounds Diminished   CHRIS Breath Sounds Expiratory Wheezes   LLL Breath Sounds Diminished   Vitals   Respiration 16   Pulse Oximetry 96 %   Respiratory   Cough Dry     Intervention: Administer and titrate oxygen therapy   01/26/18 0220   OTHER   O2 (LPM) 2.5      Intervention: Educate and encourage coughing and deep breathing  Good effort with coughing and deep breathing  Intervention: Educate and encourage incentive spirometry usage  Poor effort

## 2018-01-27 LAB
BACTERIA BLD CULT: NORMAL
BACTERIA BLD CULT: NORMAL
BACTERIA SPEC RESP CULT: NORMAL
EKG IMPRESSION: NORMAL
GLUCOSE BLD-MCNC: 123 MG/DL (ref 65–99)
GLUCOSE BLD-MCNC: 295 MG/DL (ref 65–99)
GLUCOSE BLD-MCNC: 344 MG/DL (ref 65–99)
GLUCOSE BLD-MCNC: 360 MG/DL (ref 65–99)
GRAM STN SPEC: NORMAL
INR PPP: 2.45 (ref 0.87–1.13)
POTASSIUM SERPL-SCNC: 3.9 MMOL/L (ref 3.6–5.5)
PROTHROMBIN TIME: 26.3 SEC (ref 12–14.6)
SIGNIFICANT IND 70042: NORMAL
SITE SITE: NORMAL
SOURCE SOURCE: NORMAL

## 2018-01-27 PROCEDURE — 36415 COLL VENOUS BLD VENIPUNCTURE: CPT

## 2018-01-27 PROCEDURE — 84132 ASSAY OF SERUM POTASSIUM: CPT

## 2018-01-27 PROCEDURE — 99232 SBSQ HOSP IP/OBS MODERATE 35: CPT | Performed by: HOSPITALIST

## 2018-01-27 PROCEDURE — 94640 AIRWAY INHALATION TREATMENT: CPT

## 2018-01-27 PROCEDURE — 93005 ELECTROCARDIOGRAM TRACING: CPT | Performed by: HOSPITALIST

## 2018-01-27 PROCEDURE — 700111 HCHG RX REV CODE 636 W/ 250 OVERRIDE (IP): Performed by: HOSPITALIST

## 2018-01-27 PROCEDURE — A9270 NON-COVERED ITEM OR SERVICE: HCPCS | Performed by: HOSPITALIST

## 2018-01-27 PROCEDURE — 700101 HCHG RX REV CODE 250: Performed by: HOSPITALIST

## 2018-01-27 PROCEDURE — 94760 N-INVAS EAR/PLS OXIMETRY 1: CPT

## 2018-01-27 PROCEDURE — 700102 HCHG RX REV CODE 250 W/ 637 OVERRIDE(OP): Performed by: HOSPITALIST

## 2018-01-27 PROCEDURE — 94660 CPAP INITIATION&MGMT: CPT

## 2018-01-27 PROCEDURE — 770020 HCHG ROOM/CARE - TELE (206)

## 2018-01-27 PROCEDURE — 82962 GLUCOSE BLOOD TEST: CPT | Mod: 91

## 2018-01-27 PROCEDURE — 93010 ELECTROCARDIOGRAM REPORT: CPT | Performed by: INTERNAL MEDICINE

## 2018-01-27 PROCEDURE — 85610 PROTHROMBIN TIME: CPT

## 2018-01-27 RX ORDER — FUROSEMIDE 40 MG/1
40 TABLET ORAL
Status: DISCONTINUED | OUTPATIENT
Start: 2018-01-28 | End: 2018-01-28 | Stop reason: HOSPADM

## 2018-01-27 RX ADMIN — INSULIN HUMAN 7 UNITS: 100 INJECTION, SOLUTION PARENTERAL at 11:19

## 2018-01-27 RX ADMIN — METOPROLOL TARTRATE 50 MG: 50 TABLET, FILM COATED ORAL at 20:43

## 2018-01-27 RX ADMIN — AMOXICILLIN AND CLAVULANATE POTASSIUM 1 TABLET: 875; 125 TABLET, FILM COATED ORAL at 08:29

## 2018-01-27 RX ADMIN — MAGNESIUM GLUCONATE 500 MG ORAL TABLET 400 MG: 500 TABLET ORAL at 08:29

## 2018-01-27 RX ADMIN — VITAMIN D, TAB 1000IU (100/BT) 5000 UNITS: 25 TAB at 08:29

## 2018-01-27 RX ADMIN — PREDNISONE 40 MG: 20 TABLET ORAL at 08:29

## 2018-01-27 RX ADMIN — AMOXICILLIN AND CLAVULANATE POTASSIUM 1 TABLET: 875; 125 TABLET, FILM COATED ORAL at 20:43

## 2018-01-27 RX ADMIN — LISINOPRIL 5 MG: 5 TABLET ORAL at 08:29

## 2018-01-27 RX ADMIN — IPRATROPIUM BROMIDE AND ALBUTEROL SULFATE 3 ML: .5; 3 SOLUTION RESPIRATORY (INHALATION) at 10:39

## 2018-01-27 RX ADMIN — PIOGLITAZONE 30 MG: 30 TABLET ORAL at 08:29

## 2018-01-27 RX ADMIN — IPRATROPIUM BROMIDE AND ALBUTEROL SULFATE 3 ML: .5; 3 SOLUTION RESPIRATORY (INHALATION) at 07:21

## 2018-01-27 RX ADMIN — WARFARIN SODIUM 5 MG: 5 TABLET ORAL at 17:45

## 2018-01-27 RX ADMIN — CITALOPRAM HYDROBROMIDE 20 MG: 20 TABLET ORAL at 08:29

## 2018-01-27 RX ADMIN — SIMVASTATIN 20 MG: 20 TABLET, FILM COATED ORAL at 20:43

## 2018-01-27 RX ADMIN — METOPROLOL TARTRATE 50 MG: 50 TABLET, FILM COATED ORAL at 08:29

## 2018-01-27 RX ADMIN — DILTIAZEM HYDROCHLORIDE 120 MG: 120 CAPSULE, COATED, EXTENDED RELEASE ORAL at 20:43

## 2018-01-27 RX ADMIN — INSULIN HUMAN 10 UNITS: 100 INJECTION, SOLUTION PARENTERAL at 20:47

## 2018-01-27 RX ADMIN — INSULIN GLARGINE 40 UNITS: 100 INJECTION, SOLUTION SUBCUTANEOUS at 20:47

## 2018-01-27 RX ADMIN — INSULIN HUMAN 12 UNITS: 100 INJECTION, SOLUTION PARENTERAL at 17:43

## 2018-01-27 RX ADMIN — FUROSEMIDE 40 MG: 40 TABLET ORAL at 06:14

## 2018-01-27 RX ADMIN — DILTIAZEM HYDROCHLORIDE 120 MG: 120 CAPSULE, COATED, EXTENDED RELEASE ORAL at 08:29

## 2018-01-27 RX ADMIN — NYSTATIN 1500000 UNITS: 100000 POWDER TOPICAL at 08:29

## 2018-01-27 RX ADMIN — NYSTATIN 1500000 UNITS: 100000 POWDER TOPICAL at 21:42

## 2018-01-27 RX ADMIN — FUROSEMIDE 40 MG: 40 TABLET ORAL at 14:48

## 2018-01-27 ASSESSMENT — ENCOUNTER SYMPTOMS
FEVER: 0
DIZZINESS: 0
PALPITATIONS: 0
DEPRESSION: 0
BLURRED VISION: 0
HEARTBURN: 0
MYALGIAS: 0
NAUSEA: 0
COUGH: 0
BRUISES/BLEEDS EASILY: 0

## 2018-01-27 ASSESSMENT — PAIN SCALES - GENERAL
PAINLEVEL_OUTOF10: 0

## 2018-01-27 NOTE — PROGRESS NOTES
[facility] Hospitalist Progress Note    Date of Service: 2018    Chief Complaint  62 y.o. female admitted 2018 with respiratory failure chf and pneumonia probably copd exacerbation.     Interval Problem Update  Resting in chair, no complains, on 1.5L of o2, no fever or chills, still wheezing but is much improved.     Consultants/Specialty  none    Disposition  Home with HH.          Review of Systems   Constitutional: Negative for fever.   Eyes: Negative for blurred vision and double vision.   Respiratory: Negative for cough.    Cardiovascular: Negative for chest pain.   Gastrointestinal: Negative for heartburn.   Genitourinary: Negative for dysuria.   Musculoskeletal: Negative for myalgias.   Neurological: Negative for dizziness.   Endo/Heme/Allergies: Does not bruise/bleed easily.   Psychiatric/Behavioral: Negative for depression.      Physical Exam  Laboratory/Imaging   Hemodynamics  Temp (24hrs), Av.4 °C (97.6 °F), Min:36.1 °C (97 °F), Max:37 °C (98.6 °F)   Temperature: 37 °C (98.6 °F)  Pulse  Av.9  Min: 54  Max: 108    Blood Pressure: 147/82      Respiratory      Respiration: 18, Pulse Oximetry: 98 %, O2 Daily Delivery Respiratory : Silicone Nasal Cannula     Given By:: Mouthpiece, Work Of Breathing / Effort: Mild  RUL Breath Sounds: Clear;Diminished, RML Breath Sounds: Clear;Diminished, RLL Breath Sounds: Diminished, CHRIS Breath Sounds: Clear;Diminished, LLL Breath Sounds: Diminished    Fluids    Intake/Output Summary (Last 24 hours) at 18 1615  Last data filed at 18 1130   Gross per 24 hour   Intake             1340 ml   Output             4100 ml   Net            -2760 ml       Nutrition  Orders Placed This Encounter   Procedures   • Diet Order     Standing Status:   Standing     Number of Occurrences:   1     Order Specific Question:   Diet:     Answer:   Regular [1]     Physical Exam   Constitutional: She is oriented to person, place, and time. No distress.   Cardiovascular:  Normal rate, regular rhythm and normal heart sounds.    Pulmonary/Chest: Effort normal. No respiratory distress. She has wheezes (better. ). She has no rales.   Abdominal: Soft. Bowel sounds are normal. She exhibits no distension. There is no tenderness.   Musculoskeletal: Normal range of motion. She exhibits no tenderness.   Neurological: She is alert and oriented to person, place, and time. She exhibits normal muscle tone.   Psychiatric: She has a normal mood and affect.   Nursing note and vitals reviewed.      Recent Labs      01/24/18 0229   WBC  3.2*   RBC  3.94*   HEMOGLOBIN  11.4*   HEMATOCRIT  35.3*   MCV  89.6   MCH  28.9   MCHC  32.3*   RDW  54.7*   PLATELETCT  175   MPV  10.8     Recent Labs      01/24/18 0229   SODIUM  139   POTASSIUM  3.5*   CHLORIDE  98   CO2  33   GLUCOSE  77   BUN  19   CREATININE  1.11   CALCIUM  8.3*     Recent Labs      01/24/18 0229 01/25/18   0305  01/26/18   0306   INR  1.77*  1.95*  2.51*                  Assessment/Plan     Acute respiratory failure with hypoxia (CMS-HCC)- (present on admission)   Assessment & Plan    Continue at this point with oxygen support, RT protocol nebulizer treatments and monitor oxygen status.  o2 requirements improved, likely combination acute on chronic diastolic chf and CAP.  On 4L today, continue wheezing. Will get o2 with ambulation.   o2 improving.         CAP (community acquired pneumonia)- (present on admission)   Assessment & Plan    Patient this one started on Unasyn along with azithromycin for her community acquired pneumonia.  Continue discipline with RT protocol nebulizer treatments. Sputum culture and blood culture at this point are pending.  Continue iv atb for now.   o2 requirements improved. 1.5L        Influenza A- (present on admission)   Assessment & Plan    Patient dismissed one is started on Tamiflu.  Patient is started on fluid resuscitation.  Continue at this point with Tylenol for muscle aches.  Continue with  supportive management.  Stable, continue supportive treatment.   Breathing better. On 1.5L of o2.        Hyponatremia   Assessment & Plan    Give fluid supplementation her sodium was low at 134  Stable.   Resolved.         Chronic kidney disease, stage III (moderate)- (present on admission)   Assessment & Plan    Stable.         Obstructive sleep apnea- (present on admission)   Assessment & Plan    Continue with nighttime BiPAP        History of stroke- (present on admission)   Assessment & Plan    Continue with aspirin.         Essential hypertension- (present on admission)   Assessment & Plan    Continued blood pressure management optimization keep systolic blood pressure under 140 diastolic under 90 continue at this point utilizing Cardizem 120 mg twice daily, lisinopril 5 mg daily, metoprolol 50 mg twice daily, as needed  Labetalol.  Continue monitoring.         Hyperlipidemia- (present on admission)   Assessment & Plan    Continue a low-fat low-cholesterol diet and statin.        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   bg is stable.         Class 3 obesity in adult- (present on admission)   Assessment & Plan    She will need outpatient weight loss management with bariatric evaluation.  Body mass index is 53.28 kg/m². morbid obesity.         Pulmonary edema- (present on admission)   Assessment & Plan    bnp >1000 on admission received lasix had good urine output neg balance 3.4L her o2 requirements improved.   Better today, on 3L of o2.   Pulmonary edema due to fluid overload due to valvular disease. No HF.   o2 requirements better 1.5L.        Chronic anticoagulation- (present on admission)   Assessment & Plan    On warfarin per pharmacy.         Normochromic normocytic anemia   Assessment & Plan    Stable keep monitoring.         Anxiety-  (present on admission)   Assessment & Plan    Continue with anxiolytics            Reviewed items::  Labs reviewed and Medications reviewed  DVT prophylaxis pharmacological::  Warfarin (Coumadin)  DVT prophylaxis - mechanical:  SCDs

## 2018-01-27 NOTE — PROGRESS NOTES
Report received. Pt A&Ox4, in no signs of distress. LDAs functioning properly. Discussed POC with pt. Denies pain at this time. Sitting up in chair, chair alarm on. Call light within reach. Fall precautions in place. Denies any additional needs at this time.

## 2018-01-27 NOTE — CARE PLAN
Problem: Communication  Goal: The ability to communicate needs accurately and effectively will improve  Outcome: PROGRESSING AS EXPECTED      Problem: Respiratory:  Goal: Respiratory status will improve  Outcome: PROGRESSING AS EXPECTED

## 2018-01-27 NOTE — THERAPY
"Physical Therapy Treatment completed.   Bed Mobility:  Supine to Sit: Stand by Assist (HOB elevated)  Transfers: Sit to Stand: Stand by Assist  Gait: Level Of Assist: Stand by Assist with Front-Wheel Walker x200 ft       Plan of Care: Will benefit from Physical Therapy 2 times per week  Discharge Recommendations: Equipment: No Equipment Needed.   Post-acute therapy Discharge to home with outpatient or home health for additional skilled therapy services.     Patient able to amb 200 ft w/FWW, further distance limited by SOB and generalized weakness. Will continue to follow for acute LOS for d/c needs, with decreased frequency to 2x/week. Expect patient to be okay to d/c to home with family and HH PT/OT. Patient has FWW and 4WW.     See \"Rehab Therapy-Acute\" Patient Summary Report for complete documentation.       "

## 2018-01-27 NOTE — PROGRESS NOTES
Call from monitor room, patient had rhythm change this morning, been between SR and Afib/flutter since approximately 1000. Notified hospitalist Rn, STAT EKG ordered by Md.

## 2018-01-27 NOTE — CARE PLAN
Problem: Safety  Goal: Will remain free from falls  Outcome: PROGRESSING AS EXPECTED  Pt educated regarding fall precautions, confirms understanding. Bed alarm on. Call light within reach. Bed in low position. Non-skid socks in place.     Problem: Venous Thromboembolism (VTW)/Deep Vein Thrombosis (DVT) Prevention:  Goal: Patient will participate in Venous Thrombosis (VTE)/Deep Vein Thrombosis (DVT)Prevention Measures  Outcome: PROGRESSING AS EXPECTED

## 2018-01-27 NOTE — PROGRESS NOTES
Inpatient Anticoagulation Service Note    Date: 1/27/2018  Reason for Anticoagulation: Atrial Fibrillation   [id number] VASc Score: 5    Hemoglobin Value: (!) 11.4  Hematocrit Value: (!) 35.3  Lab Platelet Value: 175  Target INR: 2.0 to 3.0    INR from last 7 days     Date/Time INR Value    01/27/18 0231 (!)  2.45    01/26/18 0306 (!)  2.51    01/25/18 0305 (!)  1.95    01/24/18 0229 (!)  1.77    01/22/18 2345 (!)  1.94    01/22/18 1409 (!)  1.69        Dose from last 7 days     Date/Time Dose (mg)    01/27/18 1300  5    01/26/18 1400  2.5    01/25/18 1000  5    01/24/18 1400  7.5    01/23/18 1600  5    01/22/18 2200  5        Average Dose (mg):  (Home dose: 2.5 mg MWF, 5 mg AOD)  Significant Interactions: Antibiotics, Corticosteroids, Statin, Other (Comments) (SSRI)  Bridge Therapy: No     A/P: INR remains therapeutic today. No new CBC collected, however there are no overt s/s of bleeding noted. No changes to warfarin-drug interaction profile; patient to remain on Augmentin through 1/29. Per review of [facility] records, patient appears to have been stable on warfarin 5 mg po daily, except 2.5 mg po MWF. Will continue with home dose of warfarin (5 mg po x 1 tonight) and recheck INR on 1/29.     Education Material Provided?: No (Chronic warfarin patient )  Pharmacist suggested discharge dosing: Resume home dose of warfarin 5 mg po daily, except 2.5 mg po MWF     Yulissa Rabago, PharmD, BCOP

## 2018-01-28 VITALS
BODY MASS INDEX: 49.68 KG/M2 | OXYGEN SATURATION: 100 % | HEART RATE: 78 BPM | RESPIRATION RATE: 18 BRPM | SYSTOLIC BLOOD PRESSURE: 110 MMHG | TEMPERATURE: 96.9 F | HEIGHT: 64 IN | DIASTOLIC BLOOD PRESSURE: 60 MMHG | WEIGHT: 291.01 LBS

## 2018-01-28 PROBLEM — J81.1 PULMONARY EDEMA: Status: RESOLVED | Noted: 2018-01-23 | Resolved: 2018-01-28

## 2018-01-28 PROBLEM — J18.9 CAP (COMMUNITY ACQUIRED PNEUMONIA): Status: RESOLVED | Noted: 2018-01-22 | Resolved: 2018-01-28

## 2018-01-28 PROBLEM — E87.1 HYPONATREMIA: Status: RESOLVED | Noted: 2018-01-22 | Resolved: 2018-01-28

## 2018-01-28 PROBLEM — J96.01 ACUTE RESPIRATORY FAILURE WITH HYPOXIA (HCC): Status: RESOLVED | Noted: 2018-01-22 | Resolved: 2018-01-28

## 2018-01-28 PROBLEM — J10.1 INFLUENZA A: Status: RESOLVED | Noted: 2018-01-22 | Resolved: 2018-01-28

## 2018-01-28 LAB
GLUCOSE BLD-MCNC: 190 MG/DL (ref 65–99)
GLUCOSE BLD-MCNC: 93 MG/DL (ref 65–99)
INR PPP: 2.51 (ref 0.87–1.13)
PROTHROMBIN TIME: 26.8 SEC (ref 12–14.6)

## 2018-01-28 PROCEDURE — 700102 HCHG RX REV CODE 250 W/ 637 OVERRIDE(OP): Performed by: HOSPITALIST

## 2018-01-28 PROCEDURE — 82962 GLUCOSE BLOOD TEST: CPT

## 2018-01-28 PROCEDURE — A9270 NON-COVERED ITEM OR SERVICE: HCPCS | Performed by: HOSPITALIST

## 2018-01-28 PROCEDURE — 85610 PROTHROMBIN TIME: CPT

## 2018-01-28 PROCEDURE — 36415 COLL VENOUS BLD VENIPUNCTURE: CPT

## 2018-01-28 PROCEDURE — 700111 HCHG RX REV CODE 636 W/ 250 OVERRIDE (IP): Performed by: HOSPITALIST

## 2018-01-28 PROCEDURE — 99239 HOSP IP/OBS DSCHRG MGMT >30: CPT | Performed by: HOSPITALIST

## 2018-01-28 RX ORDER — AMOXICILLIN AND CLAVULANATE POTASSIUM 875; 125 MG/1; MG/1
1 TABLET, FILM COATED ORAL EVERY 12 HOURS
Qty: 6 TAB | Refills: 0 | Status: SHIPPED | OUTPATIENT
Start: 2018-01-28 | End: 2018-01-31

## 2018-01-28 RX ADMIN — VITAMIN D, TAB 1000IU (100/BT) 5000 UNITS: 25 TAB at 08:55

## 2018-01-28 RX ADMIN — CITALOPRAM HYDROBROMIDE 20 MG: 20 TABLET ORAL at 08:55

## 2018-01-28 RX ADMIN — LISINOPRIL 5 MG: 5 TABLET ORAL at 08:54

## 2018-01-28 RX ADMIN — PREDNISONE 40 MG: 20 TABLET ORAL at 08:55

## 2018-01-28 RX ADMIN — METOPROLOL TARTRATE 50 MG: 50 TABLET, FILM COATED ORAL at 08:55

## 2018-01-28 RX ADMIN — PIOGLITAZONE 30 MG: 30 TABLET ORAL at 08:54

## 2018-01-28 RX ADMIN — DILTIAZEM HYDROCHLORIDE 120 MG: 120 CAPSULE, COATED, EXTENDED RELEASE ORAL at 08:56

## 2018-01-28 RX ADMIN — NYSTATIN 1500000 UNITS: 100000 POWDER TOPICAL at 08:56

## 2018-01-28 RX ADMIN — INSULIN HUMAN 3 UNITS: 100 INJECTION, SOLUTION PARENTERAL at 13:10

## 2018-01-28 RX ADMIN — FUROSEMIDE 40 MG: 40 TABLET ORAL at 08:55

## 2018-01-28 RX ADMIN — MAGNESIUM GLUCONATE 500 MG ORAL TABLET 400 MG: 500 TABLET ORAL at 08:54

## 2018-01-28 RX ADMIN — AMOXICILLIN AND CLAVULANATE POTASSIUM 1 TABLET: 875; 125 TABLET, FILM COATED ORAL at 08:55

## 2018-01-28 ASSESSMENT — PAIN SCALES - GENERAL
PAINLEVEL_OUTOF10: 0

## 2018-01-28 NOTE — DISCHARGE SUMMARY
CHIEF COMPLAINT ON ADMISSION  Chief Complaint   Patient presents with   • Flu Like Symptoms     pt transferred from [facility]   • Pulmonary Edema     lasix given pta       CODE STATUS  Full Code    HPI & HOSPITAL COURSE  Please see original H&P for specific information, patient was admitted due to hypoxic acute respiratory failure due to possible fluid overload and flu and CAP, patient was started on tamiflu and lasix, had echo that showed normal EF, patient was requiring 10 L of o2 but gradually was able to be weaned off of it, she has TERESA and she uses bipap at night she was wheezing and was started on po prednison for 5 days and nebs treatment, patient probably has undiagnosed COPD and she will need PFT's as outpatient, patient has h/o afib and she is on warfarin for it, rate is controlled, patient today she is feeling much better, she will be d/c home today and she will f/u with pcp as outpatient, continue augmentin for 3 more days, continue inhalers at home patient will be d/c today she expressed understanding of her d/c plan and agreed with it.     Needs PFT's as outpatient  pcp in 3-5 days.  INR tomorrow for warfarin dose.     The patient met 2-midnight criteria for an inpatient stay at the time of discharge.    Therefore, she is discharged in good and stable condition with close outpatient follow-up.    SPECIFIC OUTPATIENT FOLLOW-UP  PCP in 3-5 days    DISCHARGE PROBLEM LIST  Active Problems:    Class 3 obesity in adult POA: Yes    Uncontrolled type 2 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, with long-term current use of insulin (CMS-[facility]) POA: Yes    Hyperlipidemia POA: Yes    Essential hypertension POA: Yes      Overview: Stacy Monroy, cardiology    Persistent atrial fibrillation (CMS-HCC) POA: Yes    History of stroke POA: Yes      Overview: 11/2015: Subacute infarct in R PCA distribution with subsequent       hemorrhagic transformation but no recurrence on  anticoagulation.  Old left       lacunar infarct also noted at time of presentation.    Obstructive sleep apnea POA: Yes    Chronic kidney disease, stage III (moderate) POA: Yes    Anxiety POA: Yes    Normochromic normocytic anemia POA: Unknown    Chronic anticoagulation POA: Yes  Resolved Problems:    Influenza A POA: Yes    CAP (community acquired pneumonia) POA: Yes    Acute respiratory failure with hypoxia (CMS-HCC) POA: Yes    Hyponatremia POA: Unknown    Pulmonary edema POA: Yes      FOLLOW UP  Future Appointments  Date Time Provider Department Center   1/29/2018 2:00 PM Bijan Sampson M.D. [facility] None   4/2/2018 1:40 PM [facility] None     Estela Gunderson, P.A.-C.  [address] [id number]  [phone]    In 3 days        MEDICATIONS ON DISCHARGE   Shani Love   Home Medication Instructions DOYLE:[id number]    Printed on:01/28/18 1154   Medication Information                      amoxicillin-clavulanate (AUGMENTIN) 875-125 MG Tab  Take 1 Tab by mouth every 12 hours for 3 days.             cholecalciferol (VITAMIN D3) 5000 UNIT Cap  Take 1 Cap by mouth every day.             citalopram (CELEXA) 20 MG Tab  Take 1 Tab by mouth every day.             diltiazem CD (CARDIZEM CD) 120 MG CAPSULE SR 24 HR  Take 1 Cap by mouth 2 Times a Day.             furosemide (LASIX) 40 MG Tab  Take 1 Tab by mouth every day.             Insulin Degludec (TRESIBA FLEXTOUCH) 200 UNIT/ML Solution Pen-injector  Inject 40 Units as instructed every bedtime.             Ipratropium-Albuterol (DUONEB INH)  Inhale 1 Inhalation by mouth Once.             lisinopril (PRINIVIL) 5 MG Tab  Take 1 Tab by mouth every day.             magnesium oxide (MAG-OX) 400 (241.3 MG) MG Tab tablet  Take 1 Tab by mouth every day.             metoprolol (LOPRESSOR) 50 MG Tab  Take 1 Tab by mouth 2 times a day.             nystatin (MYCOSTATIN) powder  Apply to affected area 1-2 times daily PRN              pioglitazone (ACTOS) 30 MG Tab  Take 1 Tab by mouth every day.             simvastatin (ZOCOR) 20 MG Tab  Take 1 Tab by mouth every evening.             VICTOZA 18 MG/3ML Solution Pen-injector injection  Inject 0.3 mL as instructed every day.             warfarin (COUMADIN) 5 MG Tab  Take 2.5-5 mg by mouth every day. 2.5 mg on Mondays, Wednesdays, and Fridays. All other days 5 mg                 DIET  Orders Placed This Encounter   Procedures   • Diet Order     Standing Status:   Standing     Number of Occurrences:   1     Order Specific Question:   Diet:     Answer:   Regular [1]       ACTIVITY  As tolerated.  Weight bearing as tolerated      CONSULTATIONS  none    PROCEDURES  none    LABORATORY  Lab Results   Component Value Date/Time    SODIUM 139 01/24/2018 02:29 AM    POTASSIUM 3.9 01/27/2018 02:32 AM    CHLORIDE 98 01/24/2018 02:29 AM    CO2 33 01/24/2018 02:29 AM    GLUCOSE 77 01/24/2018 02:29 AM    BUN 19 01/24/2018 02:29 AM    CREATININE 1.11 01/24/2018 02:29 AM        Lab Results   Component Value Date/Time    WBC 3.2 (L) 01/24/2018 02:29 AM    HEMOGLOBIN 11.4 (L) 01/24/2018 02:29 AM    HEMATOCRIT 35.3 (L) 01/24/2018 02:29 AM    PLATELETCT 175 01/24/2018 02:29 AM        Total time of the discharge process exceeds 40 minutes

## 2018-01-28 NOTE — PROGRESS NOTES
Patient up to bathroom and back to bed with assist. Reports feeling less palpitations last hour. Continue to attempt to wean oxygen.

## 2018-01-28 NOTE — PROGRESS NOTES
[facility] Hospitalist Progress Note    Date of Service: 2018    Chief Complaint  62 y.o. female admitted 2018 with respiratory failure chf and pneumonia probably copd exacerbation.     Interval Problem Update  Doing much better, hopefully she will be wean off of o2 soon. No new complains.     Consultants/Specialty  none    Disposition  Home with .          Review of Systems   Constitutional: Negative for fever.   Eyes: Negative for blurred vision.   Respiratory: Negative for cough.    Cardiovascular: Negative for chest pain and palpitations.   Gastrointestinal: Negative for heartburn and nausea.   Genitourinary: Negative for dysuria and urgency.   Musculoskeletal: Negative for myalgias.   Neurological: Negative for dizziness.   Endo/Heme/Allergies: Does not bruise/bleed easily.   Psychiatric/Behavioral: Negative for depression.      Physical Exam  Laboratory/Imaging   Hemodynamics  Temp (24hrs), Av.4 °C (97.6 °F), Min:36.1 °C (96.9 °F), Max:36.9 °C (98.4 °F)   Temperature: 36.1 °C (96.9 °F)  Pulse  Av.3  Min: 54  Max: 108    Blood Pressure: 115/69      Respiratory      Respiration: 16, Pulse Oximetry: 94 %, O2 Daily Delivery Respiratory : Silicone Nasal Cannula     Given By:: Mouthpiece, Work Of Breathing / Effort: Mild  RUL Breath Sounds: Clear, RML Breath Sounds: Clear, RLL Breath Sounds: Diminished, CHRIS Breath Sounds: Clear, LLL Breath Sounds: Diminished    Fluids    Intake/Output Summary (Last 24 hours) at 18 1621  Last data filed at 18 1558   Gross per 24 hour   Intake             1600 ml   Output             3575 ml   Net            -1975 ml       Nutrition  Orders Placed This Encounter   Procedures   • Diet Order     Standing Status:   Standing     Number of Occurrences:   1     Order Specific Question:   Diet:     Answer:   Regular [1]     Physical Exam   Constitutional: She is oriented to person, place, and time. No distress.   Cardiovascular: Normal rate, regular rhythm and  normal heart sounds.    Pulmonary/Chest: Effort normal. No respiratory distress. She has no wheezes. She has no rales.   Abdominal: Soft. Bowel sounds are normal. She exhibits no distension. There is no tenderness.   Musculoskeletal: Normal range of motion. She exhibits no tenderness.   Neurological: She is alert and oriented to person, place, and time. She exhibits normal muscle tone.   Psychiatric: She has a normal mood and affect.   Nursing note and vitals reviewed.          Recent Labs      01/27/18   0232   POTASSIUM  3.9     Recent Labs      01/25/18   0305  01/26/18   0306  01/27/18   0231   INR  1.95*  2.51*  2.45*                  Assessment/Plan     Acute respiratory failure with hypoxia (CMS-HCC)- (present on admission)   Assessment & Plan    Continue at this point with oxygen support, RT protocol nebulizer treatments and monitor oxygen status.  o2 requirements improved, likely combination acute on chronic diastolic chf and CAP.  On 4L today, continue wheezing. Will get o2 with ambulation.   o2 improving. Almost resolved.         CAP (community acquired pneumonia)- (present on admission)   Assessment & Plan    Patient this one started on Unasyn along with azithromycin for her community acquired pneumonia.  Continue discipline with RT protocol nebulizer treatments. Sputum culture and blood culture at this point are pending.  Continue iv atb for now.   o2 requirements improved. 1.5L  Doing much better but still requiring 0.5L keep working with IS         Influenza A- (present on admission)   Assessment & Plan    Patient dismissed one is started on Tamiflu.  Patient is started on fluid resuscitation.  Continue at this point with Tylenol for muscle aches.  Continue with supportive management.  Stable, continue supportive treatment.   Breathing better. On 1.5L of o2.  On 0.5L          Hyponatremia   Assessment & Plan    Give fluid supplementation her sodium was low at 134  Stable.   Resolved.         Chronic  kidney disease, stage III (moderate)- (present on admission)   Assessment & Plan    Stable.         Obstructive sleep apnea- (present on admission)   Assessment & Plan    Continue with nighttime BiPAP        History of stroke- (present on admission)   Assessment & Plan    Continue with aspirin.         Persistent atrial fibrillation (CMS-[facility])- (present on admission)   Assessment & Plan    Rate controlled, on diltiazem and warfarin.         Essential hypertension- (present on admission)   Assessment & Plan    Continued blood pressure management optimization keep systolic blood pressure under 140 diastolic under 90 continue at this point utilizing Cardizem 120 mg twice daily, lisinopril 5 mg daily, metoprolol 50 mg twice daily, as needed  Labetalol.  Continue monitoring.         Hyperlipidemia- (present on admission)   Assessment & Plan    Continue a low-fat low-cholesterol diet and statin.        Uncontrolled type 2 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, with long-term current use of insulin (CMS-HCC)- (present on admission)   Assessment & Plan    Uncontrolled with hyperglycemia, continue lantus and SSI. Needs to follow dm diet.         Class 3 obesity in adult- (present on admission)   Assessment & Plan    She will need outpatient weight loss management with bariatric evaluation.  Body mass index is 53.28 kg/m². morbid obesity.         Pulmonary edema- (present on admission)   Assessment & Plan    bnp >1000 on admission received lasix had good urine output neg balance 3.4L her o2 requirements improved.   Better today, on 3L of o2.   Pulmonary edema due to fluid overload due to valvular disease. No HF.   o2 requirements better 1.5L.  0.5L today         Chronic anticoagulation- (present on admission)   Assessment & Plan    On warfarin per pharmacy.         Normochromic normocytic anemia   Assessment & Plan    Stable keep monitoring.         Anxiety- (present on admission)   Assessment  & Plan    Continue with anxiolytics            Reviewed items::  Labs reviewed and Medications reviewed  DVT prophylaxis pharmacological::  Warfarin (Coumadin)  DVT prophylaxis - mechanical:  SCDs

## 2018-01-28 NOTE — CARE PLAN
Problem: Venous Thromboembolism (VTW)/Deep Vein Thrombosis (DVT) Prevention:  Goal: Patient will participate in Venous Thrombosis (VTE)/Deep Vein Thrombosis (DVT)Prevention Measures  Outcome: PROGRESSING AS EXPECTED      Problem: Fluid Volume:  Goal: Will maintain balanced intake and output  Outcome: PROGRESSING AS EXPECTED  Strict I&Os. PO Lasix for diuresis.

## 2018-01-28 NOTE — DISCHARGE INSTRUCTIONS
Discharge Instructions    Discharged to home by car with relative. Discharged via wheelchair, hospital escort: Refused.  Special equipment needed: Not Applicable    Be sure to schedule a follow-up appointment with your primary care doctor or any specialists as instructed.     Discharge Plan:   Diet Plan: Discussed  Activity Level: Discussed  Confirmed Follow up Appointment: Appointment Scheduled  Confirmed Symptoms Management: Discussed  Medication Reconciliation Updated: Yes  Influenza Vaccine Indication: Not indicated: Previously immunized this influenza season and > 8 years of age    I understand that a diet low in cholesterol, fat, and sodium is recommended for good health. Unless I have been given specific instructions below for another diet, I accept this instruction as my diet prescription.   Other diet: Cardiac    Special Instructions: None    · Is patient discharged on Warfarin / Coumadin?   Yes    You are receiving the drug warfarin. Please understand the importance of monitoring warfarin with scheduled PT/INR blood draws.  Follow-up with a call to your personal Doctor's office tomorrow to schedule a PT/INR. .  Need INR tomorrow for her warfarin dose.   F/u with PCP in 3-5 days.   Will need PFT's as outpatient.    IMPORTANT: HOW TO USE THIS INFORMATION:  This is a summary and does NOT have all possible information about this product. This information does not assure that this product is safe, effective, or appropriate for you. This information is not individual medical advice and does not substitute for the advice of your health care professional. Always ask your health care professional for complete information about this product and your specific health needs.      WARFARIN - ORAL (WARF-uh-rin)      COMMON BRAND NAME(S): Coumadin      WARNING:  Warfarin can cause very serious (possibly fatal) bleeding. This is more likely to occur when you first start taking this medication or if you take too much  "warfarin. To decrease your risk for bleeding, your doctor or other health care provider will monitor you closely and check your lab results (INR test) to make sure you are not taking too much warfarin. Keep all medical and laboratory appointments. Tell your doctor right away if you notice any signs of serious bleeding. See also Side Effects section.      USES:  This medication is used to treat blood clots (such as in deep vein thrombosis-DVT or pulmonary embolus-PE) and/or to prevent new clots from forming in your body. Preventing harmful blood clots helps to reduce the risk of a stroke or heart attack. Conditions that increase your risk of developing blood clots include a certain type of irregular heart rhythm (atrial fibrillation), heart valve replacement, recent heart attack, and certain surgeries (such as hip/knee replacement). Warfarin is commonly called a \"blood thinner,\" but the more correct term is \"anticoagulant.\" It helps to keep blood flowing smoothly in your body by decreasing the amount of certain substances (clotting proteins) in your blood.      HOW TO USE:  Read the Medication Guide provided by your pharmacist before you start taking warfarin and each time you get a refill. If you have any questions, ask your doctor or pharmacist. Take this medication by mouth with or without food as directed by your doctor or other health care professional, usually once a day. It is very important to take it exactly as directed. Do not increase the dose, take it more frequently, or stop using it unless directed by your doctor. Dosage is based on your medical condition, laboratory tests (such as INR), and response to treatment. Your doctor or other health care provider will monitor you closely while you are taking this medication to determine the right dose for you. Use this medication regularly to get the most benefit from it. To help you remember, take it at the same time each day. It is important to eat a " balanced, consistent diet while taking warfarin. Some foods can affect how warfarin works in your body and may affect your treatment and dose. Avoid sudden large increases or decreases in your intake of foods high in vitamin K (such as broccoli, cauliflower, cabbage, brussels sprouts, kale, spinach, and other green leafy vegetables, liver, green tea, certain vitamin supplements). If you are trying to lose weight, check with your doctor before you try to go on a diet. Cranberry products may also affect how your warfarin works. Limit the amount of cranberry juice (16 ounces/480 milliliters a day) or other cranberry products you may drink or eat.      SIDE EFFECTS:  Nausea, loss of appetite, or stomach/abdominal pain may occur. If any of these effects persist or worsen, tell your doctor or pharmacist promptly. Remember that your doctor has prescribed this medication because he or she has judged that the benefit to you is greater than the risk of side effects. Many people using this medication do not have serious side effects. This medication can cause serious bleeding if it affects your blood clotting proteins too much (shown by unusually high INR lab results). Even if your doctor stops your medication, this risk of bleeding can continue for up to a week. Tell your doctor right away if you have any signs of serious bleeding, including: unusual pain/swelling/discomfort, unusual/easy bruising, prolonged bleeding from cuts or gums, persistent/frequent nosebleeds, unusually heavy/prolonged menstrual flow, pink/dark urine, coughing up blood, vomit that is bloody or looks like coffee grounds, severe headache, dizziness/fainting, unusual or persistent tiredness/weakness, bloody/black/tarry stools, chest pain, shortness of breath, difficulty swallowing. Tell your doctor right away if any of these unlikely but serious side effects occur: persistent nausea/vomiting, severe stomach/abdominal pain, yellowing eyes/skin. This drug  rarely has caused very serious (possibly fatal) problems if its effects lead to small blood clots (usually at the beginning of treatment). This can lead to severe skin/tissue damage that may require surgery or amputation if left untreated. Patients with certain blood conditions (protein C or S deficiency) may be at greater risk. Get medical help right away if any of these rare but serious side effects occur: painful/red/purplish patches on the skin (such as on the toe, breast, abdomen), change in the amount of urine, vision changes, confusion, slurred speech, weakness on one side of the body. A very serious allergic reaction to this drug is rare. However, get medical help right away if you notice any symptoms of a serious allergic reaction, including: rash, itching/swelling (especially of the face/tongue/throat), severe dizziness, trouble breathing. This is not a complete list of possible side effects. If you notice other effects not listed above, contact your doctor or pharmacist. In the [place] - Call your doctor for medical advice about side effects. You may report side effects to FDA at 6-695-QRR-3562. In [place] - Call your doctor for medical advice about side effects. You may report side effects to [facility] [place] at 1-592.206.3541.      PRECAUTIONS:  Before taking warfarin, tell your doctor or pharmacist if you are allergic to it; or if you have any other allergies. This product may contain inactive ingredients, which can cause allergic reactions or other problems. Talk to your pharmacist for more details. Before using this medication, tell your doctor or pharmacist your medical history, especially of: blood disorders (such as anemia, hemophilia), bleeding problems (such as bleeding of the stomach/intestines, bleeding in the brain), blood vessel disorders (such as aneurysms), recent major injury/surgery, liver disease, alcohol use, mental/mood disorders (including memory problems), frequent falls/injuries. It is  important that all your doctors and dentists know that you take warfarin. Before having surgery or any medical/dental procedures, tell your doctor or dentist that you are taking this medication and about all the products you use (including prescription drugs, nonprescription drugs, and herbal products). Avoid getting injections into the muscles. If you must have an injection into a muscle (for example, a flu shot), it should be given in the arm. This way, it will be easier to check for bleeding and/or apply pressure bandages. This medication may cause stomach bleeding. Daily use of alcohol while using this medicine will increase your risk for stomach bleeding and may also affect how this medication works. Limit or avoid alcoholic beverages. If you have not been eating well, if you have an illness or infection that causes fever, vomiting, or diarrhea for more than 2 days, or if you start using any antibiotic medications, contact your doctor or pharmacist immediately because these conditions can affect how warfarin works. This medication can cause heavy bleeding. To lower the chance of getting cut, bruised, or injured, use great caution with sharp objects like safety razors and nail cutters. Use an electric razor when shaving and a soft toothbrush when brushing your teeth. Avoid activities such as contact sports. If you fall or injure yourself, especially if you hit your head, call your doctor immediately. Your doctor may need to check you. The Food & Drug Administration has stated that generic warfarin products are interchangeable. However, consult your doctor or pharmacist before switching warfarin products. Be careful not to take more than one medication that contains warfarin unless specifically directed by the doctor or health care provider who is monitoring your warfarin treatment. Older adults may be at greater risk for bleeding while using this drug. This medication is not recommended for use during pregnancy  "because of serious (possibly fatal) harm to an unborn baby. Discuss the use of reliable forms of birth control with your doctor. If you become pregnant or think you may be pregnant, tell your doctor immediately. If you are planning pregnancy, discuss a plan for managing your condition with your doctor before you become pregnant. Your doctor may switch the type of medication you use during pregnancy. Very small amounts of this medication may pass into breast milk but is unlikely to harm a nursing infant. Consult your doctor before breast-feeding.      DRUG INTERACTIONS:  Drug interactions may change how your medications work or increase your risk for serious side effects. This document does not contain all possible drug interactions. Keep a list of all the products you use (including prescription/nonprescription drugs and herbal products) and share it with your doctor and pharmacist. Do not start, stop, or change the dosage of any medicines without your doctor's approval. Warfarin interacts with many prescription, nonprescription, vitamin, and herbal products. This includes medications that are applied to the skin or inside the vagina or rectum. The interactions with warfarin usually result in an increase or decrease in the \"blood-thinning\" (anticoagulant) effect. Your doctor or other health care professional should closely monitor you to prevent serious bleeding or clotting problems. While taking warfarin, it is very important to tell your doctor or pharmacist of any changes in medications, vitamins, or herbal products that you are taking. Some products that may interact with this drug include: capecitabine, imatinib, mifepristone. Aspirin, aspirin-like drugs (salicylates), and nonsteroidal anti-inflammatory drugs (NSAIDs such as ibuprofen, naproxen, celecoxib) may have effects similar to warfarin. These drugs may increase the risk of bleeding problems if taken during treatment with warfarin. Carefully check all " prescription/nonprescription product labels (including drugs applied to the skin such as pain-relieving creams) since the products may contain NSAIDs or salicylates. Talk to your doctor about using a different medication (such as acetaminophen) to treat pain/fever. Low-dose aspirin and related drugs (such as clopidogrel, ticlopidine) should be continued if prescribed by your doctor for specific medical reasons such as heart attack or stroke prevention. Consult your doctor or pharmacist for more details. Many herbal products interact with warfarin. Tell your doctor before taking any herbal products, especially bromelains, coenzyme Q10, cranberry, danshen, dong quai, fenugreek, garlic, ginkgo biloba, ginseng, and Cylinder's wort, among others. This medication may interfere with a certain laboratory test to measure theophylline levels, possibly causing false test results. Make sure laboratory personnel and all your doctors know you use this drug.      OVERDOSE:  If overdose is suspected, contact a poison control center or emergency room immediately. [place] residents can call the [place] National Poison Hotline at 1-305.261.7510. [place] residents can call a provincial poison control center. Symptoms of overdose may include: bloody/black/tarry stools, pink/dark urine, unusual/prolonged bleeding.      NOTES:  Do not share this medication with others. Laboratory and/or medical tests (such as INR, complete blood count) must be performed periodically to monitor your progress or check for side effects. Consult your doctor for more details.      MISSED DOSE:  For the best possible benefit, do not miss any doses. If you do miss a dose and remember on the same day, take it as soon as you remember. If you remember on the next day, skip the missed dose and resume your usual dosing schedule. Do not double the dose to catch up because this could increase your risk for bleeding. Keep a record of missed doses to give to your doctor or  pharmacist. Contact your doctor or pharmacist if you miss 2 or more doses in a row.      STORAGE:  Store at room temperature away from light and moisture. Do not store in the bathroom. Keep all medications away from children and pets. Do not flush medications down the toilet or pour them into a drain unless instructed to do so. Properly discard this product when it is  or no longer needed. Consult your pharmacist or local waste disposal company for more details about how to safely discard your product.      MEDICAL ALERT:  Your condition and medication can cause complications in a medical emergency. For information about enrolling in MedicAlert, call 1-683.965.3698 ([place]) or 1-483.487.7151 ([place]).      Information last revised 2010 Copyright(c) 2010 [employer]             Depression / Suicide Risk    As you are discharged from this [facility] facility, it is important to learn how to keep safe from harming yourself.    Recognize the warning signs:  · Abrupt changes in personality, positive or negative- including increase in energy   · Giving away possessions  · Change in eating patterns- significant weight changes-  positive or negative  · Change in sleeping patterns- unable to sleep or sleeping all the time   · Unwillingness or inability to communicate  · Depression  · Unusual sadness, discouragement and loneliness  · Talk of wanting to die  · Neglect of personal appearance   · Rebelliousness- reckless behavior  · Withdrawal from people/activities they love  · Confusion- inability to concentrate     If you or a loved one observes any of these behaviors or has concerns about self-harm, here's what you can do:  · Talk about it- your feelings and reasons for harming yourself  · Remove any means that you might use to hurt yourself (examples: pills, rope, extension cords, firearm)  · Get professional help from the community (Mental Health, Substance Abuse, psychological counseling)  · Do not  be alone:Call your Safe Contact- someone whom you trust who will be there for you.  · Call your local CRISIS HOTLINE 758-7283 or 586-538-6171  · Call your local Children's Mobile Crisis Response Team [place] (704) 712-0597 or www.[employer]  · Call the toll free National Suicide Prevention Hotlines   · National Suicide Prevention Lifeline 995-191-REIU (5115)  · National [employer] Network 800-SUICIDE (162-5023)

## 2018-01-28 NOTE — PROGRESS NOTES
Report received. Pt A&Ox4, in no signs of distress. LDAs functioning properly. Discussed POC with pt. Denies pain at this time. Bed alarm on. Call light within reach. Fall precautions in place. Denies any additional needs at this time.

## 2018-01-29 ENCOUNTER — ANTICOAGULATION VISIT (OUTPATIENT)
Dept: VASCULAR LAB | Facility: MEDICAL CENTER | Age: 63
End: 2018-01-29
Attending: INTERNAL MEDICINE
Payer: MEDICAID

## 2018-01-29 ENCOUNTER — OFFICE VISIT (OUTPATIENT)
Dept: CARDIOLOGY | Facility: MEDICAL CENTER | Age: 63
End: 2018-01-29
Payer: MEDICAID

## 2018-01-29 VITALS
SYSTOLIC BLOOD PRESSURE: 110 MMHG | BODY MASS INDEX: 50.02 KG/M2 | WEIGHT: 293 LBS | DIASTOLIC BLOOD PRESSURE: 76 MMHG | OXYGEN SATURATION: 94 % | HEIGHT: 64 IN | HEART RATE: 62 BPM

## 2018-01-29 DIAGNOSIS — I48.19 PERSISTENT ATRIAL FIBRILLATION (HCC): ICD-10-CM

## 2018-01-29 DIAGNOSIS — I48.19 ATRIAL FIBRILLATION, PERSISTENT (HCC): ICD-10-CM

## 2018-01-29 DIAGNOSIS — I05.0 MITRAL VALVE STENOSIS, UNSPECIFIED ETIOLOGY: ICD-10-CM

## 2018-01-29 DIAGNOSIS — Z79.01 ANTICOAGULATED: ICD-10-CM

## 2018-01-29 DIAGNOSIS — I10 ESSENTIAL HYPERTENSION: ICD-10-CM

## 2018-01-29 DIAGNOSIS — I34.0 MITRAL VALVE INSUFFICIENCY, UNSPECIFIED ETIOLOGY: ICD-10-CM

## 2018-01-29 LAB
INR BLD: 3.7 (ref 0.9–1.2)
INR PPP: 3.7 (ref 2–3.5)

## 2018-01-29 PROCEDURE — 99214 OFFICE O/P EST MOD 30 MIN: CPT | Performed by: INTERNAL MEDICINE

## 2018-01-29 PROCEDURE — 85610 PROTHROMBIN TIME: CPT

## 2018-01-29 ASSESSMENT — ENCOUNTER SYMPTOMS
PND: 0
DIZZINESS: 0
PALPITATIONS: 0

## 2018-01-29 NOTE — PROGRESS NOTES
Subjective:   Shani Richter is a 61 y.o. female who presents today for follow-up evaluation of mitral stenosis, mitral regurgitation, paroxysmal atrial flutter, chronic anticoagulation with a history of hypertension, cerebrovascular accident in 2015 with prior evidence of a lacunar infarction.    Last seen on 2/23/2017.    Since 2/23/2017 the patient was recently hospitalized.  Between 1/22/2018-1/28/2018 the patient was hospitalized for a flu, hypoxic respiratory failure and pulmonary edema.  Echocardiogram showed left ventricular ejection fraction 65%, moderate mitral stenosis, mild mitral regurgitation and moderate tricuspid regurgitation.  Initial EKG on 1/22/2018 showed normal sinus rhythm.  Follow-up EKG on 1/27/2018 showed atrial fibrillation.  The patient has been on long-term anticoagulation.  Symptomatically the patient feels much better since her discharge.    I last saw the patient on her initial hospitalization in November, 2015 at the time that she presented with atrial fibrillation/flutter and CVA.  Subsequently the patient was again hospitalized in April, 2016 for atrial fibrillation.  The patient subsequently underwent atrial flutter ablation on 5/9/2016 by Dr. Miguel Segura.    Overall the patient states that over the past year she's had less shortness of breath that her blood pressures been better.  Last week she had a sleep study and is being evaluated for either BiPAP or CPAP device.  The patient denies palpitations or chest pain.    Past medical history  Between 11/17/2015-11/21/2015 hospitalized at [facility] [place].  DISCHARGE DIAGNOSES:  1.  Atrial flutter.  2.  Atrial fibrillation.  3.  Stroke in the right posterior cerebral artery distribution.  4.  Hypertensive urgency.  5.  Hyponatremia.  6.  Dyslipidemia.  7.  Diabetes mellitus, type 2.  8.  Possible sleep apnea.    Between 4/6/2016-4/10/2016 hospitalized at [facility] [place].  DISCHARGE DIAGNOSES:  1.  Status post treatment of community-acquired pneumonia.  The  patient is    improved on medication regimen.  We will continue an overall 10-day antibiotic   course.  2.  Pulmonary edema secondary to valvular heart disease.  The patient also    mitral stenosis and atrial flutter fibrillation.  The patient is improved on    diuretics.  3.  Hypoxemia is improved.  The patient remains hypoxic during the entire monitoring period  4.  Question of obstructive sleep apnea.  The patient now discharged on    nocturnal oxygen, for outpatient followup and referral to pulmonary medicine    and outpatient sleep study.  5.  Atrial flutter and fibrillation.  The patient is better rate controlled on   current anticoagulation therapy, for a close outpatient followup cardiology    as well as likely, cardioversion as an outpatient.  6.  Hypertension is improved.  7.  Diabetes type 2, to continue her home regimen.  8.  Mitral stenosis.  9.  Dyslipidemia.  10.  Severe dietary obesity.    DATE OF SERVICE:  05/09/2016  PROCEDURE PERFORMED:  1.  Electrophysiology study with radiofrequency ablation of SVT circuit.  2.  Advanced mapping using CARTO system.  3.  Left atrial pacing, sensing and stimulation protocol.  4.  Transesophageal echocardiogram.  PHYSICIAN:  Miguel Segura MD  INDICATION:  Persistent atrial flutter.    Past Medical History:   Diagnosis Date   • Atrial fibrillation (CMS-HCC)    • Atrial flutter (CMS-HCC) 11/18/2015   • Diabetes    • Heart murmur     childhood   • Hyperlipidemia    • Hypertension    • Morbid obesity (CMS-HCC)    • Osteoarthritis    • Pneumonia 4/6/2016   • Stroke (CMS-[facility]) 11/18/2015    Acute ischemic right PCA stroke-Hemorrhage transformation is noted within the infarct [I63.531]   • Valvular heart disease     Mitral stenosis     Past Surgical History:   Procedure Laterality Date   • RECOVERY  5/9/2016    Procedure: CATH LAB FLUTTER ABLATION, CAMILO WITH ANESTHESIA DR. SEGURA;  Surgeon: Recoveryonly Surgery;  Location: SURGERY PRE-POST PROC UNIT [facility] [place];  Service:    •  PILONIDAL CYST EXCISION     • TONSILLECTOMY       Family History   Problem Relation Age of Onset   • Stroke Mother 71   • Cancer Father 71   • Heart Disease Brother      History   Smoking Status   • Never Smoker   Smokeless Tobacco   • Never Used     No Known Allergies  Outpatient Encounter Prescriptions as of 1/29/2018   Medication Sig Dispense Refill   • amoxicillin-clavulanate (AUGMENTIN) 875-125 MG Tab Take 1 Tab by mouth every 12 hours for 3 days. 6 Tab 0   • warfarin (COUMADIN) 5 MG Tab Take 2.5-5 mg by mouth every day. 2.5 mg on Mondays, Wednesdays, and Fridays. All other days 5 mg     • Insulin Degludec (TRESIBA FLEXTOUCH) 200 UNIT/ML Solution Pen-injector Inject 40 Units as instructed every bedtime.     • cholecalciferol (VITAMIN D3) 5000 UNIT Cap Take 1 Cap by mouth every day. 30 Cap 5   • VICTOZA 18 MG/3ML Solution Pen-injector injection Inject 0.3 mL as instructed every day. 3 PEN 6   • citalopram (CELEXA) 20 MG Tab Take 1 Tab by mouth every day. 30 Tab 2   • pioglitazone (ACTOS) 30 MG Tab Take 1 Tab by mouth every day. 30 Tab 11   • metoprolol (LOPRESSOR) 50 MG Tab Take 1 Tab by mouth 2 times a day. 180 Tab 2   • furosemide (LASIX) 40 MG Tab Take 1 Tab by mouth every day. 90 Tab 2   • nystatin (MYCOSTATIN) powder Apply to affected area 1-2 times daily PRN 15 g 2   • magnesium oxide (MAG-OX) 400 (241.3 MG) MG Tab tablet Take 1 Tab by mouth every day. 60 Tab 2   • simvastatin (ZOCOR) 20 MG Tab Take 1 Tab by mouth every evening. 90 Tab 3   • lisinopril (PRINIVIL) 5 MG Tab Take 1 Tab by mouth every day. 90 Tab 3   • diltiazem CD (CARDIZEM CD) 120 MG CAPSULE SR 24 HR Take 1 Cap by mouth 2 Times a Day. 180 Cap 3   • Ipratropium-Albuterol (DUONEB INH) Inhale 1 Inhalation by mouth Once.       No facility-administered encounter medications on file as of 1/29/2018.      Review of Systems   Cardiovascular: Positive for leg swelling. Negative for chest pain, palpitations and PND.   Neurological: Negative for  "dizziness.        Objective:   /76   Pulse 62   Ht 1.626 m (5' 4\")   Wt (!) 138.8 kg (306 lb)   SpO2 94%   BMI 52.52 kg/m²     Physical Exam   Constitutional: She is oriented to person, place, and time. She appears well-nourished. No distress.   HENT:   Head: Normocephalic.   Eyes: EOM are normal. No scleral icterus.   Neck: Carotid bruit is not present.   Thick neck JVP could not be accurately assessed.   Cardiovascular: Normal rate, S1 normal, S2 normal and normal heart sounds.  An irregularly irregular rhythm present. Exam reveals no gallop and no friction rub.    No murmur heard.  Pulses:       Carotid pulses are 2+ on the right side, and 2+ on the left side.       Radial pulses are 2+ on the right side, and 2+ on the left side.   Pulmonary/Chest: Effort normal and breath sounds normal. She has no wheezes. She has no rhonchi. She has no rales.   Increased AP diameter.   Abdominal:   Morbidly obese.   Musculoskeletal: She exhibits no edema.   Chronic dermatologic changes.   Neurological: She is alert and oriented to person, place, and time.   Skin: Skin is warm and dry.   Psychiatric: She has a normal mood and affect. Her behavior is normal.     01/22/2010 MPI  1. DIPYRIDAMOLE STRESS POSITIVE FOR CHEST DISCOMFORT BUT WITHOUT ISCHEMIC   EKG CHANGES.  2. SPECT IMAGES SUGGEST A SMALL SEGMENT OF PROBABLE MILD INFEROLATERAL   ISCHEMIA.  PROMINENT APICAL THINNING IS NOTED.  3. THE GATED STUDY SHOWS A MODERATE REDUCTION OF EJECTION FRACTION WITH   PROBABLE LATERAL HYPOKINESIS.    04/06/2016 ECHOCARDIOGRAM  Left ventricular ejection fraction is visually estimated to be 50%.  Dilated inferior vena cava, 2.6 cm, without inspiratory collapse.    Biatrial dilation.  Mild-moderate mixed mitral valve disease.  The mitral valve is   significantly thickened and restricted.  Right ventricular systolic pressure is estimated to be 55 mmHg.  No significant change from the study of 11/2015.  Moderate mitral stenosis. Mean " transvalvular gradient is 8 mmHg at a   heart rate of 80 BPM. Mitral valve area is 4.9 sq cm. Mild mitral   Regurgitation.    01/24/2018 ECHOCARDIOGRAM  Normal left ventricular systolic function.  Left ventricular ejection fraction is visually estimated to be 65%.  Moderate mitral stenosis. Mean transvalvular gradient is 8 mmHg.   Mild mitral regurgitation.  Moderate tricuspid regurgitation.  Estimated right ventricular systolic pressure is 30 mmHg.  Mild pulmonic insufficiency.  Compared to the report of the study done - there has been no significant change.     Assessment:     1. Atrial fibrillation, persistent (CMS-HCC)     2. Mitral valve stenosis, unspecified etiology     3. Mitral valve insufficiency, unspecified etiology     4. Anticoagulated     5. Essential hypertension         Medical Decision Making:  Today's Assessment / Status / Plan:     Atrial fibrillation. Persistent.    Mitral stenosis/mitral regurgitation.     History of paroxysmal atrial flutter.    Atrial flutter ablation 5/9/2016.    Anticoagulation. On warfarin. In anticoagulation surveillance.    Hypertension controlled.    Diabetes mellitus.    Morbid obesity.    Sleep apnea. Treatment in progress.    Recommendation/Discussion  Currently the patient's cardiac status is stable.  No evidence of congestive heart failure.  Atrial fibrillation rate controlled. Continue current therapy.  Follow-up one month.  EKG at one month follow-up.  Continue anticoagulation surveillance.

## 2018-01-29 NOTE — PROGRESS NOTES
Inpatient Anticoagulation Service Note    Date: 1/28/2018  Reason for Anticoagulation: Atrial Fibrillation   [id number] VASc Score: 5    Hemoglobin Value: (!) 11.4  Hematocrit Value: (!) 35.3  Lab Platelet Value: 175  Target INR: 2.0 to 3.0    INR from last 7 days     Date/Time INR Value    01/28/18 1534 (!)  2.51    01/27/18 0231 (!)  2.45    01/26/18 0306 (!)  2.51    01/25/18 0305 (!)  1.95    01/24/18 0229 (!)  1.77    01/22/18 2345 (!)  1.94    01/22/18 1409 (!)  1.69        Dose from last 7 days     Date/Time Dose (mg)    01/28/18 1500  5    01/27/18 1300  5    01/26/18 1400  2.5    01/25/18 1000  5    01/24/18 1400  7.5    01/23/18 1600  5    01/22/18 2200  5        Average Dose (mg):  (Home dose: 2.5 mg MWF, 5 mg AOD)  Significant Interactions: Antibiotics, Corticosteroids, Statin, Other (Comments) (SSRI)  Bridge Therapy: No    A/P:   · INR therapeutic today. There are no overt s/s of bleeding noted. No changes to warfarin-drug interaction profile; patient to remain on Augmentin through 1/29.   · Per review of [facility] records, patient appears to have been stable on warfarin 5 mg po daily, except 2.5 mg po MWF.   · Will continue with home dose of warfarin (5 mg po x 1 tonight) and recheck INR on 1/29.      Education Material Provided?: No (Chronic warfarin patient )  Pharmacist suggested discharge dosing: Resume home dose of warfarin 5 mg po daily, except 2.5 mg po MWF     John Felton PharmD BCPS

## 2018-01-29 NOTE — PROGRESS NOTES
Discharge instructions given to patient. Pt able to teach back education. All questions answered at this time. All belongings are with patient, including laptop computer and own CPAP machine. Pt escorted out of hospital via wheelchair with daughter.

## 2018-01-29 NOTE — PROGRESS NOTES
OP Anticoagulation Service Note    Date: 1/8/2018  Ambulatory Vitals     Anticoagulation Summary  As of 1/29/2018    INR goal:   2.0-3.0   TTR:   67.2 % (1.8 y)   Today's INR:   3.7!   Maintenance plan:   2.5 mg (5 mg x 0.5) on Mon, Wed, Fri; 5 mg (5 mg x 1) all other days   Weekly total:   27.5 mg   Plan last modified:   Loc Chan, PharmD (12/29/2017)   Next INR check:      Target end date:   Indefinite    Indications    Atrial flutter (CMS-HCC) (Resolved) [I48.92]  Acute ischemic right PCA stroke-Hemorrhage transformation is noted within the infarct (Resolved) [I63.531]  Persistent atrial fibrillation (CMS-HCC) [I48.1]             Anticoagulation Episode Summary     INR check location:   [facility]    Preferred lab:       Send INR reminders to:       Comments:         Anticoagulation Care Providers     Provider Role Specialty Phone number    Bijan Sampson M.D. Referring Cardiology [phone]    [facility] Responsible  [phone]          HPI:   Shani Love seen in clinic today, they are here today for a INR check on anticoagulation therapy with warfarin because they have a PMH/current  Atrial fibrillation/flutter    Confirmed warfarin dosing regimen  Interval Changes with foods rich in vitamin K:No  Interval Changes in ETOH:  No  Interval Changes in smoking status: No  Interval Changes in medication: Yes - Patient will start Augmentin today for 3 days  Cost restriction: No    S/S of bleeding or bruising:  No  Signs/symptoms  thrombosis since the last appt: No  CHADSVASC = 5    Assessment:   INR  supra-therapeutic.  Patient recently hospitalized for pneumonia and influenza.  Patient is discharge on Augmentin to start 1/29 for 3 days.    Possible reason(s) INR is not in range today:  Diet (hospitalized)  Medications  (inpatient antibiotics)    Intervention required today to prevent:  They are at a increased risk of bleeding because INR above goal.    Plan:  Will hold  today and reduce next 3 doses to 2.5mg as patient will being Augmentin antibiotic therapy.  Patient wishes to be seen on Friday in [place] vs. Coming in within 72hours.  Explained s/sx of bleeding to watch for and to call if any s/sx present or of any questions.    Follow up:  Follow up appointment in 4 days.       Other info:  Pt educated to contact our clinic with any changes in medications or s/s of bleeding or thrombosis    CHEST guidelines recommend frequent INR monitoring at regular intervals (a few days up to a max of 12 weeks) to ensure they are on the proper dose of warfarin and not having any complications from therapy.  INRs can dramatically change over a short time period due to diet, medications, and medical conditions.     Flaquito Blair, Pharm.D.  Pharmacy Practice Resident, PGY1

## 2018-02-02 ENCOUNTER — ANTICOAGULATION VISIT (OUTPATIENT)
Dept: MEDICAL GROUP | Facility: PHYSICIAN GROUP | Age: 63
End: 2018-02-02
Payer: MEDICAID

## 2018-02-02 VITALS
HEART RATE: 80 BPM | WEIGHT: 293 LBS | BODY MASS INDEX: 52.52 KG/M2 | DIASTOLIC BLOOD PRESSURE: 72 MMHG | SYSTOLIC BLOOD PRESSURE: 120 MMHG

## 2018-02-02 DIAGNOSIS — I48.19 PERSISTENT ATRIAL FIBRILLATION (HCC): ICD-10-CM

## 2018-02-02 LAB — INR PPP: 2.1 (ref 2–3.5)

## 2018-02-02 PROCEDURE — 85610 PROTHROMBIN TIME: CPT | Performed by: NURSE PRACTITIONER

## 2018-02-02 NOTE — PROGRESS NOTES
OP Anticoagulation Service Note    Date: 2/2/2018  Vitals:    02/02/18 1551   BP: 120/72   Pulse: 80   Weight: (!) 138.8 kg (306 lb)       Anticoagulation Summary  As of 2/2/2018    INR goal:   2.0-3.0   TTR:   67.2 % (1.8 y)   Today's INR:   2.1   Maintenance plan:   2.5 mg (5 mg x 0.5) on Mon, Wed, Fri; 5 mg (5 mg x 1) all other days   Weekly total:   27.5 mg   Plan last modified:   Loc Chan, PharmD (12/29/2017)   Next INR check:   2/16/2018   Target end date:   Indefinite    Indications    Atrial flutter (CMS-HCC) (Resolved) [I48.92]  Acute ischemic right PCA stroke-Hemorrhage transformation is noted within the infarct (Resolved) [I63.531]  Persistent atrial fibrillation (CMS-HCC) [I48.1]             Anticoagulation Episode Summary     INR check location:   [facility]    Preferred lab:       Send INR reminders to:       Comments:         Anticoagulation Care Providers     Provider Role Specialty Phone number    Bijan Sampson M.D. Referring Cardiology [phone]    [facility] Responsible  [phone]        Anticoagulation Patient Findings      HPI:   Shani Love seen in clinic today, they are here today for a INR check on anticoagulation therapy with warfarin because they have afib/flutter    The reason for today's visit is to prevent morbidity and mortality from a  stroke and to reduce the risk of bleeding while on a anticoagulant.     Reason for today's visit (per our collaborative practice policy) is because their last INR was 3.7 on 1/29.  Intervention at the last visit: held the dose that day then decreased as she was on a ABX    Additional education provided today regarding reducing bleed risk and dietary constraints: Yes, about diet and abx     Any upcoming  procedures: none    Confirmed warfarin dosing regimen  Interval Changes with foods rich in vitamin K:No  Interval Changes in ETOH:  No  Interval Changes in smoking status: No  Interval Changes in medication:  Yes, today is the last day of her Abx   Cost restriction: No    S/S of bleeding or bruising:  No  Signs/symptoms  thrombosis since the last appt: No  Bleed risk is: moderate,     3 vitals included with today's appt:No    Assessment:   INR  therapeutic.     Intervention required today to prevent:       No change in dose needed today, they will need to continue with the same dose and diet to prevent adverse events while on a anticoagulant.     They have a TTR of 67.2 which is not at target (TTR target/goal is 100%) and requires close follow up to prevent a adverse event (the lower the TTR the higher risk of clots, strokes, or bleeding).       Plan:  Continue weekly warfarin dose as noted at 27.5mg per week (her normal dose)      Follow up:  Follow up appointment in 2 week(s)       Other info:  Pt educated to contact our clinic with any changes in medications or s/s of bleeding or thrombosis    CHEST guidelines recommend frequent INR monitoring at regular intervals (a few days up to a max of 12 weeks) to ensure they are on the proper dose of warfarin and not having any complications from therapy.  INRs can dramatically change over a short time period due to diet, medications, and medical conditions.

## 2018-02-16 ENCOUNTER — TELEPHONE (OUTPATIENT)
Dept: VASCULAR LAB | Facility: MEDICAL CENTER | Age: 63
End: 2018-02-16

## 2018-02-16 ENCOUNTER — ANTICOAGULATION VISIT (OUTPATIENT)
Dept: MEDICAL GROUP | Facility: PHYSICIAN GROUP | Age: 63
End: 2018-02-16
Payer: MEDICAID

## 2018-02-16 VITALS
HEART RATE: 59 BPM | BODY MASS INDEX: 51.49 KG/M2 | WEIGHT: 293 LBS | DIASTOLIC BLOOD PRESSURE: 73 MMHG | SYSTOLIC BLOOD PRESSURE: 128 MMHG

## 2018-02-16 DIAGNOSIS — I48.19 PERSISTENT ATRIAL FIBRILLATION (HCC): ICD-10-CM

## 2018-02-16 LAB — INR PPP: 2.2 (ref 2–3.5)

## 2018-02-16 PROCEDURE — 85610 PROTHROMBIN TIME: CPT | Performed by: NURSE PRACTITIONER

## 2018-02-16 NOTE — PROGRESS NOTES
OP Anticoagulation Service Note    Date: 2/16/2018  Vitals:    02/16/18 0956 02/16/18 0957   BP: 128/73    Pulse: (!) 59    Weight:  (!) 136.1 kg (300 lb)       Anticoagulation Summary  As of 2/16/2018    INR goal:   2.0-3.0   TTR:   67.8 % (1.8 y)   Today's INR:   2.2   Maintenance plan:   2.5 mg (5 mg x 0.5) on Mon, Wed, Fri; 5 mg (5 mg x 1) all other days   Weekly total:   27.5 mg   Plan last modified:   Loc Chan, PharmD (12/29/2017)   Next INR check:   3/16/2018   Target end date:   Indefinite    Indications    Atrial flutter (CMS-HCC) (Resolved) [I48.92]  Acute ischemic right PCA stroke-Hemorrhage transformation is noted within the infarct (Resolved) [I63.531]  Persistent atrial fibrillation (CMS-HCC) [I48.1]             Anticoagulation Episode Summary     INR check location[facility]   [facility]    Preferred lab:       Send INR reminders to:       Comments:         Anticoagulation Care Providers     Provider Role Specialty Phone number    Bijan Sampson M.D. Referring Cardiology [phone]    [facility] Responsible  [phone]        Anticoagulation Patient Findings      HPI:   Shani Love seen in clinic today, they are here today for a INR check on anticoagulation therapy with warfarin because they have atrial flutter and a ischemic PCA stroke    The reason for today's visit is to prevent morbidity and mortality from a stroke  and to reduce the risk of bleeding while on a anticoagulant.     Reason for today's visit (per our collaborative practice policy) is because their last INR was 2.1  on  2/2/2018.   Intervention at the last visit:  None needed    Additional education provided today regarding reducing bleed risk and dietary constraints:  Yes about PSM and a Alere hm     Any upcoming procedures:   none    Confirmed warfarin dosing regimen  Interval Changes with foods rich in vitamin K: No  Interval Changes in ETOH:   No  Interval Changes in smoking status:  No  Interval  Changes in medication:  No   Cost restriction:  No    S/S of bleeding or bruising:  No  Signs/symptoms  thrombosis since the last appt:  No  Bleed risk is:  moderate,     3 vitals included with today's appt :Yes  (BP, HR, weight, ht, RR)     Assessment:   INR  therapeutic.     She would like a home monitor because she already tests her blood for her diabetes. She would be a good candidate for PSM program also     No change in dose needed today, they will need to continue with the same dose and diet to prevent adverse events while on a anticoagulant.     They have a TTR of 67.8  which is not at target (TTR target/goal is 100%) and requires close follow up to prevent a adverse event (the lower the TTR the higher risk of clots, strokes, or bleeding).       Plan:  Continue weekly warfarin dose as noted      Follow up:  Follow up appointment in 4 week(s)       Other info:  Pt educated to contact our clinic with any changes in medications or s/s of bleeding or thrombosis    CHEST guidelines recommend frequent INR monitoring at regular intervals (a few days up to a max of 12 weeks) to ensure they are on the proper dose of warfarin and not having any complications from therapy.  INRs can dramatically change over a short time period due to diet, medications, and medical conditions.

## 2018-02-21 ENCOUNTER — TELEPHONE (OUTPATIENT)
Dept: CARDIOLOGY | Facility: MEDICAL CENTER | Age: 63
End: 2018-02-21

## 2018-02-21 DIAGNOSIS — I10 ESSENTIAL HYPERTENSION: ICD-10-CM

## 2018-02-21 RX ORDER — DILTIAZEM HYDROCHLORIDE 120 MG/1
120 CAPSULE, COATED, EXTENDED RELEASE ORAL 2 TIMES DAILY
Qty: 180 CAP | Refills: 3 | OUTPATIENT
Start: 2018-02-21 | End: 2018-07-19 | Stop reason: SDUPTHER

## 2018-02-21 NOTE — TELEPHONE ENCOUNTER
Prescription Question     Christopher Mancini Ass't routed conversation to You 22 hours ago (10:32 AM)      Shani Sampson M.D. 22 hours ago (10:29 AM)        i need refills on my diltiazem 24 hour er 120 mg caplets sent to the [facility] pharmacy in [place] please         ______________________________________________________________    Rx refill submitted to [facility].  MyChart response sent to patient.

## 2018-02-23 ENCOUNTER — OFFICE VISIT (OUTPATIENT)
Dept: MEDICAL GROUP | Facility: CLINIC | Age: 63
End: 2018-02-23
Payer: MEDICAID

## 2018-02-23 VITALS
TEMPERATURE: 97.8 F | RESPIRATION RATE: 24 BRPM | SYSTOLIC BLOOD PRESSURE: 126 MMHG | OXYGEN SATURATION: 98 % | DIASTOLIC BLOOD PRESSURE: 72 MMHG | WEIGHT: 293 LBS | BODY MASS INDEX: 50.02 KG/M2 | HEART RATE: 102 BPM | HEIGHT: 64 IN

## 2018-02-23 DIAGNOSIS — Z00.00 ENCOUNTER FOR MEDICAL EXAMINATION TO ESTABLISH CARE: ICD-10-CM

## 2018-02-23 DIAGNOSIS — Z09 HOSPITAL DISCHARGE FOLLOW-UP: ICD-10-CM

## 2018-02-23 DIAGNOSIS — Z79.01 CHRONIC ANTICOAGULATION: ICD-10-CM

## 2018-02-23 DIAGNOSIS — Z12.39 BREAST CANCER SCREENING: ICD-10-CM

## 2018-02-23 DIAGNOSIS — Z79.01 ANTICOAGULATED: ICD-10-CM

## 2018-02-23 DIAGNOSIS — Z12.11 COLON CANCER SCREENING: ICD-10-CM

## 2018-02-23 DIAGNOSIS — I48.19 PERSISTENT ATRIAL FIBRILLATION (HCC): ICD-10-CM

## 2018-02-23 PROBLEM — M79.605 LEFT LEG PAIN: Status: RESOLVED | Noted: 2017-10-07 | Resolved: 2018-02-23

## 2018-02-23 PROBLEM — Z79.899 MEDICATION MANAGEMENT: Status: RESOLVED | Noted: 2017-09-07 | Resolved: 2018-02-23

## 2018-02-23 LAB
HBA1C MFR BLD: 7 % (ref ?–5.8)
INT CON NEG: NEGATIVE
INT CON POS: POSITIVE

## 2018-02-23 PROCEDURE — 99213 OFFICE O/P EST LOW 20 MIN: CPT | Performed by: PHYSICIAN ASSISTANT

## 2018-02-23 PROCEDURE — 83036 HEMOGLOBIN GLYCOSYLATED A1C: CPT | Performed by: PHYSICIAN ASSISTANT

## 2018-02-23 ASSESSMENT — PATIENT HEALTH QUESTIONNAIRE - PHQ9: CLINICAL INTERPRETATION OF PHQ2 SCORE: 0

## 2018-02-24 ENCOUNTER — OFFICE VISIT (OUTPATIENT)
Dept: MEDICAL GROUP | Facility: CLINIC | Age: 63
End: 2018-02-24
Payer: MEDICAID

## 2018-02-24 VITALS
OXYGEN SATURATION: 93 % | BODY MASS INDEX: 50.02 KG/M2 | RESPIRATION RATE: 18 BRPM | WEIGHT: 293 LBS | TEMPERATURE: 97.8 F | HEIGHT: 64 IN | HEART RATE: 108 BPM | SYSTOLIC BLOOD PRESSURE: 118 MMHG | DIASTOLIC BLOOD PRESSURE: 72 MMHG

## 2018-02-24 PROCEDURE — 99214 OFFICE O/P EST MOD 30 MIN: CPT | Performed by: PHYSICIAN ASSISTANT

## 2018-02-24 NOTE — ASSESSMENT & PLAN NOTE
Patient is currently without a PCP. She wishes to establish care with a new provider at this time.

## 2018-02-24 NOTE — ASSESSMENT & PLAN NOTE
Last A1c: 7.0   DM Medications: Pioglitazone 30 mg tablets daily, Victoza 1.8 mg daily injection, Triseba flextouch 40 units every bedtime, empagliflozin 10mg tab qam,  Patient reports good medication compliance.   HTN: Blood pressure goal <140/<90 Yes  ACE: Lisinopril 5 mg tablets  Hyperlipidemia: Cholesterol goal LDL <100 Yes.   Currently Rx Statin: Simvastatin 20 mg tablet  Diabetic diet: No  Exercise: None.  Last monofilament foot exam: Due for repeat check at this time however, not completed. Checks feet at home: No, no sores currently   Last Eye exam: Due in April 2018.  Kidney function: GFR decreased at 49 Jan 2018.   Microalbumin screening:  Abnormal as of sept 2017 as below:   Micro Alb Creat Ratio 80   0 - 30 mg/g Final       Has patient received flu vaccine: Yes  Has patient received Hep B series:Yes    A1c goal <7 Yes  Current barriers to control include none, although diet control has been difficult.   Glucose monitoring frequency: QAM  Fasting sugars: 120-160's. Post-prandial sugars: none taken  Hypoglycemic episodes No  Diabetic complications: nephropathy, cardiovascular disease and peripheral vascular disease    The patient is taking ASA every day and is taking all other medications as prescribed. Patient denies any side effects of medication.

## 2018-02-24 NOTE — ASSESSMENT & PLAN NOTE
Patient was recently admitted with flu for about a week. She is feeling much better now. She denies any upper or lower respiratory symptoms at this time.

## 2018-02-24 NOTE — PROGRESS NOTES
Chief Complaint   Patient presents with   • Influenza     hospital follow up - [facility]       HISTORY OF PRESENT ILLNESS: Patient is a 62 y.o. 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   HTN: Blood pressure goal <140/<90 Yes  ACE: Lisinopril 5 mg tablets  Hyperlipidemia: Cholesterol goal LDL <100 Yes.   Currently Rx Statin: Simvastatin 20 mg tablet  Diabetic diet: No  Exercise: None.  Last monofilament foot exam: Due for repeat check at this time however, not completed. Checks feet at home: No, no sores currently   Last Eye exam: Due in April 2018.  Kidney function: GFR decreased at 49 Jan 2018.   Microalbumin screening:  Abnormal as of sept 2017 as below:   Micro Alb Creat Ratio 80   0 - 30 mg/g Final       Has patient received flu vaccine: Yes  Has patient received Hep B series:Yes    A1c goal <7 Yes  Current barriers to control include none, although diet control has been difficult.   Glucose monitoring frequency: QAM  Fasting sugars: 120-160's. Post-prandial sugars: none taken  Hypoglycemic episodes No  Diabetic complications: nephropathy, cardiovascular disease and peripheral vascular disease    The patient is taking ASA every day and is taking all other medications as prescribed. Patient denies any side effects of medication.      Persistent atrial fibrillation (CMS-HCC)  Rate controlled on diltiazem. Warfarin use for anticoagulation. Continues follow-up with Dr. Cope    Chronic anticoagulation  On anticoagulation protocol with pharmacy in [place] every other week. Currently well controlled.    Hospital discharge  follow-up  Patient was recently admitted with flu for about a week. She is feeling much better now. She denies any upper or lower respiratory symptoms at this time.    Encounter for medical examination to establish care  Patient is currently without a PCP. She wishes to establish care with a new provider at this time.       Patient Active Problem List    Diagnosis Date Noted   • Chronic kidney disease, stage III (moderate) 03/15/2017     Priority: Medium   • Obstructive sleep apnea 02/15/2017     Priority: Medium   • Status post ablation of atrial flutter 05/18/2016     Priority: Medium   • Pulmonary hypertension 04/08/2016     Priority: Medium   • Essential hypertension 12/01/2015     Priority: Medium   • Persistent atrial fibrillation (CMS-HCC) 12/01/2015     Priority: Medium   • History of stroke 11/30/2015     Priority: Medium   • Hyperlipidemia 11/19/2015     Priority: Medium   • Uncontrolled type 2 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, with long-term current use of insulin (CMS-HCC) 11/17/2015     Priority: Medium   • Normochromic normocytic anemia 01/22/2018     Priority: Low   • Anxiety 09/07/2017     Priority: Low   • Retinal hemorrhage of both eyes 10/26/2016     Priority: Low   • Hospital discharge follow-up 02/23/2018   • Encounter for medical examination to establish care 02/23/2018   • Mitral stenosis 01/29/2018   • Mitral regurgitation 01/29/2018   • Cellulitis of left lower leg 10/07/2017   • Candidiasis of skin 07/20/2017   • BMI 50.0-59.9, adult (CMS-HCC) 06/28/2017   • Moderate single current episode of major depressive disorder (CMS-HCC) 06/15/2017   • Severe nonproliferative diabetic retinopathy of both eyes associated with type 2 diabetes mellitus (CMS-HCC) 05/04/2017   • Paroxysmal atrial flutter (CMS-HCC) 02/23/2017   • Chronic anticoagulation 02/15/2017   • Fecal occult blood test positive 12/16/2016   • Vitamin D deficiency 12/16/2016              Allergies:Patient has no known allergies.    Current Outpatient Prescriptions   Medication Sig Dispense Refill   • DILTIAZem CD (CARDIZEM CD) 120 MG CAPSULE SR 24 HR Take 1 Cap by mouth 2 Times a Day. 180 Cap 3   • warfarin (COUMADIN) 5 MG Tab Take 2.5-5 mg by mouth every day. 2.5 mg on , , and . All other days 5 mg     • Insulin Degludec (TRESIBA FLEXTOUCH) 200 UNIT/ML Solution Pen-injector Inject 40 Units as instructed every bedtime.     • Ipratropium-Albuterol (DUONEB INH) Inhale 1 Inhalation by mouth Once.     • cholecalciferol (VITAMIN D3) 5000 UNIT Cap Take 1 Cap by mouth every day. 30 Cap 5   • VICTOZA 18 MG/3ML Solution Pen-injector injection Inject 0.3 mL as instructed every day. 3 PEN 6   • citalopram (CELEXA) 20 MG Tab Take 1 Tab by mouth every day. 30 Tab 2   • pioglitazone (ACTOS) 30 MG Tab Take 1 Tab by mouth every day. 30 Tab 11   • metoprolol (LOPRESSOR) 50 MG Tab Take 1 Tab by mouth 2 times a day. 180 Tab 2   • furosemide (LASIX) 40 MG Tab Take 1 Tab by mouth every day. 90 Tab 2   • nystatin (MYCOSTATIN) powder Apply to affected area 1-2 times daily PRN 15 g 2   • magnesium oxide (MAG-OX) 400 (241.3 MG) MG Tab tablet Take 1 Tab by mouth every day. 60 Tab 2   • simvastatin (ZOCOR) 20 MG Tab Take 1 Tab by mouth every evening. 90 Tab 3   • lisinopril (PRINIVIL) 5 MG Tab Take 1 Tab by mouth every day. 90 Tab 3   • Empagliflozin 10 MG Tab Take 1 tablet by mouth every morning with breakfast. 30 Tab 1     No current facility-administered medications for this visit.        Social History   Substance Use Topics   • Smoking status: Never Smoker   • Smokeless tobacco: Never Used   • Alcohol use No       Family Status   Relation Status   • Mother    • Father    • Brother      Family History   Problem Relation Age of Onset   • Stroke Mother 71   • Cancer Father 71   • Heart Disease Brother        Review of Systems: See history of present illness  "above.  Constitutional: Negative for fever, chills, weight loss and positive for malaise/fatigue.   HENT: Negative for ear pain, nosebleeds, congestion, sore throat and neck pain.    Eyes: Negative for blurred vision.   Respiratory: Positive for shortness of breath on exertion. Negative for cough, sputum production,   Cardiovascular: Negative for chest pain, palpitations, positive for orthopnea and leg swelling.   Gastrointestinal: Negative for heartburn, nausea, vomiting and abdominal pain.   Genitourinary: Negative for dysuria, urgency and frequency.   Musculoskeletal: Negative for myalgias, back pain and joint pain.   Skin: Negative for rash and itching. Positive for swelling in color changes of her bilateral lateral lower legs   Neurological: Negative for dizziness, tingling, tremors, sensory change, focal weakness and headaches.   Endo/Heme/Allergies: Does not bruise/bleed easily.   Psychiatric/Behavioral: Negative for depression, suicidal ideas and memory loss.  The patient is not nervous/anxious and does not have insomnia.      Exam:  Blood pressure 126/72, pulse (!) 102, temperature 36.6 °C (97.8 °F), resp. rate (!) 24, height 1.626 m (5' 4\"), weight (!) 148.3 kg (327 lb), SpO2 98 %.  Body mass index is 56.13 kg/m².  General: Morbidly Obese female in NAD  Head: is grossly normal.  Neck: Supple without masses. Thyroid is not visibly enlarged.  Pulmonary: Difficult to auscultate due to habitus. Clear to ausculation. Normal effort. No rales, ronchi, or wheezing.  Cardiovascular: Difficult to auscultate due to habitus however, the rhythm that is auscultated is irregularly irregular. without murmur. Pulses decreased in bilateral lower extremities with 2+/4 pitting edema bilaterally. Hyperpigmentation present in bilateral lower extremities.  Extremities: no clubbing, cyanosis, or edema.    Medical decision-making and discussion:  1. Colon cancer screening  - OCCULT BLOOD FECES IMMUNOASSAY; Future    2. Breast " cancer screening  - MA-MAMMO SCREENING BILAT W/WILLIE W/CAD; Future    3. 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 Persistent atrial fibrillation (CMS-[facility])  Continue cardiac follow-up.    5. Anticoagulated  Continue anticoagulation per cardiology.    6. Hospital discharge follow-up      7. Encounter for medical examination to establish care  Happy to follow the care of this pleasant 62-year-old female.      Please note that this dictation was created using voice recognition software. I have made every reasonable attempt to correct obvious errors, but I expect that there are errors of grammar and possibly content that I did not discover before finalizing the note.      Return in about 3 months (around 5/23/2018) for Chronic conditions.

## 2018-02-24 NOTE — PROGRESS NOTES
Return to office Patient Consult Note  Referred by: Estela Gunderson P.A.-C.    Reason for consult: Diabetes Management Type 2    HPI:  Shani Love is a 62 y.o. old patient who is seeing us today for diabetes care.  This is a pleasant patient with diabetes and I appreciate the opportunity to participate in the care of this patient.    BG Diary:2/24/2018  In the AM:112, 130, 139, 121, 123, 103, 124, 115, 120  Before Bed:130, 200, 161, 148, 139, 170, 126, 121    Labs of 2/23/18 HbA1c is 7.0  Labs of 9/7/17 show a HbA1c of 10.0  Labs of 6/16/17 show a microalbumin/Cr ratio of 173, GFR 52     New labs of 9/13/17  c-peptide 3.7, GFR 58, Vit D. 55, B12 268, TSH 3.9, microal/Cr ratio 80     BG Diary:10/21/2017  In the AM: 131, 192, 160, 141, 182, 163  Before Bed: 131, 204, 215, 228, 203, 94, 163,      BG Diary:10/7/2017  In the AM:296, 191, 178, 192, 183, 268,      BG Diary:9/23/2017  In the AM:  Did not bring     BG Diary:9/9/2017  In the AM: 300, 171,   Just before Bed: 342     Has been Diabetic since 10 years  Has a Glucagon pen at home: no     Hx of TIA     Weight:  On 9/9/17 she was 320 pounds and today she is 303     She has lost 17 pounds in just 4 weeks.  This is great.  She stopped drinking SODA.  My discussions last visit helped her.           1. Uncontrolled type 2 diabetes mellitus with both eyes affected by severe nonproliferative retinopathy and macular edema, with long-term current use of insulin (CMS-[facility])    I had her:  STOP Glimepiride  STOP Lantus     I had her Start:  1.   Victoza 1.8 at night     2.   Tresiba 40 units at night  3.   Actos 30mg once a day in the Am  4.   Jardiance 10mg one in the AM     Mat need to start Novolog 6 units before dinner will wait and see in two weeks.        ROS:   Constitutional: No night sweats.  Eyes:  No visual changes.  Cardiac: No chest pain, No palpitations or racing heart rate.  Resp: No shortness of breath, No cough,   Gi: No Diarrhea    All other systems were  reviewed and were/are negative.  The ROS was revised/revisited during this office visit from the patients first office visit with me on 9/9/17 Please review the full ROS during the first office visit.    Past Medical History:  Patient Active Problem List    Diagnosis Date Noted   • Chronic kidney disease, stage III (moderate) 03/15/2017     Priority: Medium   • Obstructive sleep apnea 02/15/2017     Priority: Medium   • Status post ablation of atrial flutter 05/18/2016     Priority: Medium   • Pulmonary hypertension 04/08/2016     Priority: Medium   • Essential hypertension 12/01/2015     Priority: Medium   • Persistent atrial fibrillation (CMS-HCC) 12/01/2015     Priority: Medium   • History of stroke 11/30/2015     Priority: Medium   • Hyperlipidemia 11/19/2015     Priority: Medium   • Uncontrolled type 2 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, with long-term current use of insulin (CMS-HCC) 11/17/2015     Priority: Medium   • Normochromic normocytic anemia 01/22/2018     Priority: Low   • Anxiety 09/07/2017     Priority: Low   • Retinal hemorrhage of both eyes 10/26/2016     Priority: Low   • Hospital discharge follow-up 02/23/2018   • Encounter for medical examination to establish care 02/23/2018   • Mitral stenosis 01/29/2018   • Mitral regurgitation 01/29/2018   • Cellulitis of left lower leg 10/07/2017   • Candidiasis of skin 07/20/2017   • BMI 50.0-59.9, adult (CMS-HCC) 06/28/2017   • Moderate single current episode of major depressive disorder (CMS-HCC) 06/15/2017   • Severe nonproliferative diabetic retinopathy of both eyes associated with type 2 diabetes mellitus (CMS-HCC) 05/04/2017   • Paroxysmal atrial flutter (CMS-HCC) 02/23/2017   • Chronic anticoagulation 02/15/2017   • Fecal occult blood test positive 12/16/2016   • Vitamin D deficiency 12/16/2016       Past Surgical History:  Past Surgical History:   Procedure Laterality Date   • RECOVERY  5/9/2016    Procedure:  CATH LAB FLUTTER ABLATION, CAMILO WITH ANESTHESIA DR. ARMIJO;  Surgeon: Recoveryonly Surgery;  Location: SURGERY PRE-POST PROC UNIT [facility];  Service:    • PILONIDAL CYST EXCISION     • TONSILLECTOMY         Allergies:  Patient has no known allergies.    Social History:  Social History     Social History   • Marital status:      Spouse name: N/A   • Number of children: N/A   • Years of education: N/A     Occupational History   • Not on file.     Social History Main Topics   • Smoking status: Never Smoker   • Smokeless tobacco: Never Used   • Alcohol use No   • Drug use: No   • Sexual activity: Not Currently     Partners: Male     Other Topics Concern   • Not on file     Social History Narrative   • No narrative on file       Family History:  Family History   Problem Relation Age of Onset   • Stroke Mother 71   • Cancer Father 71   • Heart Disease Brother        Medications:    Current Outpatient Prescriptions:   •  Empagliflozin 10 MG Tab, Take 1 tablet by mouth every morning with breakfast., Disp: 30 Tab, Rfl: 1  •  DILTIAZem CD (CARDIZEM CD) 120 MG CAPSULE SR 24 HR, Take 1 Cap by mouth 2 Times a Day., Disp: 180 Cap, Rfl: 3  •  warfarin (COUMADIN) 5 MG Tab, Take 2.5-5 mg by mouth every day. 2.5 mg on Mondays, Wednesdays, and Fridays. All other days 5 mg, Disp: , Rfl:   •  Insulin Degludec (TRESIBA FLEXTOUCH) 200 UNIT/ML Solution Pen-injector, Inject 40 Units as instructed every bedtime., Disp: , Rfl:   •  Ipratropium-Albuterol (DUONEB INH), Inhale 1 Inhalation by mouth Once., Disp: , Rfl:   •  cholecalciferol (VITAMIN D3) 5000 UNIT Cap, Take 1 Cap by mouth every day., Disp: 30 Cap, Rfl: 5  •  VICTOZA 18 MG/3ML Solution Pen-injector injection, Inject 0.3 mL as instructed every day., Disp: 3 PEN, Rfl: 6  •  citalopram (CELEXA) 20 MG Tab, Take 1 Tab by mouth every day., Disp: 30 Tab, Rfl: 2  •  pioglitazone (ACTOS) 30 MG Tab, Take 1 Tab by mouth every day., Disp: 30 Tab, Rfl: 11  •  metoprolol (LOPRESSOR) 50 MG Tab,  "Take 1 Tab by mouth 2 times a day., Disp: 180 Tab, Rfl: 2  •  furosemide (LASIX) 40 MG Tab, Take 1 Tab by mouth every day., Disp: 90 Tab, Rfl: 2  •  nystatin (MYCOSTATIN) powder, Apply to affected area 1-2 times daily PRN, Disp: 15 g, Rfl: 2  •  magnesium oxide (MAG-OX) 400 (241.3 MG) MG Tab tablet, Take 1 Tab by mouth every day., Disp: 60 Tab, Rfl: 2  •  simvastatin (ZOCOR) 20 MG Tab, Take 1 Tab by mouth every evening., Disp: 90 Tab, Rfl: 3  •  lisinopril (PRINIVIL) 5 MG Tab, Take 1 Tab by mouth every day., Disp: 90 Tab, Rfl: 3        Physical Examination:   Vital signs: /72   Pulse (!) 108   Temp 36.6 °C (97.8 °F)   Resp 18   Ht 1.626 m (5' 4\")   Wt (!) 148.8 kg (328 lb)   SpO2 93%   BMI 56.30 kg/m²   General: No distress, cooperative, well dressed and well nourished.   Eyes: No scleral icterus or discharge, No hyposphagma  ENMT: Normal on external inspection of nose, lips, No nasal drainage   Neck: No abnormal masses on inspection  Resp: Normal effort, Bilateral clear to auscultation, No wheezing, No rales  CVS: Regular rate and rhythm, S1 S2 normal, No murmur. No gallop  Extremities: No edema bilateral extremities  Neuro: Alert and oriented  Skin: No rash, No Ulcers  Psych: Normal mood and affect      Assessment and Plan:    1. Uncontrolled type 2 diabetes mellitus with both eyes affected by severe nonproliferative retinopathy and macular edema, with long-term current use of insulin (CMS-[facility])    I had her Start:  1.   Victoza 1.8 at night     2.   Tresiba 40 units at night  3.   Actos 30mg once a day in the Am  4.   Jardiance 10mg one in the AM    The total time spent seeing this patient today face to face in consultation, and formulating an action plan for this visit was greater than 25 minutes. > Than 50% of this time was spent counseling, discussing problems documented above and below, coordinating care and answering questions by the physician assistant.  We developed a diabetes care plan for " this patient today.    Return in about 3 months (around 5/24/2018).    Blood glucose log: Check BG in the morning when wake up, before lunch or dinner and before bed.  So three times a day.  Always bring BG diary to the next office visit.     Thank you kindly for allowing me to participate in the diabetes care plan for this patient.    Pranav Hardy PA-C, [facility]  Board Certified - Advanced Diabetes Management  02/24/18    CC:   Estela Gunderson P.A.-C.

## 2018-03-01 ENCOUNTER — OFFICE VISIT (OUTPATIENT)
Dept: CARDIOLOGY | Facility: MEDICAL CENTER | Age: 63
End: 2018-03-01
Payer: MEDICAID

## 2018-03-01 VITALS
SYSTOLIC BLOOD PRESSURE: 118 MMHG | OXYGEN SATURATION: 89 % | HEART RATE: 96 BPM | HEIGHT: 64 IN | DIASTOLIC BLOOD PRESSURE: 70 MMHG

## 2018-03-01 DIAGNOSIS — I48.19 PERSISTENT ATRIAL FIBRILLATION (HCC): ICD-10-CM

## 2018-03-01 DIAGNOSIS — Z79.01 CHRONIC ANTICOAGULATION: ICD-10-CM

## 2018-03-01 DIAGNOSIS — I10 ESSENTIAL HYPERTENSION: ICD-10-CM

## 2018-03-01 DIAGNOSIS — I34.0 MITRAL VALVE INSUFFICIENCY, UNSPECIFIED ETIOLOGY: ICD-10-CM

## 2018-03-01 DIAGNOSIS — I05.0 MITRAL VALVE STENOSIS, UNSPECIFIED ETIOLOGY: ICD-10-CM

## 2018-03-01 PROBLEM — I48.0 PAF (PAROXYSMAL ATRIAL FIBRILLATION) (HCC): Status: ACTIVE | Noted: 2018-03-01

## 2018-03-01 PROCEDURE — 99214 OFFICE O/P EST MOD 30 MIN: CPT | Performed by: INTERNAL MEDICINE

## 2018-03-01 ASSESSMENT — ENCOUNTER SYMPTOMS
PALPITATIONS: 0
PND: 0
DIZZINESS: 0

## 2018-03-01 NOTE — PROGRESS NOTES
Subjective:   Shani Richter is a 62 y.o. female who presents today for follow-up evaluation of mitral stenosis, mitral regurgitation, paroxysmal atrial flutter, paroxysmal atrial fibrillation, ablation for SVT/flutter, chronic anticoagulation with a history of hypertension, cerebrovascular accident in 2015 with prior evidence of a lacunar infarction.    Last seen on 1/29/2018.    Since 1/29/2018 appointment the patient has had no new cardiac symptoms.  Physical activity severely limited.  Denies palpitations or chest pain.  Oximetry log demonstrates average pulse of 80s-90s.    Since 2/23/2017 the patient was recently hospitalized.  Between 1/22/2018-1/28/2018 the patient was hospitalized for a flu, hypoxic respiratory failure and pulmonary edema.  Echocardiogram showed left ventricular ejection fraction 65%, moderate mitral stenosis, mild mitral regurgitation and moderate tricuspid regurgitation.  Initial EKG on 1/22/2018 showed normal sinus rhythm.  Follow-up EKG on 1/27/2018 showed atrial fibrillation.  The patient has been on long-term anticoagulation.  Symptomatically the patient feels much better since her discharge.    I last saw the patient on her initial hospitalization in November, 2015 at the time that she presented with atrial fibrillation/flutter and CVA.  Subsequently the patient was again hospitalized in April, 2016 for atrial fibrillation.  The patient subsequently underwent atrial flutter ablation on 5/9/2016 by Dr. Miguel Segura.    Overall the patient states that over the past year she's had less shortness of breath that her blood pressures been better.  Last week she had a sleep study and is being evaluated for either BiPAP or CPAP device.  The patient denies palpitations or chest pain.    Past medical history  [place]17[place] [facility] [place] [facility] [place]CHARGE DIAGNOSES:  1.  Atrial flutter.  2.  Atrial fibrillation.  3.  Stroke in the right posterior cerebral artery distribution.  4.   Hypertensive urgency.  5.  Hyponatremia.  6.  Dyslipidemia.  7.  Diabetes mellitus, type 2.  8.  Possible sleep apnea.    [place] 4/6[place]2016[place] [facility] [place]GE DIAGNOSES:  1.  Status post treatment of community-acquired pneumonia.  The patient is    improved on medication regimen.  We will continue an overall 10-day antibiotic   course.  2.  Pulmonary edema secondary to valvular heart disease.  The patient also    mitral stenosis and atrial flutter fibrillation.  The patient is improved on    diuretics.  3.  Hypoxemia is improved.  The patient remains hypoxic during the entire monitoring period  4.  Question of obstructive sleep apnea.  The patient now discharged on    nocturnal oxygen, for outpatient followup and referral to pulmonary medicine    and outpatient sleep study.  5.  Atrial flutter and fibrillation.  The patient is better rate controlled on   current anticoagulation therapy, for a close outpatient followup cardiology    as well as likely, cardioversion as an outpatient.  6.  Hypertension is improved.  7.  Diabetes type 2, to continue her home regimen.  8.  Mitral stenosis.  9.  Dyslipidemia.  10.  Severe dietary obesity.    DATE OF SERVICE:  05/09/2016  PROCEDURE PERFORMED:  1.  Electrophysiology study with radiofrequency ablation of SVT circuit.  2.  Advanced mapping using CARTO system.  3.  Left atrial pacing, sensing and stimulation protocol.  4.  Transesophageal echocardiogram.  PHYSICIAN:  Miguel Segura MD  INDICATION:  Persistent atrial flutter.    Past Medical History:   Diagnosis Date   • Atrial fibrillation (CMS-HCC)    • Atrial flutter (CMS-HCC) 11/18/2015   • Diabetes    • Heart murmur     childhood   • Hyperlipidemia    • Hypertension    • Morbid obesity (CMS-HCC)    • Osteoarthritis    • Pneumonia 4/6/2016   • Stroke (CMS-HCC) 11/18/2015    Acute ischemic right PCA stroke-Hemorrhage transformation is noted within the infarct [I63.531]   • Valvular heart disease      Mitral stenosis     Past Surgical History:   Procedure Laterality Date   • RECOVERY  5/9/2016    Procedure: CATH LAB FLUTTER ABLATION, CAMILO WITH ANESTHESIA DR. ARMIJO;  Surgeon: Recoveryonly Surgery;  Location: SURGERY PRE-POST PROC UNIT [facility] [place];  Service:    • PILONIDAL CYST EXCISION     • TONSILLECTOMY       Family History   Problem Relation Age of Onset   • Stroke Mother 71   • Cancer Father 71   • Heart Disease Brother      History   Smoking Status   • Never Smoker   Smokeless Tobacco   • Never Used     No Known Allergies  Outpatient Encounter Prescriptions as of 3/1/2018   Medication Sig Dispense Refill   • Empagliflozin 10 MG Tab Take 1 tablet by mouth every morning with breakfast. 30 Tab 1   • DILTIAZem CD (CARDIZEM CD) 120 MG CAPSULE SR 24 HR Take 1 Cap by mouth 2 Times a Day. 180 Cap 3   • warfarin (COUMADIN) 5 MG Tab Take 2.5-5 mg by mouth every day. 2.5 mg on Mondays, Wednesdays, and Fridays. All other days 5 mg     • Insulin Degludec (TRESIBA FLEXTOUCH) 200 UNIT/ML Solution Pen-injector Inject 40 Units as instructed every bedtime.     • cholecalciferol (VITAMIN D3) 5000 UNIT Cap Take 1 Cap by mouth every day. 30 Cap 5   • VICTOZA 18 MG/3ML Solution Pen-injector injection Inject 0.3 mL as instructed every day. 3 PEN 6   • citalopram (CELEXA) 20 MG Tab Take 1 Tab by mouth every day. 30 Tab 2   • pioglitazone (ACTOS) 30 MG Tab Take 1 Tab by mouth every day. 30 Tab 11   • metoprolol (LOPRESSOR) 50 MG Tab Take 1 Tab by mouth 2 times a day. 180 Tab 2   • furosemide (LASIX) 40 MG Tab Take 1 Tab by mouth every day. 90 Tab 2   • nystatin (MYCOSTATIN) powder Apply to affected area 1-2 times daily PRN 15 g 2   • magnesium oxide (MAG-OX) 400 (241.3 MG) MG Tab tablet Take 1 Tab by mouth every day. 60 Tab 2   • simvastatin (ZOCOR) 20 MG Tab Take 1 Tab by mouth every evening. 90 Tab 3   • lisinopril (PRINIVIL) 5 MG Tab Take 1 Tab by mouth every day. 90 Tab 3   • Ipratropium-Albuterol (DUONEB INH) Inhale 1 Inhalation by mouth  "Once.       No facility-administered encounter medications on file as of 3/1/2018.      Review of Systems   Cardiovascular: Positive for leg swelling. Negative for chest pain, palpitations and PND.   Neurological: Negative for dizziness.        Objective:   /70   Pulse 96   Ht 1.626 m (5' 4\")   SpO2 89%     Physical Exam   Constitutional: She is oriented to person, place, and time. She appears well-nourished. No distress.   HENT:   Head: Normocephalic.   Eyes: EOM are normal. No scleral icterus.   Neck: Carotid bruit is not present.   Thick neck JVP could not be accurately assessed.   Cardiovascular: Normal rate, S1 normal, S2 normal and normal heart sounds.  An irregularly irregular rhythm present. Exam reveals no gallop and no friction rub.    No murmur heard.  Pulses:       Carotid pulses are 2+ on the right side, and 2+ on the left side.       Radial pulses are 2+ on the right side, and 2+ on the left side.   Pulmonary/Chest: Effort normal and breath sounds normal. She has no wheezes. She has no rhonchi. She has no rales.   Increased AP diameter.   Abdominal:   Morbidly obese.   Musculoskeletal: She exhibits edema.   Chronic dermatologic changes.   Neurological: She is alert and oriented to person, place, and time.   Skin: Skin is warm and dry.   Psychiatric: She has a normal mood and affect. Her behavior is normal.     01/22/2010 MPI  1. DIPYRIDAMOLE STRESS POSITIVE FOR CHEST DISCOMFORT BUT WITHOUT ISCHEMIC   EKG CHANGES.  2. SPECT IMAGES SUGGEST A SMALL SEGMENT OF PROBABLE MILD INFEROLATERAL   ISCHEMIA.  PROMINENT APICAL THINNING IS NOTED.  3. THE GATED STUDY SHOWS A MODERATE REDUCTION OF EJECTION FRACTION WITH   PROBABLE LATERAL HYPOKINESIS.    04/06/2016 ECHOCARDIOGRAM  Left ventricular ejection fraction is visually estimated to be 50%.  Dilated inferior vena cava, 2.6 cm, without inspiratory collapse.    Biatrial dilation.  Mild-moderate mixed mitral valve disease.  The mitral valve is "   significantly thickened and restricted.  Right ventricular systolic pressure is estimated to be 55 mmHg.  No significant change from the study of 11/2015.  Moderate mitral stenosis. Mean transvalvular gradient is 8 mmHg at a   heart rate of 80 BPM. Mitral valve area is 4.9 sq cm. Mild mitral   Regurgitation.    01/24/2018 ECHOCARDIOGRAM  Normal left ventricular systolic function.  Left ventricular ejection fraction is visually estimated to be 65%.  Moderate mitral stenosis. Mean transvalvular gradient is 8 mmHg.   Mild mitral regurgitation.  Moderate tricuspid regurgitation.  Estimated right ventricular systolic pressure is 30 mmHg.  Mild pulmonic insufficiency.  Compared to the report of the study done - there has been no significant change.     01/22/2018 EKG: Sinus tachycardia, rate 100.  01/27/2018 EKG: Atrial fibrillation, rate 93. Reviewed by myself.    Assessment:     1. Persistent atrial fibrillation (CMS-HCC)     2. Chronic anticoagulation     3. Mitral valve stenosis, unspecified etiology     4. Mitral valve insufficiency, unspecified etiology     5. Essential hypertension         Medical Decision Making:  Today's Assessment / Status / Plan:     Atrial fibrillation. Persistent.    Mitral stenosis/mitral regurgitation.     History of paroxysmal atrial flutter and paroxysmal atrial fibrillation. Since 2015.    Atrial flutter ablation 5/9/2016.    Anticoagulation. On warfarin. In anticoagulation surveillance.    Hypertension controlled.    CVA. 2015 with prior history of lacunar infarction.    Diabetes mellitus.    Morbid obesity.    Sleep apnea. Diagnosed early 2017. On CPAP.    Recommendation/Discussion  I had a lengthy and detailed discussion with the patient and her family with regards to ongoing management of her atrial fibrillation.  I discussed options regarding hospitalization for antiarrhythmic therapy with cardioversion or consideration of ablation.  In my judgment the patient has and will have  ongoing chronic medical problems that would undoubtedly lead to recurrent atrial fibrillation and I recommend that a rate control and anticoagulation management regimen should be continued.  Follow-up 3 months.

## 2018-03-09 DIAGNOSIS — E55.9 VITAMIN D DEFICIENCY: ICD-10-CM

## 2018-03-09 DIAGNOSIS — F32.A DEPRESSION, UNSPECIFIED DEPRESSION TYPE: ICD-10-CM

## 2018-03-09 NOTE — TELEPHONE ENCOUNTER
Pt states she is out.    Was the patient seen in the last year in this department? Yes     Does patient have an active prescription for medications requested? Yes     Received Request Via: Patient

## 2018-03-12 RX ORDER — CITALOPRAM 20 MG/1
20 TABLET ORAL DAILY
Qty: 30 TAB | Refills: 2 | Status: SHIPPED | OUTPATIENT
Start: 2018-03-12 | End: 2018-07-02 | Stop reason: SDUPTHER

## 2018-03-16 ENCOUNTER — ANTICOAGULATION VISIT (OUTPATIENT)
Dept: MEDICAL GROUP | Facility: PHYSICIAN GROUP | Age: 63
End: 2018-03-16
Payer: MEDICAID

## 2018-03-16 DIAGNOSIS — I48.19 PERSISTENT ATRIAL FIBRILLATION (HCC): ICD-10-CM

## 2018-03-16 LAB — INR PPP: 1.8 (ref 2–3.5)

## 2018-03-16 PROCEDURE — 99211 OFF/OP EST MAY X REQ PHY/QHP: CPT | Performed by: NURSE PRACTITIONER

## 2018-03-16 PROCEDURE — 85610 PROTHROMBIN TIME: CPT | Performed by: NURSE PRACTITIONER

## 2018-03-16 NOTE — PROGRESS NOTES
OP Anticoagulation Service Note    Date: 3/16/2018  There were no vitals filed for this visit.    Anticoagulation Summary  As of 3/16/2018    INR goal:   2.0-3.0   TTR:   67.1 % (1.9 y)   Today's INR:   1.8!   Maintenance plan:   2.5 mg (5 mg x 0.5) on Mon, Wed, Fri; 5 mg (5 mg x 1) all other days   Weekly total:   27.5 mg   Plan last modified:   Loc Chan, PharmD (12/29/2017)   Next INR check:   4/13/2018   Target end date:   Indefinite    Indications    Atrial flutter (CMS-HCC) (Resolved) [I48.92]  Acute ischemic right PCA stroke-Hemorrhage transformation is noted within the infarct (Resolved) [I63.531]  Persistent atrial fibrillation (CMS-HCC) [I48.1]             Anticoagulation Episode Summary     INR check location:   [facility]    Preferred lab:       Send INR reminders to:       Comments:         Anticoagulation Care Providers     Provider Role Specialty Phone number    Bijan Sampson M.D. Referring Cardiology [phone]    [facility] Responsible  [phone]        Anticoagulation Patient Findings      HPI:   Shani Love seen in clinic today, they are here today for a INR check on anticoagulation therapy with warfarin because they have atrial flutter and a ischemic stroke     The reason for today's visit is to prevent morbidity and mortality from a stroke  and to reduce the risk of bleeding while on a anticoagulant.     Reason for today's visit (per our collaborative practice policy) is because their last INR was 2.2  on  2/16/2018.   Intervention at the last visit:  None needed at that time    Additional education provided today regarding reducing bleed risk and dietary constraints:  About diet and the PSM program, she still has not got the  yet    Any upcoming procedures:   none    Confirmed warfarin dosing regimen  Interval Changes with foods rich in vitamin K: No  Interval Changes in ETOH:   No  Interval Changes in smoking status:  No  Interval Changes in  medication:  No   Cost restriction:  No    S/S of bleeding or bruising:  No  Signs/symptoms  thrombosis since the last appt:  No  Bleed risk is:  moderate,     3 vitals included with today's appt :No, declined   (BP, HR, weight, ht, RR)     Assessment:   INR  sub-therapeutic.     Possible reason(s) for INR today:   might be diet    Intervention required today to prevent:    They are at a increased risk of stroke because INR is below goal.      They have a TTR of 67.1  which is not at target (TTR target/goal is 100%) and requires close follow up to prevent a adverse event (the lower the TTR the higher risk of clots, strokes, or bleeding).     We did talk about this plan   INR  DOSE ADJUSTMENT RECHECK INR IN:        UNDER 1.5 Call Coumadin clinic As directed   1.5-1.9 Take EXTRA 2.5mg tonight 1 week   2.0-3.0 No change 2 weeks   3.1-3.9 Take 2.5 mg  LESS of normal dose (even if that means taking no tablet at all) 1 week   OVER 4.0 Call Coumadin clinic As directed       Plan:  5mg today then continue weekly warfarin dose as noted    Follow up:  Follow up appointment in 4 week(s)       Other info:  Pt educated to contact our clinic with any changes in medications or s/s of bleeding or thrombosis    CHEST guidelines recommend frequent INR monitoring at regular intervals (a few days up to a max of 12 weeks) to ensure they are on the proper dose of warfarin and not having any complications from therapy.  INRs can dramatically change over a short time period due to diet, medications, and medical conditions.

## 2018-04-13 ENCOUNTER — ANTICOAGULATION VISIT (OUTPATIENT)
Dept: MEDICAL GROUP | Facility: PHYSICIAN GROUP | Age: 63
End: 2018-04-13
Payer: MEDICARE

## 2018-04-13 DIAGNOSIS — I48.19 PERSISTENT ATRIAL FIBRILLATION (HCC): ICD-10-CM

## 2018-04-13 LAB — INR PPP: 2.4 (ref 2–3.5)

## 2018-04-13 PROCEDURE — 85610 PROTHROMBIN TIME: CPT | Performed by: PHYSICIAN ASSISTANT

## 2018-04-13 NOTE — PROGRESS NOTES
Anticoagulation Summary  As of 4/13/2018    INR goal:   2.0-3.0   TTR:   67.1 % (2 y)   Today's INR:   2.4   Maintenance plan:   2.5 mg (5 mg x 0.5) on Mon, Wed, Fri; 5 mg (5 mg x 1) all other days   Weekly total:   27.5 mg   Plan last modified:   Loc Chan, PharmD (12/29/2017)   Next INR check:   5/18/2018   Target end date:   Indefinite    Indications    Atrial flutter (CMS-HCC) (Resolved) [I48.92]  Acute ischemic right PCA stroke-Hemorrhage transformation is noted within the infarct (Resolved) [I63.531]  Persistent atrial fibrillation (CMS-HCC) [I48.1]             Anticoagulation Episode Summary     INR check location:   [facility]    Preferred lab:       Send INR reminders to:       Comments:         Anticoagulation Care Providers     Provider Role Specialty Phone number    Bijan Sampson M.D. Referring Cardiology [phone]    [facility] Responsible  [phone]        Anticoagulation Patient Findings      HPI:  Shani Love seen in clinic today, on anticoagulation therapy with warfarin for AF.  Reason for today's visit (per our collaborative practice policy) is because their last INR was 1.8 on 3/16/18. Intervention at the last visit:INcreased dose one day.   Changes to current medical/health status since last appt: none  No signs/symptoms of bleeding and/or thrombosis since the last appt.    No interval changes to diet or any interval changes to medications since last appt.   No complications or cost restrictions with current therapy.   Unable to obtain vitals.   Pt is to continue with current warfarin dosing regimen.     A/P   INR  therapeutic.     Pt is to continue with current warfarin dosing regimen.     Follow up appointment in 5 weeks to reduce risk of adverse events related to this high risk medication, Warfarin.    Purpose of next visit:  They have a TTR of 67% which is not at target (TTR target/goal is 100%) and requires close follow up to prevent a adverse  event (the lower the TTR the higher risk of clots, strokes, or bleeding).     Other info:  Pt educated to contact our clinic with any changes in medications or s/s of bleeding or thrombosis  CHEST guidelines recommend frequent INR monitoring at regular intervals (a few days up to a max of 12 weeks) to ensure they are on the proper dose of warfarin and not having any complications from therapy. INRs can dramatically change over a short time period due to diet, medications, and medical conditions.     Self Management of Warfarin Therapy    Pt was educated on purpose of self management therapy.  Educated Pt on s/s of bleeding and when to contact professional medical help.  Also instructed pt to call coumadin clinic for any changes to his medications.      Educated patient for warfarin therapy concerning:   Diet   Monitoring (s/sx of bleeding and INR)   Drug interactions    Reviewed the test to ensure patient comprehension and patient appears to fully understand how to self manage their warfarin therapy.  Answered Pt questions and will place his warfarin comprehension test in medical record.     Vic Morrison, PharmD

## 2018-05-03 ENCOUNTER — OFFICE VISIT (OUTPATIENT)
Dept: PULMONOLOGY | Facility: HOSPICE | Age: 63
End: 2018-05-03
Payer: MEDICARE

## 2018-05-03 VITALS
SYSTOLIC BLOOD PRESSURE: 120 MMHG | TEMPERATURE: 97.7 F | WEIGHT: 293 LBS | OXYGEN SATURATION: 95 % | RESPIRATION RATE: 17 BRPM | HEIGHT: 64 IN | HEART RATE: 83 BPM | DIASTOLIC BLOOD PRESSURE: 70 MMHG | BODY MASS INDEX: 50.02 KG/M2

## 2018-05-03 DIAGNOSIS — I27.20 PULMONARY HYPERTENSION (HCC): ICD-10-CM

## 2018-05-03 DIAGNOSIS — G47.33 OBSTRUCTIVE SLEEP APNEA: ICD-10-CM

## 2018-05-03 PROCEDURE — 99214 OFFICE O/P EST MOD 30 MIN: CPT | Performed by: NURSE PRACTITIONER

## 2018-05-03 RX ORDER — MAGNESIUM OXIDE 400 MG/1
TABLET ORAL
Refills: 0 | COMMUNITY
Start: 2018-03-12 | End: 2019-01-24

## 2018-05-03 NOTE — PATIENT INSTRUCTIONS
Plan:    1) Continue Auto Bipap at current pressures. Increase time spent on therapy.   2) Sleep hygiene discussed. Recommend keeping a set sleep/wake schedule. Logging enough hours of sleep. Limiting/Avoiding naps. No caffeine after noon and no heavy meals in the evening. Recommend daily exercise.   3) Weight loss recommended.  4) Continue to follow with Cardiology.   5) Patient is up to date on her Prevnar 13, Pneumovax 23 and Influenza vaccinations.  6) Follow up in 6 months with compliance card download, sooner if needed.

## 2018-05-03 NOTE — PROGRESS NOTES
Chief Complaint   Patient presents with   • Follow-Up     TERESA       HPI:  Shani Love is a 62 y.o. year old female here today for follow-up on her obstructive sleep apnea. She has a history of morbid obesity, documented nighttime desaturation, atrial fibrillation post ablation with prior embolic stroke, DM II, hypertension, hyperlipidemia and on chronic anticoagulation. BMI is 55. PSG split-night 2/14/2017 indicated severe obstructive sleep apnea with an AHI of 52.0 and minimum oxygen saturation of 64%. Patient spent 95.5% of diagnostic study between 80-89% oxygen saturation. Patient fell asleep quickly during the study but did have poor sleep efficiency during the titration portion. She was titrated on CPAP and BiPAP with no successful pressure setting. She underwent a dedicated in lab titration study 2/15/2017 which indicates successful titration to a Bipap pressure of 22/18 CM with a resultant AHI of 3.6 with a low 02 saturation of 90%. However, she was only on this pressure for 16 minutes and no REM sleep on this setting. She was started on Auto Bipap with an EPAP min of 13, IPAP max of 22 with a PS of 4. Her compliance card download today in the office indicates an AHI of 2.7 with an average use of around 5 hours at night. She had a nasal mask and juno she was opening her mouth at night. She was ordered a fit for a full face mask at her last office visit. She is using the full face currently and feels it is a good fit. She tolerates the pressure. She does feels she sleeps better on therapy and wakes more refreshed. She does wake a couple times during the night to urinate. She denies any morning headaches.         Echo 3/23/2017;   CONCLUSIONS  Normal left ventricular systolic function.  Left ventricular ejection fraction is visually estimated to be 65%.  Mild left ventricular hypertrophy.  Moderate mitral stenosis.  Estimated right ventricular systolic pressure  is at least 35 mmHg but   is  underestimated.  Dilated right ventricle.  Normal right ventricular systolic function.  Aortic sclerosis without stenosis.    Echo 1/23/2018;  CONCLUSIONS  Compare to prior echo on 3/23/17. Compared to the report of the study   done - there has been no significant change.   Normal left ventricular systolic function.  Left ventricular ejection fraction is visually estimated to be 65%.  Moderate mitral stenosis.  Mean transvalvular gradient is 8 mmHg.   Moderate tricuspid regurgitation.  Estimated right ventricular systolic pressure is 30 mmHg.  Mild pulmonic insufficiency.    Past Medical History:   Diagnosis Date   • Atrial fibrillation ([facility])    • Atrial flutter ([facility]) 11/18/2015   • Diabetes    • Heart murmur     childhood   • Hyperlipidemia    • Hypertension    • Morbid obesity ([facility])    • Osteoarthritis    • Pneumonia 4/6/2016   • Stroke ([facility]) 11/18/2015    Acute ischemic right PCA stroke-Hemorrhage transformation is noted within the infarct [I63.531]   • Valvular heart disease     Mitral stenosis       Past Surgical History:   Procedure Laterality Date   • RECOVERY  5/9/2016    Procedure: CATH LAB FLUTTER ABLATION, CAMILO WITH ANESTHESIA DR. ARMIJO;  Surgeon: Recovery[facility] Surgery;  Location[place] PRE-[place] PROC UNIT [facility] [place];  Service:    • PILONIDAL CYST EXCISION     • TONSILLECTOMY         Family History   Problem Relation Age of Onset   • Stroke Mother 71   • Cancer Father 71   • Heart Disease Brother        Social History     Social History   • Marital status:      Spouse name: N/A   • Number of children: N/A   • Years of education: N/A     Occupational History   • Not on file.     Social History Main Topics   • Smoking status: Never Smoker   • Smokeless tobacco: Never Used   • Alcohol use No   • Drug use: No   • Sexual activity: Not Currently     Partners: Male     Other Topics Concern   • Not on file     Social History Narrative   • No narrative on file         ROS:  Constitutional: Denies fevers, chills,  sweats, fatigue, weight loss  Eyes: Denies vision loss, pain, drainage, double vision. Wears glasses   Ears/Nose/Mouth/Throat: Denies rhinitis, nasal congestion, ear ache, difficulty hearing, sore throat, persistent hoarseness, decayed teeth/toothache  Cardiovascular: Denies chest pain, tightness, palpitations, swelling in feet/legs, fainting, difficulty breathing when laying down  Respiratory: Denies shortness of breath, cough, sputum, wheezing, painful breathing, coughing up blood  GI: Denies heartburn, difficulty swallowing, nausea, vomiting, abdominal pain, diarrhea, constipation  : Denies frequent urination, painful urination  Integumentary: Denies rashes, lumps or color changes  MSK: Denies painful joints, sore muscles, and back pain.   Neurological: Denies frequent headaches, dizziness, weakness  Sleep: See HPI       Current Outpatient Prescriptions   Medication Sig Dispense Refill   • magnesium oxide (MAG-OX) 400 MG Tab   0   • magnesium oxide (MAG-OX) 400 (241.3 Mg) MG Tab tablet Take 1 Tab by mouth every day. 180 Tab 0   • citalopram (CELEXA) 20 MG Tab Take 1 Tab by mouth every day. 30 Tab 2   • cholecalciferol (VITAMIN D3) 5000 UNIT Cap Take 1 Cap by mouth every day. 30 Cap 5   • Empagliflozin 10 MG Tab Take 1 tablet by mouth every morning with breakfast. 30 Tab 1   • DILTIAZem CD (CARDIZEM CD) 120 MG CAPSULE SR 24 HR Take 1 Cap by mouth 2 Times a Day. 180 Cap 3   • warfarin (COUMADIN) 5 MG Tab Take 2.5-5 mg by mouth every day. 2.5 mg on Mondays, Wednesdays, and Fridays. All other days 5 mg     • Insulin Degludec (TRESIBA FLEXTOUCH) 200 UNIT/ML Solution Pen-injector Inject 40 Units as instructed every bedtime.     • Ipratropium-Albuterol (DUONEB INH) Inhale 1 Inhalation by mouth Once.     • VICTOZA 18 MG/3ML Solution Pen-injector injection Inject 0.3 mL as instructed every day. 3 PEN 6   • pioglitazone (ACTOS) 30 MG Tab Take 1 Tab by mouth every day. 30 Tab 11   • metoprolol (LOPRESSOR) 50 MG Tab Take 1  "Tab by mouth 2 times a day. 180 Tab 2   • furosemide (LASIX) 40 MG Tab Take 1 Tab by mouth every day. 90 Tab 2   • nystatin (MYCOSTATIN) powder Apply to affected area 1-2 times daily PRN 15 g 2   • simvastatin (ZOCOR) 20 MG Tab Take 1 Tab by mouth every evening. 90 Tab 3   • lisinopril (PRINIVIL) 5 MG Tab Take 1 Tab by mouth every day. 90 Tab 3     No current facility-administered medications for this visit.        No Known Allergies    Blood pressure 120/70, pulse 83, temperature 36.5 °C (97.7 °F), resp. rate 17, height 1.626 m (5' 4\"), weight (!) 147.8 kg (325 lb 12.8 oz), SpO2 95 %.    PE:   Appearance: Well developed, well nourished, no acute distress  Eyes: PERRL, EOM intact, sclera white, conjunctiva moist  Ears: no lesions or deformities  Hearing: grossly intact  Nose: no lesions or deformities  Oropharynx: tongue normal, posterior pharynx without erythema or exudate  Mallampati Classification: Class 4   Neck: supple, trachea midline, no masses   Respiratory effort: no intercostal retractions or use of accessory muscles  Lung auscultation: no rales, rhonchi or wheezes  Heart auscultation: no murmur rub or gallop  Extremities: no cyanosis or edema  Abdomen: soft ,non tender, no masses  Gait and Station: normal  Digits and nails: no clubbing, cyanosis, petechiae or nodes.  Cranial nerves: grossly intact  Skin: no rashes, lesions or ulcers noted  Orientation: Oriented to time, person and place  Mood and affect: mood and affect appropriate, normal interaction with examiner  Judgement: Intact          Assessment:  1. Obstructive sleep apnea     2. Pulmonary hypertension (HCC)     3. BMI 50.0-59.9, adult (HCC)  Patient identified as having weight management issue.  Appropriate orders and counseling given.         Plan:    1) Continue Auto Bipap at current pressures. Increase time spent on therapy.   2) Sleep hygiene discussed. Recommend keeping a set sleep/wake schedule. Logging enough hours of sleep. " Limiting/Avoiding naps. No caffeine after noon and no heavy meals in the evening. Recommend daily exercise.   3) Weight loss recommended.  4) Continue to follow with Cardiology.   5) Patient is up to date on her Prevnar 13, Pneumovax 23 and Influenza vaccinations.  6) Follow up in 6 months with compliance card download, sooner if needed.

## 2018-05-18 ENCOUNTER — ANTICOAGULATION VISIT (OUTPATIENT)
Dept: MEDICAL GROUP | Facility: PHYSICIAN GROUP | Age: 63
End: 2018-05-18
Payer: MEDICARE

## 2018-05-18 DIAGNOSIS — I48.19 PERSISTENT ATRIAL FIBRILLATION (HCC): ICD-10-CM

## 2018-05-18 LAB — INR PPP: 3 (ref 2–3.5)

## 2018-05-18 PROCEDURE — 85610 PROTHROMBIN TIME: CPT | Performed by: NURSE PRACTITIONER

## 2018-05-18 PROCEDURE — 99211 OFF/OP EST MAY X REQ PHY/QHP: CPT | Performed by: NURSE PRACTITIONER

## 2018-05-18 NOTE — PROGRESS NOTES
OP Anticoagulation Service Note    Date: 5/18/2018  There were no vitals filed for this visit.    Anticoagulation Summary  As of 5/18/2018    INR goal:   2.0-3.0   TTR:   67.1 % (2 y)   Today's INR:      Warfarin maintenance plan:   2.5 mg (5 mg x 0.5) on Mon, Wed, Fri; 5 mg (5 mg x 1) all other days   Weekly warfarin total:   27.5 mg   Plan last modified:   Loc Chan, PharmD (12/29/2017)   Next INR check:      Target end date:   Indefinite    Indications    Atrial flutter (HCC) (Resolved) [I48.92]  Acute ischemic right PCA stroke-Hemorrhage transformation is noted within the infarct (Resolved) [I63.531]  Persistent atrial fibrillation (HCC) [I48.1]             Anticoagulation Episode Summary     [facility] check location[facility]   [facility]    Preferred lab:       Send INR reminders to:       Comments:         Anticoagulation Care Providers     Provider Role Specialty Phone number    Bijan Sampson M.D. Referring Cardiology [phone]    [facility] Anticoagulation Services Responsible  [phone]        Anticoagulation Patient Findings      HPI:   Shani Love seen in clinic today, they are here today for a INR check on anticoagulation therapy with warfarin because they have atrial flutter a d a ischemic stroke    The reason for today's visit is to prevent morbidity and mortality from a stroke  and to reduce the risk of bleeding while on a anticoagulant.     Reason for today's visit (per our collaborative practice policy) is because their last INR was 2.4 on  4/13.   Intervention at the last visit:  none    Additional education provided today regarding reducing bleed risk and dietary constraints:  About home monitor     Any upcoming procedures:   none    Confirmed warfarin dosing regimen  Interval Changes with foods rich in vitamin K: No  Interval Changes in ETOH:   No  Interval Changes in smoking status:  No  Interval Changes in medication:  No   Cost restriction:  No    S/S of bleeding or bruising:   No  Signs/symptoms  thrombosis since the last appt:  No  Bleed risk is:  moderate,     3 vitals included with today's appt :No  (BP, HR, weight, ht, RR)     Assessment:   INR  therapeutic.     No change in dose needed today, they will need to continue with the same dose and diet to prevent adverse events while on a anticoagulant.     They have a TTR of 68.6  which is not at target (TTR target/goal is 100%) and requires close follow up to prevent a adverse event (the lower the TTR the higher risk of clots, strokes, or bleeding).       Plan:  Continue weekly warfarin dose as noted      Follow up:  Follow up appointment in 7 week(s)       Other info:  Pt educated to contact our clinic with any changes in medications or s/s of bleeding or thrombosis    CHEST guidelines recommend frequent INR monitoring at regular intervals (a few days up to a max of 12 weeks) to ensure they are on the proper dose of warfarin and not having any complications from therapy.  INRs can dramatically change over a short time period due to diet, medications, and medical conditions.

## 2018-05-22 ENCOUNTER — ANTICOAGULATION MONITORING (OUTPATIENT)
Dept: VASCULAR LAB | Facility: MEDICAL CENTER | Age: 63
End: 2018-05-22

## 2018-05-22 DIAGNOSIS — I48.19 PERSISTENT ATRIAL FIBRILLATION (HCC): ICD-10-CM

## 2018-06-13 RX ORDER — PEN NEEDLE, DIABETIC 32GX 5/32"
NEEDLE, DISPOSABLE MISCELLANEOUS
Refills: 3 | COMMUNITY
Start: 2018-05-02 | End: 2019-01-24 | Stop reason: SDUPTHER

## 2018-06-21 DIAGNOSIS — E78.00 PURE HYPERCHOLESTEROLEMIA: ICD-10-CM

## 2018-06-21 RX ORDER — SIMVASTATIN 20 MG
20 TABLET ORAL EVERY EVENING
Qty: 30 TAB | Refills: 0 | Status: SHIPPED | OUTPATIENT
Start: 2018-06-21 | End: 2018-06-24 | Stop reason: SDUPTHER

## 2018-06-22 ENCOUNTER — TELEPHONE (OUTPATIENT)
Dept: VASCULAR LAB | Facility: MEDICAL CENTER | Age: 63
End: 2018-06-22

## 2018-06-25 RX ORDER — SIMVASTATIN 20 MG
20 TABLET ORAL EVERY EVENING
Qty: 90 TAB | Refills: 1 | Status: SHIPPED | OUTPATIENT
Start: 2018-06-25 | End: 2019-01-24 | Stop reason: SDUPTHER

## 2018-06-25 NOTE — TELEPHONE ENCOUNTER
From: Shani Love  To: JANICE Mosqueda-GEORGIANA  Sent: 6/24/2018 10:29 AM PDT  Subject: Medication Renewal Request    Shani Kate would like a refill of the following medications:     magnesium oxide (MAG-OX) 400 (241.3 Mg) MG Tab tablet [Estela Gunderson, P.A.-C.]     glucose blood strip [Estela Gunderson, FELIBERTO.A.-C.]     simvastatin (ZOCOR) 20 MG Tab [Estela Gunderosn, P.A.-C.]    Preferred pharmacy: [facility] [address] [place] [address]

## 2018-07-02 DIAGNOSIS — F32.A DEPRESSION, UNSPECIFIED DEPRESSION TYPE: ICD-10-CM

## 2018-07-02 RX ORDER — CITALOPRAM 20 MG/1
20 TABLET ORAL DAILY
Qty: 30 TAB | Refills: 6 | Status: SHIPPED | OUTPATIENT
Start: 2018-07-02 | End: 2019-01-24 | Stop reason: SDUPTHER

## 2018-07-13 ENCOUNTER — ANTICOAGULATION VISIT (OUTPATIENT)
Dept: MEDICAL GROUP | Facility: PHYSICIAN GROUP | Age: 63
End: 2018-07-13
Payer: MEDICARE

## 2018-07-13 VITALS — WEIGHT: 293 LBS | BODY MASS INDEX: 50.02 KG/M2 | RESPIRATION RATE: 20 BRPM | HEIGHT: 64 IN

## 2018-07-13 DIAGNOSIS — I48.19 PERSISTENT ATRIAL FIBRILLATION (HCC): ICD-10-CM

## 2018-07-13 LAB — INR PPP: 2.8 (ref 2–3.5)

## 2018-07-13 PROCEDURE — 99211 OFF/OP EST MAY X REQ PHY/QHP: CPT | Performed by: NURSE PRACTITIONER

## 2018-07-13 PROCEDURE — 85610 PROTHROMBIN TIME: CPT | Performed by: NURSE PRACTITIONER

## 2018-07-13 NOTE — PROGRESS NOTES
Anticoagulation Summary  As of 7/13/2018    INR goal:   2.0-3.0   TTR:   70.8 % (2.2 y)   Today's INR:   2.8   Warfarin maintenance plan:   2.5 mg (5 mg x 0.5) on Mon, Wed, Fri; 5 mg (5 mg x 1) all other days   Weekly warfarin total:   27.5 mg   Plan last modified:   Loc Chan, PharmD (12/29/2017)   Next INR check:   9/7/2018   Target end date:   Indefinite    Indications    Atrial flutter (HCC) (Resolved) [I48.92]  Acute ischemic right PCA stroke-Hemorrhage transformation is noted within the infarct (Resolved) [I63.531]  Persistent atrial fibrillation (HCC) [I48.1]             Anticoagulation Episode Summary     [facility]R check location[facility]   [facility]    Preferred lab:       Send INR reminders to:       Comments:         Anticoagulation Care Providers     Provider Role Specialty Phone number    Bijan Sampson M.D. Referring Cardiology [phone]    [facility] Anticoagulation Services Responsible  [phone]        Anticoagulation Patient Findings      HPI:  Shani Love seen in clinic today, on anticoagulation therapy with warfarin for AF.  Reason for today's visit (per our collaborative practice policy) is because their last INR was 3 on 5/18/18. Intervention at the last visit:none  Changes to current medical/health status since last appt: none  No signs/symptoms of bleeding and/or thrombosis since the last appt.    No interval changes to diet or any interval changes to medications since last appt.   No complications or cost restrictions with current therapy.   Unable to obtain BP.  Confirmed dosing regimen.     A/P   INR  therapeutic.     Pt is to continue with current warfarin dosing regimen.     Follow up appointment in 8 weeks to reduce risk of adverse events related to this high risk medication, Warfarin.    Purpose of next visit:  They have a TTR of 71% which is not at target (TTR target/goal is 100%) and requires close follow up to prevent a adverse event (the lower the TTR the higher risk of  clots, strokes, or bleeding).     Other info:  Pt educated to contact our clinic with any changes in medications or s/s of bleeding or thrombosis  CHEST guidelines recommend frequent INR monitoring at regular intervals (a few days up to a max of 12 weeks) to ensure they are on the proper dose of warfarin and not having any complications from therapy. INRs can dramatically change over a short time period due to diet, medications, and medical conditions.     Vic Morrison, PharmD

## 2018-07-16 ENCOUNTER — ANTICOAGULATION MONITORING (OUTPATIENT)
Dept: VASCULAR LAB | Facility: MEDICAL CENTER | Age: 63
End: 2018-07-16

## 2018-07-16 DIAGNOSIS — I48.19 PERSISTENT ATRIAL FIBRILLATION (HCC): ICD-10-CM

## 2018-07-16 LAB — INR PPP: 2.8 (ref 2–3.5)

## 2018-07-16 NOTE — PROGRESS NOTES
OP Anticoagulation Service Note    Date: 7/16/2018  Anticoagulation Summary  As of 7/16/2018    INR goal:   2.0-3.0   TTR:   70.9 % (2.2 y)   Today's INR:   2.8   Warfarin maintenance plan:   2.5 mg (5 mg x 0.5) on Mon, Wed, Fri; 5 mg (5 mg x 1) all other days   Weekly warfarin total:   27.5 mg   No change documented:   Christopher Hastings Ass't   Plan last modified:   Fred SamD (12/29/2017)   Next INR check:   7/30/2018   Target end date:   Indefinite    Indications    Atrial flutter (HCC) (Resolved) [I48.92]  Acute ischemic right PCA stroke-Hemorrhage transformation is noted within the infarct (Resolved) [I63.531]  Persistent atrial fibrillation (HCC) [I48.1]             Anticoagulation Episode Summary     [facility]   [facility]    Preferred lab:       Send INR reminders to:       Comments:   [place]      Anticoagulation Care Providers     Provider Role Specialty Phone number    Bijan Sampson M.D. Referring Cardiology [phone]    [facility] Anticoagulation Services Responsible  [phone]        Anticoagulation Patient Findings  Patient Findings     Negatives:   Signs/symptoms of thrombosis, Signs/symptoms of bleeding, Laboratory test error suspected, Change in health, Change in alcohol use, Change in activity, Upcoming invasive procedure, Emergency department visit, Upcoming dental procedure, Missed doses, Extra doses, Change in medications, Change in diet/appetite, Hospital admission, Bruising, Other complaints        Plan: Spoke to patient. Patient is therapeutic and will remain on the same dose. Patient reports no unusual bleeding or bruising and no changes to medication or diet. Patient is to be checked again in 2 weeks.    Christopher Hastings. Ass't  [facility]    I have reviewed and concur with the above plan     Loc Chan, FredD

## 2018-07-19 DIAGNOSIS — I10 ESSENTIAL HYPERTENSION: ICD-10-CM

## 2018-07-22 RX ORDER — METOPROLOL TARTRATE 50 MG/1
50 TABLET, FILM COATED ORAL 2 TIMES DAILY
Qty: 180 TAB | Refills: 2 | Status: SHIPPED | OUTPATIENT
Start: 2018-07-22 | End: 2019-01-24 | Stop reason: SDUPTHER

## 2018-07-22 RX ORDER — DILTIAZEM HYDROCHLORIDE 120 MG/1
120 CAPSULE, COATED, EXTENDED RELEASE ORAL 2 TIMES DAILY
Qty: 180 CAP | Refills: 2 | Status: SHIPPED | OUTPATIENT
Start: 2018-07-22 | End: 2019-01-24 | Stop reason: SDUPTHER

## 2018-07-30 ENCOUNTER — ANTICOAGULATION MONITORING (OUTPATIENT)
Dept: VASCULAR LAB | Facility: MEDICAL CENTER | Age: 63
End: 2018-07-30

## 2018-07-30 DIAGNOSIS — I48.19 PERSISTENT ATRIAL FIBRILLATION (HCC): ICD-10-CM

## 2018-07-30 LAB — INR PPP: 2 (ref 2–3.5)

## 2018-07-30 NOTE — PROGRESS NOTES
Anticoagulation Summary  As of 7/30/2018    INR goal:   2.0-3.0   TTR:   71.4 % (2.3 y)   Today's INR:   2   Warfarin maintenance plan:   2.5 mg (5 mg x 0.5) on Mon, Wed, Fri; 5 mg (5 mg x 1) all other days   Weekly warfarin total:   27.5 mg   No change documented:   Dominga NIEVES'Gordon, Med Ass't   Plan last modified:   Fred SamD (12/29/2017)   Next INR check:   8/13/2018   Target end date:   Indefinite    Indications    Atrial flutter (HCC) (Resolved) [I48.92]  Acute ischemic right PCA stroke-Hemorrhage transformation is noted within the infarct (Resolved) [I63.531]  Persistent atrial fibrillation (HCC) [I48.1]             Anticoagulation Episode Summary     [facility] check [facility]   [facility]    Preferred lab:       Send INR reminders to:       Comments:   [place]      Anticoagulation Care Providers     Provider Role Specialty Phone number    Bijan Sampson M.D. Referring Cardiology [phone]    [facility] Anticoagulation Services Responsible  [phone]        Anticoagulation Patient Findings  Patient Findings     Negatives:   Signs/symptoms of thrombosis, Signs/symptoms of bleeding, Laboratory test error suspected, Change in health, Change in alcohol use, Change in activity, Upcoming invasive procedure, Emergency department visit, Upcoming dental procedure, Missed doses, Extra doses, Change in medications, Change in diet/appetite, Hospital admission, Bruising, Other complaints        Spoke with patient to report a therapeutic INR.  Pt instructed to continue with current warfarin dosing regimen. Pt denies any s/s of bleeding, bruising, clotting or any changes to diet or medication.  Will follow up in 2 weeks.    Dominga NIEVES'Gordon, Med Ass't    I have reviewed and am in agreement with the above stated plan on 7-30-18.  Timur Gonzalez, PharmD

## 2018-08-13 ENCOUNTER — ANTICOAGULATION MONITORING (OUTPATIENT)
Dept: VASCULAR LAB | Facility: MEDICAL CENTER | Age: 63
End: 2018-08-13

## 2018-08-13 DIAGNOSIS — I48.19 PERSISTENT ATRIAL FIBRILLATION (HCC): ICD-10-CM

## 2018-08-13 LAB — INR PPP: 2.1 (ref 2–3.5)

## 2018-08-13 NOTE — PROGRESS NOTES
OP Anticoagulation Telephone Note    Date: 8/13/2018  Anticoagulation Summary  As of 8/13/2018    INR goal:   2.0-3.0   TTR:   71.8 % (2.3 y)   Today's INR:   2.1   Warfarin maintenance plan:   2.5 mg (5 mg x 0.5) on Mon, Wed, Fri; 5 mg (5 mg x 1) all other days   Weekly warfarin total:   27.5 mg   No change documented:   Clare Pickard, Med Ass't   Plan last modified:   Loc Chan, PharmD (12/29/2017)   Next INR check:   8/27/2018   Target end date:   Indefinite    Indications    Atrial flutter (HCC) (Resolved) [I48.92]  Acute ischemic right PCA stroke-Hemorrhage transformation is noted within the infarct (Resolved) [I63.531]  Persistent atrial fibrillation (HCC) [I48.1]             Anticoagulation Episode Summary     [facility]   [facility]    Preferred lab:       Send INR reminders to:       Comments:   [place]      Anticoagulation Care Providers     Provider Role Specialty Phone number    Bijan Sampson M.D. Referring Cardiology [phone]    [facility] Anticoagulation Services Responsible  [phone]        Anticoagulation Patient Findings  Patient Findings     Negatives:   Signs/symptoms of thrombosis, Signs/symptoms of bleeding, Laboratory test error suspected, Change in health, Change in alcohol use, Change in activity, Upcoming invasive procedure, Emergency department visit, Upcoming dental procedure, Missed doses, Extra doses, Change in medications, Change in diet/appetite, Hospital admission, Bruising, Other complaints      Plan:  Spoke with patient on the phone. Patient is therapeutic today. Patient denies any changes in medications or diet. Patient denies any signs or symptoms of bleeding or clotting. Instructed patient to call clinic if any unusual bleeding or bruising occurs. Will continue dosing as outlined above. Will follow-up with patient in 2 weeks.    Clare Pickard, Medical Assistant      I have reviewed and agree with the plan above on 08/14/2018      Joyce BEACH  Filter, PharmD

## 2018-08-27 ENCOUNTER — ANTICOAGULATION MONITORING (OUTPATIENT)
Dept: VASCULAR LAB | Facility: MEDICAL CENTER | Age: 63
End: 2018-08-27

## 2018-08-27 DIAGNOSIS — I48.19 PERSISTENT ATRIAL FIBRILLATION (HCC): ICD-10-CM

## 2018-08-27 LAB — INR PPP: 2.8 (ref 2–3.5)

## 2018-08-28 NOTE — PROGRESS NOTES
OP Telephone Anticoagulation Service Note    Date: 8/28/2018      Anticoagulation Summary  As of 8/27/2018    INR goal:   2.0-3.0   TTR:   72.3 % (2.4 y)   Today's INR:   2.8   Warfarin maintenance plan:   2.5 mg (5 mg x 0.5) on Mon, Wed, Fri; 5 mg (5 mg x 1) all other days   Weekly warfarin total:   27.5 mg   Plan last modified:   Loc Chan PharmD (12/29/2017)   Next INR check:   9/10/2018   Target end date:   Indefinite    Indications    Atrial flutter (HCC) (Resolved) [I48.92]  Acute ischemic right PCA stroke-Hemorrhage transformation is noted within the infarct (Resolved) [I63.531]  Persistent atrial fibrillation (HCC) [I48.1]             Anticoagulation Episode Summary     [facility]   [facility]    Preferred lab:       Send INR reminders to:       Comments:   [place]      Anticoagulation Care Providers     Provider Role Specialty Phone number    Bijan Sampson M.D. Referring Cardiology [phone]    [facility] Anticoagulation Services Responsible  [phone]        Anticoagulation Patient Findings        Plan: INR is in range. Left message on patient's answering machine/voicemail. Instructed patient to call back with any concerns regarding any unusual bleeding or bruising, any medication or diet changes or any signs or symptoms of thrombosis. Instructed patient to resume medication as outlined above. Patient to follow up in 2 week(s).       Anju Valdez PharmD

## 2018-09-10 RX ORDER — FUROSEMIDE 40 MG/1
40 TABLET ORAL DAILY
Qty: 90 TAB | Refills: 2 | OUTPATIENT
Start: 2018-09-10

## 2018-09-10 NOTE — TELEPHONE ENCOUNTER
Was the patient seen in the last year in this department? Yes    Does patient have an active prescription for medications requested? Yes    Received Request Via: Pharmacy   Last Appointment 2/24/18  Last Labs 8/13/18

## 2018-09-11 ENCOUNTER — ANTICOAGULATION MONITORING (OUTPATIENT)
Dept: VASCULAR LAB | Facility: MEDICAL CENTER | Age: 63
End: 2018-09-11

## 2018-09-11 DIAGNOSIS — I48.19 PERSISTENT ATRIAL FIBRILLATION (HCC): ICD-10-CM

## 2018-09-11 LAB — INR PPP: 2.3 (ref 2–3.5)

## 2018-09-11 NOTE — PROGRESS NOTES
OP Telephone Anticoagulation Service Note    Date: 9/11/2018      Anticoagulation Summary  As of 9/11/2018    INR goal:   2.0-3.0   TTR:   72.8 % (2.4 y)   Today's INR:   2.3   Warfarin maintenance plan:   2.5 mg (5 mg x 0.5) on Mon, Wed, Fri; 5 mg (5 mg x 1) all other days   Weekly warfarin total:   27.5 mg   Plan last modified:   Loc Chan PharmD (12/29/2017)   Next INR check:   9/25/2018   Target end date:   Indefinite    Indications    Atrial flutter (HCC) (Resolved) [I48.92]  Acute ischemic right PCA stroke-Hemorrhage transformation is noted within the infarct (Resolved) [I63.531]  Persistent atrial fibrillation (HCC) [I48.1]             Anticoagulation Episode Summary     [facility]   [facility]    Preferred lab:       Send INR reminders to:       Comments:   [place]      Anticoagulation Care Providers     Provider Role Specialty Phone number    Bijan Sampson M.D. Referring Cardiology [phone]    [facility] Anticoagulation Services Responsible  [phone]        Anticoagulation Patient Findings        Plan: Spoke with patient on the phone. Patient is therapeutic today. Confirmed dosing. No missed tablets in the last week. Patient denies any changes in medications or diet. Patient denies any signs or symptoms of bleeding or clotting. Instructed patient to call clinic if any unusual bleeding or bruising occurs. Will continue dosing as outlined. Will follow-up with patient in 2 week(s).        Anju Valdez PharmD

## 2018-09-24 ENCOUNTER — ANTICOAGULATION MONITORING (OUTPATIENT)
Dept: VASCULAR LAB | Facility: MEDICAL CENTER | Age: 63
End: 2018-09-24

## 2018-09-24 DIAGNOSIS — I48.19 PERSISTENT ATRIAL FIBRILLATION (HCC): ICD-10-CM

## 2018-09-24 LAB — INR PPP: 2.3 (ref 2–3.5)

## 2018-09-24 NOTE — PROGRESS NOTES
OP Anticoagulation Service Note    Date: 9/24/2018  Anticoagulation Summary  As of 9/24/2018    INR goal:   2.0-3.0   TTR:   73.2 % (2.4 y)   Today's INR:   2.3   Warfarin maintenance plan:   2.5 mg (5 mg x 0.5) on Mon, Wed, Fri; 5 mg (5 mg x 1) all other days   Weekly warfarin total:   27.5 mg   No change documented:   Christopher Hastings Ass't   Plan last modified:   Fred SamD (12/29/2017)   Next INR check:   10/8/2018   Target end date:   Indefinite    Indications    Atrial flutter (HCC) (Resolved) [I48.92]  Acute ischemic right PCA stroke-Hemorrhage transformation is noted within the infarct (Resolved) [I63.531]  Persistent atrial fibrillation (HCC) [I48.1]             Anticoagulation Episode Summary     INR check location:   [facility]    Preferred lab:       Send INR reminders to:       Comments:   Alere      Anticoagulation Care Providers     Provider Role Specialty Phone number    Bijan Sampson M.D. Referring Cardiology [phone]    [facility] Anticoagulation Services Responsible  [phone]        Anticoagulation Patient Findings  Patient Findings     Negatives:   Signs/symptoms of thrombosis, Signs/symptoms of bleeding, Laboratory test error suspected, Change in health, Change in alcohol use, Change in activity, Upcoming invasive procedure, Emergency department visit, Upcoming dental procedure, Missed doses, Extra doses, Change in medications, Change in diet/appetite, Hospital admission, Bruising, Other complaints        Plan: Left patient a message. Patient is therapeutic and will remain on the same dose. Patient was instructed to call back if needed to report any unusual bleeding or bruising or any changes to medication or diet. Patient is to be checked again in 2 weeks.    Christopher Hastings. Ass't  [facility]    I have reviewed and concur with the above plan     Loc Chan, FredD

## 2018-10-08 LAB — INR PPP: 2.8 (ref 2–3.5)

## 2018-10-09 ENCOUNTER — ANTICOAGULATION MONITORING (OUTPATIENT)
Dept: VASCULAR LAB | Facility: MEDICAL CENTER | Age: 63
End: 2018-10-09

## 2018-10-09 DIAGNOSIS — I48.19 PERSISTENT ATRIAL FIBRILLATION (HCC): ICD-10-CM

## 2018-10-09 NOTE — PROGRESS NOTES
OP Telephone Anticoagulation Service Note    Date: 10/9/2018      Anticoagulation Summary  As of 10/9/2018    INR goal:   2.0-3.0   TTR:   73.6 % (2.5 y)   Today's INR:   2.8 (10/8/2018)   Warfarin maintenance plan:   2.5 mg (5 mg x 0.5) on Mon, Wed, Fri; 5 mg (5 mg x 1) all other days   Weekly warfarin total:   27.5 mg   Plan last modified:   Loc Chan PharmD (12/29/2017)   Next INR check:   10/23/2018   Target end date:   Indefinite    Indications    Atrial flutter (HCC) (Resolved) [I48.92]  Acute ischemic right PCA stroke-Hemorrhage transformation is noted within the infarct (Resolved) [I63.531]  Persistent atrial fibrillation (HCC) [I48.1]             Anticoagulation Episode Summary     INR check location:   [facility]    Preferred lab:       Send INR reminders to:       Comments:   Alere      Anticoagulation Care Providers     Provider Role Specialty Phone number    Bijan Sampson M.D. Referring Cardiology [phone]    [facility] Anticoagulation Services Responsible  [phone]            Plan: Spoke with patient on the phone. Patient is therapeutic today. Confirmed dosing. No missed tablets in the last week. Patient denies any changes in medications or diet. Patient denies any signs or symptoms of bleeding or clotting. Instructed patient to call clinic if any unusual bleeding or bruising occurs. Will continue dosing as outlined. Will follow-up with patient in 2 week(s).        Anju Valdez PharmD

## 2018-10-22 LAB — INR PPP: 3.8 (ref 2–3.5)

## 2018-10-23 ENCOUNTER — ANTICOAGULATION MONITORING (OUTPATIENT)
Dept: VASCULAR LAB | Facility: MEDICAL CENTER | Age: 63
End: 2018-10-23

## 2018-10-23 DIAGNOSIS — I48.19 PERSISTENT ATRIAL FIBRILLATION (HCC): ICD-10-CM

## 2018-10-23 NOTE — PROGRESS NOTES
OP Telephone Anticoagulation Service Note    Date: 10/23/2018      Anticoagulation Summary  As of 10/23/2018    INR goal:   2.0-3.0   TTR:   72.8 % (2.5 y)   Today's INR:   3.8! (10/22/2018)   Warfarin maintenance plan:   2.5 mg (5 mg x 0.5) on Mon, Wed, Fri; 5 mg (5 mg x 1) all other days   Weekly warfarin total:   27.5 mg   Plan last modified:   Loc Chan, PharmD (12/29/2017)   Next INR check:   11/6/2018   Target end date:   Indefinite    Indications    Atrial flutter (HCC) (Resolved) [I48.92]  Acute ischemic right PCA stroke-Hemorrhage transformation is noted within the infarct (Resolved) [I63.531]  Persistent atrial fibrillation (HCC) [I48.1]             Anticoagulation Episode Summary     INR check location:   [facility]    Preferred lab:       Send INR reminders to:       Comments:   Marcelina      Anticoagulation Care Providers     Provider Role Specialty Phone number    Bijan Sampson M.D. Referring Cardiology [phone]    [facility] Anticoagulation Services Responsible  [phone]        Anticoagulation Patient Findings        Plan: Left message on patient's voicemail.   INR 3.8 therapeutic.   Instructed patient to call back with any concerns regarding any unusual bleeding or bruising, any medication or diet changes, or any signs or symptoms of thrombosis.  Instructed patient to take half a tablet tonight only then resume warfarin regimen as outlined above.   Patient to follow up in 2 weeks 11/06/18.      Discussed with Anisha Patiño, Pharmacy Intern

## 2018-11-05 LAB — INR PPP: 4.2 (ref 2–3.5)

## 2018-11-06 ENCOUNTER — ANTICOAGULATION MONITORING (OUTPATIENT)
Dept: VASCULAR LAB | Facility: MEDICAL CENTER | Age: 63
End: 2018-11-06

## 2018-11-06 DIAGNOSIS — I48.19 PERSISTENT ATRIAL FIBRILLATION (HCC): ICD-10-CM

## 2018-11-06 NOTE — PROGRESS NOTES
Anticoagulation Summary  As of 11/6/2018    INR goal:   2.0-3.0   TTR:   71.7 % (2.6 y)   Today's INR:   4.2! (11/5/2018)   Warfarin maintenance plan:   5 mg (5 mg x 1) on Mon, Wed, Fri; 2.5 mg (5 mg x 0.5) all other days   Weekly warfarin total:   25 mg   Plan last modified:   Loc Chan PharmD (11/6/2018)   Next INR check:   11/13/2018   Target end date:   Indefinite    Indications    Atrial flutter (HCC) (Resolved) [I48.92]  Acute ischemic right PCA stroke-Hemorrhage transformation is noted within the infarct (Resolved) [I63.531]  Persistent atrial fibrillation (HCC) [I48.1]             Anticoagulation Episode Summary     INR check location:   [facility]    Preferred lab:       Send INR reminders to:       Comments:   Marcelina      Anticoagulation Care Providers     Provider Role Specialty Phone number    Bijan Sampson M.D. Referring Cardiology [phone]    [facility] Anticoagulation Services Responsible  [phone]        Anticoagulation Patient Findings        INR  supra-therapeutic.   Denies signs/symptoms of bleeding and/or thrombosis.   Denies changes to diet or medications.   Follow up appointment in 1 week(s).    Hold then decrease weekly warfarin dose as noted      Loc Chan, PharmD

## 2018-11-12 LAB — INR PPP: 2.9 (ref 2–3.5)

## 2018-11-13 ENCOUNTER — ANTICOAGULATION MONITORING (OUTPATIENT)
Dept: VASCULAR LAB | Facility: MEDICAL CENTER | Age: 63
End: 2018-11-13

## 2018-11-13 DIAGNOSIS — I48.19 PERSISTENT ATRIAL FIBRILLATION (HCC): ICD-10-CM

## 2018-11-13 NOTE — PROGRESS NOTES
OP Telephone Anticoagulation Service Note    Date: 11/13/2018      Anticoagulation Summary  As of 11/13/2018    INR goal:   2.0-3.0   TTR:   71.2 % (2.6 y)   Today's INR:   2.9 (11/12/2018)   Warfarin maintenance plan:   5 mg (5 mg x 1) on Mon, Wed, Fri; 2.5 mg (5 mg x 0.5) all other days   Weekly warfarin total:   25 mg   Plan last modified:   Loc Chan PharmD (11/6/2018)   Next INR check:   11/20/2018   Target end date:   Indefinite    Indications    Atrial flutter (HCC) (Resolved) [I48.92]  Acute ischemic right PCA stroke-Hemorrhage transformation is noted within the infarct (Resolved) [I63.531]  Persistent atrial fibrillation (HCC) [I48.1]             Anticoagulation Episode Summary     INR check location:   [facility]    Preferred lab:       Send INR reminders to:       Comments:   Alere      Anticoagulation Care Providers     Provider Role Specialty Phone number    Bijan Sampson M.D. Referring Cardiology [phone]    [facility] Anticoagulation Services Responsible  [phone]        Anticoagulation Patient Findings      INR therapeutic at 2.9.  Spoke w/ pt on phone.  Verified regimen w/ pt.  Pt to continue on with current regimen.  NO s/s bleeding reported per pt.  NO changes in diet reported per pt.  NO changes in medications reported per pt.  Check INR in 1 week.  Instructed pt to call clinic at [phone] if there are any questions.  Pt stated understanding.    Parth Robert, Pharmacy Intern      I have reviewed and concur with the above plan     Loc Chan, FredD

## 2018-11-19 LAB — INR PPP: 2.4 (ref 2–3.5)

## 2018-11-20 ENCOUNTER — ANTICOAGULATION MONITORING (OUTPATIENT)
Dept: VASCULAR LAB | Facility: MEDICAL CENTER | Age: 63
End: 2018-11-20

## 2018-11-20 DIAGNOSIS — I48.19 PERSISTENT ATRIAL FIBRILLATION (HCC): ICD-10-CM

## 2018-11-20 NOTE — PROGRESS NOTES
Anticoagulation Summary  As of 11/20/2018    INR goal:   2.0-3.0   TTR:   71.4 % (2.6 y)   Today's INR:   2.4 (11/19/2018)   Warfarin maintenance plan:   5 mg (5 mg x 1) on Mon, Wed, Fri; 2.5 mg (5 mg x 0.5) all other days   Weekly warfarin total:   25 mg   Plan last modified:   Loc Chan PharmD (11/6/2018)   Next INR check:   12/3/2018   Target end date:   Indefinite    Indications    Atrial flutter (HCC) (Resolved) [I48.92]  Acute ischemic right PCA stroke-Hemorrhage transformation is noted within the infarct (Resolved) [I63.531]  Persistent atrial fibrillation (HCC) [I48.1]             Anticoagulation Episode Summary     INR check location:   [facility]    Preferred lab:       Send INR reminders to:       Comments:   Marcelina      Anticoagulation Care Providers     Provider Role Specialty Phone number    Bijan Sampson M.D. Referring Cardiology [phone]    [facility] Anticoagulation Services Responsible  [phone]        Anticoagulation Patient Findings        Spoke with patient.  INR is therapeutic.   Pt denies any unusual s/s of bleeding, bruising, clotting.    Pt denies any changes to diet or medications.   Pt verified current warfarin regimen.   Plan: Continue on current warfarin regimen 5mg MWF and 2.5 mg all other days.       Repeat INR in 2 weeks.    Tino Rivera 2019 PharmD Candidate      I have reviewed and concur with the above plan     Loc Chan, Pawel

## 2018-12-04 ENCOUNTER — ANTICOAGULATION MONITORING (OUTPATIENT)
Dept: VASCULAR LAB | Facility: MEDICAL CENTER | Age: 63
End: 2018-12-04

## 2018-12-04 DIAGNOSIS — I48.19 PERSISTENT ATRIAL FIBRILLATION (HCC): ICD-10-CM

## 2018-12-04 LAB — INR PPP: 3.4 (ref 2–3.5)

## 2018-12-04 NOTE — PROGRESS NOTES
Anticoagulation Summary  As of 12/4/2018    INR goal:   2.0-3.0   TTR:   71.2 % (2.6 y)   Today's INR:   3.4!   Warfarin maintenance plan:   5 mg (5 mg x 1) on Mon, Wed, Fri; 2.5 mg (5 mg x 0.5) all other days   Weekly warfarin total:   25 mg   Plan last modified:   Fred SamD (11/6/2018)   Next INR check:   12/18/2018   Target end date:   Indefinite    Indications    Atrial flutter (HCC) (Resolved) [I48.92]  Acute ischemic right PCA stroke-Hemorrhage transformation is noted within the infarct (Resolved) [I63.531]  Persistent atrial fibrillation (HCC) [I48.1]             Anticoagulation Episode Summary     INR check location:   [facility]    Preferred lab:       Send INR reminders to:       Comments:   Marcelina      Anticoagulation Care Providers     Provider Role Specialty Phone number    Bijan Sampson M.D. Referring Cardiology [phone]    [facility] Anticoagulation Services Responsible  [phone]        Anticoagulation Patient Findings    Left voicemail message to report a supratherapeutic INR of 3.4.  Requested pt contact the clinic for any s/s of unusual bleeding, bruising, clotting or any changes to diet or medication.   Instructed patient to HOLD X 1, then resume current warfarin regimen.  FU 2 weeks.  Timur Gonzalez, PharmD

## 2018-12-24 ENCOUNTER — ANTICOAGULATION MONITORING (OUTPATIENT)
Dept: VASCULAR LAB | Facility: MEDICAL CENTER | Age: 63
End: 2018-12-24

## 2018-12-24 DIAGNOSIS — I48.19 PERSISTENT ATRIAL FIBRILLATION (HCC): ICD-10-CM

## 2018-12-24 LAB — INR PPP: 3.4 (ref 2–3.5)

## 2018-12-24 NOTE — PROGRESS NOTES
Anticoagulation Summary  As of 12/24/2018    INR goal:   2.0-3.0   TTR:   69.8 % (2.7 y)   Today's INR:   3.4!   Warfarin maintenance plan:   5 mg (5 mg x 1) on Wed, Fri; 2.5 mg (5 mg x 0.5) all other days   Weekly warfarin total:   22.5 mg   Plan last modified:   Timur Gonzalez PharmD (12/24/2018)   Next INR check:   1/7/2019   Target end date:   Indefinite    Indications    Atrial flutter (HCC) (Resolved) [I48.92]  Acute ischemic right PCA stroke-Hemorrhage transformation is noted within the infarct (Resolved) [I63.531]  Persistent atrial fibrillation (HCC) [I48.1]             Anticoagulation Episode Summary     INR check location:   [facility]    Preferred lab:       Send INR reminders to:       Comments:   Marcelina      Anticoagulation Care Providers     Provider Role Specialty Phone number    Bijan Sampson M.D. Referring Cardiology [phone]    [facility] Anticoagulation Services Responsible  [phone]        Anticoagulation Patient Findings  Patient Findings     Negatives:   Signs/symptoms of thrombosis, Signs/symptoms of bleeding, Laboratory test error suspected, Change in health, Change in alcohol use, Change in activity, Upcoming invasive procedure, Emergency department visit, Upcoming dental procedure, Missed doses, Extra doses, Change in medications, Change in diet/appetite, Hospital admission, Bruising, Other complaints          Spoke with patient today regarding supratherapeutic INR of 3.4.  Patient denies any signs/symptoms of bruising or bleeding or any changes in diet and medications.  Instructed patient to call clinic with any questions or concerns.  Instructed patient to decrease weekly warfarin regimen as detailed above.  Follow up in 2 weeks, to reduce risk of adverse events related to this high risk medication,  Warfarin.    Timur Gonzalez, FredD

## 2018-12-28 DIAGNOSIS — I48.19 PERSISTENT ATRIAL FIBRILLATION (HCC): ICD-10-CM

## 2018-12-28 DIAGNOSIS — N18.30 CHRONIC KIDNEY DISEASE, STAGE III (MODERATE) (HCC): ICD-10-CM

## 2018-12-28 DIAGNOSIS — F32.1 MODERATE SINGLE CURRENT EPISODE OF MAJOR DEPRESSIVE DISORDER (HCC): ICD-10-CM

## 2018-12-28 DIAGNOSIS — E78.00 PURE HYPERCHOLESTEROLEMIA: ICD-10-CM

## 2018-12-28 DIAGNOSIS — G47.33 OBSTRUCTIVE SLEEP APNEA: ICD-10-CM

## 2018-12-28 DIAGNOSIS — I10 ESSENTIAL HYPERTENSION: ICD-10-CM

## 2018-12-28 DIAGNOSIS — E55.9 VITAMIN D DEFICIENCY: ICD-10-CM

## 2018-12-28 DIAGNOSIS — Z79.01 CHRONIC ANTICOAGULATION: ICD-10-CM

## 2018-12-28 DIAGNOSIS — D64.9 NORMOCHROMIC NORMOCYTIC ANEMIA: ICD-10-CM

## 2018-12-31 PROBLEM — I48.0 PAF (PAROXYSMAL ATRIAL FIBRILLATION) (HCC): Status: RESOLVED | Noted: 2018-03-01 | Resolved: 2018-12-31

## 2018-12-31 PROBLEM — E11.3493: Status: RESOLVED | Noted: 2017-05-04 | Resolved: 2018-12-31

## 2018-12-31 RX ORDER — LISINOPRIL 5 MG/1
5 TABLET ORAL DAILY
Qty: 90 TAB | Refills: 0 | Status: SHIPPED | OUTPATIENT
Start: 2018-12-31 | End: 2019-01-24 | Stop reason: SDUPTHER

## 2018-12-31 RX ORDER — LIRAGLUTIDE 6 MG/ML
1.8 INJECTION SUBCUTANEOUS DAILY
Qty: 9 ML | Refills: 6 | Status: SHIPPED | OUTPATIENT
Start: 2018-12-31 | End: 2019-01-24

## 2018-12-31 NOTE — TELEPHONE ENCOUNTER
Was the patient seen in the last year in this department? Yes    Does patient have an active prescription for medications requested? Yes    Received Request Via: Pharmacy     Last Appointment 2/24/18  Last Labs 1/24/18

## 2019-01-07 ENCOUNTER — ANTICOAGULATION MONITORING (OUTPATIENT)
Dept: VASCULAR LAB | Facility: MEDICAL CENTER | Age: 64
End: 2019-01-07

## 2019-01-07 DIAGNOSIS — I48.19 PERSISTENT ATRIAL FIBRILLATION (HCC): ICD-10-CM

## 2019-01-07 LAB — INR PPP: 2.9 (ref 2–3.5)

## 2019-01-07 NOTE — PROGRESS NOTES
Anticoagulation Summary  As of 1/7/2019    INR goal:   2.0-3.0   TTR:   69.1 % (2.7 y)   Today's INR:   2.9   Warfarin maintenance plan:   5 mg (5 mg x 1) on Wed, Fri; 2.5 mg (5 mg x 0.5) all other days   Weekly warfarin total:   22.5 mg   Plan last modified:   Timur Gonzalez, PharmD (12/24/2018)   Next INR check:   1/21/2019   Target end date:   Indefinite    Indications    Atrial flutter (HCC) (Resolved) [I48.92]  Acute ischemic right PCA stroke-Hemorrhage transformation is noted within the infarct (Resolved) [I63.531]  Persistent atrial fibrillation (HCC) [I48.1]             Anticoagulation Episode Summary     INR check location:   [facility]    Preferred lab:       Send INR reminders to:       Comments:   Marcelina      Anticoagulation Care Providers     Provider Role Specialty Phone number    Bijan Sampson M.D. Referring Cardiology [phone]    [facility] Anticoagulation Services Responsible  [phone]        Anticoagulation Patient Findings  Patient Findings     Negatives:   Signs/symptoms of thrombosis, Signs/symptoms of bleeding, Laboratory test error suspected, Change in health, Change in alcohol use, Change in activity, Upcoming invasive procedure, Emergency department visit, Upcoming dental procedure, Missed doses, Extra doses, Change in medications, Change in diet/appetite, Hospital admission, Bruising, Other complaints        Spoke with patient today regarding therapeutic INR of 2.9.  Patient denies any signs/symptoms of bruising or bleeding or any changes in diet and medications.  Instructed patient to call clinic with any questions or concerns.  Pt is to continue with current warfarin dosing regimen.  Follow up in 2 weeks, to reduce risk of adverse events related to this high risk medication,  Warfarin.    Timur Gonzalez, FredD

## 2019-01-08 ENCOUNTER — HOSPITAL ENCOUNTER (OUTPATIENT)
Dept: LAB | Facility: MEDICAL CENTER | Age: 64
End: 2019-01-08
Attending: PHYSICIAN ASSISTANT
Payer: MEDICARE

## 2019-01-08 ENCOUNTER — NON-PROVIDER VISIT (OUTPATIENT)
Dept: MEDICAL GROUP | Facility: CLINIC | Age: 64
End: 2019-01-08
Payer: MEDICARE

## 2019-01-08 DIAGNOSIS — F32.1 MODERATE SINGLE CURRENT EPISODE OF MAJOR DEPRESSIVE DISORDER (HCC): ICD-10-CM

## 2019-01-08 DIAGNOSIS — D64.9 NORMOCHROMIC NORMOCYTIC ANEMIA: ICD-10-CM

## 2019-01-08 DIAGNOSIS — I10 ESSENTIAL HYPERTENSION: ICD-10-CM

## 2019-01-08 DIAGNOSIS — Z79.01 CHRONIC ANTICOAGULATION: ICD-10-CM

## 2019-01-08 DIAGNOSIS — G47.33 OBSTRUCTIVE SLEEP APNEA: ICD-10-CM

## 2019-01-08 DIAGNOSIS — N18.30 CHRONIC KIDNEY DISEASE, STAGE III (MODERATE) (HCC): ICD-10-CM

## 2019-01-08 DIAGNOSIS — E78.00 PURE HYPERCHOLESTEROLEMIA: ICD-10-CM

## 2019-01-08 LAB
25(OH)D3 SERPL-MCNC: 56 NG/ML (ref 30–100)
ALBUMIN SERPL BCP-MCNC: 3.9 G/DL (ref 3.2–4.9)
ALBUMIN/GLOB SERPL: 1.3 G/DL
ALP SERPL-CCNC: 117 U/L (ref 30–99)
ALT SERPL-CCNC: 15 U/L (ref 2–50)
ANION GAP SERPL CALC-SCNC: 8 MMOL/L (ref 0–11.9)
AST SERPL-CCNC: 14 U/L (ref 12–45)
BASOPHILS # BLD AUTO: 0.3 % (ref 0–1.8)
BASOPHILS # BLD: 0.03 K/UL (ref 0–0.12)
BILIRUB SERPL-MCNC: 1.4 MG/DL (ref 0.1–1.5)
BUN SERPL-MCNC: 17 MG/DL (ref 8–22)
CALCIUM SERPL-MCNC: 9.6 MG/DL (ref 8.5–10.5)
CHLORIDE SERPL-SCNC: 98 MMOL/L (ref 96–112)
CHOLEST SERPL-MCNC: 102 MG/DL (ref 100–199)
CO2 SERPL-SCNC: 29 MMOL/L (ref 20–33)
CREAT SERPL-MCNC: 1.15 MG/DL (ref 0.5–1.4)
CREAT UR-MCNC: 98.6 MG/DL
EOSINOPHIL # BLD AUTO: 0.13 K/UL (ref 0–0.51)
EOSINOPHIL NFR BLD: 1.5 % (ref 0–6.9)
ERYTHROCYTE [DISTWIDTH] IN BLOOD BY AUTOMATED COUNT: 54.4 FL (ref 35.9–50)
EST. AVERAGE GLUCOSE BLD GHB EST-MCNC: 197 MG/DL
FASTING STATUS PATIENT QL REPORTED: NORMAL
GLOBULIN SER CALC-MCNC: 3 G/DL (ref 1.9–3.5)
GLUCOSE SERPL-MCNC: 117 MG/DL (ref 65–99)
HBA1C MFR BLD: 8.5 % (ref 0–5.6)
HCT VFR BLD AUTO: 43.3 % (ref 37–47)
HDLC SERPL-MCNC: 26 MG/DL
HGB BLD-MCNC: 13.8 G/DL (ref 12–16)
IMM GRANULOCYTES # BLD AUTO: 0.02 K/UL (ref 0–0.11)
IMM GRANULOCYTES NFR BLD AUTO: 0.2 % (ref 0–0.9)
LDLC SERPL CALC-MCNC: 56 MG/DL
LYMPHOCYTES # BLD AUTO: 1.09 K/UL (ref 1–4.8)
LYMPHOCYTES NFR BLD: 12.3 % (ref 22–41)
MCH RBC QN AUTO: 28.2 PG (ref 27–33)
MCHC RBC AUTO-ENTMCNC: 31.9 G/DL (ref 33.6–35)
MCV RBC AUTO: 88.5 FL (ref 81.4–97.8)
MICROALBUMIN UR-MCNC: 40.2 MG/DL
MICROALBUMIN/CREAT UR: 408 MG/G (ref 0–30)
MONOCYTES # BLD AUTO: 0.61 K/UL (ref 0–0.85)
MONOCYTES NFR BLD AUTO: 6.9 % (ref 0–13.4)
NEUTROPHILS # BLD AUTO: 6.98 K/UL (ref 2–7.15)
NEUTROPHILS NFR BLD: 78.8 % (ref 44–72)
NRBC # BLD AUTO: 0 K/UL
NRBC BLD-RTO: 0 /100 WBC
PLATELET # BLD AUTO: 249 K/UL (ref 164–446)
PMV BLD AUTO: 12.3 FL (ref 9–12.9)
POTASSIUM SERPL-SCNC: 4 MMOL/L (ref 3.6–5.5)
PROT SERPL-MCNC: 6.9 G/DL (ref 6–8.2)
RBC # BLD AUTO: 4.89 M/UL (ref 4.2–5.4)
SODIUM SERPL-SCNC: 135 MMOL/L (ref 135–145)
TRIGL SERPL-MCNC: 98 MG/DL (ref 0–149)
WBC # BLD AUTO: 8.9 K/UL (ref 4.8–10.8)

## 2019-01-08 PROCEDURE — 36415 COLL VENOUS BLD VENIPUNCTURE: CPT | Performed by: PHYSICIAN ASSISTANT

## 2019-01-08 PROCEDURE — 82306 VITAMIN D 25 HYDROXY: CPT

## 2019-01-08 PROCEDURE — 80053 COMPREHEN METABOLIC PANEL: CPT

## 2019-01-08 PROCEDURE — 36415 COLL VENOUS BLD VENIPUNCTURE: CPT

## 2019-01-08 PROCEDURE — 83036 HEMOGLOBIN GLYCOSYLATED A1C: CPT | Mod: GA

## 2019-01-08 PROCEDURE — 85025 COMPLETE CBC W/AUTO DIFF WBC: CPT

## 2019-01-08 PROCEDURE — 82570 ASSAY OF URINE CREATININE: CPT

## 2019-01-08 PROCEDURE — 99000 SPECIMEN HANDLING OFFICE-LAB: CPT | Performed by: PHYSICIAN ASSISTANT

## 2019-01-08 PROCEDURE — 80061 LIPID PANEL: CPT

## 2019-01-08 PROCEDURE — 82043 UR ALBUMIN QUANTITATIVE: CPT

## 2019-01-22 LAB — INR PPP: 2 (ref 2–3.5)

## 2019-01-22 NOTE — PROGRESS NOTES
Subjective:      Shani Love is a 63 y.o. female who presents with No chief complaint on file.    Samson Keith MA yefri labs        HPI    ROS       Objective:     There were no vitals taken for this visit.     Physical Exam            Assessment/Plan:     There are no diagnoses linked to this encounter.

## 2019-01-23 ENCOUNTER — ANTICOAGULATION MONITORING (OUTPATIENT)
Dept: VASCULAR LAB | Facility: MEDICAL CENTER | Age: 64
End: 2019-01-23

## 2019-01-23 DIAGNOSIS — I48.19 PERSISTENT ATRIAL FIBRILLATION (HCC): ICD-10-CM

## 2019-01-23 NOTE — PROGRESS NOTES
Anticoagulation Summary  As of 1/23/2019    INR goal:   2.0-3.0   TTR:   69.5 % (2.8 y)   Today's INR:   2.0 (1/22/2019)   Warfarin maintenance plan:   5 mg (5 mg x 1) on Wed, Fri; 2.5 mg (5 mg x 0.5) all other days   Weekly warfarin total:   22.5 mg   Plan last modified:   Timur Gonzalez, PharmD (12/24/2018)   Next INR check:   2/5/2019   Target end date:   Indefinite    Indications    Atrial flutter (HCC) (Resolved) [I48.92]  Acute ischemic right PCA stroke-Hemorrhage transformation is noted within the infarct (Resolved) [I63.531]  Persistent atrial fibrillation (HCC) [I48.1]             Anticoagulation Episode Summary     INR check location:   [facility]    Preferred lab:       Send INR reminders to:       Comments:   Marcelina      Anticoagulation Care Providers     Provider Role Specialty Phone number    Bijan Sampson M.D. Referring Cardiology [phone]    [facility] Anticoagulation Services Responsible  [phone]        Anticoagulation Patient Findings      Spoke with patient to report a therapeutic INR.    Pt instructed to continue with current warfarin dosing regimen, confirms dosing.   Pt denies any s/s of bleeding, bruising, clotting or any changes to diet or medication.    Will follow up in 2 week(s).     Ant Segura, Pharmacy Intern

## 2019-01-24 ENCOUNTER — OFFICE VISIT (OUTPATIENT)
Dept: MEDICAL GROUP | Facility: CLINIC | Age: 64
End: 2019-01-24
Payer: MEDICARE

## 2019-01-24 VITALS
TEMPERATURE: 97.9 F | SYSTOLIC BLOOD PRESSURE: 128 MMHG | RESPIRATION RATE: 18 BRPM | DIASTOLIC BLOOD PRESSURE: 86 MMHG | WEIGHT: 293 LBS | OXYGEN SATURATION: 96 % | HEIGHT: 64 IN | BODY MASS INDEX: 50.02 KG/M2 | HEART RATE: 85 BPM

## 2019-01-24 DIAGNOSIS — I48.19 PERSISTENT ATRIAL FIBRILLATION (HCC): ICD-10-CM

## 2019-01-24 DIAGNOSIS — I10 ESSENTIAL HYPERTENSION: ICD-10-CM

## 2019-01-24 DIAGNOSIS — I05.0 MITRAL VALVE STENOSIS, UNSPECIFIED ETIOLOGY: ICD-10-CM

## 2019-01-24 DIAGNOSIS — N18.30 CHRONIC KIDNEY DISEASE, STAGE III (MODERATE) (HCC): ICD-10-CM

## 2019-01-24 DIAGNOSIS — E55.9 VITAMIN D DEFICIENCY: ICD-10-CM

## 2019-01-24 DIAGNOSIS — L03.116 CELLULITIS OF LEFT LOWER LEG: ICD-10-CM

## 2019-01-24 DIAGNOSIS — Z23 NEED FOR VACCINATION: ICD-10-CM

## 2019-01-24 DIAGNOSIS — E78.00 PURE HYPERCHOLESTEROLEMIA: ICD-10-CM

## 2019-01-24 DIAGNOSIS — I48.92 PAROXYSMAL ATRIAL FLUTTER (HCC): ICD-10-CM

## 2019-01-24 DIAGNOSIS — I34.0 MITRAL VALVE INSUFFICIENCY, UNSPECIFIED ETIOLOGY: ICD-10-CM

## 2019-01-24 DIAGNOSIS — F33.1 MODERATE EPISODE OF RECURRENT MAJOR DEPRESSIVE DISORDER (HCC): ICD-10-CM

## 2019-01-24 PROBLEM — Z00.00 ENCOUNTER FOR MEDICAL EXAMINATION TO ESTABLISH CARE: Status: RESOLVED | Noted: 2018-02-23 | Resolved: 2019-01-24

## 2019-01-24 PROBLEM — Z09 HOSPITAL DISCHARGE FOLLOW-UP: Status: RESOLVED | Noted: 2018-02-23 | Resolved: 2019-01-24

## 2019-01-24 PROCEDURE — G0008 ADMIN INFLUENZA VIRUS VAC: HCPCS | Performed by: PHYSICIAN ASSISTANT

## 2019-01-24 PROCEDURE — 99215 OFFICE O/P EST HI 40 MIN: CPT | Mod: 25 | Performed by: PHYSICIAN ASSISTANT

## 2019-01-24 PROCEDURE — 90662 IIV NO PRSV INCREASED AG IM: CPT | Performed by: PHYSICIAN ASSISTANT

## 2019-01-24 RX ORDER — METOPROLOL TARTRATE 50 MG/1
50 TABLET, FILM COATED ORAL 2 TIMES DAILY
Qty: 180 TAB | Refills: 2 | Status: SHIPPED | OUTPATIENT
Start: 2019-01-24

## 2019-01-24 RX ORDER — LISINOPRIL 5 MG/1
5 TABLET ORAL DAILY
Qty: 90 TAB | Refills: 1 | Status: SHIPPED | OUTPATIENT
Start: 2019-01-24

## 2019-01-24 RX ORDER — CITALOPRAM 20 MG/1
20 TABLET ORAL DAILY
Qty: 30 TAB | Refills: 6 | Status: SHIPPED | OUTPATIENT
Start: 2019-01-24 | End: 2019-05-20 | Stop reason: SDUPTHER

## 2019-01-24 RX ORDER — PEN NEEDLE, DIABETIC 32GX 5/32"
1 NEEDLE, DISPOSABLE MISCELLANEOUS 2 TIMES DAILY
Qty: 180 EACH | Refills: 3 | Status: SHIPPED | OUTPATIENT
Start: 2019-01-24

## 2019-01-24 RX ORDER — FUROSEMIDE 40 MG/1
40 TABLET ORAL
Qty: 104 TAB | Refills: 1 | Status: SHIPPED | OUTPATIENT
Start: 2019-01-24 | End: 2019-06-18 | Stop reason: SDUPTHER

## 2019-01-24 RX ORDER — PIOGLITAZONEHYDROCHLORIDE 30 MG/1
30 TABLET ORAL DAILY
Qty: 30 TAB | Refills: 11 | Status: SHIPPED | OUTPATIENT
Start: 2019-01-24 | End: 2019-03-20 | Stop reason: SDUPTHER

## 2019-01-24 RX ORDER — SULFAMETHOXAZOLE AND TRIMETHOPRIM 800; 160 MG/1; MG/1
1 TABLET ORAL 2 TIMES DAILY
Qty: 14 TAB | Refills: 0 | Status: SHIPPED | OUTPATIENT
Start: 2019-01-24 | End: 2019-06-14

## 2019-01-24 RX ORDER — WARFARIN SODIUM 5 MG/1
2.5-5 TABLET ORAL DAILY
Qty: 30 TAB | Refills: 2 | Status: SHIPPED | OUTPATIENT
Start: 2019-01-24

## 2019-01-24 RX ORDER — SIMVASTATIN 20 MG
20 TABLET ORAL EVERY EVENING
Qty: 90 TAB | Refills: 1 | Status: SHIPPED | OUTPATIENT
Start: 2019-01-24

## 2019-01-24 RX ORDER — DILTIAZEM HYDROCHLORIDE 120 MG/1
120 CAPSULE, COATED, EXTENDED RELEASE ORAL 2 TIMES DAILY
Qty: 180 CAP | Refills: 2 | Status: SHIPPED | OUTPATIENT
Start: 2019-01-24 | End: 2019-06-20 | Stop reason: SDUPTHER

## 2019-01-24 RX ORDER — LIRAGLUTIDE 6 MG/ML
1.8 INJECTION SUBCUTANEOUS DAILY
Qty: 3 PEN | Refills: 6 | Status: SHIPPED | OUTPATIENT
Start: 2019-01-24

## 2019-01-25 ENCOUNTER — ANTICOAGULATION MONITORING (OUTPATIENT)
Dept: MEDICAL GROUP | Facility: PHYSICIAN GROUP | Age: 64
End: 2019-01-25

## 2019-01-25 DIAGNOSIS — I48.19 PERSISTENT ATRIAL FIBRILLATION (HCC): ICD-10-CM

## 2019-01-25 PROBLEM — F33.1 MODERATE EPISODE OF RECURRENT MAJOR DEPRESSIVE DISORDER (HCC): Status: ACTIVE | Noted: 2019-01-25

## 2019-01-25 PROBLEM — F32.1 MODERATE SINGLE CURRENT EPISODE OF MAJOR DEPRESSIVE DISORDER (HCC): Status: RESOLVED | Noted: 2017-06-15 | Resolved: 2019-01-25

## 2019-01-25 NOTE — ASSESSMENT & PLAN NOTE
Patient continues on daily simvastatin 20 mg tablets, previously prescribed by cardiology but patient wishes to transfer to a new cardiologist.  Component      Latest Ref Rng & Units 1/22/2018 1/8/2019   Cholesterol,Tot      100 - 199 mg/dL 96 (L) 102   Triglycerides      0 - 149 mg/dL 63 98   HDL      >=40 mg/dL 32 (A) 26 (A)   LDL      <100 mg/dL 51 56

## 2019-01-25 NOTE — ASSESSMENT & PLAN NOTE
"This is previously been followed by cardiology however, patient states she is \"mad at [her] cardiologist\" who she claims stated he could no longer help her due to her noncompliance with the medication regimen he had laid out for her.  She is requesting referral to a new cardiologist today.  "

## 2019-01-25 NOTE — ASSESSMENT & PLAN NOTE
Patient denies symptoms of this with the exception of shortness of breath on exertion which may be due to deconditioning.

## 2019-01-25 NOTE — ASSESSMENT & PLAN NOTE
Last A1c: 8.5% 1/8/19 7.0% 2/2018   DM Medications: (non-compliant with Pioglitazone 30 mg tablets daily,) Victoza 1.8 mg daily injection, Triseba flextouch 40 units every bedtime, empagliflozin 10mg tab qam,  Patient reports good medication compliance with the last three of those medicines.   HTN: Blood pressure goal <140/<90 Yes  ACE: Lisinopril 5 mg tablets  Hyperlipidemia: Cholesterol goal LDL <100 Yes.   Currently Rx Statin: Simvastatin 20 mg tablet  Diabetic diet: No, very poor diet at this time, eating pies, and a great deal of simple carbohydrates.   Exercise: None.  Last monofilament foot exam: Due for repeat check at this time however, not completed. Checks feet at home: No, no sores currently   Last Eye exam: overdue  Kidney function: GFR decreased at 47 Jan 2019, stable.   Microalbumin screening:  Abnormal as of jan 2019 as below:   Component      Latest Ref Rng & Units 1/8/2019   Creatinine, Urine      mg/dL 98.60   Microalbumin, Urine Random      mg/dL 40.2   Micro Alb Creat Ratio      0 - 30 mg/g 408 (H)       Has patient received flu vaccine: Yes  Has patient received Hep B series:Yes    A1c goal <7 Yes  Current barriers to control include diet control, lack of exercise  Glucose monitoring frequency: QAM-patient does not present with blood glucose log today.  Fasting sugars: 120-160's. Post-prandial sugars: 280's per patniet  Hypoglycemic episodes No  Diabetic complications: nephropathy, cardiovascular disease and peripheral vascular disease    The patient is taking ASA every day and is not taking all other medications as prescribed. Patient denies any side effects of medication.

## 2019-01-25 NOTE — ASSESSMENT & PLAN NOTE
Patient continues warfarin and diltiazem as prescribed by Dr. Eren Gomez.  She requests referral to new cardiologist at this time.

## 2019-01-25 NOTE — ASSESSMENT & PLAN NOTE
Patient continues taking Celexa 20 mg tablets on a daily basis and states she continues doing well.  She is requesting medication refills.  She states she does not wish to complete counseling.  She also denies suicidal homicidal ideations at this time.

## 2019-01-25 NOTE — ASSESSMENT & PLAN NOTE
This is a recurring problem, currently present.  Patient states that for the past week or so she has noted increased redness, warmth to touch and slight pain sensation in her left lower leg and somewhat in her right.  She states in addition to that she scratched her left leg somewhere in her home and has an open wound due to this.  The skin is not weeping but is very tense.

## 2019-01-25 NOTE — PROGRESS NOTES
"Chief Complaint   Patient presents with   • Results     FV        HISTORY OF PRESENT ILLNESS: Patient is a 63 y.o. female established patient who presents today for evaluation and management of:    BMI 50.0-59.9, adult (CMS-[facility])  patient has little concern about her weight and does not exercise or eat for weight loss despite multiple comorbidities which would be improved with weight loss.    Cellulitis of left lower leg  This is a recurring problem, currently present.  Patient states that for the past week or so she has noted increased redness, warmth to touch and slight pain sensation in her left lower leg and somewhat in her right.  She states in addition to that she scratched her left leg somewhere in her home and has an open wound due to this.  The skin is not weeping but is very tense.    Chronic kidney disease, stage III (moderate)  This remains relatively stable with the difference between January 2018 and January 2019 listed below.  Patient denies any recent change in frequency or color of urine.  She denies flank pain.  01/2018 GFR 49  01/2019 GFR 47      Essential hypertension  This is previously been followed by cardiology however, patient states she is \"mad at [her] cardiologist\" who she claims stated he could no longer help her due to her noncompliance with the medication regimen he had laid out for her.  She is requesting referral to a new cardiologist today.    Hyperlipidemia  Patient continues on daily simvastatin 20 mg tablets, previously prescribed by cardiology but patient wishes to transfer to a new cardiologist.  Component      Latest Ref Rng & Units 1/22/2018 1/8/2019   Cholesterol,Tot      100 - 199 mg/dL 96 (L) 102   Triglycerides      0 - 149 mg/dL 63 98   HDL      >=40 mg/dL 32 (A) 26 (A)   LDL      <100 mg/dL 51 56       Mitral regurgitation  Patient denies symptoms of this with the exception of shortness of breath on exertion which may be due to deconditioning.    Mitral stenosis  See " mitral regurg.    Paroxysmal atrial flutter (CMS-[facility])  Patient continues on anticoagulation including warfarin for this.  She denies recent symptoms.    Persistent atrial fibrillation ([facility])  Patient continues warfarin and diltiazem as prescribed by Dr. Eren Gomez.  She requests referral to new cardiologist at this time.    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   HTN: Blood pressure goal <140/<90 Yes  ACE: Lisinopril 5 mg tablets  Hyperlipidemia: Cholesterol goal LDL <100 Yes.   Currently Rx Statin: Simvastatin 20 mg tablet  Diabetic diet: No, very poor diet at this time, eating pies, and a great deal of simple carbohydrates.   Exercise: None.  Last monofilament foot exam: Due for repeat check at this time however, not completed. Checks feet at home: No, no sores currently   Last Eye exam: overdue  Kidney function: GFR decreased at 47 Jan 2019, stable.   Microalbumin screening:  Abnormal as of jan 2019 as below:   Component      Latest Ref Rng & Units 1/8/2019   Creatinine, Urine      mg/dL 98.60   Microalbumin, Urine Random      mg/dL 40.2   Micro Alb Creat Ratio      0 - 30 mg/g 408 (H)       Has patient received flu vaccine: Yes  Has patient received Hep B series:Yes    A1c goal <7 Yes  Current barriers to control include diet control, lack of exercise  Glucose monitoring frequency: QAM-patient does not present with blood glucose log today.  Fasting sugars: 120-160's. Post-prandial sugars: 280's per patniet  Hypoglycemic episodes No  Diabetic complications: nephropathy, cardiovascular disease and peripheral vascular  disease    The patient is taking ASA every day and is not taking all other medications as prescribed. Patient denies any side effects of medication.      Vitamin D deficiency  With continued daily supplementation, this patient has well-controlled vitamin D at this time.    Moderate episode of recurrent major depressive disorder (HCC)  Patient continues taking Celexa 20 mg tablets on a daily basis and states she continues doing well.  She is requesting medication refills.  She states she does not wish to complete counseling.  She also denies suicidal homicidal ideations at this time.       Patient Active Problem List    Diagnosis Date Noted   • Chronic kidney disease, stage III (moderate) ([facility]) 03/15/2017     Priority: Medium   • Obstructive sleep apnea 02/15/2017     Priority: Medium   • Status post ablation of atrial flutter 05/18/2016     Priority: Medium   • Pulmonary hypertension ([facility]) 04/08/2016     Priority: Medium   • Essential hypertension 12/01/2015     Priority: Medium   • Persistent atrial fibrillation ([facility]) 12/01/2015     Priority: Medium   • History of stroke 11/30/2015     Priority: Medium   • Hyperlipidemia 11/19/2015     Priority: Medium   • Uncontrolled type 2 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, with long-term current use of insulin ([facility]) 11/17/2015     Priority: Medium   • Normochromic normocytic anemia 01/22/2018     Priority: Low   • Anxiety 09/07/2017     Priority: Low   • Retinal hemorrhage of both eyes 10/26/2016     Priority: Low   • Moderate episode of recurrent major depressive disorder ([facility]) 01/25/2019   • Mitral stenosis 01/29/2018   • Mitral regurgitation 01/29/2018   • Cellulitis of left lower leg 10/07/2017   • Candidiasis of skin 07/20/2017   • BMI 50.0-59.9, adult ([facility]) 06/28/2017   • Paroxysmal atrial flutter ([facility]) 02/23/2017   • Chronic anticoagulation 02/15/2017   • Fecal occult blood test positive 12/16/2016   • Vitamin D deficiency 12/16/2016        Allergies:Patient has no known allergies.    Current Outpatient Prescriptions   Medication Sig Dispense Refill   • sulfamethoxazole-trimethoprim (BACTRIM DS) 800-160 MG tablet Take 1 Tab by mouth 2 times a day. 14 Tab 0   • furosemide (LASIX) 40 MG Tab Take 1 Tab by mouth every day. And 80mg po for leg swelling for 14 days 104 Tab 1   • citalopram (CELEXA) 20 MG Tab Take 1 Tab by mouth every day. 30 Tab 6   • VICTOZA 18 MG/3ML Solution Pen-injector injection Inject 0.3 mL as instructed every day. 3 PEN 6   • simvastatin (ZOCOR) 20 MG Tab Take 1 Tab by mouth every evening. Labs needed. 90 Tab 1   • pioglitazone (ACTOS) 30 MG Tab Take 1 Tab by mouth every day. 30 Tab 11   • metoprolol (LOPRESSOR) 50 MG Tab Take 1 Tab by mouth 2 times a day. 180 Tab 2   • magnesium oxide (MAG-OX) 400 (241.3 Mg) MG Tab tablet Take 1 Tab by mouth every day. 180 Tab 1   • lisinopril (PRINIVIL) 5 MG Tab Take 1 Tab by mouth every day. 90 Tab 1   • Insulin Degludec (TRESIBA FLEXTOUCH) 200 UNIT/ML Solution Pen-injector Inject 30-60 Units as instructed every bedtime. 3 PEN 5   • GLOBAL EASE INJECT PEN NEEDLES Inject 1 Each as instructed 2 Times a Day. 180 Each 3   • Empagliflozin 10 MG Tab Take 1 tablet by mouth every morning with breakfast. 90 Tab 1   • DILTIAZem CD (CARDIZEM CD) 120 MG CAPSULE SR 24 HR Take 1 Cap by mouth 2 Times a Day. 180 Cap 2   • cholecalciferol (VITAMIN D3) 5000 UNIT Cap Take 1 Cap by mouth every day. 30 Cap 5   • warfarin (COUMADIN) 5 MG Tab Take 0.5-1 Tabs by mouth every day. 2.5 mg on Mondays, Wednesdays, and Fridays. All other days 5 mg 30 Tab 2   • nystatin (MYCOSTATIN) powder Apply to affected area 1-2 times daily PRN 15 g 2   • glucose blood strip 1 Strip by Other route as needed. 300 Strip 0   • Ipratropium-Albuterol (DUONEB INH) Inhale 1 Inhalation by mouth Once.       No current facility-administered medications for this visit.        Social History   Substance Use Topics   • Smoking status: Never Smoker  "  • Smokeless tobacco: Never Used   • Alcohol use No       Family Status   Relation Status   • Mo    • Fa    • Bro (Not Specified)     Family History   Problem Relation Age of Onset   • Stroke Mother 71   • Cancer Father 71   • Heart Disease Brother        Review of Systems: See HPI above.   Constitutional: Negative for fever, chills, weight loss and malaise.   HENT: Negative for ear pain, nosebleeds, congestion, sore throat and neck pain.    Eyes: Negative for blurred vision.   Respiratory: Negative for shortness of breath, cough, sputum production and wheezing.    Cardiovascular: Negative for chest pain, palpitations, orthopnea and positive for leg swelling.   Gastrointestinal: Negative for heartburn, nausea, vomiting and abdominal pain.   Genitourinary: Negative for dysuria, urgency and frequency.   Musculoskeletal: Negative for myalgias, back pain.   Skin: Negative for rash and itching.  See HPI above for cellulitis.  Neurological: Negative for dizziness, tingling, tremors,focal weakness and headaches.   Endo/Heme/Allergies: Does not bruise/bleed easily.   Psychiatric/Behavioral: Positive for well-controlled depression without suicidal ideas and memory loss.  The patient is not nervous/anxious and does not have insomnia.      Exam:  Blood pressure 128/86, pulse 85, temperature 36.6 °C (97.9 °F), temperature source Temporal, resp. rate 18, height 1.626 m (5' 4\"), weight (!) 139.7 kg (308 lb), SpO2 96 %, not currently breastfeeding.  Body mass index is 52.87 kg/m².  General: Morbidly Obese female in NAD  Head: is grossly normal.  Neck: Supple without masses. Thyroid is not visibly enlarged.  Pulmonary: Clear to ausculation.  Increased effort on mild exertion. No rales, ronchi, or wheezing.  Cardiovascular: Regular rate and rhythm with murmur. Carotid pulses are intact and equal bilaterally.  Extremities: no clubbing, cyanosis with edema, erythema and heat to touch of bilateral lower extremities. "     Medical decision-making and discussion:  1. Moderate episode of recurrent major depressive disorder (HCC)  - citalopram (CELEXA) 20 MG Tab; Take 1 Tab by mouth every day.  Dispense: 30 Tab; Refill: 6    2. BMI 50.0-59.9, adult ([facility])  Diet discussed at length today.    3. Chronic kidney disease, stage III (moderate) ([facility])  Continue to monitor.    4. Paroxysmal atrial flutter (HCC)    - warfarin (COUMADIN) 5 MG Tab; Take 0.5-1 Tabs by mouth every day. 2.5 mg on Mondays, Wednesdays, and Fridays. All other days 5 mg  Dispense: 30 Tab; Refill: 2  - REFERRAL TO CARDIOLOGY    5. Uncontrolled type 2 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, with long-term current use of insulin ([facility])  Diet discussed at length today.  Patient advised to begin follow-up with Josh Hardy, diabetic specialist  - VICTOZA 18 MG/3ML Solution Pen-injector injection; Inject 0.3 mL as instructed every day.  Dispense: 3 PEN; Refill: 6  - pioglitazone (ACTOS) 30 MG Tab; Take 1 Tab by mouth every day.  Dispense: 30 Tab; Refill: 11  - magnesium oxide (MAG-OX) 400 (241.3 Mg) MG Tab tablet; Take 1 Tab by mouth every day.  Dispense: 180 Tab; Refill: 1  - Insulin Degludec (TRESIBA FLEXTOUCH) 200 UNIT/ML Solution Pen-injector; Inject 30-60 Units as instructed every bedtime.  Dispense: 3 PEN; Refill: 5  - GLOBAL EASE INJECT PEN NEEDLES; Inject 1 Each as instructed 2 Times a Day.  Dispense: 180 Each; Refill: 3  - Empagliflozin 10 MG Tab; Take 1 tablet by mouth every morning with breakfast.  Dispense: 90 Tab; Refill: 1  - Diabetic Monofilament Lower Extremity Exam  - REFERRAL TO CARDIOLOGY    6. Persistent atrial fibrillation (HCC)  Continue anticoagulation.  - REFERRAL TO CARDIOLOGY    7. Cellulitis of left lower leg  Advised regarding wound care  - sulfamethoxazole-trimethoprim (BACTRIM DS) 800-160 MG tablet; Take 1 Tab by mouth 2 times a day.  Dispense: 14 Tab; Refill: 0  - furosemide (LASIX) 40 MG Tab; Take 1 Tab by  mouth every day. And 80mg po for leg swelling for 14 days  Dispense: 104 Tab; Refill: 1    8. Pure hypercholesterolemia    - simvastatin (ZOCOR) 20 MG Tab; Take 1 Tab by mouth every evening. Labs needed.  Dispense: 90 Tab; Refill: 1  - REFERRAL TO CARDIOLOGY    9. Mitral valve insufficiency, unspecified etiology    - warfarin (COUMADIN) 5 MG Tab; Take 0.5-1 Tabs by mouth every day. 2.5 mg on Mondays, Wednesdays, and Fridays. All other days 5 mg  Dispense: 30 Tab; Refill: 2  - REFERRAL TO CARDIOLOGY    10. Mitral valve stenosis, unspecified etiology    - warfarin (COUMADIN) 5 MG Tab; Take 0.5-1 Tabs by mouth every day. 2.5 mg on Mondays, Wednesdays, and Fridays. All other days 5 mg  Dispense: 30 Tab; Refill: 2  - REFERRAL TO CARDIOLOGY    11. Essential hypertension    - VICTOZA 18 MG/3ML Solution Pen-injector injection; Inject 0.3 mL as instructed every day.  Dispense: 3 PEN; Refill: 6  - metoprolol (LOPRESSOR) 50 MG Tab; Take 1 Tab by mouth 2 times a day.  Dispense: 180 Tab; Refill: 2  - lisinopril (PRINIVIL) 5 MG Tab; Take 1 Tab by mouth every day.  Dispense: 90 Tab; Refill: 1  - DILTIAZem CD (CARDIZEM CD) 120 MG CAPSULE SR 24 HR; Take 1 Cap by mouth 2 Times a Day.  Dispense: 180 Cap; Refill: 2  - REFERRAL TO CARDIOLOGY    12. Vitamin D deficiency  Continue supplementation due to lack of exposure to sunlight and lack of exercise.  - cholecalciferol (VITAMIN D3) 5000 UNIT Cap; Take 1 Cap by mouth every day.  Dispense: 30 Cap; Refill: 5    13. Need for vaccination    - INFLUENZA VACCINE, HIGH DOSE (65+ ONLY)      Please note that this dictation was created using voice recognition software. I have made every reasonable attempt to correct obvious errors, but I expect that there are errors of grammar and possibly content that I did not discover before finalizing the note.      Return in about 8 weeks (around 3/20/2019) for with celestina damon, and 6 months with me. .

## 2019-01-25 NOTE — ASSESSMENT & PLAN NOTE
patient has little concern about her weight and does not exercise or eat for weight loss despite multiple comorbidities which would be improved with weight loss.

## 2019-01-25 NOTE — ASSESSMENT & PLAN NOTE
This remains relatively stable with the difference between January 2018 and January 2019 listed below.  Patient denies any recent change in frequency or color of urine.  She denies flank pain.  01/2018 GFR 49  01/2019 GFR 47

## 2019-01-26 NOTE — PROGRESS NOTES
Patient prescribed bactrim.  She states she is picking up and starting on 1-28-19.  Due to DDI will have patient HOLD X 2, then resume current warfarin regimen.  She understands to check INR within four days of starting bactrim.  Timur Gonzalez, PharmD

## 2019-02-04 LAB — INR PPP: 2.2 (ref 2–3.5)

## 2019-02-05 ENCOUNTER — ANTICOAGULATION MONITORING (OUTPATIENT)
Dept: MEDICAL GROUP | Facility: PHYSICIAN GROUP | Age: 64
End: 2019-02-05

## 2019-02-05 DIAGNOSIS — I48.19 PERSISTENT ATRIAL FIBRILLATION (HCC): ICD-10-CM

## 2019-02-05 NOTE — PROGRESS NOTES
Anticoagulation Summary  As of 2019    INR goal:   2.0-3.0   TTR:   69.9 % (2.8 y)   INR used for dosin.2 (2019)   Warfarin maintenance plan:   5 mg (5 mg x 1) every Wed, Fri; 2.5 mg (5 mg x 0.5) all other days   Weekly warfarin total:   22.5 mg   Plan last modified:   Timur Gonzalez, PharmD (2018)   Next INR check:   2019   Target end date:   Indefinite    Indications    Atrial flutter (HCC) (Resolved) [I48.92]  Acute ischemic right PCA stroke-Hemorrhage transformation is noted within the infarct (Resolved) [I63.531]  Persistent atrial fibrillation (HCC) [I48.1]             Anticoagulation Episode Summary     [facility]R check location:   [facility]    Preferred lab:       Send INR reminders to:       Comments:   Marcelina      Anticoagulation Care Providers     Provider Role Specialty Phone number    Bijan Sampson M.D. Referring Cardiology [phone]    [facility]ticoagulation Services Responsible  [phone]        Anticoagulation Patient Findings    Called and spoke with patient this morning. Patient denies any changes to her medication and diet regimens at this time. Patient's INR remains to be within goal @ 2.2.     She had held her warfarin doses last Monday and Tuesday due being prescribed an antibiotic.     She continues to take warfarin 5 mg on Wednesday and Friday, and 2.5 mg all other days of the week. No indications of s/s of bleeding and/or bruising.     Will follow up with patient again on     Seven Davila, Pharmacy Intern

## 2019-02-19 LAB — INR PPP: 2.2 (ref 2–3.5)

## 2019-02-20 ENCOUNTER — ANTICOAGULATION MONITORING (OUTPATIENT)
Dept: VASCULAR LAB | Facility: MEDICAL CENTER | Age: 64
End: 2019-02-20

## 2019-02-20 DIAGNOSIS — I48.19 PERSISTENT ATRIAL FIBRILLATION (HCC): ICD-10-CM

## 2019-02-20 NOTE — PROGRESS NOTES
Anticoagulation Summary  As of 2019    INR goal:   2.0-3.0   TTR:   70.4 % (2.8 y)   INR used for dosin.2 (2019)   Warfarin maintenance plan:   5 mg (5 mg x 1) every Wed, Fri; 2.5 mg (5 mg x 0.5) all other days   Weekly warfarin total:   22.5 mg   Plan last modified:   Timur Gonzalez, PharmD (2018)   Next INR check:   3/4/2019   Target end date:   Indefinite    Indications    Atrial flutter (HCC) (Resolved) [I48.92]  Acute ischemic right PCA stroke-Hemorrhage transformation is noted within the infarct (Resolved) [I63.531]  Persistent atrial fibrillation (HCC) [I48.1]             Anticoagulation Episode Summary     INR check location:   [facility]    Preferred lab:       Send INR reminders to:       Comments:   Marcelina      Anticoagulation Care Providers     Provider Role Specialty Phone number    Bijan Sampson M.D. Referring Cardiology [phone]    [facility] Anticoagulation Services Responsible  [phone]        Anticoagulation Patient Findings      Spoke with patient this morning to report a therapeutic INR of 2.2.      Patient instructed to continue with current warfarin dosing regimen, confirms dosing.     Patient denies any s/s of bleeding, bruising, clotting or any changes to diet or medication.      Will follow up in 2 week(s) on Monday,      Seven Davila, Pharmacy Intern

## 2019-03-06 ENCOUNTER — ANTICOAGULATION MONITORING (OUTPATIENT)
Dept: VASCULAR LAB | Facility: MEDICAL CENTER | Age: 64
End: 2019-03-06

## 2019-03-06 DIAGNOSIS — I48.19 PERSISTENT ATRIAL FIBRILLATION (HCC): ICD-10-CM

## 2019-03-06 LAB — INR PPP: 2 (ref 2–3.5)

## 2019-03-06 NOTE — PROGRESS NOTES
Anticoagulation Summary  As of 3/6/2019    INR goal:   2.0-3.0   TTR:   70.8 % (2.9 y)   INR used for dosin.0 (3/6/2019)   Warfarin maintenance plan:   5 mg (5 mg x 1) every Wed, Fri; 2.5 mg (5 mg x 0.5) all other days   Weekly warfarin total:   22.5 mg   Plan last modified:   Timur Gonzalez PharmD (2018)   Next INR check:   3/20/2019   Target end date:   Indefinite    Indications    Atrial flutter (HCC) (Resolved) [I48.92]  Acute ischemic right PCA stroke-Hemorrhage transformation is noted within the infarct (Resolved) [I63.531]  Persistent atrial fibrillation (HCC) [I48.1]             Anticoagulation Episode Summary     INR check location:   [facility]    Preferred lab:       Send INR reminders to:       Comments:   Marcelina      Anticoagulation Care Providers     Provider Role Specialty Phone number    Bijan Sampson M.D. Referring Cardiology [phone]    [facility] Anticoagulation Services Responsible  [phone]        Anticoagulation Patient Findings      Spoke with patient to report a therapeutic INR.    Pt instructed to continue with current warfarin dosing regimen, confirms dosing.   Pt denies any s/s of bleeding, bruising, clotting or any changes to diet or medication.    Will follow up in 2 week(s).     Acacia Constantino, FredD

## 2019-03-18 ENCOUNTER — ANTICOAGULATION MONITORING (OUTPATIENT)
Dept: VASCULAR LAB | Facility: MEDICAL CENTER | Age: 64
End: 2019-03-18

## 2019-03-18 DIAGNOSIS — I48.19 PERSISTENT ATRIAL FIBRILLATION (HCC): ICD-10-CM

## 2019-03-18 LAB — INR PPP: 3.2 (ref 2–3.5)

## 2019-03-18 NOTE — PROGRESS NOTES
Anticoagulation Summary  As of 3/18/2019    INR goal:   2.0-3.0   TTR:   70.9 % (2.9 y)   INR used for dosing:   3.2! (3/18/2019)   Warfarin maintenance plan:   5 mg (5 mg x 1) every Wed, Fri; 2.5 mg (5 mg x 0.5) all other days   Weekly warfarin total:   22.5 mg   Plan last modified:   Timur Gonzalez, PharmD (12/24/2018)   Next INR check:   4/1/2019   Target end date:   Indefinite    Indications    Atrial flutter (HCC) (Resolved) [I48.92]  Acute ischemic right PCA stroke-Hemorrhage transformation is noted within the infarct (Resolved) [I63.531]  Persistent atrial fibrillation (HCC) [I48.1]             Anticoagulation Episode Summary     INR check location:   [facility]    Preferred lab:       Send INR reminders to:       Comments:   Marcelina      Anticoagulation Care Providers     Provider Role Specialty Phone number    Bijan Sampson M.D. Referring Cardiology [phone]    [facility] Anticoagulation Services Responsible  [phone]        Anticoagulation Patient Findings     HPI:  Shani Love, on anticoagulation therapy with warfarin for afib and history of stroke.  Changes to current medical/health status since last appt: none  Denies signs/symptoms of bleeding and/or thrombosis since the last appt.    Denies any interval changes to diet, Patient reported that she has not been eating as many greens the past couple of weeks but will begin to increase that.  Denies any interval changes to medications since last appt.   Denies any complications or cost restrictions with current therapy.     A/P   INR SUPRA-therapeutic.     Will have patient HOLD today then continue weekly regimen.    Next INR in 2 week(s).    Angelita Gaspar, PharmD

## 2019-03-20 ENCOUNTER — OFFICE VISIT (OUTPATIENT)
Dept: MEDICAL GROUP | Facility: CLINIC | Age: 64
End: 2019-03-20
Payer: MEDICARE

## 2019-03-20 VITALS
HEIGHT: 64 IN | SYSTOLIC BLOOD PRESSURE: 130 MMHG | RESPIRATION RATE: 16 BRPM | DIASTOLIC BLOOD PRESSURE: 86 MMHG | TEMPERATURE: 98.2 F | BODY MASS INDEX: 50.02 KG/M2 | WEIGHT: 293 LBS | HEART RATE: 89 BPM | OXYGEN SATURATION: 90 %

## 2019-03-20 DIAGNOSIS — I10 ESSENTIAL HYPERTENSION: ICD-10-CM

## 2019-03-20 PROCEDURE — 99214 OFFICE O/P EST MOD 30 MIN: CPT | Performed by: PHYSICIAN ASSISTANT

## 2019-03-20 RX ORDER — PIOGLITAZONEHYDROCHLORIDE 30 MG/1
30 TABLET ORAL DAILY
Qty: 30 TAB | Refills: 11 | Status: SHIPPED | OUTPATIENT
Start: 2019-03-20 | End: 2019-07-24

## 2019-03-20 NOTE — PROGRESS NOTES
Return to office Patient Consult Note  Referred by: Estela Gunderson P.A.-C.    Reason for consult: Diabetes Management Type 2    HPI:  Shani Love is a 63 y.o. old patient who is seeing us today for diabetes care.  This is a pleasant patient with diabetes and I appreciate the opportunity to participate in the care of this patient.    Labs of  1/8/19 HbA1c 8.5 (admits do drinking soda)  Labs of 2/23/18 HbA1c is 7.0  Labs of 9/7/17 show a HbA1c of 10.0  Labs of 6/16/17 show a microalbumin/Cr ratio of 173, GFR 52     New labs of 9/13/17  c-peptide 3.7, GFR 58, Vit D. 55, B12 268, TSH 3.9, microal/Cr ratio 80    BG Diary:3/20/2019  In the AM: 119, 109, 106, 121, 88, 98, 105, 121, 94    Hx of TIA     Weight:  On 9/9/17 she was 320 pounds and today she is 303     She has lost 17 pounds in just 4 weeks.  This is great.  She stopped drinking SODA.  My discussions last visit helped her.           1. 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   Victoza 1.8 at night     2.   Tresiba 40 units at night  3.   Actos 30mg once a day in the Am  4.   Jardiance 10mg one in the AM     May need to start Novolog 6 units before dinner will wait and see in two weeks.     Drinking soda a few times a week     2. Essential hypertension  This is stable today and no new changes are needed or required in today's visit          ROS:   Constitutional: No night sweats.  Eyes:  No visual changes.  Cardiac: No chest pain, No palpitations or racing heart rate.  Resp: No shortness of breath, No cough,   Gi: No Diarrhea    All other systems were reviewed and were/are negative.  The ROS was revised/revisited during this office visit from the patients first office visit with me on 9/9/17 Please review the full ROS during the first office visit.    Past Medical History:  Patient Active Problem  List    Diagnosis Date Noted   • Chronic kidney disease, stage III (moderate) ([facility]) 03/15/2017     Priority: Medium   • Obstructive sleep apnea 02/15/2017     Priority: Medium   • Status post ablation of atrial flutter 05/18/2016     Priority: Medium   • Pulmonary hypertension ([facility]) 04/08/2016     Priority: Medium   • Essential hypertension 12/01/2015     Priority: Medium   • Persistent atrial fibrillation ([facility]) 12/01/2015     Priority: Medium   • History of stroke 11/30/2015     Priority: Medium   • Hyperlipidemia 11/19/2015     Priority: Medium   • Uncontrolled type 2 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, with long-term current use of insulin ([facility]) 11/17/2015     Priority: Medium   • Normochromic normocytic anemia 01/22/2018     Priority: Low   • Anxiety 09/07/2017     Priority: Low   • Retinal hemorrhage of both eyes 10/26/2016     Priority: Low   • Moderate episode of recurrent major depressive disorder ([facility]) 01/25/2019   • Mitral stenosis 01/29/2018   • Mitral regurgitation 01/29/2018   • Cellulitis of left lower leg 10/07/2017   • Candidiasis of skin 07/20/2017   • BMI 50.0-59.9, adult ([facility]) 06/28/2017   • Paroxysmal atrial flutter ([facility]) 02/23/2017   • Chronic anticoagulation 02/15/2017   • Fecal occult blood test positive 12/16/2016   • Vitamin D deficiency 12/16/2016       Past Surgical History:  Past Surgical History:   Procedure Laterality Date   • RECOVERY  5/9/2016    Procedure: CATH LAB FLUTTER ABLATION, CAMILO WITH ANESTHESIA DR. ARMIJO;  Surgeon: Recoveryonly Surgery;  Location[place] PRE-[place] PROC UNIT [facility] [place];  Service:    • PILONIDAL CYST EXCISION     • TONSILLECTOMY         Allergies:  Patient has no known allergies.    Social History:  Social History     Social History   • Marital status:      Spouse name: N/A   • Number of children: N/A   • Years of education: N/A     Occupational History   • Not on file.     Social History Main Topics   • Smoking status:  Never Smoker   • Smokeless tobacco: Never Used   • Alcohol use No   • Drug use: No   • Sexual activity: Not Currently     Partners: Male     Other Topics Concern   • Not on file     Social History Narrative   • No narrative on file       Family History:  Family History   Problem Relation Age of Onset   • Stroke Mother 71   • Cancer Father 71   • Heart Disease Brother        Medications:    Current Outpatient Prescriptions:   •  pioglitazone (ACTOS) 30 MG Tab, Take 1 Tab by mouth every day., Disp: 30 Tab, Rfl: 11  •  Empagliflozin 10 MG Tab, Take 1 tablet by mouth every morning with breakfast., Disp: 90 Tab, Rfl: 1  •  sulfamethoxazole-trimethoprim (BACTRIM DS) 800-160 MG tablet, Take 1 Tab by mouth 2 times a day., Disp: 14 Tab, Rfl: 0  •  furosemide (LASIX) 40 MG Tab, Take 1 Tab by mouth every day. And 80mg po for leg swelling for 14 days, Disp: 104 Tab, Rfl: 1  •  citalopram (CELEXA) 20 MG Tab, Take 1 Tab by mouth every day., Disp: 30 Tab, Rfl: 6  •  VICTOZA 18 MG/3ML Solution Pen-injector injection, Inject 0.3 mL as instructed every day., Disp: 3 PEN, Rfl: 6  •  simvastatin (ZOCOR) 20 MG Tab, Take 1 Tab by mouth every evening. Labs needed., Disp: 90 Tab, Rfl: 1  •  metoprolol (LOPRESSOR) 50 MG Tab, Take 1 Tab by mouth 2 times a day., Disp: 180 Tab, Rfl: 2  •  magnesium oxide (MAG-OX) 400 (241.3 Mg) MG Tab tablet, Take 1 Tab by mouth every day., Disp: 180 Tab, Rfl: 1  •  lisinopril (PRINIVIL) 5 MG Tab, Take 1 Tab by mouth every day., Disp: 90 Tab, Rfl: 1  •  Insulin Degludec (TRESIBA FLEXTOUCH) 200 UNIT/ML Solution Pen-injector, Inject 30-60 Units as instructed every bedtime., Disp: 3 PEN, Rfl: 5  •  GLOBAL EASE INJECT PEN NEEDLES, Inject 1 Each as instructed 2 Times a Day., Disp: 180 Each, Rfl: 3  •  DILTIAZem CD (CARDIZEM CD) 120 MG CAPSULE SR 24 HR, Take 1 Cap by mouth 2 Times a Day., Disp: 180 Cap, Rfl: 2  •  cholecalciferol (VITAMIN D3) 5000 UNIT Cap, Take 1 Cap by mouth every day., Disp: 30 Cap, Rfl: 5  •   "warfarin (COUMADIN) 5 MG Tab, Take 0.5-1 Tabs by mouth every day. 2.5 mg on Mondays, Wednesdays, and Fridays. All other days 5 mg, Disp: 30 Tab, Rfl: 2  •  glucose blood strip, 1 Strip by Other route as needed., Disp: 300 Strip, Rfl: 0  •  Ipratropium-Albuterol (DUONEB INH), Inhale 1 Inhalation by mouth Once., Disp: , Rfl:   •  nystatin (MYCOSTATIN) powder, Apply to affected area 1-2 times daily PRN, Disp: 15 g, Rfl: 2        Physical Examination:   Vital signs: /86 (BP Location: Left arm, Patient Position: Sitting, BP Cuff Size: Large adult)   Pulse 89   Temp 36.8 °C (98.2 °F) (Temporal)   Resp 16   Ht 1.626 m (5' 4\")   Wt (!) 140.6 kg (310 lb)   SpO2 90%   BMI 53.21 kg/m²   General: No distress, cooperative, well dressed and well nourished.   Eyes: No scleral icterus or discharge, No hyposphagma  ENMT: Normal on external inspection of nose, lips, No nasal drainage   Neck: No abnormal masses on inspection  Resp: Normal effort, Bilateral clear to auscultation, No wheezing, No rales  CVS: Regular rate and rhythm, S1 S2 normal, No murmur. No gallop  Extremities: No edema bilateral extremities  Neuro: Alert and oriented  Skin: No rash, No Ulcers  Psych: Normal mood and affect      Assessment and Plan:    1. Uncontrolled type 2 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, with long-term current use of insulin (HCC)    I had her Start:  1.   Victoza 1.8 at night     2.   Tresiba 40 units at night  3.   Actos 30mg once a day in the Am  4.   Jardiance 10mg one in the AM     May need to start Novolog 6 units before dinner will wait and see in two weeks.        2. Essential hypertension  This is stable today and no new changes are needed or required in today's visit        Return in about 2 months (around 5/20/2019).      This patient during there office visit today was started on a new medication.  Side effects of the new medication were discussed with the patient today in the " office.       Thank you kindly for allowing me to participate in the diabetes care plan for this patient.    Pranav Hardy PA-C, [facility]  Board Certified - Advanced Diabetes Management  03/20/19    CC:   Estela Gunderson P.A.-C.

## 2019-04-01 LAB — INR PPP: 2 (ref 2–3.5)

## 2019-04-02 ENCOUNTER — ANTICOAGULATION MONITORING (OUTPATIENT)
Dept: VASCULAR LAB | Facility: MEDICAL CENTER | Age: 64
End: 2019-04-02

## 2019-04-02 DIAGNOSIS — I48.19 PERSISTENT ATRIAL FIBRILLATION (HCC): ICD-10-CM

## 2019-04-02 NOTE — PROGRESS NOTES
Anticoagulation Summary  As of 2019    INR goal:   2.0-3.0   TTR:   71.1 % (3 y)   INR used for dosin.0 (2019)   Warfarin maintenance plan:   5 mg (5 mg x 1) every Wed, Fri; 2.5 mg (5 mg x 0.5) all other days   Weekly warfarin total:   22.5 mg   Plan last modified:   Fred HaynesD (2018)   Next INR check:   2019   Target end date:   Indefinite    Indications    Atrial flutter (HCC) (Resolved) [I48.92]  Acute ischemic right PCA stroke-Hemorrhage transformation is noted within the infarct (Resolved) [I63.531]  Persistent atrial fibrillation (HCC) [I48.1]             Anticoagulation Episode Summary     [facility] check [facility]   [facility]    Preferred lab:       Send INR reminders to:       Comments:   Alere      Anticoagulation Care Providers     Provider Role Specialty Phone number    Bijan Sampson M.D. Referring Cardiology [phone]    [facility]ticoagulation Services Responsible  [phone]        Anticoagulation Patient Findings        Spoke to patient on the phone.   INR  therapeutic.   Denies signs/symptoms of bleeding and/or thrombosis.   Denies changes to diet or medications.   Follow up appointment in 2 week(s).    Continue weekly warfarin dose as noted      Loc Chan, PharmD

## 2019-04-15 ENCOUNTER — OFFICE VISIT (OUTPATIENT)
Dept: URGENT CARE | Facility: PHYSICIAN GROUP | Age: 64
End: 2019-04-15
Payer: MEDICARE

## 2019-04-15 VITALS
WEIGHT: 293 LBS | HEART RATE: 76 BPM | OXYGEN SATURATION: 96 % | SYSTOLIC BLOOD PRESSURE: 110 MMHG | DIASTOLIC BLOOD PRESSURE: 78 MMHG | HEIGHT: 64 IN | TEMPERATURE: 98.4 F | RESPIRATION RATE: 12 BRPM | BODY MASS INDEX: 50.02 KG/M2

## 2019-04-15 DIAGNOSIS — L03.119 CELLULITIS OF LOWER EXTREMITY, UNSPECIFIED LATERALITY: ICD-10-CM

## 2019-04-15 PROCEDURE — 99214 OFFICE O/P EST MOD 30 MIN: CPT | Performed by: PHYSICIAN ASSISTANT

## 2019-04-15 RX ORDER — AMOXICILLIN AND CLAVULANATE POTASSIUM 875; 125 MG/1; MG/1
1 TABLET, FILM COATED ORAL 2 TIMES DAILY
Qty: 20 TAB | Refills: 0 | Status: SHIPPED | OUTPATIENT
Start: 2019-04-15 | End: 2019-04-25

## 2019-04-16 ENCOUNTER — ANTICOAGULATION MONITORING (OUTPATIENT)
Dept: VASCULAR LAB | Facility: MEDICAL CENTER | Age: 64
End: 2019-04-16

## 2019-04-16 DIAGNOSIS — I48.19 PERSISTENT ATRIAL FIBRILLATION (HCC): ICD-10-CM

## 2019-04-16 LAB — INR PPP: 2.1 (ref 2–3.5)

## 2019-04-16 NOTE — PROGRESS NOTES
Chief Complaint   Patient presents with   • Edema     B lower Legs/ cellulitis       HISTORY OF PRESENT ILLNESS: Patient is a 63 y.o. female who presents today for the following:    Patient comes in with her daughter for evaluation of possible cellulitis.  She has redness, swelling, pain, and weeping of both lower legs.  Started about a month ago and has progressively worsened.  She denies any fever, sweats, chills, body aches.  She has not used any over-the-counter medication or salves.  She was treated for cellulitis approximately 1 month ago.    Patient Active Problem List    Diagnosis Date Noted   • Chronic kidney disease, stage III (moderate) ([facility]) 03/15/2017     Priority: Medium   • Obstructive sleep apnea 02/15/2017     Priority: Medium   • Status post ablation of atrial flutter 05/18/2016     Priority: Medium   • Pulmonary hypertension ([facility]) 04/08/2016     Priority: Medium   • Essential hypertension 12/01/2015     Priority: Medium   • Persistent atrial fibrillation ([facility]) 12/01/2015     Priority: Medium   • History of stroke 11/30/2015     Priority: Medium   • Hyperlipidemia 11/19/2015     Priority: Medium   • Uncontrolled type 2 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, with long-term current use of insulin ([facility]) 11/17/2015     Priority: Medium   • Normochromic normocytic anemia 01/22/2018     Priority: Low   • Anxiety 09/07/2017     Priority: Low   • Retinal hemorrhage of both eyes 10/26/2016     Priority: Low   • Moderate episode of recurrent major depressive disorder ([facility]) 01/25/2019   • Mitral stenosis 01/29/2018   • Mitral regurgitation 01/29/2018   • Cellulitis of left lower leg 10/07/2017   • Candidiasis of skin 07/20/2017   • BMI 50.0-59.9, adult ([facility]) 06/28/2017   • Paroxysmal atrial flutter ([facility]) 02/23/2017   • Chronic anticoagulation 02/15/2017   • Fecal occult blood test positive 12/16/2016   • Vitamin D deficiency 12/16/2016       Allergies:Patient has no known  allergies.    Current Outpatient Prescriptions Ordered in [facility]   Medication Sig Dispense Refill   • amoxicillin-clavulanate (AUGMENTIN) 875-125 MG Tab Take 1 Tab by mouth 2 times a day for 10 days. 20 Tab 0   • pioglitazone (ACTOS) 30 MG Tab Take 1 Tab by mouth every day. 30 Tab 11   • Empagliflozin 10 MG Tab Take 1 tablet by mouth every morning with breakfast. 90 Tab 1   • sulfamethoxazole-trimethoprim (BACTRIM DS) 800-160 MG tablet Take 1 Tab by mouth 2 times a day. 14 Tab 0   • furosemide (LASIX) 40 MG Tab Take 1 Tab by mouth every day. And 80mg po for leg swelling for 14 days 104 Tab 1   • citalopram (CELEXA) 20 MG Tab Take 1 Tab by mouth every day. 30 Tab 6   • VICTOZA 18 MG/3ML Solution Pen-injector injection Inject 0.3 mL as instructed every day. 3 PEN 6   • simvastatin (ZOCOR) 20 MG Tab Take 1 Tab by mouth every evening. Labs needed. 90 Tab 1   • metoprolol (LOPRESSOR) 50 MG Tab Take 1 Tab by mouth 2 times a day. 180 Tab 2   • magnesium oxide (MAG-OX) 400 (241.3 Mg) MG Tab tablet Take 1 Tab by mouth every day. 180 Tab 1   • lisinopril (PRINIVIL) 5 MG Tab Take 1 Tab by mouth every day. 90 Tab 1   • Insulin Degludec (TRESIBA FLEXTOUCH) 200 UNIT/ML Solution Pen-injector Inject 30-60 Units as instructed every bedtime. 3 PEN 5   • GLOBAL EASE INJECT PEN NEEDLES Inject 1 Each as instructed 2 Times a Day. 180 Each 3   • DILTIAZem CD (CARDIZEM CD) 120 MG CAPSULE SR 24 HR Take 1 Cap by mouth 2 Times a Day. 180 Cap 2   • cholecalciferol (VITAMIN D3) 5000 UNIT Cap Take 1 Cap by mouth every day. 30 Cap 5   • warfarin (COUMADIN) 5 MG Tab Take 0.5-1 Tabs by mouth every day. 2.5 mg on Mondays, Wednesdays, and Fridays. All other days 5 mg 30 Tab 2   • glucose blood strip 1 Strip by Other route as needed. 300 Strip 0   • Ipratropium-Albuterol (DUONEB INH) Inhale 1 Inhalation by mouth Once.     • nystatin (MYCOSTATIN) powder Apply to affected area 1-2 times daily PRN 15 g 2     No current Epic-ordered facility-administered  "medications on file.        Past Medical History:   Diagnosis Date   • Atrial fibrillation (HCC)    • Atrial flutter (HCC) 2015   • Diabetes    • Heart murmur     childhood   • Hyperlipidemia    • Hypertension    • Morbid obesity (HCC)    • Osteoarthritis    • Pneumonia 2016   • Stroke (HCC) 2015    Acute ischemic right PCA stroke-Hemorrhage transformation is noted within the infarct [I63.531]   • Valvular heart disease     Mitral stenosis       Social History   Substance Use Topics   • Smoking status: Never Smoker   • Smokeless tobacco: Never Used   • Alcohol use No       Family Status   Relation Status   • Mo    • Fa    • Bro (Not Specified)     Family History   Problem Relation Age of Onset   • Stroke Mother 71   • Cancer Father 71   • Heart Disease Brother        Review of Systems:    Constitutional ROS: No unexpected change in weight, No weakness, No fatigue  Eye ROS: No recent significant change in vision, No eye pain, redness, discharge  Ear ROS: No drainage, No tinnitus or vertigo, No recent change in hearing  Mouth/Throat ROS: No teeth or gum problems, No bleeding gums, No tongue complaints  Neck ROS: No swollen glands, No significant pain in neck  Pulmonary ROS: No chronic cough, sputum, or hemoptysis, No dyspnea on exertion, No wheezing  Cardiovascular ROS: No diaphoresis, No edema, No palpitations  Gastrointestinal ROS: No change in bowel habits, No significant change in appetite, No nausea, vomiting, diarrhea, or constipation  Musculoskeletal/Extremities ROS: Redness, swelling, and pain of the bilateral lower extremities.      Exam:  /78   Pulse 76   Temp 36.9 °C (98.4 °F) (Temporal)   Resp 12   Ht 1.626 m (5' 4\")   Wt (!) 141.5 kg (312 lb)   SpO2 96%   General: Well developed, well nourished. No distress.  Pulmonary: Unlabored respiratory effort.    Cardiovascular: Palpable pedal pulses bilaterally.  Extremities: Diffuse edema, erythema, and tenderness noted, " starting at the mid legs and extending to the ankles.  A 5 x 6 cm area of weeping is noted on the lateral right lower leg have a 4 x 3 cm area is noted on the left lateral lower leg.  Neurologic: Grossly nonfocal. No facial asymmetry noted.  Psych: Normal mood. Alert and oriented x3. Judgment and insight is normal.    Assessment/Plan:  Bilateral lower legs were thoroughly cleaned with saline and debrided at the areas of weeping.  Use all medication as directed.  May need wound care referral if symptoms do not improve.  Follow-up with primary care for referral.  1. Cellulitis of lower extremity, unspecified laterality  amoxicillin-clavulanate (AUGMENTIN) 875-125 MG Tab    Bilateral lower extremities.  Use all medication as directed.  Follow-up with primary care for worsening or persistent symptoms.

## 2019-04-16 NOTE — PROGRESS NOTES
Anticoagulation Summary  As of 2019    INR goal:   2.0-3.0   TTR:   71.5 % (3 y)   INR used for dosin.1 (2019)   Warfarin maintenance plan:   5 mg (5 mg x 1) every Wed, Fri; 2.5 mg (5 mg x 0.5) all other days   Weekly warfarin total:   22.5 mg   Plan last modified:   Timur Gonzalez, PharmD (2018)   Next INR check:   2019   Target end date:   Indefinite    Indications    Atrial flutter (HCC) (Resolved) [I48.92]  Acute ischemic right PCA stroke-Hemorrhage transformation is noted within the infarct (Resolved) [I63.531]  Persistent atrial fibrillation (HCC) [I48.1]             Anticoagulation Episode Summary     [facility]   [facility]    Preferred lab:       Send INR reminders to:       Comments:   Marcelina      Anticoagulation Care Providers     Provider Role Specialty Phone number    Bjian Sampson M.D. Referring Cardiology [phone]    [facility] Anticoagulation Services Responsible  [phone]        Anticoagulation Patient Findings    Spoke with Shani to report a therapeutic INR of 2.1. Continue current dosing regimen.  Follow up in 2 weeks, to reduce the risk of adverse events related to this high risk medication, warfarin.    Joyce Su, Clinical Pharmacist

## 2019-04-30 LAB — INR PPP: 4.5 (ref 2–3.5)

## 2019-05-01 ENCOUNTER — ANTICOAGULATION MONITORING (OUTPATIENT)
Dept: VASCULAR LAB | Facility: MEDICAL CENTER | Age: 64
End: 2019-05-01

## 2019-05-01 DIAGNOSIS — I48.19 PERSISTENT ATRIAL FIBRILLATION (HCC): ICD-10-CM

## 2019-05-01 NOTE — PROGRESS NOTES
Anticoagulation Summary  As of 2019    INR goal:   2.0-3.0   TTR:   71.0 % (3 y)   INR used for dosin.50! (2019)   Warfarin maintenance plan:   5 mg (5 mg x 1) every Wed, Fri; 2.5 mg (5 mg x 0.5) all other days   Weekly warfarin total:   22.5 mg   Plan last modified:   Fred HaynesD (2018)   Next INR check:   2019   Target end date:   Indefinite    Indications    Atrial flutter (HCC) (Resolved) [I48.92]  Acute ischemic right PCA stroke-Hemorrhage transformation is noted within the infarct (Resolved) [I63.531]  Persistent atrial fibrillation (HCC) [I48.1]             Anticoagulation Episode Summary     [facility]   [facility]    Preferred lab:       Send INR reminders to:       Comments:   Alere      Anticoagulation Care Providers     Provider Role Specialty Phone number    Bijan Sampson M.D. Referring Cardiology [phone]    [facility] Anticoagulation Services Responsible  [phone]        Anticoagulation Patient Findings  Patient Findings     Positives:   Hospital admission    Negatives:   Signs/symptoms of thrombosis, Signs/symptoms of bleeding, Laboratory test error suspected, Change in health, Change in alcohol use, Change in activity, Upcoming invasive procedure, Emergency department visit, Upcoming dental procedure, Missed doses, Extra doses, Change in medications, Change in diet/appetite, Bruising, Other complaints    Comments:   Admitted for C. Diff, discharged 19        Spoke with patient today regarding supratherapeutic INR of 4.5.  Patient denies any signs/symptoms of bruising or bleeding or any changes in diet and medications.  Instructed patient to call clinic with any questions or concerns.  Patient held yesterday's dose, will have her decrease today's dose to 2.5mg, then resume current warfarin regimen.  Follow up in 1 weeks, to reduce risk of adverse events related to this high risk medication,  Warfarin.    Timur Gonzalez, PharmD

## 2019-05-21 ENCOUNTER — ANTICOAGULATION MONITORING (OUTPATIENT)
Dept: VASCULAR LAB | Facility: MEDICAL CENTER | Age: 64
End: 2019-05-21

## 2019-05-21 DIAGNOSIS — I48.19 PERSISTENT ATRIAL FIBRILLATION (HCC): ICD-10-CM

## 2019-05-21 LAB — INR PPP: 2 (ref 2–3.5)

## 2019-05-22 ENCOUNTER — ANTICOAGULATION MONITORING (OUTPATIENT)
Dept: VASCULAR LAB | Facility: MEDICAL CENTER | Age: 64
End: 2019-05-22

## 2019-05-22 DIAGNOSIS — I48.19 PERSISTENT ATRIAL FIBRILLATION (HCC): ICD-10-CM

## 2019-05-22 NOTE — PROGRESS NOTES
Anticoagulation Summary  As of 2019    INR goal:   2.0-3.0   TTR:   70.5 % (3.1 y)   INR used for dosin.00 (2019)   Warfarin maintenance plan:   5 mg (5 mg x 1) every Wed, Fri; 2.5 mg (5 mg x 0.5) all other days   Weekly warfarin total:   22.5 mg   No change documented:   Shelby Pond, Med Ass't   Plan last modified:   Timur Gonzalez, PharmD (2018)   Next INR check:   2019   Target end date:   Indefinite    Indications    Atrial flutter (HCC) (Resolved) [I48.92]  Acute ischemic right PCA stroke-Hemorrhage transformation is noted within the infarct (Resolved) [I63.531]  Persistent atrial fibrillation (HCC) [I48.1]             Anticoagulation Episode Summary     [facility]   [facility]    Preferred lab:       Send INR reminders to:       Comments:   Aledalila      Anticoagulation Care Providers     Provider Role Specialty Phone number    Bijan Sampson M.D. Referring Cardiology [phone]    [facility] Anticoagulation Services Responsible  [phone]        Anticoagulation Patient Findings  Patient Findings     Negatives:   Signs/symptoms of thrombosis, Signs/symptoms of bleeding, Laboratory test error suspected, Change in health, Change in alcohol use, Change in activity, Upcoming invasive procedure, Emergency department visit, Upcoming dental procedure, Missed doses, Extra doses, Change in medications, Change in diet/appetite, Hospital admission, Bruising, Other complaints      Left voicemail message to report 2.0 therapeutic INR of 2.0-3.0.  Patient to continue with current warfarin dosing regimen. Requested pt contact the clinic for any change to diet or medication, and to report any signs or symptoms of bleeding, bruising or clotting.  Pt to follow up in 2 weeks.  Shelby Pond, Med Ass't     19  Ever - Acacia Constantino, PharmD

## 2019-05-23 DIAGNOSIS — Z79.01 CHRONIC ANTICOAGULATION: ICD-10-CM

## 2019-06-04 ENCOUNTER — ANTICOAGULATION MONITORING (OUTPATIENT)
Dept: VASCULAR LAB | Facility: MEDICAL CENTER | Age: 64
End: 2019-06-04

## 2019-06-04 DIAGNOSIS — I48.19 PERSISTENT ATRIAL FIBRILLATION (HCC): ICD-10-CM

## 2019-06-04 LAB — INR PPP: 1.5 (ref 2–3.5)

## 2019-06-04 NOTE — PROGRESS NOTES
OP Telephone Anticoagulation Service Note    Date: 2019      Anticoagulation Summary  As of 2019    INR goal:   2.0-3.0   TTR:   69.6 % (3.1 y)   INR used for dosin.50! (2019)   Warfarin maintenance plan:   5 mg (5 mg x 1) every Wed, Fri; 2.5 mg (5 mg x 0.5) all other days   Weekly warfarin total:   22.5 mg   Plan last modified:   Fred HaynesD (2018)   Next INR check:   2019   Target end date:   Indefinite    Indications    Atrial flutter (HCC) (Resolved) [I48.92]  Acute ischemic right PCA stroke-Hemorrhage transformation is noted within the infarct (Resolved) [I63.531]  Persistent atrial fibrillation (HCC) [I48.1]             Anticoagulation Episode Summary     [facility]   [facility]    Preferred lab:       Send INR reminders to:       Comments:   Alere      Anticoagulation Care Providers     Provider Role Specialty Phone number    Bijan Sampson M.D. Referring Cardiology [phone]    [facility] Anticoagulation Services Responsible  [phone]        Anticoagulation Patient Findings        Plan: INR is low. Left message on patient's answering machine/voicemail. Instructed patient to call back with any concerns regarding any unusual bleeding or bruising, any medication or diet changes or any signs or symptoms of thrombosis. Instructed patient to take 5 mg today then resume medication as outlined above. Patient to follow up in 2 week(s).             Anju A Gurries, PharmD

## 2019-06-14 ENCOUNTER — OFFICE VISIT (OUTPATIENT)
Dept: URGENT CARE | Facility: PHYSICIAN GROUP | Age: 64
End: 2019-06-14
Payer: MEDICARE

## 2019-06-14 VITALS
OXYGEN SATURATION: 94 % | RESPIRATION RATE: 20 BRPM | BODY MASS INDEX: 51.67 KG/M2 | DIASTOLIC BLOOD PRESSURE: 84 MMHG | WEIGHT: 293 LBS | TEMPERATURE: 97.1 F | SYSTOLIC BLOOD PRESSURE: 136 MMHG | HEART RATE: 111 BPM

## 2019-06-14 DIAGNOSIS — L03.115 CELLULITIS OF RIGHT LOWER EXTREMITY: ICD-10-CM

## 2019-06-14 DIAGNOSIS — S81.801A WOUND OF RIGHT LOWER EXTREMITY, INITIAL ENCOUNTER: ICD-10-CM

## 2019-06-14 PROCEDURE — 99214 OFFICE O/P EST MOD 30 MIN: CPT | Performed by: FAMILY MEDICINE

## 2019-06-14 RX ORDER — DOXYCYCLINE HYCLATE 100 MG
100 TABLET ORAL 2 TIMES DAILY
Qty: 20 TAB | Refills: 0 | Status: SHIPPED | OUTPATIENT
Start: 2019-06-14 | End: 2019-06-24

## 2019-06-14 RX ORDER — METRONIDAZOLE 500 MG/1
TABLET ORAL
Refills: 0 | COMMUNITY
Start: 2019-04-27 | End: 2019-07-01

## 2019-06-14 ASSESSMENT — ENCOUNTER SYMPTOMS
NAUSEA: 0
WEAKNESS: 0
MYALGIAS: 0
FEVER: 0
NUMBNESS: 1
LEG PAIN: 1
CHILLS: 0

## 2019-06-14 NOTE — PATIENT INSTRUCTIONS
Cellulitis, Adult  Cellulitis is a skin infection. The infected area is usually red and tender. This condition occurs most often in the arms and lower legs. The infection can travel to the muscles, blood, and underlying tissue and become serious. It is very important to get treated for this condition.  What are the causes?  Cellulitis is caused by bacteria. The bacteria enter through a break in the skin, such as a cut, burn, insect bite, open sore, or crack.  What increases the risk?  This condition is more likely to occur in people who:  · Have a weak defense system (immune system).  · Have open wounds on the skin such as cuts, burns, bites, and scrapes. Bacteria can enter the body through these open wounds.  · Are older.  · Have diabetes.  · Have a type of long-lasting (chronic) liver disease (cirrhosis) or kidney disease.  · Use IV drugs.  What are the signs or symptoms?  Symptoms of this condition include:  · Redness, streaking, or spotting on the skin.  · Swollen area of the skin.  · Tenderness or pain when an area of the skin is touched.  · Warm skin.  · Fever.  · Chills.  · Blisters.  How is this diagnosed?  This condition is diagnosed based on a medical history and physical exam. You may also have tests, including:  · Blood tests.  · Lab tests.  · Imaging tests.  How is this treated?  Treatment for this condition may include:  · Medicines, such as antibiotic medicines or antihistamines.  · Supportive care, such as rest and application of cold or warm cloths (cold or warm compresses) to the skin.  · Hospital care, if the condition is severe.  The infection usually gets better within 1-2 days of treatment.  Follow these instructions at home:  · Take over-the-counter and prescription medicines only as told by your health care provider.  · If you were prescribed an antibiotic medicine, take it as told by your health care provider. Do not stop taking the antibiotic even if you start to feel better.  · Drink  enough fluid to keep your urine clear or pale yellow.  · Do not touch or rub the infected area.  · Raise (elevate) the infected area above the level of your heart while you are sitting or lying down.  · Apply warm or cold compresses to the affected area as told by your health care provider.  · Keep all follow-up visits as told by your health care provider. This is important. These visits let your health care provider make sure a more serious infection is not developing.  Contact a health care provider if:  · You have a fever.  · Your symptoms do not improve within 1-2 days of starting treatment.  · Your bone or joint underneath the infected area becomes painful after the skin has healed.  · Your infection returns in the same area or another area.  · You notice a swollen bump in the infected area.  · You develop new symptoms.  · You have a general ill feeling (malaise) with muscle aches and pains.  Get help right away if:  · Your symptoms get worse.  · You feel very sleepy.  · You develop vomiting or diarrhea that persists.  · You notice red streaks coming from the infected area.  · Your red area gets larger or turns dark in color.  This information is not intended to replace advice given to you by your health care provider. Make sure you discuss any questions you have with your health care provider.  Document Released: 09/27/2006 Document Revised: 04/27/2017 Document Reviewed: 10/26/2016  [employer] Interactive Patient Education © 2017 [employer]

## 2019-06-14 NOTE — PROGRESS NOTES
Subjective:   Shani Love is a 63 y.o. female who presents for Leg Problem (Bilateral legs )        Leg Pain   This is a new problem. The current episode started more than 1 month ago. The problem occurs constantly. The problem has been rapidly worsening. Associated symptoms include numbness. Pertinent negatives include no chills, fever, myalgias, nausea or weakness.     Review of Systems   Constitutional: Negative for chills and fever.   Gastrointestinal: Negative for nausea.   Musculoskeletal: Negative for myalgias.   Neurological: Positive for numbness. Negative for weakness.     No Active Allergies   Objective:   /84   Pulse (!) 111   Temp 36.2 °C (97.1 °F) (Temporal)   Resp 20   Wt (!) 136.5 kg (301 lb)   SpO2 94%   BMI 51.67 kg/m²   Physical Exam   Constitutional: She is oriented to person, place, and time. She appears well-developed and well-nourished. No distress.   HENT:   Head: Normocephalic and atraumatic.   Eyes: Pupils are equal, round, and reactive to light. Conjunctivae and EOM are normal.   Cardiovascular: Normal rate and regular rhythm.    No murmur heard.  Pulmonary/Chest: Effort normal and breath sounds normal. No respiratory distress.   Abdominal: Soft. She exhibits no distension. There is no tenderness.   Neurological: She is alert and oriented to person, place, and time. She has normal reflexes. No sensory deficit.   Skin: Skin is warm and dry.        Psychiatric: She has a normal mood and affect.         Assessment/Plan:   1. Cellulitis of right lower extremity  - REFERRAL TO WOUND CLINIC  - doxycycline (VIBRAMYCIN) 100 MG Tab; Take 1 Tab by mouth 2 times a day for 10 days.  Dispense: 20 Tab; Refill: 0    2. Wound of right lower extremity, initial encounter  - REFERRAL TO WOUND CLINIC  - doxycycline (VIBRAMYCIN) 100 MG Tab; Take 1 Tab by mouth 2 times a day for 10 days.  Dispense: 20 Tab; Refill: 0    Other orders  - metroNIDAZOLE (FLAGYL) 500 MG Tab; ; Refill: 0    Differential  diagnosis, natural history, supportive care, and indications for immediate follow-up discussed.

## 2019-06-18 ENCOUNTER — PATIENT MESSAGE (OUTPATIENT)
Dept: MEDICAL GROUP | Facility: CLINIC | Age: 64
End: 2019-06-18

## 2019-06-18 DIAGNOSIS — L03.116 CELLULITIS OF LEFT LOWER LEG: ICD-10-CM

## 2019-06-18 RX ORDER — FUROSEMIDE 40 MG/1
40 TABLET ORAL
Qty: 90 TAB | Refills: 0 | Status: SHIPPED | OUTPATIENT
Start: 2019-06-18

## 2019-06-18 NOTE — TELEPHONE ENCOUNTER
From: Shani Love  To: Estela Gunderson P.A.-C.  Sent: 6/18/2019 2:44 PM PDT  Subject: Prescription Question    Could you please send a new prescription for my furosemide to the [facility] in [place]. They said that they don't have a current script. Thank you.

## 2019-06-19 ENCOUNTER — ANTICOAGULATION MONITORING (OUTPATIENT)
Dept: VASCULAR LAB | Facility: MEDICAL CENTER | Age: 64
End: 2019-06-19

## 2019-06-19 DIAGNOSIS — I48.19 PERSISTENT ATRIAL FIBRILLATION (HCC): ICD-10-CM

## 2019-06-19 LAB — INR PPP: 1.4 (ref 2–3.5)

## 2019-06-19 NOTE — PROGRESS NOTES
Anticoagulation Summary  As of 2019    INR goal:   2.0-3.0   TTR:   68.7 % (3.2 y)   INR used for dosin.40! (2019)   Warfarin maintenance plan:   5 mg (5 mg x 1) every Wed, Fri; 2.5 mg (5 mg x 0.5) all other days   Weekly warfarin total:   22.5 mg   Plan last modified:   Timur Gonzalez, PharmD (2018)   Next INR check:   2019   Target end date:   Indefinite    Indications    Atrial flutter (HCC) (Resolved) [I48.92]  Acute ischemic right PCA stroke-Hemorrhage transformation is noted within the infarct (Resolved) [I63.531]  Persistent atrial fibrillation (HCC) [I48.1]             Anticoagulation Episode Summary     [facility] check location[facility]   [facility]    Preferred lab:       Send INR reminders to:       Comments:   Marcelina      Anticoagulation Care Providers     Provider Role Specialty Phone number    Bijan Sampson M.D. Referring Cardiology [phone]    [facility] Anticoagulation Services Responsible  [phone]        Anticoagulation Patient Findings  Patient Findings     Positives:   Change in medications    Negatives:   Signs/symptoms of thrombosis, Signs/symptoms of bleeding, Laboratory test error suspected, Change in health, Change in alcohol use, Change in activity, Upcoming invasive procedure, Emergency department visit, Upcoming dental procedure, Missed doses, Extra doses, Change in diet/appetite, Hospital admission, Bruising, Other complaints    Comments:   Started doxycycline 19        Spoke with patient today regarding subtherapeutic INR of 1.4.  Patient denies any signs/symptoms of bruising or bleeding or any changes in diet and medications.  Instructed patient to call clinic with any questions or concerns.  Patient denies missed doses or alterations to dosing regimen.  Started doxycycline five days ago, likely no DDI present at this time.  Discussed lovenox bridge given hx of stroke, patient declines despite risks.  Will have her bolus with 7.5mg X 2, then recheck  INR.  Follow up in 2 days, to reduce risk of adverse events related to this high risk medication,  Warfarin.    Timur Gonzalez, PharmD

## 2019-06-20 DIAGNOSIS — I10 ESSENTIAL HYPERTENSION: ICD-10-CM

## 2019-06-20 RX ORDER — DILTIAZEM HYDROCHLORIDE 120 MG/1
120 CAPSULE, COATED, EXTENDED RELEASE ORAL 2 TIMES DAILY
Qty: 60 CAP | Refills: 0 | Status: SHIPPED | OUTPATIENT
Start: 2019-06-20 | End: 2019-07-24 | Stop reason: SDUPTHER

## 2019-06-20 NOTE — TELEPHONE ENCOUNTER
Was the patient seen in the last year in this department? Yes    Does patient have an active prescription for medications requested? Yes    Received Request Via: Pharmacy    Anticoagulation Monitoring on 06/19/2019   Component Date Value   • INR 06/19/2019 1.40    Anticoagulation Monitoring on 06/04/2019   Component Date Value   • INR 06/04/2019 1.50    Anticoagulation Monitoring on 05/22/2019   Component Date Value   • INR 05/21/2019 2.00    Anticoagulation Monitoring on 05/01/2019   Component Date Value   • INR 04/30/2019 4.50    Anticoagulation Monitoring on 04/16/2019   Component Date Value   • INR 04/16/2019 2.1    Anticoagulation Monitoring on 04/02/2019   Component Date Value   • INR 04/01/2019 2.0    Anticoagulation Monitoring on 03/18/2019   Component Date Value   • INR 03/18/2019 3.2    Anticoagulation Monitoring on 03/06/2019   Component Date Value   • INR 03/06/2019 2.0    Anticoagulation Monitoring on 02/20/2019   Component Date Value   • INR 02/19/2019 2.2    Anticoagulation Monitoring on 02/05/2019   Component Date Value   • INR 02/04/2019 2.2    There may be more visits with results that are not included.   ]

## 2019-07-01 ENCOUNTER — ANTICOAGULATION MONITORING (OUTPATIENT)
Dept: VASCULAR LAB | Facility: MEDICAL CENTER | Age: 64
End: 2019-07-01

## 2019-07-01 ENCOUNTER — ANTICOAGULATION MONITORING (OUTPATIENT)
Dept: MEDICAL GROUP | Facility: PHYSICIAN GROUP | Age: 64
End: 2019-07-01

## 2019-07-01 DIAGNOSIS — I48.19 PERSISTENT ATRIAL FIBRILLATION (HCC): ICD-10-CM

## 2019-07-01 LAB — INR PPP: 2.4 (ref 2–3.5)

## 2019-07-01 NOTE — PROGRESS NOTES
Anticoagulation clinic    Reminder voice message for patient regarding getting INR done ASAP for anticoagulation clinic.     Loc Chan, PharmD

## 2019-07-02 NOTE — PROGRESS NOTES
Anticoagulation Summary  As of 2019    INR goal:   2.0-3.0   TTR:   68.4 % (3.2 y)   INR used for dosin.40 (2019)   Warfarin maintenance plan:   5 mg (5 mg x 1) every Wed, Fri; 2.5 mg (5 mg x 0.5) all other days   Weekly warfarin total:   22.5 mg   Plan last modified:   Timur Gonzalez PharmD (2018)   Next INR check:   7/15/2019   Target end date:   Indefinite    Indications    Atrial flutter (HCC) (Resolved) [I48.92]  Acute ischemic right PCA stroke-Hemorrhage transformation is noted within the infarct (Resolved) [I63.531]  Persistent atrial fibrillation (HCC) [I48.1]             Anticoagulation Episode Summary     [facility] check location:   [facility]    Preferred lab:       Send INR reminders to:       Comments:   Marcelina      Anticoagulation Care Providers     Provider Role Specialty Phone number    Bijan Sampson M.D. Referring Cardiology [phone]    [facility] Anticoagulation Services Responsible  [phone]        Anticoagulation Patient Findings    Left voicemail message to report a therapeutic INR of 2.4.  Pt to continue with current warfarin dosing regimen. Requested pt contact the clinic for any s/s of unusual bleeding, bruising, clotting or any changes to diet or medication. FU 2 weeks.  Timur Gonzalez, PharmD, BCACP

## 2019-07-15 ENCOUNTER — ANTICOAGULATION MONITORING (OUTPATIENT)
Dept: VASCULAR LAB | Facility: MEDICAL CENTER | Age: 64
End: 2019-07-15

## 2019-07-15 DIAGNOSIS — I48.19 PERSISTENT ATRIAL FIBRILLATION (HCC): ICD-10-CM

## 2019-07-15 LAB — INR PPP: 1.5 (ref 2–3.5)

## 2019-07-15 NOTE — PROGRESS NOTES
Anticoagulation Summary  As of 7/15/2019    INR goal:   2.0-3.0   TTR:   68.1 % (3.2 y)   INR used for dosin.50! (7/15/2019)   Warfarin maintenance plan:   5 mg (5 mg x 1) every Wed, Fri; 2.5 mg (5 mg x 0.5) all other days   Weekly warfarin total:   22.5 mg   Plan last modified:   Fred HaynesD (2018)   Next INR check:   2019   Target end date:   Indefinite    Indications    Atrial flutter (HCC) (Resolved) [I48.92]  Acute ischemic right PCA stroke-Hemorrhage transformation is noted within the infarct (Resolved) [I63.531]  Persistent atrial fibrillation (HCC) [I48.1]             Anticoagulation Episode Summary     INR check location[facility]   [facility]    Preferred lab:       Send INR reminders to:       Comments:   Marcelina      Anticoagulation Care Providers     Provider Role Specialty Phone number    Bijan Sampson M.D. Referring Cardiology [phone]    [facility] Anticoagulation Services Responsible  [phone]        Anticoagulation Patient Findings    Spoke to patient on the phone.   INR  sub-therapeutic.   Denies signs/symptoms of bleeding and/or thrombosis.   Denies changes to diet or medications.   Follow up appointment in 1 week(s).    5mg today then continue weekly warfarin dose as noted      Loc Chan, PharmD

## 2019-07-23 ENCOUNTER — ANTICOAGULATION MONITORING (OUTPATIENT)
Dept: MEDICAL GROUP | Facility: MEDICAL CENTER | Age: 64
End: 2019-07-23

## 2019-07-23 DIAGNOSIS — I48.19 PERSISTENT ATRIAL FIBRILLATION (HCC): ICD-10-CM

## 2019-07-23 LAB — INR PPP: 1.6 (ref 2–3.5)

## 2019-07-23 NOTE — PROGRESS NOTES
OP Telephone Anticoagulation Service Note    Date: 2019      Anticoagulation Summary  As of 2019    INR goal:   2.0-3.0   TTR:   67.7 % (3.3 y)   INR used for dosin.60! (2019)   Warfarin maintenance plan:   5 mg (5 mg x 1) every Mon, Wed, Fri; 2.5 mg (5 mg x 0.5) all other days   Weekly warfarin total:   25 mg   Plan last modified:   Anju Valdez PharmD (2019)   Next INR check:   2019   Target end date:   Indefinite    Indications    Atrial flutter (HCC) (Resolved) [I48.92]  Acute ischemic right PCA stroke-Hemorrhage transformation is noted within the infarct (Resolved) [I63.531]  Persistent atrial fibrillation (HCC) [I48.1]             Anticoagulation Episode Summary     INR check location:   [facility]    Preferred lab:       Send INR reminders to:       Comments:   Alere      Anticoagulation Care Providers     Provider Role Specialty Phone number    Bijan Sampson M.D. Referring Cardiology [phone]    [facility] Anticoagulation Services Responsible  [phone]        Anticoagulation Patient Findings        Plan: INR is low. Left message on patient's answering machine/voicemail. Instructed patient to call back with any concerns regarding any unusual bleeding or bruising, any medication or diet changes or any signs or symptoms of thrombosis. Instructed patient take 5 mg today then  to resume medication as outlined above. Patient to follow up in 1 week(s).             Anju Valdez PharmD

## 2019-07-24 ENCOUNTER — OFFICE VISIT (OUTPATIENT)
Dept: CARDIOLOGY | Facility: MEDICAL CENTER | Age: 64
End: 2019-07-24
Payer: MEDICARE

## 2019-07-24 VITALS
OXYGEN SATURATION: 98 % | DIASTOLIC BLOOD PRESSURE: 78 MMHG | BODY MASS INDEX: 50.02 KG/M2 | HEIGHT: 64 IN | SYSTOLIC BLOOD PRESSURE: 138 MMHG | HEART RATE: 99 BPM | WEIGHT: 293 LBS

## 2019-07-24 DIAGNOSIS — I05.0 MITRAL VALVE STENOSIS, UNSPECIFIED ETIOLOGY: ICD-10-CM

## 2019-07-24 DIAGNOSIS — E78.00 PURE HYPERCHOLESTEROLEMIA: ICD-10-CM

## 2019-07-24 DIAGNOSIS — Z98.890 STATUS POST ABLATION OF ATRIAL FLUTTER: ICD-10-CM

## 2019-07-24 DIAGNOSIS — G47.33 OBSTRUCTIVE SLEEP APNEA: ICD-10-CM

## 2019-07-24 DIAGNOSIS — I10 ESSENTIAL HYPERTENSION: ICD-10-CM

## 2019-07-24 DIAGNOSIS — I48.19 PERSISTENT ATRIAL FIBRILLATION (HCC): ICD-10-CM

## 2019-07-24 DIAGNOSIS — I27.20 PULMONARY HYPERTENSION (HCC): ICD-10-CM

## 2019-07-24 DIAGNOSIS — Z86.79 STATUS POST ABLATION OF ATRIAL FLUTTER: ICD-10-CM

## 2019-07-24 DIAGNOSIS — Z86.73 HISTORY OF STROKE: ICD-10-CM

## 2019-07-24 DIAGNOSIS — Z79.01 CHRONIC ANTICOAGULATION: ICD-10-CM

## 2019-07-24 DIAGNOSIS — N18.30 CHRONIC KIDNEY DISEASE, STAGE III (MODERATE) (HCC): ICD-10-CM

## 2019-07-24 PROBLEM — I34.0 MITRAL REGURGITATION: Status: RESOLVED | Noted: 2018-01-29 | Resolved: 2019-07-24

## 2019-07-24 PROBLEM — I48.92 PAROXYSMAL ATRIAL FLUTTER (HCC): Status: RESOLVED | Noted: 2017-02-23 | Resolved: 2019-07-24

## 2019-07-24 LAB — EKG IMPRESSION: NORMAL

## 2019-07-24 PROCEDURE — 93000 ELECTROCARDIOGRAM COMPLETE: CPT | Performed by: INTERNAL MEDICINE

## 2019-07-24 PROCEDURE — 99214 OFFICE O/P EST MOD 30 MIN: CPT | Performed by: NURSE PRACTITIONER

## 2019-07-24 RX ORDER — DILTIAZEM HYDROCHLORIDE 240 MG/1
240 CAPSULE, COATED, EXTENDED RELEASE ORAL 2 TIMES DAILY
Qty: 60 CAP | Refills: 11 | Status: SHIPPED | OUTPATIENT
Start: 2019-07-24 | End: 2020-05-26 | Stop reason: SDUPTHER

## 2019-07-24 ASSESSMENT — ENCOUNTER SYMPTOMS
CLAUDICATION: 0
MYALGIAS: 0
ABDOMINAL PAIN: 0
WEAKNESS: 0
PALPITATIONS: 0
BACK PAIN: 1
PND: 0
MEMORY LOSS: 1
COUGH: 0
SHORTNESS OF BREATH: 1
ORTHOPNEA: 0
BLURRED VISION: 0
FEVER: 0
DIZZINESS: 0

## 2019-07-24 NOTE — PROGRESS NOTES
Subjective:   Shani Richter is a 59 y.o. female who presents today for 12 month follow up.     She is a patient of Dr. Sampson and Colton.     Hx of obesity, chronic pain, PH, TERESA, DM, new onset diabetes, ischemic CVA, afib/flutter with prior ablation, GERD, and HTN.    She remains overweight. Is in the apt with her daughter who is in a motorized chair.    She is struggling with left leg redness and swelling. She states she has cellulitis and her PCP is seeing her tomorrow.    She has mild palpitations with exertion but otherwise no symptoms to report.    She just moved to [place].    Her EKG shows afib today.    Past Medical History:   Diagnosis Date   • Atrial fibrillation (HCC)    • Atrial flutter (HCC) 11/18/2015   • Diabetes    • Heart murmur     childhood   • Hyperlipidemia    • Hypertension    • Morbid obesity (HCC)    • Osteoarthritis    • Paroxysmal atrial fibrillation (HCC)    • Pneumonia 4/6/2016   • Stroke (HCC) 11/18/2015    Acute ischemic right PCA stroke-Hemorrhage transformation is noted within the infarct [I63.531]   • Valvular heart disease     Mitral stenosis     Past Surgical History:   Procedure Laterality Date   • RECOVERY  5/9/2016    Procedure: CATH LAB FLUTTER ABLATION, CAMILO WITH ANESTHESIA DR. ARMIJO;  Surgeon: Recoveryonly Surgery;  [place] [facility] [place]:    • PILONIDAL CYST EXCISION     • TONSILLECTOMY       Family History   Problem Relation Age of Onset   • Stroke Mother 71   • Cancer Father 71   • Heart Disease Brother      History   Smoking Status   • Never Smoker   Smokeless Tobacco   • Never Used     No Active Allergies  Outpatient Encounter Prescriptions as of 7/24/2019   Medication Sig Dispense Refill   • DILTIAZem CD (CARDIZEM CD) 240 MG CAPSULE SR 24 HR Take 1 Cap by mouth 2 Times a Day. 60 Cap 11   • furosemide (LASIX) 40 MG Tab Take 1 Tab by mouth every day. 90 Tab 0   • glucose blood strip 1 Strip by Other route as needed. 300 Strip 0   •  citalopram (CELEXA) 20 MG Tab TAKE 1 TABLET BY MOUTH ONCE DAILY 30 Tab 2   • Empagliflozin 10 MG Tab Take 1 tablet by mouth every morning with breakfast. 90 Tab 1   • VICTOZA 18 MG/3ML Solution Pen-injector injection Inject 0.3 mL as instructed every day. 3 PEN 6   • simvastatin (ZOCOR) 20 MG Tab Take 1 Tab by mouth every evening. Labs needed. 90 Tab 1   • metoprolol (LOPRESSOR) 50 MG Tab Take 1 Tab by mouth 2 times a day. 180 Tab 2   • magnesium oxide (MAG-OX) 400 (241.3 Mg) MG Tab tablet Take 1 Tab by mouth every day. 180 Tab 1   • lisinopril (PRINIVIL) 5 MG Tab Take 1 Tab by mouth every day. 90 Tab 1   • Insulin Degludec (TRESIBA FLEXTOUCH) 200 UNIT/ML Solution Pen-injector Inject 30-60 Units as instructed every bedtime. 3 PEN 5   • GLOBAL EASE INJECT PEN NEEDLES Inject 1 Each as instructed 2 Times a Day. 180 Each 3   • cholecalciferol (VITAMIN D3) 5000 UNIT Cap Take 1 Cap by mouth every day. 30 Cap 5   • warfarin (COUMADIN) 5 MG Tab Take 0.5-1 Tabs by mouth every day. 2.5 mg on Mondays, Wednesdays, and Fridays. All other days 5 mg 30 Tab 2   • nystatin (MYCOSTATIN) powder Apply to affected area 1-2 times daily PRN 15 g 2   • [DISCONTINUED] DILTIAZem CD (CARDIZEM CD) 120 MG CAPSULE SR 24 HR Take 1 Cap by mouth 2 Times a Day. MUST SEE CARDIOLOGY. NO MORE REFILLS. 60 Cap 0   • [DISCONTINUED] pioglitazone (ACTOS) 30 MG Tab Take 1 Tab by mouth every day. (Patient not taking: Reported on 7/24/2019) 30 Tab 11   • [DISCONTINUED] Ipratropium-Albuterol (DUONEB INH) Inhale 1 Inhalation by mouth Once.       No facility-administered encounter medications on file as of 7/24/2019.      Review of Systems   Constitutional: Positive for malaise/fatigue. Negative for fever.   Eyes: Negative for blurred vision.        Blind spots in vision   Respiratory: Positive for shortness of breath. Negative for cough.    Cardiovascular: Positive for leg swelling. Negative for chest pain, palpitations, orthopnea, claudication and PND.  "  Gastrointestinal: Negative for abdominal pain.   Musculoskeletal: Positive for back pain. Negative for myalgias.   Neurological: Negative for dizziness and weakness.   Psychiatric/Behavioral: Positive for memory loss.   All other systems reviewed and are negative.       Objective:   /78 (BP Location: Left arm, Patient Position: Sitting, BP Cuff Size: Adult)   Pulse 99   Ht 1.626 m (5' 4\")   Wt (!) 139.8 kg (308 lb 2.5 oz)   SpO2 98%   BMI 52.90 kg/m²     Physical Exam   Constitutional: She is oriented to person, place, and time. She appears well-developed. No distress.   Morbid obesity   HENT:   Head: Normocephalic and atraumatic.   Eyes: EOM are normal.   Neck: Normal range of motion. No JVD present.   Cardiovascular: Normal rate, regular rhythm, S1 normal, S2 normal, normal heart sounds, intact distal pulses and normal pulses.    No murmur heard.  Pulses:       Radial pulses are 2+ on the right side, and 2+ on the left side.   Pulmonary/Chest: Effort normal and breath sounds normal. No respiratory distress.   Abdominal: Soft. Bowel sounds are normal.   Musculoskeletal: Normal range of motion. She exhibits edema.   Bilateral edema with red appearance to L leg (being treated for cellulitis)   Neurological: She is alert and oriented to person, place, and time.   Skin: Skin is warm and dry.   Psychiatric: She has a normal mood and affect.   Nursing note and vitals reviewed.    1/20/2010 ECHO CONCLUSIONS  Normal left ventricular chamber size. Normal left ventricular systolic   function. Grade I diastolic dysfunction is present. Mild concentric   left ventricular hypertrophy. Left ventricular ejection fraction is   visually 55% to 60%.  Trace tricuspid regurgitation. Right ventricular systolic pressure is   estimated to be 23mmHg.    1/20/2012 MPI IMPRESSION:   1. DIPYRIDAMOLE STRESS POSITIVE FOR CHEST DISCOMFORT BUT WITHOUT ISCHEMIC   EKG CHANGES.  2. SPECT IMAGES SUGGEST A SMALL SEGMENT OF PROBABLE MILD " INFEROLATERAL   ISCHEMIA. PROMINENT APICAL THINNING IS NOTED.  3. THE GATED STUDY SHOWS A MODERATE REDUCTION OF EJECTION FRACTION WITH   PROBABLE LATERAL HYPOKINESIS.    11/18/2015 ECHO CONCLUSIONS  Normal left ventricular systolic function.  Left ventricular ejection fraction is visually estimated to be 55-60%.  Massively dilated left atrium per 2D.  Extensive, dense, circumferential mitral annular calcification.  Mild mitral regurgitation.  Unable to estimate pulmonary artery pressure due to an inadequate   tricuspid regurgitant jet.    11/17/2015 Carotid Duplex CONCLUSIONS  Minimal bilateral ICA thickening  Nl subclavians and vertebrals    Reviewed with patient  Lab Results   Component Value Date/Time    CHOLSTRLTOT 102 01/08/2019 09:49 AM    LDL 56 01/08/2019 09:49 AM    HDL 26 (A) 01/08/2019 09:49 AM    TRIGLYCERIDE 98 01/08/2019 09:49 AM       Lab Results   Component Value Date/Time    SODIUM 135 01/08/2019 09:49 AM    POTASSIUM 4.0 01/08/2019 09:49 AM    CHLORIDE 98 01/08/2019 09:49 AM    CO2 29 01/08/2019 09:49 AM    GLUCOSE 117 (H) 01/08/2019 09:49 AM    BUN 17 01/08/2019 09:49 AM    CREATININE 1.15 01/08/2019 09:49 AM     Lab Results   Component Value Date/Time    ALKPHOSPHAT 117 (H) 01/08/2019 09:49 AM    ASTSGOT 14 01/08/2019 09:49 AM    ALTSGPT 15 01/08/2019 09:49 AM    TBILIRUBIN 1.4 01/08/2019 09:49 AM        Assessment:     1. Persistent atrial fibrillation ([facility])  EKG   2. Chronic kidney disease, stage III (moderate) ([facility])     3. Essential hypertension  DILTIAZem CD (CARDIZEM CD) 240 MG CAPSULE SR 24 HR   4. History of stroke     5. Pure hypercholesterolemia     6. Obstructive sleep apnea     7. Pulmonary hypertension ([facility])     8. Status post ablation of atrial flutter     9. 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)     10. BMI 50.0-59.9, adult ([facility])     11. Chronic anticoagulation     12. Mitral valve  stenosis, unspecified etiology  EC-ECHOCARDIOGRAM COMPLETE W/O CONT       Medical Decision Making:  Today's Assessment / Status / Plan:     1. HTN  -good control on metoprolol, lisinopril, lasix, and diltiazem    2. DM type II  -managed by PCP    3. HLD  -LDL goal  <100 with DM   -at goal, follow yearly with PCP    4. Moderate mitral stenosis  -recommend yearly echo  -ordered echo    5. LAE  -manage with sleep apnea therapy    6. Obesity  -recommended lifestyle changes  -will start swimming soon    7. Afib/flutter post ablation  -persistent afib post ablation  -coumadin therapeutic with RCC management \  -continue diltiazem and metoprolol for rate control  -afib rates >90 at rest, recommend increase to 240 mg BID  -follow at home rates, PCP apt tomorrow as well    8. Acute CVA  -mild memory loss noted  -no other significant deficits  -cont statin    9. TERESA  -not followed by pulmonary  -needs to be evaluated in future    FU in clinic in 12 months with MD; yearly echo ordered for mitral stenosis surveillance    Patient verbalizes understanding and agrees with the plan of care.     Collaborating MD: Miguel SAMUEL    Physical Exam   Constitutional: She is oriented to person, place, and time. She appears well-developed. No distress.   Morbid obesity   HENT:   Head: Normocephalic and atraumatic.   Eyes: EOM are normal.   Neck: Normal range of motion. No JVD present.   Cardiovascular: Normal rate, regular rhythm, S1 normal, S2 normal, normal heart sounds, intact distal pulses and normal pulses.    No murmur heard.  Pulses:       Radial pulses are 2+ on the right side, and 2+ on the left side.   Pulmonary/Chest: Effort normal and breath sounds normal. No respiratory distress.   Abdominal: Soft. Bowel sounds are normal.   Musculoskeletal: Normal range of motion. She exhibits edema.   Bilateral edema with red appearance to L leg (being treated for cellulitis)   Neurological: She is alert and oriented to person, place, and time.    Skin: Skin is warm and dry.   Psychiatric: She has a normal mood and affect.   Nursing note and vitals reviewed.

## 2019-07-24 NOTE — LETTER
[facility]   [address] [facility], [facility] [address]  [place] [address]  Phone: [phone]  Fax: [phone]              Shani Love  1955    Encounter Date: 7/24/2019    REGLA Pollard          PROGRESS NOTE:  Subjective:   Shani Richter is a 59 y.o. female who presents today for 12 month follow up.     She is a patient of Dr. Sampson and Colton.     Hx of obesity, chronic pain, PH, TERESA, DM, new onset diabetes, ischemic CVA, afib/flutter with prior ablation, GERD, and HTN.    She remains overweight. Is in the apt with her daughter who is in a motorized chair.    She is struggling with left leg redness and swelling. She states she has cellulitis and her PCP is seeing her tomorrow.    She has mild palpitations with exertion but otherwise no symptoms to report.    She just moved to [place].    Her EKG shows afib today.    Past Medical History:   Diagnosis Date   • Atrial fibrillation (HCC)    • Atrial flutter (HCC) 11/18/2015   • Diabetes    • Heart murmur     childhood   • Hyperlipidemia    • Hypertension    • Morbid obesity (HCC)    • Osteoarthritis    • Paroxysmal atrial fibrillation (HCC)    • Pneumonia 4/6/2016   • Stroke (HCC) 11/18/2015    Acute ischemic right PCA stroke-Hemorrhage transformation is noted within the infarct [I63.531]   • Valvular heart disease     Mitral stenosis     Past Surgical History:   Procedure Laterality Date   • RECOVERY  5/9/2016    Procedure: CATH LAB FLUTTER ABLATION, CAMILO WITH ANESTHESIA DR. ARMIJO;  Surgeon: Recoveryonly Surgery;  Location: SURGERY PRE-POST PROC UNIT [facility];  Service:    • PILONIDAL CYST EXCISION     • TONSILLECTOMY       Family History   Problem Relation Age of Onset   • Stroke Mother 71   • Cancer Father 71   • Heart Disease Brother      History   Smoking Status   • Never Smoker   Smokeless Tobacco   • Never Used     No Active Allergies  Outpatient Encounter Prescriptions as of 7/24/2019   Medication Sig  Dispense Refill   • DILTIAZem CD (CARDIZEM CD) 240 MG CAPSULE SR 24 HR Take 1 Cap by mouth 2 Times a Day. 60 Cap 11   • furosemide (LASIX) 40 MG Tab Take 1 Tab by mouth every day. 90 Tab 0   • glucose blood strip 1 Strip by Other route as needed. 300 Strip 0   • citalopram (CELEXA) 20 MG Tab TAKE 1 TABLET BY MOUTH ONCE DAILY 30 Tab 2   • Empagliflozin 10 MG Tab Take 1 tablet by mouth every morning with breakfast. 90 Tab 1   • VICTOZA 18 MG/3ML Solution Pen-injector injection Inject 0.3 mL as instructed every day. 3 PEN 6   • simvastatin (ZOCOR) 20 MG Tab Take 1 Tab by mouth every evening. Labs needed. 90 Tab 1   • metoprolol (LOPRESSOR) 50 MG Tab Take 1 Tab by mouth 2 times a day. 180 Tab 2   • magnesium oxide (MAG-OX) 400 (241.3 Mg) MG Tab tablet Take 1 Tab by mouth every day. 180 Tab 1   • lisinopril (PRINIVIL) 5 MG Tab Take 1 Tab by mouth every day. 90 Tab 1   • Insulin Degludec (TRESIBA FLEXTOUCH) 200 UNIT/ML Solution Pen-injector Inject 30-60 Units as instructed every bedtime. 3 PEN 5   • GLOBAL EASE INJECT PEN NEEDLES Inject 1 Each as instructed 2 Times a Day. 180 Each 3   • cholecalciferol (VITAMIN D3) 5000 UNIT Cap Take 1 Cap by mouth every day. 30 Cap 5   • warfarin (COUMADIN) 5 MG Tab Take 0.5-1 Tabs by mouth every day. 2.5 mg on Mondays, Wednesdays, and Fridays. All other days 5 mg 30 Tab 2   • nystatin (MYCOSTATIN) powder Apply to affected area 1-2 times daily PRN 15 g 2   • [DISCONTINUED] DILTIAZem CD (CARDIZEM CD) 120 MG CAPSULE SR 24 HR Take 1 Cap by mouth 2 Times a Day. MUST SEE CARDIOLOGY. NO MORE REFILLS. 60 Cap 0   • [DISCONTINUED] pioglitazone (ACTOS) 30 MG Tab Take 1 Tab by mouth every day. (Patient not taking: Reported on 7/24/2019) 30 Tab 11   • [DISCONTINUED] Ipratropium-Albuterol (DUONEB INH) Inhale 1 Inhalation by mouth Once.       No facility-administered encounter medications on file as of 7/24/2019.      Review of Systems   Constitutional: Positive for malaise/fatigue. Negative for  "fever.   Eyes: Negative for blurred vision.        Blind spots in vision   Respiratory: Positive for shortness of breath. Negative for cough.    Cardiovascular: Positive for leg swelling. Negative for chest pain, palpitations, orthopnea, claudication and PND.   Gastrointestinal: Negative for abdominal pain.   Musculoskeletal: Positive for back pain. Negative for myalgias.   Neurological: Negative for dizziness and weakness.   Psychiatric/Behavioral: Positive for memory loss.   All other systems reviewed and are negative.       Objective:   /78 (BP Location: Left arm, Patient Position: Sitting, BP Cuff Size: Adult)   Pulse 99   Ht 1.626 m (5' 4\")   Wt (!) 139.8 kg (308 lb 2.5 oz)   SpO2 98%   BMI 52.90 kg/m²      Physical Exam   Constitutional: She is oriented to person, place, and time. She appears well-developed. No distress.   Morbid obesity   HENT:   Head: Normocephalic and atraumatic.   Eyes: EOM are normal.   Neck: Normal range of motion. No JVD present.   Cardiovascular: Normal rate, regular rhythm, S1 normal, S2 normal, normal heart sounds, intact distal pulses and normal pulses.    No murmur heard.  Pulses:       Radial pulses are 2+ on the right side, and 2+ on the left side.   Pulmonary/Chest: Effort normal and breath sounds normal. No respiratory distress.   Abdominal: Soft. Bowel sounds are normal.   Musculoskeletal: Normal range of motion. She exhibits edema.   Bilateral edema with red appearance to L leg (being treated for cellulitis)   Neurological: She is alert and oriented to person, place, and time.   Skin: Skin is warm and dry.   Psychiatric: She has a normal mood and affect.   Nursing note and vitals reviewed.    1/20/2010 ECHO CONCLUSIONS  Normal left ventricular chamber size. Normal left ventricular systolic   function. Grade I diastolic dysfunction is present. Mild concentric   left ventricular hypertrophy. Left ventricular ejection fraction is   visually 55% to 60%.  Trace " tricuspid regurgitation. Right ventricular systolic pressure is   estimated to be 23mmHg.    1/20/2012 MPI IMPRESSION:   1. DIPYRIDAMOLE STRESS POSITIVE FOR CHEST DISCOMFORT BUT WITHOUT ISCHEMIC   EKG CHANGES.  2. SPECT IMAGES SUGGEST A SMALL SEGMENT OF PROBABLE MILD INFEROLATERAL   ISCHEMIA. PROMINENT APICAL THINNING IS NOTED.  3. THE GATED STUDY SHOWS A MODERATE REDUCTION OF EJECTION FRACTION WITH   PROBABLE LATERAL HYPOKINESIS.    11/18/2015 ECHO CONCLUSIONS  Normal left ventricular systolic function.  Left ventricular ejection fraction is visually estimated to be 55-60%.  Massively dilated left atrium per 2D.  Extensive, dense, circumferential mitral annular calcification.  Mild mitral regurgitation.  Unable to estimate pulmonary artery pressure due to an inadequate   tricuspid regurgitant jet.    11/17/2015 Carotid Duplex CONCLUSIONS  Minimal bilateral ICA thickening  Nl subclavians and vertebrals    Reviewed with patient  Lab Results   Component Value Date/Time    CHOLSTRLTOT 102 01/08/2019 09:49 AM    LDL 56 01/08/2019 09:49 AM    HDL 26 (A) 01/08/2019 09:49 AM    TRIGLYCERIDE 98 01/08/2019 09:49 AM       Lab Results   Component Value Date/Time    SODIUM 135 01/08/2019 09:49 AM    POTASSIUM 4.0 01/08/2019 09:49 AM    CHLORIDE 98 01/08/2019 09:49 AM    CO2 29 01/08/2019 09:49 AM    GLUCOSE 117 (H) 01/08/2019 09:49 AM    BUN 17 01/08/2019 09:49 AM    CREATININE 1.15 01/08/2019 09:49 AM     Lab Results   Component Value Date/Time    ALKPHOSPHAT 117 (H) 01/08/2019 09:49 AM    ASTSGOT 14 01/08/2019 09:49 AM    ALTSGPT 15 01/08/2019 09:49 AM    TBILIRUBIN 1.4 01/08/2019 09:49 AM        Assessment:     1. Persistent atrial fibrillation (HCC)  EKG   2. Chronic kidney disease, stage III (moderate) (HCC)     3. Essential hypertension  DILTIAZem CD (CARDIZEM CD) 240 MG CAPSULE SR 24 HR   4. History of stroke     5. Pure hypercholesterolemia     6. Obstructive sleep apnea     7. Pulmonary hypertension (HCC)     8. Status  post ablation of atrial flutter     9. Uncontrolled type 2 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, with long-term current use of insulin (HCC)     10. BMI 50.0-59.9, adult (HCC)     11. Chronic anticoagulation     12. Mitral valve stenosis, unspecified etiology  EC-ECHOCARDIOGRAM COMPLETE W/O CONT       Medical Decision Making:  Today's Assessment / Status / Plan:     1. HTN  -good control on metoprolol, lisinopril, lasix, and diltiazem    2. DM type II  -managed by PCP    3. HLD  -LDL goal  <100 with DM   -at goal, follow yearly with PCP    4. Moderate mitral stenosis  -recommend yearly echo  -ordered echo    5. LAE  -manage with sleep apnea therapy    6. Obesity  -recommended lifestyle changes  -will start swimming soon    7. Afib/flutter post ablation  -persistent afib post ablation  -coumadin therapeutic with RCC management \  -continue diltiazem and metoprolol for rate control  -afib rates >90 at rest, recommend increase to 240 mg BID  -follow at home rates, PCP apt tomorrow as well    8. Acute CVA  -mild memory loss noted  -no other significant deficits  -cont statin    9. TERESA  -not followed by pulmonary  -needs to be evaluated in future    FU in clinic in 12 months with MD; yearly echo ordered for mitral stenosis surveillance    Patient verbalizes understanding and agrees with the plan of care.     Collaborating MD: Miguel SAMUEL    Physical Exam   Constitutional: She is oriented to person, place, and time. She appears well-developed. No distress.   Morbid obesity   HENT:   Head: Normocephalic and atraumatic.   Eyes: EOM are normal.   Neck: Normal range of motion. No JVD present.   Cardiovascular: Normal rate, regular rhythm, S1 normal, S2 normal, normal heart sounds, intact distal pulses and normal pulses.    No murmur heard.  Pulses:       Radial pulses are 2+ on the right side, and 2+ on the left side.   Pulmonary/Chest: Effort normal and breath sounds normal. No respiratory  distress.   Abdominal: Soft. Bowel sounds are normal.   Musculoskeletal: Normal range of motion. She exhibits edema.   Bilateral edema with red appearance to L leg (being treated for cellulitis)   Neurological: She is alert and oriented to person, place, and time.   Skin: Skin is warm and dry.   Psychiatric: She has a normal mood and affect.   Nursing note and vitals reviewed.          No Recipients

## 2019-07-30 ENCOUNTER — ANTICOAGULATION MONITORING (OUTPATIENT)
Dept: VASCULAR LAB | Facility: MEDICAL CENTER | Age: 64
End: 2019-07-30

## 2019-07-30 DIAGNOSIS — I48.19 PERSISTENT ATRIAL FIBRILLATION (HCC): ICD-10-CM

## 2019-07-30 LAB — INR PPP: 1.8 (ref 2–3.5)

## 2019-07-30 NOTE — PROGRESS NOTES
Anticoagulation Summary  As of 2019    INR goal:   2.0-3.0   TTR:   67.3 % (3.3 y)   INR used for dosin.80! (2019)   Warfarin maintenance plan:   2.5 mg (5 mg x 0.5) every Sun, Tue, Thu; 5 mg (5 mg x 1) all other days   Weekly warfarin total:   27.5 mg   Plan last modified:   Joyce Pineda (2019)   Next INR check:   2019   Target end date:   Indefinite    Indications    Atrial flutter (HCC) (Resolved) [I48.92]  Acute ischemic right PCA stroke-Hemorrhage transformation is noted within the infarct (Resolved) [I63.531]  Persistent atrial fibrillation (HCC) [I48.1]             Anticoagulation Episode Summary     INR check location:   [facility]    Preferred lab:       Send INR reminders to:       Comments:   Alere      Anticoagulation Care Providers     Provider Role Specialty Phone number    Bijan Sampson M.D. Referring Cardiology [phone]    [facility] Anticoagulation Services Responsible  [phone]        Anticoagulation Patient Findings  Patient Findings     Negatives:   Signs/symptoms of thrombosis, Signs/symptoms of bleeding, Laboratory test error suspected, Change in health, Change in alcohol use, Change in activity, Upcoming invasive procedure, Emergency department visit, Upcoming dental procedure, Missed doses, Extra doses, Change in medications, Change in diet/appetite, Hospital admission, Bruising, Other complaints        Spoke with the patient on the phone today, reporting a SUB-therapeutic INR of 1.8.  Confirmed the current warfarin dosing regimen and patient compliance. Patient denies any interval changes to diet and/or medications. Patient denies any signs/symptoms of bleeding or clotting.  Patient instructed to increase weekly regimen to 2.5mg on Sun, Tues, Thurs and 5mg ROW. Patient will follow up again in 2 weeks.     Radha Pineda  PharmD

## 2019-08-13 ENCOUNTER — ANTICOAGULATION MONITORING (OUTPATIENT)
Dept: VASCULAR LAB | Facility: MEDICAL CENTER | Age: 64
End: 2019-08-13

## 2019-08-13 DIAGNOSIS — I48.19 PERSISTENT ATRIAL FIBRILLATION (HCC): ICD-10-CM

## 2019-08-13 LAB — INR PPP: 2.9 (ref 2–3.5)

## 2019-08-13 NOTE — PROGRESS NOTES
Anticoagulation Summary  As of 2019    INR goal:   2.0-3.0   TTR:   67.4 % (3.3 y)   INR used for dosin.90 (2019)   Warfarin maintenance plan:   2.5 mg (5 mg x 0.5) every Sun, Tue, Thu; 5 mg (5 mg x 1) all other days   Weekly warfarin total:   27.5 mg   Plan last modified:   Joyce Pineda (2019)   Next INR check:   2019   Target end date:   Indefinite    Indications    Atrial flutter (HCC) (Resolved) [I48.92]  Acute ischemic right PCA stroke-Hemorrhage transformation is noted within the infarct (Resolved) [I63.531]  Persistent atrial fibrillation (HCC) [I48.1]             Anticoagulation Episode Summary     INR check location:   Anticoagulation Clinic    Preferred lab:       Send INR reminders to:       Comments:   Marcelina      Anticoagulation Care Providers     Provider Role Specialty Phone number    Bijan Sampson M.D. Referring Cardiology [phone]    [facility] Anticoagulation Services Responsible  [phone]        Anticoagulation Patient Findings          HPI:  Shani Love, on anticoagulation therapy with warfarin for AF  Changes to current medical/health status since last appt: none  Denies signs/symptoms of bleeding and/or thrombosis since the last appt.    Denies any interval changes to diet  Pt has been on Doxycycline for the past 5 days.    Denies any complications or cost restrictions with current therapy.     A/P   INR  therapeutic.   Pt will reduce warfarin dose tomorrow then Pt is to continue with current warfarin dosing regimen.     Next INR in 2 week(s).    Vic Morrison, PharmD

## 2019-08-27 ENCOUNTER — ANTICOAGULATION MONITORING (OUTPATIENT)
Dept: VASCULAR LAB | Facility: MEDICAL CENTER | Age: 64
End: 2019-08-27

## 2019-08-27 DIAGNOSIS — I48.19 PERSISTENT ATRIAL FIBRILLATION (HCC): ICD-10-CM

## 2019-08-27 LAB — INR PPP: 1.9 (ref 2–3.5)

## 2019-08-27 NOTE — PROGRESS NOTES
Anticoagulation Summary  As of 2019    INR goal:   2.0-3.0   TTR:   67.7 % (3.4 y)   INR used for dosin.90! (2019)   Warfarin maintenance plan:   2.5 mg (5 mg x 0.5) every Sun, e, Thu; 5 mg (5 mg x 1) all other days   Weekly warfarin total:   27.5 mg   Plan last modified:   Joyce Su (2019)   Next INR check:   9/10/2019   Target end date:   Indefinite    Indications    Atrial flutter (HCC) (Resolved) [I48.92]  Acute ischemic right PCA stroke-Hemorrhage transformation is noted within the infarct (Resolved) [I63.531]  Persistent atrial fibrillation (HCC) [I48.1]             Anticoagulation Episode Summary     INR check location:   Anticoagulation Clinic    Preferred lab:       Send INR reminders to:       Comments:   Marcelina      Anticoagulation Care Providers     Provider Role Specialty Phone number    Bijan Sampson M.D. Referring Cardiology [phone]    [facility] Anticoagulation Services Responsible  [phone]        Anticoagulation Patient Findings        spoke with Katt to report a sub therapeutic INR of 1.9.  Will bolus dose with 5mg tonight, then resume current dosing regimen.  Pt reports not missing any doses for the past two weeks.  Follow up in 2 weeks, to reduce the risk of adverse events related to this high risk medication, warfarin.    Joyce Su, Clinical Pharmacist

## 2019-09-11 ENCOUNTER — ANTICOAGULATION MONITORING (OUTPATIENT)
Dept: VASCULAR LAB | Facility: MEDICAL CENTER | Age: 64
End: 2019-09-11

## 2019-09-11 DIAGNOSIS — I48.19 PERSISTENT ATRIAL FIBRILLATION (HCC): ICD-10-CM

## 2019-09-11 LAB — INR PPP: 2.4 (ref 2–3.5)

## 2019-09-11 NOTE — PROGRESS NOTES
Anticoagulation Summary  As of 2019    INR goal:   2.0-3.0   TTR:   67.8 % (3.4 y)   INR used for dosin.40 (2019)   Warfarin maintenance plan:   2.5 mg (5 mg x 0.5) every Sun, Tue, Thu; 5 mg (5 mg x 1) all other days   Weekly warfarin total:   27.5 mg   Plan last modified:   Joyce BAYLEE Filter (2019)   Next INR check:   2019   Target end date:   Indefinite    Indications    Atrial flutter (HCC) (Resolved) [I48.92]  Acute ischemic right PCA stroke-Hemorrhage transformation is noted within the infarct (Resolved) [I63.531]  Persistent atrial fibrillation (HCC) [I48.1]             Anticoagulation Episode Summary     INR check location:   Anticoagulation Clinic    Preferred lab:       Send INR reminders to:       Comments:   Marcelina      Anticoagulation Care Providers     Provider Role Specialty Phone number    Bijan Sampson M.D. Referring Cardiology [phone]    [facility] Anticoagulation Services Responsible  [phone]        Anticoagulation Patient Findings  Patient Findings     Negatives:   Signs/symptoms of thrombosis, Signs/symptoms of bleeding, Laboratory test error suspected, Change in health, Change in alcohol use, Change in activity, Upcoming invasive procedure, Emergency department visit, Upcoming dental procedure, Missed doses, Extra doses, Change in medications, Change in diet/appetite, Hospital admission, Bruising, Other complaints        Spoke with patient today regarding therapeutic INR of 2.4.  Patient denies any signs/symptoms of bruising or bleeding or any changes in diet and medications.  Instructed patient to call clinic with any questions or concerns.  Pt is to continue with current warfarin dosing regimen.  Follow up in 2 weeks, to reduce risk of adverse events related to this high risk medication,  Warfarin.    Timur Gonzalez, PharmD, BCACP

## 2019-09-24 ENCOUNTER — ANTICOAGULATION MONITORING (OUTPATIENT)
Dept: VASCULAR LAB | Facility: MEDICAL CENTER | Age: 64
End: 2019-09-24

## 2019-09-24 DIAGNOSIS — I48.19 PERSISTENT ATRIAL FIBRILLATION (HCC): ICD-10-CM

## 2019-09-24 LAB — INR PPP: 2.3 (ref 2–3.5)

## 2019-09-24 NOTE — PROGRESS NOTES
Anticoagulation Summary  As of 2019    INR goal:   2.0-3.0   TTR:   68.2 % (3.4 y)   INR used for dosin.30 (2019)   Warfarin maintenance plan:   2.5 mg (5 mg x 0.5) every Sun, Tue, u; 5 mg (5 mg x 1) all other days   Weekly warfarin total:   27.5 mg   No change documented:   Christopher Leavitt Ass't   Plan last modified:   Joyce BEACH Filter (2019)   Next INR check:   10/8/2019   Target end date:   Indefinite    Indications    Atrial flutter (HCC) (Resolved) [I48.92]  Acute ischemic right PCA stroke-Hemorrhage transformation is noted within the infarct (Resolved) [I63.531]  Persistent atrial fibrillation (HCC) [I48.1]             Anticoagulation Episode Summary     INR check location:   Anticoagulation Clinic    Preferred lab:       Send INR reminders to:       Comments:   Alere      Anticoagulation Care Providers     Provider Role Specialty Phone number    Bijan Sampson M.D. Referring Cardiology [phone]    [facility] Anticoagulation Services Responsible  [phone]        Anticoagulation Patient Findings  Patient Findings     Negatives:   Signs/symptoms of thrombosis, Signs/symptoms of bleeding, Laboratory test error suspected, Change in health, Change in alcohol use, Change in activity, Upcoming invasive procedure, Emergency department visit, Upcoming dental procedure, Missed doses, Extra doses, Change in medications, Change in diet/appetite, Hospital admission, Bruising, Other complaints          Left voicemail message to report a therapeutic INR of 2.30.    Pt to continue with current warfarin dosing regimen. Requested pt contact the clinic for any s/s of unusual bleeding, bruising, clotting or any changes to diet or medication.    FU INR in 2 week(s).    Raul Jarquin, Pharmacy Intern

## 2019-09-24 NOTE — PROGRESS NOTES
Anticoagulation Summary  As of 2019    INR goal:   2.0-3.0   TTR:   68.2 % (3.4 y)   INR used for dosin.30 (2019)   Warfarin maintenance plan:   2.5 mg (5 mg x 0.5) every Sun, Tue, u; 5 mg (5 mg x 1) all other days   Weekly warfarin total:   27.5 mg   No change documented:   Christopher Leavitt Ass't   Plan last modified:   Joyce BAYLEE Filter (2019)   Next INR check:   10/8/2019   Target end date:   Indefinite    Indications    Atrial flutter (HCC) (Resolved) [I48.92]  Acute ischemic right PCA stroke-Hemorrhage transformation is noted within the infarct (Resolved) [I63.531]  Persistent atrial fibrillation (HCC) [I48.1]             Anticoagulation Episode Summary     INR check location:   Anticoagulation Clinic    Preferred lab:       Send INR reminders to:       Comments:   Alere      Anticoagulation Care Providers     Provider Role Specialty Phone number    Bijan Sampson M.D. Referring Cardiology [phone]    [facility] Anticoagulation Services Responsible  [phone]        Anticoagulation Patient Findings  Patient Findings     Negatives:   Signs/symptoms of thrombosis, Signs/symptoms of bleeding, Laboratory test error suspected, Change in health, Change in alcohol use, Change in activity, Upcoming invasive procedure, Emergency department visit, Upcoming dental procedure, Missed doses, Extra doses, Change in medications, Change in diet/appetite, Hospital admission, Bruising, Other complaints      Spoke with patient to report a therapeutic INR.  Pt instructed to continue with current warfarin dosing regimen. Pt denies any s/s of bleeding, bruising, clotting or any changes to diet or medication.  Will follow up in 2 weeks.  Christopher Leavitt Ass't

## 2019-10-08 ENCOUNTER — ANTICOAGULATION MONITORING (OUTPATIENT)
Dept: VASCULAR LAB | Facility: MEDICAL CENTER | Age: 64
End: 2019-10-08

## 2019-10-08 DIAGNOSIS — I48.19 PERSISTENT ATRIAL FIBRILLATION (HCC): ICD-10-CM

## 2019-10-08 LAB — INR PPP: 2.4 (ref 2–3.5)

## 2019-10-08 NOTE — PROGRESS NOTES
Anticoagulation Summary  As of 10/8/2019    INR goal:   2.0-3.0   TTR:   68.5 % (3.5 y)   INR used for dosin.40 (10/8/2019)   Warfarin maintenance plan:   2.5 mg (5 mg x 0.5) every Sun, Tue, Thu; 5 mg (5 mg x 1) all other days   Weekly warfarin total:   27.5 mg   No change documented:   Christopher Leavitt Ass't   Plan last modified:   Joyce M Filter (2019)   Next INR check:   10/22/2019   Target end date:   Indefinite    Indications    Atrial flutter (HCC) (Resolved) [I48.92]  Acute ischemic right PCA stroke-Hemorrhage transformation is noted within the infarct (Resolved) [I63.531]  Persistent atrial fibrillation [I48.19]             Anticoagulation Episode Summary     INR check location:   Anticoagulation Clinic    Preferred lab:       Send INR reminders to:       Comments:   Marcelina      Anticoagulation Care Providers     Provider Role Specialty Phone number    Bijan Sampson M.D. Referring Cardiology [phone]    [facility] Anticoagulation Services Responsible  [phone]        Anticoagulation Patient Findings  Patient Findings     Negatives:   Signs/symptoms of thrombosis, Signs/symptoms of bleeding, Laboratory test error suspected, Change in health, Change in alcohol use, Change in activity, Upcoming invasive procedure, Emergency department visit, Upcoming dental procedure, Missed doses, Extra doses, Change in medications, Change in diet/appetite, Hospital admission, Bruising, Other complaints      Spoke with patient to report a therapeutic INR.  Pt instructed to continue with current warfarin dosing regimen. Pt denies any s/s of bleeding, bruising, clotting or any changes to diet or medication.  Will follow up in 2 weeks.  [place] Med Ass't    I have reviewed and am in agreement with the above stated plan on 10-8-19.  Timur Gonzalez, PharmD, BCACP

## 2019-10-22 ENCOUNTER — ANTICOAGULATION MONITORING (OUTPATIENT)
Dept: VASCULAR LAB | Facility: MEDICAL CENTER | Age: 64
End: 2019-10-22

## 2019-10-22 DIAGNOSIS — I48.19 PERSISTENT ATRIAL FIBRILLATION (HCC): ICD-10-CM

## 2019-10-22 LAB — INR PPP: 2 (ref 2–3.5)

## 2019-10-22 NOTE — PROGRESS NOTES
Anticoagulation Summary  As of 10/22/2019    INR goal:   2.0-3.0   TTR:   68.9 % (3.5 y)   INR used for dosin.00 (10/22/2019)   Warfarin maintenance plan:   2.5 mg (5 mg x 0.5) every Sun, Tue, u; 5 mg (5 mg x 1) all other days   Weekly warfarin total:   27.5 mg   No change documented:   Joyce Pineda   Plan last modified:   Joyce BAYLEE Filter (2019)   Next INR check:   2019   Target end date:   Indefinite    Indications    Atrial flutter (HCC) (Resolved) [I48.92]  Acute ischemic right PCA stroke-Hemorrhage transformation is noted within the infarct (Resolved) [I63.531]  Persistent atrial fibrillation [I48.19]             Anticoagulation Episode Summary     INR check location:   Anticoagulation Clinic    Preferred lab:       Send INR reminders to:       Comments:   Marcelina      Anticoagulation Care Providers     Provider Role Specialty Phone number    Bijan Sampson M.D. Referring Cardiology [phone]    [facility] Anticoagulation Services Responsible  [phone]        Anticoagulation Patient Findings    Left a message on the patient's voicemail today, informing the patient of a therapeutic INR of 2.0.  Instructed patient to call the clinic with any changes to diet or medications, with any signs/sx of bleeding or clotting or with any questions or concerns.     Patient instructed to continue with the current warfarin dosing regimen, and asked to follow up again in 2 weeks.     Radha IbarraD

## 2019-11-05 ENCOUNTER — ANTICOAGULATION MONITORING (OUTPATIENT)
Dept: VASCULAR LAB | Facility: MEDICAL CENTER | Age: 64
End: 2019-11-05

## 2019-11-05 DIAGNOSIS — I48.19 PERSISTENT ATRIAL FIBRILLATION (HCC): ICD-10-CM

## 2019-11-05 LAB — INR PPP: 1.8 (ref 2–3.5)

## 2019-11-06 NOTE — PROGRESS NOTES
Anticoagulation Summary  As of 2019    INR goal:   2.0-3.0   TTR:   68.1 % (3.6 y)   INR used for dosin.80! (2019)   Warfarin maintenance plan:   2.5 mg (5 mg x 0.5) every Sun, Thu; 5 mg (5 mg x 1) all other days   Weekly warfarin total:   30 mg   Plan last modified:   Joyce Su (2019)   Next INR check:   2019   Target end date:   Indefinite    Indications    Atrial flutter (HCC) (Resolved) [I48.92]  Acute ischemic right PCA stroke-Hemorrhage transformation is noted within the infarct (Resolved) [I63.531]  Persistent atrial fibrillation [I48.19]             Anticoagulation Episode Summary     INR check location:   Anticoagulation Clinic    Preferred lab:       Send INR reminders to:       Comments:   Marcelina      Anticoagulation Care Providers     Provider Role Specialty Phone number    Bijan Sampson M.D. Referring Cardiology [phone]    [facility] Anticoagulation Services Responsible  [phone]        Anticoagulation Patient Findings          Spoke with Shani to report a sub therapeutic INR of 1.8.  Pt INRs have been trending downward.  Will increase weekly dose by 9%. Follow up in 2 weeks, to reduce the risk of adverse events related to this high risk medication, warfarin.    Joyce Su, Clinical Pharmacist

## 2019-11-20 ENCOUNTER — ANTICOAGULATION MONITORING (OUTPATIENT)
Dept: VASCULAR LAB | Facility: MEDICAL CENTER | Age: 64
End: 2019-11-20

## 2019-11-20 DIAGNOSIS — I48.19 PERSISTENT ATRIAL FIBRILLATION (HCC): ICD-10-CM

## 2019-11-20 LAB — INR PPP: 2.5 (ref 2–3.5)

## 2019-11-21 NOTE — PROGRESS NOTES
Anticoagulation Summary  As of 2019    INR goal:   2.0-3.0   TTR:   68.2 % (3.6 y)   INR used for dosin.50 (2019)   Warfarin maintenance plan:   2.5 mg (5 mg x 0.5) every Sun, Thu; 5 mg (5 mg x 1) all other days   Weekly warfarin total:   30 mg   No change documented:   Christopher Leavitt Ass't   Plan last modified:   Jocye BEACH Filter (2019)   Next INR check:   2019   Target end date:   Indefinite    Indications    Atrial flutter (HCC) (Resolved) [I48.92]  Acute ischemic right PCA stroke-Hemorrhage transformation is noted within the infarct (Resolved) [I63.531]  Persistent atrial fibrillation [I48.19]             Anticoagulation Episode Summary     INR check location:   Anticoagulation Clinic    Preferred lab:       Send INR reminders to:       Comments:   Marcelina      Anticoagulation Care Providers     Provider Role Specialty Phone number    Bijan Sampson M.D. Referring Cardiology [phone]    [facility] Anticoagulation Services Responsible  [phone]        Anticoagulation Patient Findings  Patient Findings     Negatives:   Signs/symptoms of thrombosis, Signs/symptoms of bleeding, Laboratory test error suspected, Change in health, Change in alcohol use, Change in activity, Upcoming invasive procedure, Emergency department visit, Upcoming dental procedure, Missed doses, Extra doses, Change in medications, Change in diet/appetite, Hospital admission, Bruising, Other complaints      Spoke with patient to report a therapeutic INR.  Pt instructed to continue with current warfarin dosing regimen. Pt denies any s/s of bleeding, bruising, clotting or any changes to diet or medication.  Will follow up in 2 weeks.  Christopher Leavitt Ass't          I have reviewed and agree with the plan  on  2019    Anju Valdez, Pharm D

## 2019-11-21 NOTE — PROGRESS NOTES
Anticoagulation Summary  As of 2019    INR goal:   2.0-3.0   TTR:   68.2 % (3.6 y)   INR used for dosin.50 (2019)   Warfarin maintenance plan:   2.5 mg (5 mg x 0.5) every Sun, Thu; 5 mg (5 mg x 1) all other days   Weekly warfarin total:   30 mg   No change documented:   Christopher Leavitt Ass't   Plan last modified:   Joyce BEACH Filter (2019)   Next INR check:   2019   Target end date:   Indefinite    Indications    Atrial flutter (HCC) (Resolved) [I48.92]  Acute ischemic right PCA stroke-Hemorrhage transformation is noted within the infarct (Resolved) [I63.531]  Persistent atrial fibrillation [I48.19]             Anticoagulation Episode Summary     INR check location:   Anticoagulation Clinic    Preferred lab:       Send INR reminders to:       Comments:   Marcelina      Anticoagulation Care Providers     Provider Role Specialty Phone number    Bijan Sampson M.D. Referring Cardiology [phone]    [facility] Anticoagulation Services Responsible  [phone]        Anticoagulation Patient Findings  Patient Findings     Negatives:   Signs/symptoms of thrombosis, Signs/symptoms of bleeding, Laboratory test error suspected, Change in health, Change in alcohol use, Change in activity, Upcoming invasive procedure, Emergency department visit, Upcoming dental procedure, Missed doses, Extra doses, Change in medications, Change in diet/appetite, Hospital admission, Bruising, Other complaints            HPI:  Shani Love, on anticoagulation therapy with warfarin for Atrial flutter and PAF  Changes to current medical/health status since last appt: None  Denies signs/symptoms of bleeding and/or thrombosis since the last appt.    Denies any interval changes to diet  Denies any interval changes to medications since last appt.   Denies any complications or cost restrictions with current therapy.     A/P   INR 2.50-therapeutic.     Pt is to continue with current warfarin dosing regimen.    Next INR in  2 week(s).    Nayla Saldivar, Pharmacy Intern    .

## 2019-12-04 ENCOUNTER — ANTICOAGULATION MONITORING (OUTPATIENT)
Dept: VASCULAR LAB | Facility: MEDICAL CENTER | Age: 64
End: 2019-12-04

## 2019-12-04 DIAGNOSIS — I48.19 PERSISTENT ATRIAL FIBRILLATION (HCC): ICD-10-CM

## 2019-12-04 LAB — INR PPP: 2.2 (ref 2–3.5)

## 2019-12-04 NOTE — PROGRESS NOTES
Anticoagulation Summary  As of 2019    INR goal:   2.0-3.0   TTR:   68.5 % (3.6 y)   INR used for dosin.20 (2019)   Warfarin maintenance plan:   2.5 mg (5 mg x 0.5) every Sun, Thu; 5 mg (5 mg x 1) all other days   Weekly warfarin total:   30 mg   No change documented:   Christopher Leavitt Ass't   Plan last modified:   Joyce Su (2019)   Next INR check:   2019   Target end date:   Indefinite    Indications    Atrial flutter (HCC) (Resolved) [I48.92]  Acute ischemic right PCA stroke-Hemorrhage transformation is noted within the infarct (Resolved) [I63.531]  Persistent atrial fibrillation [I48.19]             Anticoagulation Episode Summary     INR check location:   Anticoagulation Clinic    Preferred lab:       Send INR reminders to:       Comments:   Marcelina      Anticoagulation Care Providers     Provider Role Specialty Phone number    Bijan Sampson M.D. Referring Cardiology [phone]    [facility] Anticoagulation Services Responsible  [phone]        Anticoagulation Patient Findings  Patient Findings     Negatives:   Signs/symptoms of thrombosis, Signs/symptoms of bleeding, Laboratory test error suspected, Change in health, Change in alcohol use, Change in activity, Upcoming invasive procedure, Emergency department visit, Upcoming dental procedure, Missed doses, Extra doses, Change in medications, Change in diet/appetite, Hospital admission, Bruising, Other complaints      Left voicemail message to report 2.2 therapeutic INR of 2.0-3.0.  Patient to continue with current warfarin dosing regimen. Requested pt contact the clinic for any change to diet or medication, and to report any signs or symptoms of bleeding, bruising or clotting.  Pt to follow up in 2 weeks.  [place] Med Ass't        I have reviewed and concur with the above plan on 2019.  Joyce Su, Clinical Pharmacist, CDE, CACP

## 2019-12-20 ENCOUNTER — ANTICOAGULATION MONITORING (OUTPATIENT)
Dept: VASCULAR LAB | Facility: MEDICAL CENTER | Age: 64
End: 2019-12-20

## 2019-12-20 DIAGNOSIS — I48.19 PERSISTENT ATRIAL FIBRILLATION (HCC): ICD-10-CM

## 2019-12-20 LAB — INR PPP: 3 (ref 2–3.5)

## 2019-12-20 NOTE — PROGRESS NOTES
Anticoagulation Summary  As of 12/20/2019    INR goal:   2.0-3.0   TTR:   68.9 % (3.7 y)   INR used for dosing:   3.00 (12/20/2019)   Warfarin maintenance plan:   2.5 mg (5 mg x 0.5) every Sun, Thu; 5 mg (5 mg x 1) all other days   Weekly warfarin total:   30 mg   No change documented:   Christopher Leavitt Ass't   Plan last modified:   Joyce Su (11/5/2019)   Next INR check:   1/3/2020   Target end date:   Indefinite    Indications    Atrial flutter (HCC) (Resolved) [I48.92]  Acute ischemic right PCA stroke-Hemorrhage transformation is noted within the infarct (Resolved) [I63.531]  Persistent atrial fibrillation [I48.19]             Anticoagulation Episode Summary     INR check location:   Anticoagulation Clinic    Preferred lab:       Send INR reminders to:       Comments:   Marcelina      Anticoagulation Care Providers     Provider Role Specialty Phone number    Bijan Sampson M.D. Referring Cardiology [phone]    [facility] Anticoagulation Services Responsible  [phone]        Anticoagulation Patient Findings  Patient Findings     Negatives:   Signs/symptoms of thrombosis, Signs/symptoms of bleeding, Laboratory test error suspected, Change in health, Change in alcohol use, Change in activity, Upcoming invasive procedure, Emergency department visit, Upcoming dental procedure, Missed doses, Extra doses, Change in medications, Change in diet/appetite, Hospital admission, Bruising, Other complaints      Spoke with patient to report a therapeutic INR.  Pt instructed to continue with current warfarin dosing regimen. Pt denies any s/s of bleeding, bruising, clotting or any changes to diet or medication.  Will follow up in 2 weeks.  Christopher Leavitt Ass't          I have reviewed and concur with the above plan on 12/20/2019.  Joyce Su, Clinical Pharmacist, CDE, CACP

## 2019-12-30 LAB — INR PPP: 2.6 (ref 2–3.5)

## 2019-12-31 ENCOUNTER — ANTICOAGULATION MONITORING (OUTPATIENT)
Dept: VASCULAR LAB | Facility: MEDICAL CENTER | Age: 64
End: 2019-12-31

## 2019-12-31 DIAGNOSIS — I48.19 PERSISTENT ATRIAL FIBRILLATION (HCC): ICD-10-CM

## 2019-12-31 LAB — INR PPP: 3 (ref 2–3.5)

## 2019-12-31 NOTE — PROGRESS NOTES
Anticoagulation Summary  As of 12/31/2019    INR goal:   2.0-3.0   TTR:   68.9 % (3.7 y)   INR used for dosing:   3.00 (12/20/2019)   Warfarin maintenance plan:   2.5 mg (5 mg x 0.5) every Sun, Thu; 5 mg (5 mg x 1) all other days   Weekly warfarin total:   30 mg   Plan last modified:   Joyce Su (11/5/2019)   Next INR check:   1/3/2020   Target end date:   Indefinite    Indications    Atrial flutter (HCC) (Resolved) [I48.92]  Acute ischemic right PCA stroke-Hemorrhage transformation is noted within the infarct (Resolved) [I63.531]  Persistent atrial fibrillation [I48.19]             Anticoagulation Episode Summary     INR check location:   Anticoagulation Clinic    Preferred lab:       Send INR reminders to:       Comments:   Marcelina      Anticoagulation Care Providers     Provider Role Specialty Phone number    Bijan Sampson M.D. Referring Cardiology [phone]    [facility] Anticoagulation Services Responsible  [phone]        Anticoagulation Patient Findings          Spoke with Shani to report a therapeutic INR of 2.6. Continue current dosing regimen.  Follow up in 2 weeks, to reduce the risk of adverse events related to this high risk medication, warfarin.    Joyce Su, Clinical Pharmacist

## 2020-01-13 ENCOUNTER — ANTICOAGULATION MONITORING (OUTPATIENT)
Dept: VASCULAR LAB | Facility: MEDICAL CENTER | Age: 65
End: 2020-01-13

## 2020-01-13 DIAGNOSIS — I48.19 PERSISTENT ATRIAL FIBRILLATION (HCC): ICD-10-CM

## 2020-01-13 LAB — INR PPP: 1.9 (ref 2–3.5)

## 2020-01-14 NOTE — PROGRESS NOTES
Anticoagulation Summary  As of 2020    INR goal:   2.0-3.0   TTR:   69.3 % (3.7 y)   INR used for dosin.90! (2020)   Warfarin maintenance plan:   2.5 mg (5 mg x 0.5) every Sun, Thu; 5 mg (5 mg x 1) all other days   Weekly warfarin total:   30 mg   Plan last modified:   Joyce M Filter (2019)   Next INR check:   2020   Target end date:   Indefinite    Indications    Atrial flutter (HCC) (Resolved) [I48.92]  Acute ischemic right PCA stroke-Hemorrhage transformation is noted within the infarct (Resolved) [I63.531]  Persistent atrial fibrillation [I48.19]             Anticoagulation Episode Summary     INR check location:   Anticoagulation Clinic    Preferred lab:       Send INR reminders to:       Comments:   Marcelina      Anticoagulation Care Providers     Provider Role Specialty Phone number    Bijan Sampson M.D. Referring Cardiology [phone]    [facility] Anticoagulation [facility] Responsible  [phone]        Anticoagulation Patient Findings      INR  sub-therapeutic.   Left a voice message for the patient, asked patient to please call the anticoagulation clinic if they have any signs/symptoms of bleeding and/or thrombosis or any changes to diet or medications.    Follow up appointment in 2 week(s)    7.5 mg today then continue weekly warfarin dose as noted      Loc Chan, PharmD, MS, BCACP, LCC    This note was created using voice recognition software (Dragon). The accuracy of the dictation is limited by the abilities of the software. I have reviewed the note prior to signing, however some errors in grammar and context are still possible. If you have any questions related to this note please do not hesitate to contact our office.

## 2020-01-27 ENCOUNTER — ANTICOAGULATION MONITORING (OUTPATIENT)
Dept: VASCULAR LAB | Facility: MEDICAL CENTER | Age: 65
End: 2020-01-27

## 2020-01-27 DIAGNOSIS — I48.19 PERSISTENT ATRIAL FIBRILLATION (HCC): ICD-10-CM

## 2020-01-27 LAB — INR PPP: 1.9 (ref 2–3.5)

## 2020-01-27 NOTE — PROGRESS NOTES
OP Telephone Anticoagulation Service Note    Date: 2020      Anticoagulation Summary  As of 2020    INR goal:   2.0-3.0   TTR:   68.6 % (3.8 y)   INR used for dosin.90! (2020)   Warfarin maintenance plan:   2.5 mg (5 mg x 0.5) every Sun; 5 mg (5 mg x 1) all other days   Weekly warfarin total:   32.5 mg   Plan last modified:   Jumana Waterman, Pharmacy Intern (2020)   Next INR check:   2/10/2020   Target end date:   Indefinite    Indications    Atrial flutter (HCC) (Resolved) [I48.92]  Acute ischemic right PCA stroke-Hemorrhage transformation is noted within the infarct (Resolved) [I63.531]  Persistent atrial fibrillation [I48.19]             Anticoagulation Episode Summary     INR check location:   Anticoagulation Clinic    Preferred lab:       Send INR reminders to:       Comments:   Marcelina      Anticoagulation Care Providers     Provider Role Specialty Phone number    Bijan Sampson M.D. Referring Cardiology [phone]    [facility] Anticoagulation Services Responsible  [phone]        Anticoagulation Patient Findings  Patient Findings     Negatives:   Signs/symptoms of thrombosis, Signs/symptoms of bleeding, Laboratory test error suspected, Change in health, Change in alcohol use, Change in activity, Upcoming invasive procedure, Emergency department visit, Upcoming dental procedure, Missed doses, Extra doses, Change in medications, Change in diet/appetite, Hospital admission, Bruising, Other complaints            Plan: Spoke with patient on the phone. Patient is SUB-therapeutic today at 1.9. Confirmed dosing. No missed tablets in the last week. Patient denies any changes in medications or diet. Patient denies any signs or symptoms of bleeding or clotting. Instructed patient to call clinic if any unusual bleeding or bruising occurs.Patient has been at 1.9 for the last few weeks, instructed to start an increased regimen as outlined above. Will follow-up with patient in 2  week(s).    Discussed with Anju Valdez, PharmD    Jumana Waterman, Pharmacy Intern

## 2020-02-10 ENCOUNTER — ANTICOAGULATION MONITORING (OUTPATIENT)
Dept: VASCULAR LAB | Facility: MEDICAL CENTER | Age: 65
End: 2020-02-10

## 2020-02-10 DIAGNOSIS — I48.19 PERSISTENT ATRIAL FIBRILLATION (HCC): ICD-10-CM

## 2020-02-10 LAB — INR PPP: 2.9 (ref 2–3.5)

## 2020-02-10 NOTE — PROGRESS NOTES
Anticoagulation Summary  As of 2/10/2020    INR goal:   2.0-3.0   TTR:   68.8 % (3.8 y)   INR used for dosin.90 (2/10/2020)   Warfarin maintenance plan:   2.5 mg (5 mg x 0.5) every Sun; 5 mg (5 mg x 1) all other days   Weekly warfarin total:   32.5 mg   Plan last modified:   Jumana Waterman, Pharmacy Intern (2020)   Next INR check:   2020   Target end date:   Indefinite    Indications    Atrial flutter (HCC) (Resolved) [I48.92]  Acute ischemic right PCA stroke-Hemorrhage transformation is noted within the infarct (Resolved) [I63.531]  Persistent atrial fibrillation [I48.19]             Anticoagulation Episode Summary     INR check location:   Anticoagulation Clinic    Preferred lab:       Send INR reminders to:       Comments:   Marcelina      Anticoagulation Care Providers     Provider Role Specialty Phone number    Bijan Sampson M.D. Referring Cardiology [phone]    [facility] Anticoagulation Services Responsible  [phone]        Anticoagulation Patient Findings        Spoke to patient on the phone.   INR  therapeutic.   Denies signs/symptoms of bleeding and/or thrombosis.   Denies changes to diet or medications.   Follow up appointment in 2 week(s).    Continue weekly warfarin dose as noted      Loc Chan, PharmD, MS, BCACP, C    This note was created using voice recognition software (Dragon). The accuracy of the dictation is limited by the abilities of the software. I have reviewed the note prior to signing, however some errors in grammar and context are still possible. If you have any questions related to this note please do not hesitate to contact our office.

## 2020-02-24 ENCOUNTER — ANTICOAGULATION MONITORING (OUTPATIENT)
Dept: VASCULAR LAB | Facility: MEDICAL CENTER | Age: 65
End: 2020-02-24

## 2020-02-24 DIAGNOSIS — I48.19 PERSISTENT ATRIAL FIBRILLATION (HCC): ICD-10-CM

## 2020-02-24 LAB — INR PPP: 2.7 (ref 2–3.5)

## 2020-02-24 NOTE — PROGRESS NOTES
Anticoagulation Summary  As of 2020    INR goal:   2.0-3.0   TTR:   69.1 % (3.9 y)   INR used for dosin.70 (2020)   Warfarin maintenance plan:   2.5 mg (5 mg x 0.5) every Sun; 5 mg (5 mg x 1) all other days   Weekly warfarin total:   32.5 mg   Plan last modified:   Jumana Waterman, Pharmacy Intern (2020)   Next INR check:   3/9/2020   Target end date:   Indefinite    Indications    Atrial flutter (HCC) (Resolved) [I48.92]  Acute ischemic right PCA stroke-Hemorrhage transformation is noted within the infarct (Resolved) [I63.531]  Persistent atrial fibrillation [I48.19]             Anticoagulation Episode Summary     INR check location:   Anticoagulation Clinic    Preferred lab:       Send INR reminders to:       Comments:   Marcelina      Anticoagulation Care Providers     Provider Role Specialty Phone number    Bijan Sampson M.D. Referring Cardiology [phone]    [facility] Anticoagulation Services Responsible  [phone]        Anticoagulation Patient Findings      Spoke with patient to report a therapeutic INR.    Pt instructed to continue with current warfarin dosing regimen, confirms dosing.   Pt denies any s/s of bleeding, bruising, clotting or any changes to diet or medication.    Will follow up in 2 week(s).     Acacia Constantino, PharmD

## 2020-03-09 ENCOUNTER — ANTICOAGULATION MONITORING (OUTPATIENT)
Dept: VASCULAR LAB | Facility: MEDICAL CENTER | Age: 65
End: 2020-03-09

## 2020-03-09 DIAGNOSIS — I48.19 PERSISTENT ATRIAL FIBRILLATION (HCC): ICD-10-CM

## 2020-03-09 LAB — INR PPP: 3.5 (ref 2–3.5)

## 2020-03-09 NOTE — PROGRESS NOTES
Anticoagulation Summary  As of 3/9/2020    INR goal:   2.0-3.0   TTR:   68.8 % (3.9 y)   INR used for dosing:   3.50! (3/9/2020)   Warfarin maintenance plan:   2.5 mg (5 mg x 0.5) every Sun; 5 mg (5 mg x 1) all other days   Weekly warfarin total:   32.5 mg   Plan last modified:   Jumana Waterman, Pharmacy Intern (1/27/2020)   Next INR check:   3/23/2020   Target end date:   Indefinite    Indications    Atrial flutter (HCC) (Resolved) [I48.92]  Acute ischemic right PCA stroke-Hemorrhage transformation is noted within the infarct (Resolved) [I63.531]  Persistent atrial fibrillation [I48.19]             Anticoagulation Episode Summary     INR check location:   Anticoagulation Clinic    Preferred lab:       Send INR reminders to:       Comments:   Marcelina      Anticoagulation Care Providers     Provider Role Specialty Phone number    Bijan Sampson M.D. Referring Cardiology [phone]    [facility] Anticoagulation Services Responsible  [phone]        Anticoagulation Patient Findings        Spoke to patient on the phone.   INR  supra-therapeutic.   Denies signs/symptoms of bleeding and/or thrombosis.   Denies changes to diet or medications.   Follow up appointment in 2 week(s).    2.5mg today then Continue weekly warfarin dose as noted      Loc Chan, PharmD, MS, BCACP, LCC    This note was created using voice recognition software (Dragon). The accuracy of the dictation is limited by the abilities of the software. I have reviewed the note prior to signing, however some errors in grammar and context are still possible. If you have any questions related to this note please do not hesitate to contact our office.

## 2020-03-23 ENCOUNTER — ANTICOAGULATION MONITORING (OUTPATIENT)
Dept: VASCULAR LAB | Facility: MEDICAL CENTER | Age: 65
End: 2020-03-23

## 2020-03-23 DIAGNOSIS — I48.19 PERSISTENT ATRIAL FIBRILLATION (HCC): ICD-10-CM

## 2020-03-23 LAB — INR PPP: 2.6 (ref 2–3.5)

## 2020-03-23 NOTE — PROGRESS NOTES
Anticoagulation Summary  As of 3/23/2020    INR goal:   2.0-3.0   TTR:   68.6 % (3.9 y)   INR used for dosin.60 (3/23/2020)   Warfarin maintenance plan:   2.5 mg (5 mg x 0.5) every Sun; 5 mg (5 mg x 1) all other days   Weekly warfarin total:   32.5 mg   Plan last modified:   Jumana Waterman, Pharmacy Intern (2020)   Next INR check:   2020   Target end date:   Indefinite    Indications    Atrial flutter (HCC) (Resolved) [I48.92]  Acute ischemic right PCA stroke-Hemorrhage transformation is noted within the infarct (Resolved) [I63.531]  Persistent atrial fibrillation [I48.19]             Anticoagulation Episode Summary     INR check location:   Anticoagulation Clinic    Preferred lab:       Send INR reminders to:       Comments:   Marcelina      Anticoagulation Care Providers     Provider Role Specialty Phone number    Bijan Sampson M.D. Referring Cardiology [phone]    [facility] Anticoagulation Services Responsible  [phone]        Anticoagulation Patient Findings      INR  therapeutic.   Left a voice message for the patient, asked patient to please call the anticoagulation clinic if they have any signs/symptoms of bleeding and/or thrombosis or any changes to diet or medications.    Follow up appointment in 2 week(s)    Continue weekly warfarin dose as noted      Loc Chan, PharmD, MS, BCACP, LCC    This note was created using voice recognition software (Dragon). The accuracy of the dictation is limited by the abilities of the software. I have reviewed the note prior to signing, however some errors in grammar and context are still possible. If you have any questions related to this note please do not hesitate to contact our office.

## 2020-04-14 LAB — INR PPP: 2.5 (ref 2–3.5)

## 2020-04-15 ENCOUNTER — ANTICOAGULATION MONITORING (OUTPATIENT)
Dept: VASCULAR LAB | Facility: MEDICAL CENTER | Age: 65
End: 2020-04-15

## 2020-04-15 DIAGNOSIS — I48.19 PERSISTENT ATRIAL FIBRILLATION (HCC): ICD-10-CM

## 2020-04-15 NOTE — PROGRESS NOTES
Anticoagulation Summary  As of 4/15/2020    INR goal:   2.0-3.0   TTR:   69.0 % (4 y)   INR used for dosin.50 (2020)   Warfarin maintenance plan:   2.5 mg (5 mg x 0.5) every Sun; 5 mg (5 mg x 1) all other days   Weekly warfarin total:   32.5 mg   No change documented:   Christopher Leavitt Ass't   Plan last modified:   Jumana Waterman, Pharmacy Intern (2020)   Next INR check:   2020   Target end date:   Indefinite    Indications    Atrial flutter (HCC) (Resolved) [I48.92]  Acute ischemic right PCA stroke-Hemorrhage transformation is noted within the infarct (Resolved) [I63.531]  Persistent atrial fibrillation (HCC) [I48.19]             Anticoagulation Episode Summary     INR check location:   Anticoagulation Clinic    Preferred lab:       Send INR reminders to:       Comments:   Marcelina      Anticoagulation Care Providers     Provider Role Specialty Phone number    Bijan Sampson M.D. Referring Cardiology [phone]    [facility] Anticoagulation Services Responsible  [phone]        Anticoagulation Patient Findings  Patient Findings     Negatives:   Signs/symptoms of thrombosis, Signs/symptoms of bleeding, Laboratory test error suspected, Change in health, Change in alcohol use, Change in activity, Upcoming invasive procedure, Emergency department visit, Upcoming dental procedure, Missed doses, Extra doses, Change in medications, Change in diet/appetite, Hospital admission, Bruising, Other complaints      Spoke with patient to report a therapeutic INR.  Pt instructed to continue with current warfarin dosing regimen. Pt denies any s/s of bleeding, bruising, clotting or any changes to diet or medication.  Will follow up in 2 weeks.  Christopher Leavitt Ass't    I have reviewed and am in agreement with the above stated plan on 4-15-20.  Timur Gonzalez, PharmD, BCACP

## 2020-04-27 LAB — INR PPP: 2.4 (ref 2–3.5)

## 2020-04-28 ENCOUNTER — ANTICOAGULATION MONITORING (OUTPATIENT)
Dept: VASCULAR LAB | Facility: MEDICAL CENTER | Age: 65
End: 2020-04-28

## 2020-04-28 DIAGNOSIS — I48.19 PERSISTENT ATRIAL FIBRILLATION (HCC): ICD-10-CM

## 2020-04-28 NOTE — PROGRESS NOTES
Anticoagulation Summary  As of 2020    INR goal:   2.0-3.0   TTR:   69.3 % (4 y)   INR used for dosin.40 (2020)   Warfarin maintenance plan:   2.5 mg (5 mg x 0.5) every Sun, Mon; 5 mg (5 mg x 1) all other days   Weekly warfarin total:   30 mg   Plan last modified:   Timur Gonzalez PharmD (2020)   Next INR check:   2020   Target end date:   Indefinite    Indications    Atrial flutter (HCC) (Resolved) [I48.92]  Acute ischemic right PCA stroke-Hemorrhage transformation is noted within the infarct (Resolved) [I63.531]  Persistent atrial fibrillation (HCC) [I48.19]             Anticoagulation Episode Summary     INR check location:   Anticoagulation Clinic    Preferred lab:       Send INR reminders to:       Comments:   Marcelina      Anticoagulation Care Providers     Provider Role Specialty Phone number    Bijan Sampson M.D. Referring Cardiology [phone]    [facility] Anticoagulation Services Responsible  [phone]        Anticoagulation Patient Findings  Patient Findings     Negatives:   Signs/symptoms of thrombosis, Signs/symptoms of bleeding, Laboratory test error suspected, Change in health, Change in alcohol use, Change in activity, Upcoming invasive procedure, Emergency department visit, Upcoming dental procedure, Missed doses, Extra doses, Change in medications, Change in diet/appetite, Hospital admission, Bruising, Other complaints        Spoke with patient today regarding therapeutic INR of 2.4.  Patient denies any signs/symptoms of bruising or bleeding or any changes in diet and medications.  Instructed patient to call clinic with any questions or concerns.  Pt is to continue with current warfarin dosing regimen.  Follow up in 2 weeks, to reduce risk of adverse events related to this high risk medication,  Warfarin.    Timur Gonzalez, PharmD, BCACP

## 2020-05-14 ENCOUNTER — ANTICOAGULATION MONITORING (OUTPATIENT)
Dept: VASCULAR LAB | Facility: MEDICAL CENTER | Age: 65
End: 2020-05-14

## 2020-05-14 DIAGNOSIS — I48.19 PERSISTENT ATRIAL FIBRILLATION (HCC): ICD-10-CM

## 2020-05-14 LAB — INR PPP: 2.2 (ref 2–3.5)

## 2020-05-14 NOTE — PROGRESS NOTES
OP Telephone Anticoagulation Service Note    Date: 2020      Anticoagulation Summary  As of 2020    INR goal:   2.0-3.0   TTR:   69.7 % (4.1 y)   INR used for dosin.20 (2020)   Warfarin maintenance plan:   2.5 mg (5 mg x 0.5) every Sun, Mon; 5 mg (5 mg x 1) all other days   Weekly warfarin total:   30 mg   Plan last modified:   Timur Gonzalez PharmD (2020)   Next INR check:   2020   Target end date:   Indefinite    Indications    Atrial flutter (HCC) (Resolved) [I48.92]  Acute ischemic right PCA stroke-Hemorrhage transformation is noted within the infarct (Resolved) [I63.531]  Persistent atrial fibrillation (HCC) [I48.19]             Anticoagulation Episode Summary     INR check location:   Anticoagulation Clinic    Preferred lab:       Send INR reminders to:       Comments:   Marcelina      Anticoagulation Care Providers     Provider Role Specialty Phone number    Bijan Sampson M.D. Referring Cardiology [phone]    [facility] Anticoagulation [facility] Responsible  [phone]        Anticoagulation Patient Findings        Plan: INR is in range. Left message on patient's answering machine/voicemail. Instructed patient to call back with any concerns regarding any unusual bleeding or bruising, any medication or diet changes or any signs or symptoms of thrombosis. Instructed patient to resume medication as outlined above. Patient to follow up in 2 week(s).             Anju Valdez PharmD

## 2020-05-26 DIAGNOSIS — I10 ESSENTIAL HYPERTENSION: ICD-10-CM

## 2020-05-26 RX ORDER — DILTIAZEM HYDROCHLORIDE 240 MG/1
240 CAPSULE, COATED, EXTENDED RELEASE ORAL 2 TIMES DAILY
Qty: 180 CAP | Refills: 1 | Status: SHIPPED | OUTPATIENT
Start: 2020-05-26

## 2020-07-17 ENCOUNTER — TELEPHONE (OUTPATIENT)
Dept: VASCULAR LAB | Facility: MEDICAL CENTER | Age: 65
End: 2020-07-17

## 2020-07-17 NOTE — LETTER
July 17, 2020          Shani Love  [address]  [place] [address]        Dear Shani Love ,    We have been unsuccessful in our attempts to contact you regarding your Anticoagulation Service appointments. Warfarin is a potent blood-thinning agent that requires monitoring to ensure that the dosage is correct for your body.  If it isn't, you could develop serious, sometimes life-threatening bleeding problems or life-threatening blood clots or stroke could result.     It is extremely important that you have your lab work drawn as soon as possible.  We are open Monday-Friday 8 am until 5 pm.  You may reach our Service at [phone].     To monitor you effectively, we need to be able to communicate with you.  This is a requirement to be followed by our Service.  If we are unable to contact you on three separate occasions, you will be discharged from the Anticoagulation Service.     If you repeatedly fail to keep your lab appointments, you are at risk of being discharged from the Service.           Sincerely,           Santana Good, PharmD, [facility]  Pharmacy Clinical Supervisor  [facility]  Outpatient Anticoagulation Service

## 2020-07-17 NOTE — TELEPHONE ENCOUNTER
Unable to leave message for pt to have INR checked  Phone/VM out of service  Letter sent   Joyce Su, Clinical Pharmacist, CDE, CACP

## 2020-08-11 ENCOUNTER — TELEPHONE (OUTPATIENT)
Dept: VASCULAR LAB | Facility: MEDICAL CENTER | Age: 65
End: 2020-08-11

## 2020-08-11 NOTE — LETTER
August 11, 2020        Shani Love  [address]  [place] [address]    08/11/20    Dear Shani Love ,    We have been unsuccessful in our attempts to contact you regarding your Anticoagulation Service appointments. Warfarin a potent blood-thinning agent that requires monitoring to ensure that the dosage is correct for your body.  If it isn't, you could develop serious, sometimes life-threatening bleeding problems or life-threatening blood clots or stroke could result.    To monitor you effectively, we need to be able to communicate with you.  This is a requirement to be followed by our Service.       If you repeatedly fail to keep your lab appointments, you are at risk of being discharged from the Anticoagulation Service.    It is extremely important that you contact the clinic as soon as possible to arrange appropriate follow up.  We are open Monday-Friday 8 am until 5 pm.  You may reach our Service at [phone].           Sincerely,           Vic Morrison, PharmD, [facility]  Clinic Supervisor  [facility]  Outpatient Anticoagulation Service

## 2020-08-11 NOTE — TELEPHONE ENCOUNTER
Unable to leave vm message for pt to have INR checked phone is out of service   2nd call  Letter sent  Joyce Su, Clinical Pharmacist, CDE, CACP

## 2020-10-07 ENCOUNTER — TELEPHONE (OUTPATIENT)
Dept: MEDICAL GROUP | Facility: PHYSICIAN GROUP | Age: 65
End: 2020-10-07

## 2020-10-07 NOTE — TELEPHONE ENCOUNTER
Unable to leave vm reminder to check INR.  Phone remains out of service  3rd letter sent  INR   Date Value Ref Range Status   05/14/2020 2.20  Final     POC INR   Date Value Ref Range Status   01/29/2018 3.7 (H) 0.9 - 1.2 Final     Comment:     INR - Non-therapeutic Reference Range: 0.9-1.2  INR - Therapeutic Reference Range: 2.0-4.0         Joyce Su, Clinical Pharmacist, CDE, CACP

## 2020-10-07 NOTE — LETTER
Shani Love  [address]  [place] [address]    10/07/20    Dear Shani Love ,    We have been unsuccessful in our attempts to contact you regarding your Anticoagulation Service appointments. Warfarin is a potent blood-thinning agent that requires monitoring to ensure that the dosage is correct for your body.  If it isn't, you could develop serious, sometimes life-threatening bleeding problems or life-threatening blood clots or stroke could result.    To monitor you effectively, we need to be able to communicate with you.  This is a requirement to be followed by our Service.       If you repeatedly fail to keep your lab appointments, you are at risk of being discharged from the Anticoagulation Service.    It is extremely important that you contact the clinic as soon as possible to arrange appropriate follow up.  We are open Monday-Friday 8 am until 5 pm.  You may reach our Service at [phone].           Sincerely,           Vic Morrison, PharmD, [facility]  Clinic Supervisor  [facility]  Outpatient Anticoagulation Service

## 2020-10-08 ENCOUNTER — ANTICOAGULATION MONITORING (OUTPATIENT)
Dept: VASCULAR LAB | Facility: MEDICAL CENTER | Age: 65
End: 2020-10-08

## 2020-10-08 DIAGNOSIS — I48.19 PERSISTENT ATRIAL FIBRILLATION (HCC): ICD-10-CM

## 2020-10-08 NOTE — PROGRESS NOTES
Discharged from [facility] Anticoagulation Clinic secondary to nonadherence/non compliance.  Joyce Su, Clinical Pharmacist, CDE, CACP

## 2021-06-13 NOTE — MR AVS SNAPSHOT
"        Shani Neelam   2017 1:00 PM   Office Visit   MRN: [id number]    Department:  [facility]   Dept Phone:  [phone]    Description:  Female : 1955   Provider:  STORM Marcial           Reason for Visit     Apnea Sleep study results      Allergies as of 2017     No Known Allergies      You were diagnosed with     Obstructive sleep apnea   [102695]       BMI 50.0-59.9, adult (CMS-HCC)   [336011]       Essential hypertension   [3348189]       Persistent atrial fibrillation (CMS-HCC)   [889465]       Pulmonary hypertension (CMS-HCC)   [182746]       Chronic anticoagulation   [279112]         Vital Signs     Blood Pressure Pulse Temperature Respirations Height Weight    118/86 mmHg 76 36.9 °C (98.4 °F) 16 1.626 m (5' 4.02\") 140.615 kg (310 lb)    Body Mass Index Oxygen Saturation Smoking Status             53.19 kg/m2 91% Passive Smoke Exposure - Never Smoker         Basic Information     Date Of Birth Sex Race Ethnicity Preferred Language    1955 Female White Non- English      Your appointments     Mar 15, 2017  9:20 AM   Established Patient with REGLA oDyle   [facility] (--)    [address] [id number] [phone]           You will be receiving a confirmation call a few days before your appointment from our automated call confirmation system.            Mar 23, 2017 12:15 PM   ECHO with [facility] EXAM 9   [facility]ARD[facility]OLOGY [facility]    [address] [place] [address]   [phone]           No prep            2017 10:00 AM   Established Patient Pul with STORM Marcial   [facility] (--)    [address]  [place] [id number] [phone]            2017  9:15 AM   Anti-Coag Routine with [facility] ANTI-COAG   [facility] [place] ([place])    [address] [place] [address] [phone]              " ATTEMPT #3  LEFT VOICEMAIL FOR PATIENT TO CALL BACK.   Problem List              ICD-10-CM Priority Class Noted - Resolved    Obesity E66.9 Medium  11/17/2015 - Present    Diabetes type 2, uncontrolled (CMS-HCC) E11.65 Medium  11/17/2015 - Present    Hyperlipidemia E78.5 Medium  11/19/2015 - Present    Essential hypertension I10 Medium  12/1/2015 - Present    Persistent atrial fibrillation (CMS-HCC) I48.1 Medium  12/1/2015 - Present    Sleep apnea G47.30 Medium  12/30/2015 - Present    Mitral stenosis with regurgitation I05.2 Medium  4/8/2016 - Present    Pulmonary hypertension (CMS-HCC) I27.2 Medium  4/8/2016 - Present    History of stroke Z86.73 Medium  11/30/2015 - Present    Status post ablation of atrial flutter Z98.890, Z86.79 Medium  5/18/2016 - Present    Cellulitis L03.90 Low  10/26/2016 - Present    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 Low  10/26/2016 - Present    Fecal occult blood test positive R19.5   12/16/2016 - Present    Vitamin D deficiency E55.9   12/16/2016 - Present    Viral upper respiratory tract infection J06.9, B97.89   1/18/2017 - Present    Obstructive sleep apnea G47.33   2/15/2017 - Present    Chronic anticoagulation Z79.01   2/15/2017 - Present    Paroxysmal atrial flutter (CMS-HCC) I48.92   2/23/2017 - Present      Health Maintenance        Date Due Completion Dates    IMM DTaP/Tdap/Td Vaccine (1 - Tdap) 12/8/1972 ---    PAP SMEAR 12/8/1974 ---    MAMMOGRAM 12/8/1993 ---    IMM ZOSTER VACCINE 12/8/2013 ---    IMM PNEUMOCOCCAL 19-64 (ADULT) MEDIUM RISK SERIES (1 of 1 - PPSV23) 1/14/2016 11/19/2015    IMM INFLUENZA (1) 9/1/2016 11/18/2015    URINE ACR / MICROALBUMIN 5/3/2017 5/3/2016    A1C SCREENING 5/30/2017 11/30/2016, 10/26/2016, 4/1/2016, 3/9/2016, 11/17/2015, 1/22/2010, 9/25/2009    DIABETES MONOFILAMENT / LE EXAM 10/26/2017 10/26/2016    COLON CANCER SCREENING ANNUAL FIT 11/15/2017 11/15/2016    FASTING LIPID PROFILE 11/30/2017 11/30/2016, 4/7/2016, 4/1/2016, 12/8/2015, 11/17/2015, 1/22/2010, 9/25/2009    SERUM CREATININE  "11/30/2017 11/30/2016, 5/9/2016, 4/20/2016, 4/10/2016, 4/7/2016, 4/6/2016, 4/1/2016, 12/8/2015, 11/21/2015, 11/20/2015, 11/17/2015, 11/17/2015, 1/22/2010, 1/21/2010, 9/24/2009    RETINAL SCREENING 1/12/2018 1/12/2017, 8/25/2016            Current Immunizations     13-VALENT PCV PREVNAR 11/19/2015  6:12 AM    Influenza Vaccine Quad Inj (Preserved) 11/18/2015  3:53 PM      Below and/or attached are the medications your provider expects you to take. Review all of your home medications and newly ordered medications with your provider and/or pharmacist. Follow medication instructions as directed by your provider and/or pharmacist. Please keep your medication list with you and share with your provider. Update the information when medications are discontinued, doses are changed, or new medications (including over-the-counter products) are added; and carry medication information at all times in the event of emergency situations     Allergies:  No Known Allergies          Medications  Valid as of: February 27, 2017 -  1:31 PM    Generic Name Brand Name Tablet Size Instructions for use    Cholecalciferol (Cap) VITAMIN D3 5000 UNIT Take 1 Cap by mouth every day.        DiltiaZEM HCl Coated Beads (CAPSULE SR 24 HR) CARDIZEM  MG Take 1 Cap by mouth 2 Times a Day.        Furosemide (Tab) LASIX 40 MG Take 1 Tab by mouth every day.        Glimepiride (Tab) AMARYL 2 MG TAKE 2 TABLETS BY MOUTH EVERY MORNING        Glucose Blood (Strip) glucose blood  1 Strip by Other route as needed (one touch ultra meter test bid dx- E11.65).        Insulin Glargine (Solution) LANTUS 100 UNIT/ML Take 20 units in the morning and 46 units in the evening as directed for a total dose of 66 units daily.        Insulin Syringe-Needle U-100 (Misc) INSULIN SYRINGE 1CC/29G 29G X 1/2\" 1 ML 1 Each by Does not apply route every day.        Lisinopril (Tab) PRINIVIL 5 MG Take 1 Tab by mouth every day.        Magnesium Oxide (Tab) MAG- (241.3 MG) MG " Take 1 Tab by mouth every day.        Metoprolol Tartrate (Tab) LOPRESSOR 50 MG Take 1 Tab by mouth 2 times a day.        Simvastatin (Tab) ZOCOR 20 MG Take 1 Tab by mouth every evening.        Warfarin Sodium (Tab) COUMADIN 5 MG Take one to 2  tablets daily as directed by coumadin clinic        Warfarin Sodium (Tab) COUMADIN 5 MG Take one to one and one-half (1-1.5) tablets daily as directed by coumadin clinic        .                 Medicines prescribed today were sent to:     [facility] [place] [facility] [address] [place] [address]    [address] [place] [address]    Phone: [phone] Fax: [phone]    Open 24 Hours?: No      Medication refill instructions:       If your prescription bottle indicates you have medication refills left, it is not necessary to call your provider’s office. Please contact your pharmacy and they will refill your medication.    If your prescription bottle indicates you do not have any refills left, you may request refills at any time through one of the following ways: The online [employer]rx system (except Urgent Care), by calling your provider’s office, or by asking your pharmacy to contact your provider’s office with a refill request. Medication refills are processed only during regular business hours and may not be available until the next business day. Your provider may request additional information or to have a follow-up visit with you prior to refilling your medication.   *Please Note: Medication refills are assigned a new Rx number when refilled electronically. Your pharmacy may indicate that no refills were authorized even though a new prescription for the same medication is available at the pharmacy. Please request the medicine by name with the pharmacy before contacting your provider for a refill.           Syndevrx Access Code: Activation code not generated  Current Syn[employer]rx Status: Active

## 2022-08-15 NOTE — PATIENT INSTRUCTIONS
I had her Start:  1.   Victoza 1.8 at night     2.   Tresiba 30 units at night (INCREASE to 40)  3.   Actos 30mg once a day in the Am  4.   Jardiance 10mg one in the AM        [facility]    Received referral from [facility] to review patient's medication list.    Med list reviewed and reconciled.  No clinically significant DDI identified.  Allergies reviewed.    Bridgette Wolf, CeceliaD

## 2024-01-18 NOTE — PROGRESS NOTES
Anticoagulation clinic    Reminder voice message for patient regarding getting INR done ASAP for anticoagulation clinic.       Loc Chan, PharmD                 
No

## 2024-10-08 NOTE — ASSESSMENT & PLAN NOTE
Make an appointment with Daniel Ashley MD in one week for suture removal.    WOUND CARE INSTRUCTIONS  ENT DEPARTMENT        After surgery, you will need to take care of your incision as it heals. During healing, you may notice some soreness, tenderness, tingling, numbness, and itching around your incision.  There may also be “mild” oozing and bruising and a small lump may form.  This is normal and no cause for concern.    The local anesthetic will last one to several hours after your excision.  After the anesthetic wears off, you may want to take Acetaminophen for discomfort as long as this medication is approved for you. You can apply ice if there is swelling or bruising.   The dressing applied in the office should be removed after 24 hours. Try to keep uncovered after the 1st day.  Check your incision daily, clean gently with soap and water twice daily and apply antibiotic ointment sparingly 3-4 times daily after cleaning. Apply ointment before and after showering, while avoiding direct water pressure on the incision.  Report any of the following:  Increased redness around the postoperative site  Increased swelling around the postoperative site  Increased pain not controlled by Acetaminophen  Separation of the wound edges  Purulent drainage from the site  Bleeding described as more than serous drainage  Do not use lotion, creams, vitamin oils, or powder on your incision.  Do not expose your incision to sunlight or tanning booths.   Stitches are usually removed 7 to 14 days after the excision.  An appointment will be provided.  Please call [phone] for any questions or concerns.      Patient reports since starting Celexa 20 mg daily, she has not been crying as often and has been doing well without AE. She does have an appt next month to also follow up, she has not started counseling yet.